# Patient Record
Sex: MALE | Race: WHITE | NOT HISPANIC OR LATINO | Employment: OTHER | URBAN - METROPOLITAN AREA
[De-identification: names, ages, dates, MRNs, and addresses within clinical notes are randomized per-mention and may not be internally consistent; named-entity substitution may affect disease eponyms.]

---

## 2017-02-22 ENCOUNTER — HOSPITAL ENCOUNTER (EMERGENCY)
Facility: OTHER | Age: 52
Discharge: HOME | End: 2017-02-22
Attending: EMERGENCY MEDICINE
Payer: MEDICAID

## 2017-02-22 VITALS
DIASTOLIC BLOOD PRESSURE: 82 MMHG | SYSTOLIC BLOOD PRESSURE: 143 MMHG | BODY MASS INDEX: 28.25 KG/M2 | TEMPERATURE: 98 F | HEART RATE: 98 BPM | OXYGEN SATURATION: 94 % | HEIGHT: 67 IN | WEIGHT: 180 LBS | RESPIRATION RATE: 18 BRPM

## 2017-02-22 DIAGNOSIS — F10.920 ALCOHOL INTOXICATION, UNCOMPLICATED: Primary | ICD-10-CM

## 2017-02-22 DIAGNOSIS — R41.0 CONFUSION: ICD-10-CM

## 2017-02-22 LAB
ETHANOL SERPL-MCNC: 424 MG/DL
POCT GLUCOSE: 104 MG/DL (ref 70–110)

## 2017-02-22 PROCEDURE — 25000003 PHARM REV CODE 250: Performed by: EMERGENCY MEDICINE

## 2017-02-22 PROCEDURE — 80320 DRUG SCREEN QUANTALCOHOLS: CPT

## 2017-02-22 PROCEDURE — 82962 GLUCOSE BLOOD TEST: CPT

## 2017-02-22 PROCEDURE — 99285 EMERGENCY DEPT VISIT HI MDM: CPT | Mod: 25

## 2017-02-22 RX ORDER — IBUPROFEN 200 MG
1 TABLET ORAL
Status: DISCONTINUED | OUTPATIENT
Start: 2017-02-22 | End: 2017-02-22 | Stop reason: HOSPADM

## 2017-02-22 RX ADMIN — NICOTINE 1 PATCH: 21 PATCH, EXTENDED RELEASE TRANSDERMAL at 10:02

## 2017-02-22 RX ADMIN — SODIUM CHLORIDE 1000 ML: 0.9 INJECTION, SOLUTION INTRAVENOUS at 10:02

## 2017-02-22 NOTE — ED NOTES
Patient dirty, disshelved, and malodorous. ETOH like odor on breath. Incontinent of urine. Unsteady gait noted. Slurred speech and aggressive behavior, AAOx3, respirations even and unlabored, HR regular and WNL, skin is warm , dry and intact w/ good turgor, peripheral pulses intact.

## 2017-02-22 NOTE — ED NOTES
"Patient on ambulance stretcher yelling and screaming at staff and patient states "I have a knife in my pocket if I could get to it". Security at bedside to search patient   "

## 2017-02-22 NOTE — ED AVS SNAPSHOT
[facility]  [address]  [facility] [address]               Chris Aguirre   2017  8:37 AM   ED    Description:  Male : 1965   Department:  [facility]           Your Care was Coordinated By:     Provider Role From To    Shawn Garcia MD Attending Provider 17 0912 --      Reason for Visit     Alcohol Intoxication           Diagnoses this Visit        Comments    Alcohol intoxication, uncomplicated    -  Primary     Confusion           ED Disposition     None           To Do List           Follow-up Information     Follow up with [facility] In 2 days.    Contact information:    [address]  [facility] [address]        [facility]     [facility] -  Assistance  Registered nurses in the [facility] provide clinical advisement, health education, appointment booking, and other advisory services.  Call for this free service at [phone].             Medications           Message regarding Medications     Verify the changes and/or additions to your medication regime listed below are the same as discussed with your clinician today.  If any of these changes or additions are incorrect, please notify your healthcare provider.        These medications were administered today        Dose Freq    sodium chloride 0.9% bolus 1,000 mL 1,000 mL ED 1 Time    Sig: Inject 1,000 mLs into the vein ED 1 Time.    Class: Normal    Route: Intravenous    nicotine 21 mg/24 hr 1 patch 1 patch ED 1 Time    Sig: Place 1 patch onto the skin ED 1 Time.    Class: Normal    Route: Transdermal           Verify that the below list of medications is an accurate representation of the medications you are currently taking.  If none reported, the list may be blank. If incorrect, please contact your healthcare provider. Carry this list with you in case of emergency.           Current Medications      "aspirin 81 MG Chew Take 1 tablet (81 mg total) by mouth once daily.    mirtazapine (REMERON) 15 MG tablet Take 1 tablet (15 mg total) by mouth nightly.    nicotine 21 mg/24 hr 1 patch Place 1 patch onto the skin ED 1 Time.    sertraline (ZOLOFT) 50 MG tablet Take 1 tablet (50 mg total) by mouth once daily.           Clinical Reference Information           Your Vitals Were     BP Pulse Temp Resp Height Weight    126/95 90 97.6 °F (36.4 °C) (Oral) 18 5' 7" (1.702 m) 81.6 kg (180 lb)    SpO2 BMI             99% 28.19 kg/m2         Allergies as of 2/22/2017     No Known Allergies      Immunizations Administered on Date of Encounter - 2/22/2017     None      ED Micro, Lab, POCT     Start Ordered       Status Ordering Provider    02/22/17 0939 02/22/17 0938  POCT glucose  Once      Acknowledged     02/22/17 0939 02/22/17 0938  Ethanol  Once      Final result     02/22/17 0932 02/22/17 0932  POCT glucose  Once      Final result       ED Imaging Orders     None      Discharge References/Attachments     ALCOHOL INTOXICATION (ENGLISH)    CONFUSION (ENGLISH)      [facility] Sign-Up     Activating your [facility] account is as easy as 1-2-3!     1) Visit [email or web address], select Sign Up Now, enter this activation code and your date of birth, then select Next.  [email or web address]  Expires: 4/8/2017 10:59 AM      2) Create a username and password to use when you visit [facility] in the future and select a security question in case you lose your password and select Next.    3) Enter your e-mail address and click Sign Up!    Additional Information  If you have questions, please e-mail [email or web address] or call [phone] to talk to our [facility] staff. Remember, [facility] is NOT to be used for urgent needs. For medical emergencies, dial 911.         Smoking Cessation     If you would like to quit smoking:   You may be eligible for free services if you are a [place] resident and started smoking cigarettes before September 1, " 1988.  Call the [employer]oking [employer]essation [employer] ([employer]) toll free at (225) 051-0653 or (181) 366-6195.   Call 8-499-QUIT-NOW if you do not meet the above criteria.             [facility] complies with applicable Federal civil rights laws and does not discriminate on the basis of race, color, national origin, age, disability, or sex.        Language Assistance Services     ATTENTION: Language assistance services are available, free of charge. Please call [phone].      ATENCIÓN: Si habla español, tiene a ashton disposición servicios gratuitos de asistencia lingüística. Llame al [phone].     CHÚ Ý: N?u b?n nói Ti?ng Vi?t, có các d?ch v? h? tr? ngôn ng? mi?n phí dành cho b?n. G?i s? [phone].

## 2017-02-22 NOTE — ED NOTES
Patient able to ambulate down hallway with steady gait and without assistance. No slurred speech or nystagmus noted

## 2018-03-09 ENCOUNTER — HOSPITAL ENCOUNTER (INPATIENT)
Facility: HOSPITAL | Age: 53
LOS: 7 days | Discharge: HOME | DRG: 330 | End: 2018-03-17
Attending: EMERGENCY MEDICINE | Admitting: COLON & RECTAL SURGERY
Payer: MEDICAID

## 2018-03-09 DIAGNOSIS — R10.30 LOWER ABDOMINAL PAIN: Primary | ICD-10-CM

## 2018-03-09 DIAGNOSIS — R39.89 PNEUMATURIA: ICD-10-CM

## 2018-03-09 PROBLEM — K92.1 BLOOD IN STOOL: Status: ACTIVE | Noted: 2018-03-09

## 2018-03-09 PROBLEM — K59.09 OTHER CONSTIPATION: Status: ACTIVE | Noted: 2018-03-09

## 2018-03-09 PROBLEM — B19.20 HEPATITIS C: Status: ACTIVE | Noted: 2018-03-09

## 2018-03-09 LAB
ABO + RH BLD: NORMAL
ALBUMIN SERPL BCP-MCNC: 3.4 G/DL
ALP SERPL-CCNC: 49 U/L
ALT SERPL W/O P-5'-P-CCNC: 10 U/L
ANION GAP SERPL CALC-SCNC: 9 MMOL/L
AST SERPL-CCNC: 16 U/L
BACTERIA #/AREA URNS AUTO: ABNORMAL /HPF
BASOPHILS # BLD AUTO: 0.04 K/UL
BASOPHILS NFR BLD: 0.4 %
BILIRUB SERPL-MCNC: 0.3 MG/DL
BILIRUB UR QL STRIP: NEGATIVE
BLD GP AB SCN CELLS X3 SERPL QL: NORMAL
BUN SERPL-MCNC: 10 MG/DL
BUN SERPL-MCNC: 9 MG/DL (ref 6–30)
CALCIUM SERPL-MCNC: 9.7 MG/DL
CHLORIDE SERPL-SCNC: 103 MMOL/L (ref 95–110)
CHLORIDE SERPL-SCNC: 104 MMOL/L
CLARITY UR REFRACT.AUTO: ABNORMAL
CO2 SERPL-SCNC: 27 MMOL/L
COLOR UR AUTO: YELLOW
CREAT SERPL-MCNC: 0.7 MG/DL (ref 0.5–1.4)
CREAT SERPL-MCNC: 0.8 MG/DL
DIFFERENTIAL METHOD: ABNORMAL
EOSINOPHIL # BLD AUTO: 0.2 K/UL
EOSINOPHIL NFR BLD: 1.6 %
ERYTHROCYTE [DISTWIDTH] IN BLOOD BY AUTOMATED COUNT: 13.4 %
EST. GFR  (AFRICAN AMERICAN): >60 ML/MIN/1.73 M^2
EST. GFR  (NON AFRICAN AMERICAN): >60 ML/MIN/1.73 M^2
GLUCOSE SERPL-MCNC: 95 MG/DL
GLUCOSE SERPL-MCNC: 99 MG/DL (ref 70–110)
GLUCOSE UR QL STRIP: NEGATIVE
HCT VFR BLD AUTO: 37.7 %
HCT VFR BLD CALC: 38 %PCV (ref 36–54)
HGB BLD-MCNC: 12.8 G/DL
HGB UR QL STRIP: ABNORMAL
HYALINE CASTS UR QL AUTO: 0 /LPF
IMM GRANULOCYTES # BLD AUTO: 0.04 K/UL
IMM GRANULOCYTES NFR BLD AUTO: 0.4 %
INR PPP: 1
KETONES UR QL STRIP: NEGATIVE
LACTATE SERPL-SCNC: 1.2 MMOL/L
LEUKOCYTE ESTERASE UR QL STRIP: ABNORMAL
LIPASE SERPL-CCNC: 10 U/L
LYMPHOCYTES # BLD AUTO: 2.8 K/UL
LYMPHOCYTES NFR BLD: 24.8 %
MAGNESIUM SERPL-MCNC: 1.3 MG/DL
MCH RBC QN AUTO: 29.4 PG
MCHC RBC AUTO-ENTMCNC: 34 G/DL
MCV RBC AUTO: 87 FL
MICROSCOPIC COMMENT: ABNORMAL
MONOCYTES # BLD AUTO: 0.8 K/UL
MONOCYTES NFR BLD: 6.7 %
NEUTROPHILS # BLD AUTO: 7.5 K/UL
NEUTROPHILS NFR BLD: 66.1 %
NITRITE UR QL STRIP: NEGATIVE
NRBC BLD-RTO: 0 /100 WBC
PH UR STRIP: 6 [PH] (ref 5–8)
PHOSPHATE SERPL-MCNC: 3.3 MG/DL
PLATELET # BLD AUTO: 350 K/UL
PMV BLD AUTO: 10 FL
POC IONIZED CALCIUM: 1.14 MMOL/L (ref 1.06–1.42)
POC TCO2 (MEASURED): 27 MMOL/L (ref 23–29)
POTASSIUM BLD-SCNC: 3.5 MMOL/L (ref 3.5–5.1)
POTASSIUM SERPL-SCNC: 3.6 MMOL/L
PROT SERPL-MCNC: 7.3 G/DL
PROT UR QL STRIP: ABNORMAL
PROTHROMBIN TIME: 10.5 SEC
RBC # BLD AUTO: 4.36 M/UL
RBC #/AREA URNS AUTO: >100 /HPF (ref 0–4)
SAMPLE: NORMAL
SODIUM BLD-SCNC: 140 MMOL/L (ref 136–145)
SODIUM SERPL-SCNC: 140 MMOL/L
SP GR UR STRIP: 1 (ref 1–1.03)
SQUAMOUS #/AREA URNS AUTO: 3 /HPF
URN SPEC COLLECT METH UR: ABNORMAL
UROBILINOGEN UR STRIP-ACNC: NEGATIVE EU/DL
WBC # BLD AUTO: 11.37 K/UL
WBC #/AREA URNS AUTO: >100 /HPF (ref 0–5)
WBC CLUMPS UR QL AUTO: ABNORMAL

## 2018-03-09 PROCEDURE — 80053 COMPREHEN METABOLIC PANEL: CPT

## 2018-03-09 PROCEDURE — 93010 ELECTROCARDIOGRAM REPORT: CPT | Mod: ,,, | Performed by: INTERNAL MEDICINE

## 2018-03-09 PROCEDURE — 96376 TX/PRO/DX INJ SAME DRUG ADON: CPT

## 2018-03-09 PROCEDURE — 25000003 PHARM REV CODE 250: Performed by: EMERGENCY MEDICINE

## 2018-03-09 PROCEDURE — 85610 PROTHROMBIN TIME: CPT

## 2018-03-09 PROCEDURE — 25500020 PHARM REV CODE 255: Performed by: EMERGENCY MEDICINE

## 2018-03-09 PROCEDURE — 83735 ASSAY OF MAGNESIUM: CPT

## 2018-03-09 PROCEDURE — 84100 ASSAY OF PHOSPHORUS: CPT

## 2018-03-09 PROCEDURE — 83690 ASSAY OF LIPASE: CPT

## 2018-03-09 PROCEDURE — 87077 CULTURE AEROBIC IDENTIFY: CPT

## 2018-03-09 PROCEDURE — 87186 SC STD MICRODIL/AGAR DIL: CPT

## 2018-03-09 PROCEDURE — 96374 THER/PROPH/DIAG INJ IV PUSH: CPT

## 2018-03-09 PROCEDURE — 99285 EMERGENCY DEPT VISIT HI MDM: CPT

## 2018-03-09 PROCEDURE — 63600175 PHARM REV CODE 636 W HCPCS: Performed by: EMERGENCY MEDICINE

## 2018-03-09 PROCEDURE — 96375 TX/PRO/DX INJ NEW DRUG ADDON: CPT

## 2018-03-09 PROCEDURE — 99285 EMERGENCY DEPT VISIT HI MDM: CPT | Mod: ,,, | Performed by: EMERGENCY MEDICINE

## 2018-03-09 PROCEDURE — 93005 ELECTROCARDIOGRAM TRACING: CPT

## 2018-03-09 PROCEDURE — 86901 BLOOD TYPING SEROLOGIC RH(D): CPT

## 2018-03-09 PROCEDURE — 85025 COMPLETE CBC W/AUTO DIFF WBC: CPT

## 2018-03-09 PROCEDURE — 87040 BLOOD CULTURE FOR BACTERIA: CPT | Mod: 59

## 2018-03-09 PROCEDURE — 81001 URINALYSIS AUTO W/SCOPE: CPT

## 2018-03-09 PROCEDURE — 87088 URINE BACTERIA CULTURE: CPT

## 2018-03-09 PROCEDURE — 83605 ASSAY OF LACTIC ACID: CPT

## 2018-03-09 PROCEDURE — 87086 URINE CULTURE/COLONY COUNT: CPT

## 2018-03-09 PROCEDURE — 20600001 HC STEP DOWN PRIVATE ROOM

## 2018-03-09 RX ORDER — MEPERIDINE HYDROCHLORIDE 50 MG/ML
INJECTION INTRAMUSCULAR; INTRAVENOUS; SUBCUTANEOUS
Status: DISPENSED
Start: 2018-03-09 | End: 2018-03-10

## 2018-03-09 RX ORDER — IBUPROFEN 200 MG
1 TABLET ORAL
Status: COMPLETED | OUTPATIENT
Start: 2018-03-09 | End: 2018-03-11

## 2018-03-09 RX ORDER — MEPERIDINE HYDROCHLORIDE 50 MG/ML
25 INJECTION INTRAMUSCULAR; INTRAVENOUS; SUBCUTANEOUS
Status: COMPLETED | OUTPATIENT
Start: 2018-03-09 | End: 2018-03-09

## 2018-03-09 RX ORDER — ONDANSETRON 2 MG/ML
4 INJECTION INTRAMUSCULAR; INTRAVENOUS
Status: COMPLETED | OUTPATIENT
Start: 2018-03-09 | End: 2018-03-09

## 2018-03-09 RX ORDER — ONDANSETRON 2 MG/ML
INJECTION INTRAMUSCULAR; INTRAVENOUS
Status: DISPENSED
Start: 2018-03-09 | End: 2018-03-10

## 2018-03-09 RX ORDER — NAPROXEN 500 MG/1
500 TABLET ORAL 2 TIMES DAILY
COMMUNITY
End: 2018-04-13

## 2018-03-09 RX ORDER — THIAMINE HCL 100 MG
100 TABLET ORAL DAILY
Status: DISCONTINUED | OUTPATIENT
Start: 2018-03-10 | End: 2018-03-17 | Stop reason: HOSPADM

## 2018-03-09 RX ORDER — FOLIC ACID 1 MG/1
1 TABLET ORAL DAILY
Status: DISCONTINUED | OUTPATIENT
Start: 2018-03-10 | End: 2018-03-17 | Stop reason: HOSPADM

## 2018-03-09 RX ORDER — ONDANSETRON 4 MG/1
8 TABLET, ORALLY DISINTEGRATING ORAL
COMMUNITY
End: 2018-04-02

## 2018-03-09 RX ORDER — LORAZEPAM 1 MG/1
2 TABLET ORAL EVERY 4 HOURS PRN
Status: DISCONTINUED | OUTPATIENT
Start: 2018-03-09 | End: 2018-03-17 | Stop reason: HOSPADM

## 2018-03-09 RX ORDER — POLYETHYLENE GLYCOL 3350 17 G/17G
POWDER, FOR SOLUTION ORAL
COMMUNITY
End: 2018-04-13

## 2018-03-09 RX ADMIN — IOHEXOL 15 ML: 350 INJECTION, SOLUTION INTRAVENOUS at 06:03

## 2018-03-09 RX ADMIN — ONDANSETRON HYDROCHLORIDE 4 MG: 2 INJECTION, SOLUTION INTRAMUSCULAR; INTRAVENOUS at 06:03

## 2018-03-09 RX ADMIN — IOHEXOL 100 ML: 350 INJECTION, SOLUTION INTRAVENOUS at 09:03

## 2018-03-09 RX ADMIN — MEPERIDINE HYDROCHLORIDE 25 MG: 50 INJECTION INTRAMUSCULAR; INTRAVENOUS; SUBCUTANEOUS at 06:03

## 2018-03-09 RX ADMIN — MEPERIDINE HYDROCHLORIDE 25 MG: 50 INJECTION INTRAMUSCULAR; INTRAVENOUS; SUBCUTANEOUS at 09:03

## 2018-03-09 RX ADMIN — SODIUM CHLORIDE 1000 ML: 0.9 INJECTION, SOLUTION INTRAVENOUS at 06:03

## 2018-03-09 RX ADMIN — IOHEXOL 30 ML: 350 INJECTION, SOLUTION INTRAVENOUS at 09:03

## 2018-03-09 RX ADMIN — IOHEXOL 15 ML: 350 INJECTION, SOLUTION INTRAVENOUS at 07:03

## 2018-03-09 NOTE — ED TRIAGE NOTES
Presents to ER with complaint of dysuria for 4-5 days.    Pt identifiers checked and correct  LOC: The patient is awake, alert, aware of environment with an appropriate affect. Oriented x3, speaking appropriately  APPEARANCE: Pt resting comfortably, in no acute distress, pt is clean and well groomed, clothing properly fastened  SKIN: Skin warm, dry and intact, normal skin turgor, moist mucus membranes  RESPIRATORY: Airway is open and patent, respirations are spontaneous, even and unlabored, normal effort and rate  MUSCULOSKELETAL: No obvious deformities.

## 2018-03-09 NOTE — ED PROVIDER NOTES
Encounter Date: 3/9/2018    SCRIBE #1 NOTE: I, Jessica Rodríguezaggie, am scribing for, and in the presence of, Dr. Lao.       History     Chief Complaint   Patient presents with    Abdominal Pain     still having pain, states diarrhea coming from penis after taking laxatives for few days     Time seen by provider: 5:11 PM    This is a 53 y.o. male with medical conditions including CAD, MI, HTN, psychosis who presents with complaint of painful urination with passing what he thinks is stool and gas through his penis for about 4-5 days. It is dark in color and smells of stool. The patient reports that for the past 2 months he has been severely constipated and requires laxatives in order to have a BM and BMs require a lot of straining. He states his BMs have been stringy and thin in shape/size. The patient also describes that he has had several months of lower abdominal pain, abdominal distention, and rectal pain. He has not noted any blood with urination or BMs. He states he has lost about 15lbs unexpectedly in the past 2-3 months. The patient reports an extensive family history of cancer including rectal, throat, lung, and colon cancers. The patient denies any recent fevers, headache, visual or eye complains, nose bleeds, easy bleeding, dental problems, neck swelling or stiffness, swelling of the ankles, cough or hemoptysis, back pain, testicular complaints, or swelling to the lower extremities.       The history is provided by the patient.     Review of patient's allergies indicates:  No Known Allergies  Past Medical History:   Diagnosis Date    Coronary artery disease     Depression     Hepatitis C 3/9/2018    History of psychiatric care     History of psychiatric hospitalization     Hypertension     MI (myocardial infarction)     6 months ago    Neuropathy     Psychiatric problem     Psychosis     Self-harming behavior     Suicide attempt      Past Surgical History:   Procedure Laterality Date     ABDOMINAL SURGERY      History of perforated ulcer, unknown surgery    APPENDECTOMY       Family History   Problem Relation Age of Onset    No Known Problems Mother     No Known Problems Father     No Known Problems Sister     No Known Problems Brother     No Known Problems Maternal Aunt     No Known Problems Paternal Aunt     No Known Problems Maternal Uncle     No Known Problems Paternal Uncle     No Known Problems Maternal Grandfather     No Known Problems Maternal Grandmother     No Known Problems Paternal Grandfather     No Known Problems Paternal Grandmother     ADD / ADHD Cousin     Alcohol abuse Neg Hx     Anxiety disorder Neg Hx     Bipolar disorder Neg Hx     Dementia Neg Hx     Depression Neg Hx     Drug abuse Neg Hx     OCD Neg Hx     Paranoid behavior Neg Hx     Physical abuse Neg Hx     Schizophrenia Neg Hx     Seizures Neg Hx     Self injury Neg Hx     Sexual abuse Neg Hx     Suicide Neg Hx      Social History   Substance Use Topics    Smoking status: Current Every Day Smoker     Packs/day: 2.00     Years: 30.00    Smokeless tobacco: Never Used    Alcohol use Yes     Review of Systems   Constitutional: Positive for unexpected weight change. Negative for chills and fever.   HENT: Negative for dental problem and nosebleeds.    Eyes: Negative for pain, discharge and visual disturbance.   Respiratory: Negative for cough and shortness of breath.    Cardiovascular: Negative for chest pain.   Gastrointestinal: Positive for abdominal distention, abdominal pain, constipation and rectal pain. Negative for anal bleeding and blood in stool.   Genitourinary: Positive for difficulty urinating and dysuria. Negative for scrotal swelling and testicular pain.        Patient believes he is passing stool through his penis.    Musculoskeletal: Negative for back pain, gait problem, neck pain and neck stiffness.   Skin: Negative for wound.   Neurological: Negative for headaches.    Hematological: Does not bruise/bleed easily.       Physical Exam     Initial Vitals [03/09/18 1559]   BP Pulse Resp Temp SpO2   (!) 151/98 90 18 97.5 °F (36.4 °C) 100 %      MAP       115.67         Physical Exam    Constitutional: He appears ill. He appears distressed.   HENT:   Head: Normocephalic and atraumatic.   Mouth/Throat: Oropharynx is clear and moist.   Eyes: Conjunctivae and lids are normal.   Neck: Normal range of motion. Neck supple.   Cardiovascular: Normal rate, regular rhythm and normal heart sounds.   Pulmonary/Chest: Breath sounds normal. No accessory muscle usage. No tachypnea. No respiratory distress.   Abdominal: There is tenderness in the right lower quadrant, suprapubic area and left lower quadrant. There is rigidity and guarding.   Genitourinary:   Genitourinary Comments: Normal rectal exam. Brown stool. No tightening of canal.    Musculoskeletal: Normal range of motion.   Neurological: He is alert and oriented to person, place, and time. He has normal strength. No cranial nerve deficit. GCS eye subscore is 4. GCS verbal subscore is 5. GCS motor subscore is 6.   Skin: Skin is warm and dry.         ED Course   Procedures  Labs Reviewed   URINALYSIS, REFLEX TO URINE CULTURE - Abnormal; Notable for the following:        Result Value    Appearance, UA Cloudy (*)     Protein, UA 2+ (*)     Occult Blood UA 3+ (*)     Leukocytes, UA 3+ (*)     All other components within normal limits    Narrative:     Preferred Collection Type->Urine, Clean Catch   URINALYSIS MICROSCOPIC - Abnormal; Notable for the following:     RBC, UA >100 (*)     WBC, UA >100 (*)     WBC Clumps, UA Many (*)     Bacteria, UA Many (*)     All other components within normal limits    Narrative:     Preferred Collection Type->Urine, Clean Catch   CBC W/ AUTO DIFFERENTIAL - Abnormal; Notable for the following:     RBC 4.36 (*)     Hemoglobin 12.8 (*)     Hematocrit 37.7 (*)     All other components within normal limits    COMPREHENSIVE METABOLIC PANEL - Abnormal; Notable for the following:     Albumin 3.4 (*)     Alkaline Phosphatase 49 (*)     All other components within normal limits   MAGNESIUM - Abnormal; Notable for the following:     Magnesium 1.3 (*)     All other components within normal limits   LIPASE   PROTIME-INR   LACTIC ACID, PLASMA   PHOSPHORUS   TYPE & SCREEN   ISTAT PROCEDURE     EKG Readings: (Independently Interpreted)   Initial Reading: No STEMI. Rhythm: Normal Sinus Rhythm. Heart Rate: 76.       X-Rays:   Independently Interpreted Readings:   Abdomen: CT of the chest abdomen and pelvis with oral IV and rectal contrast demonstrates a mass in the region of the sigmoid with thickening of the wall this region which has air within this towards the bladder and there was likewise air in the bladder there is concern for a fistulous tract from the colon to the bladder at this time.  There were no significant abnormal findings in the chest CT portion of the study     Medical Decision Making:   History:   Old Medical Records: I decided to obtain old medical records.  Initial Assessment:   This is a very worrisome 53 y.o. Male who presents reporting that he is passing fecal matter and gas through his penis which has been ongoing for the last 6 days. He also reports unexpected significant weight loss over the past few months and stool caliber change to the size of spaghetti. Patient has a very strong family history of various cancers and various relatives including tonsil cancer, lung cancer, and several people with rectal and sigmoid cancer. Patient is also complaining of lower abdominal pain and he is very tender. He denies any blood or fever. Will do labs and CT of chest, and abdomen/pelvis with oral, IV, and rectal contrast. Will discuss case with colo/rectal   Independently Interpreted Test(s):   I have ordered and independently interpreted EKG Reading(s) - see prior notes  Clinical Tests:   Lab Tests: Ordered and  Reviewed  Radiological Study: Ordered and Reviewed  Medical Tests: Ordered and Reviewed  ED Management:  Discussed case with colo/rectal who evaluated the patient in the ED. They plan to admit the patient to their service. We are awaiting final results of imaging. Pain medication has been given and the patient is comfortable.     CT of chest, ab/pel with oral, IV, and rectal contrast radiological read shows findings concerning for:  1.  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  Air within the urinary bladder is suspected to be on the bases of the same although correlation for recent catheterization and clinical symptomatology.  There is indistinctness about the colon region, with several although nonenlarged lymph nodes, and disorganized fluid.  Please see above for full description.    2.  Vague groundglass focus of attenuation within the right upper lobe of the right lung, nonspecific, could reflect nonspecific inflammation or infection, metastatic disease however cannot be excluded in this setting.  Followup is advised.  Other:   I have discussed this case with another health care provider.            Scribe Attestation:   Scribe #1: I performed the above scribed service and the documentation accurately describes the services I performed. I attest to the accuracy of the note.    Attending Attestation:             Attending ED Notes:   Patient presenting to the ED with recent significant weight loss also with several day history of associated passage of feces and feculent air through the penis.  Patient has also had a thinning in the caliber of the feces that is passed through his rectum with now spaghetti size feces.  There is strong family history of cancers of tonsil long and sigmoid and rectal cancer   patient evaluated in the ER with blood work patient given IV fluid pain control was obtained a CT study of the  chest abdomen and pelvis with oral and IV contrast yielded a mass of the sigmoid area that abuts and distorts the bladder there is air within the bladder with concern for a fistulous tract at this time patient was seen by colon and rectal surgery and they will admit the patient             Clinical Impression:   The primary encounter diagnosis was Lower abdominal pain. A diagnosis of Pneumaturia was also pertinent to this visit.    Disposition:   Disposition: Admitted  Condition: Serious                        Ciaran Lao MD  03/18/18 8954

## 2018-03-10 PROBLEM — Z01.810 PRE-OPERATIVE CARDIOVASCULAR EXAMINATION: Status: ACTIVE | Noted: 2018-03-10

## 2018-03-10 LAB
ALBUMIN SERPL BCP-MCNC: 2.9 G/DL
ALP SERPL-CCNC: 50 U/L
ALT SERPL W/O P-5'-P-CCNC: 9 U/L
ANION GAP SERPL CALC-SCNC: 11 MMOL/L
AST SERPL-CCNC: 16 U/L
BASOPHILS # BLD AUTO: 0.03 K/UL
BASOPHILS NFR BLD: 0.3 %
BILIRUB SERPL-MCNC: 0.5 MG/DL
BUN SERPL-MCNC: 8 MG/DL
CALCIUM SERPL-MCNC: 9.1 MG/DL
CEA SERPL-MCNC: 1.7 NG/ML
CHLORIDE SERPL-SCNC: 106 MMOL/L
CO2 SERPL-SCNC: 22 MMOL/L
CREAT SERPL-MCNC: 0.7 MG/DL
DIFFERENTIAL METHOD: ABNORMAL
EOSINOPHIL # BLD AUTO: 0.2 K/UL
EOSINOPHIL NFR BLD: 2.1 %
ERYTHROCYTE [DISTWIDTH] IN BLOOD BY AUTOMATED COUNT: 13.6 %
EST. GFR  (AFRICAN AMERICAN): >60 ML/MIN/1.73 M^2
EST. GFR  (NON AFRICAN AMERICAN): >60 ML/MIN/1.73 M^2
GLUCOSE SERPL-MCNC: 83 MG/DL
HCT VFR BLD AUTO: 35.4 %
HGB BLD-MCNC: 12 G/DL
IMM GRANULOCYTES # BLD AUTO: 0.04 K/UL
IMM GRANULOCYTES NFR BLD AUTO: 0.4 %
INR PPP: 1
LYMPHOCYTES # BLD AUTO: 2.1 K/UL
LYMPHOCYTES NFR BLD: 19.4 %
MCH RBC QN AUTO: 29.1 PG
MCHC RBC AUTO-ENTMCNC: 33.9 G/DL
MCV RBC AUTO: 86 FL
MONOCYTES # BLD AUTO: 0.9 K/UL
MONOCYTES NFR BLD: 8.4 %
NEUTROPHILS # BLD AUTO: 7.3 K/UL
NEUTROPHILS NFR BLD: 69.4 %
NRBC BLD-RTO: 0 /100 WBC
PLATELET # BLD AUTO: 304 K/UL
PMV BLD AUTO: 10.3 FL
POTASSIUM SERPL-SCNC: 3.7 MMOL/L
PROT SERPL-MCNC: 6.3 G/DL
PROTHROMBIN TIME: 10.2 SEC
RBC # BLD AUTO: 4.12 M/UL
SODIUM SERPL-SCNC: 139 MMOL/L
WBC # BLD AUTO: 10.54 K/UL

## 2018-03-10 PROCEDURE — S0030 INJECTION, METRONIDAZOLE: HCPCS | Performed by: COLON & RECTAL SURGERY

## 2018-03-10 PROCEDURE — 25000003 PHARM REV CODE 250: Performed by: COLON & RECTAL SURGERY

## 2018-03-10 PROCEDURE — 99223 1ST HOSP IP/OBS HIGH 75: CPT | Mod: ,,, | Performed by: COLON & RECTAL SURGERY

## 2018-03-10 PROCEDURE — 85610 PROTHROMBIN TIME: CPT

## 2018-03-10 PROCEDURE — 63600175 PHARM REV CODE 636 W HCPCS: Performed by: COLON & RECTAL SURGERY

## 2018-03-10 PROCEDURE — 20600001 HC STEP DOWN PRIVATE ROOM

## 2018-03-10 PROCEDURE — 25000003 PHARM REV CODE 250: Performed by: EMERGENCY MEDICINE

## 2018-03-10 PROCEDURE — 82378 CARCINOEMBRYONIC ANTIGEN: CPT

## 2018-03-10 PROCEDURE — 36415 COLL VENOUS BLD VENIPUNCTURE: CPT

## 2018-03-10 PROCEDURE — 80053 COMPREHEN METABOLIC PANEL: CPT

## 2018-03-10 PROCEDURE — 85025 COMPLETE CBC W/AUTO DIFF WBC: CPT

## 2018-03-10 PROCEDURE — S4991 NICOTINE PATCH NONLEGEND: HCPCS | Performed by: EMERGENCY MEDICINE

## 2018-03-10 RX ORDER — SODIUM CHLORIDE, SODIUM LACTATE, POTASSIUM CHLORIDE, CALCIUM CHLORIDE 600; 310; 30; 20 MG/100ML; MG/100ML; MG/100ML; MG/100ML
INJECTION, SOLUTION INTRAVENOUS CONTINUOUS
Status: DISCONTINUED | OUTPATIENT
Start: 2018-03-10 | End: 2018-03-11

## 2018-03-10 RX ORDER — OXYCODONE HYDROCHLORIDE 5 MG/1
5 TABLET ORAL EVERY 4 HOURS PRN
Status: DISCONTINUED | OUTPATIENT
Start: 2018-03-10 | End: 2018-03-17 | Stop reason: HOSPADM

## 2018-03-10 RX ORDER — MEPERIDINE HYDROCHLORIDE 50 MG/ML
25 INJECTION INTRAMUSCULAR; INTRAVENOUS; SUBCUTANEOUS EVERY 4 HOURS PRN
Status: DISCONTINUED | OUTPATIENT
Start: 2018-03-10 | End: 2018-03-17

## 2018-03-10 RX ORDER — ONDANSETRON 2 MG/ML
4 INJECTION INTRAMUSCULAR; INTRAVENOUS EVERY 6 HOURS PRN
Status: DISCONTINUED | OUTPATIENT
Start: 2018-03-10 | End: 2018-03-17 | Stop reason: HOSPADM

## 2018-03-10 RX ORDER — DIPHENHYDRAMINE HYDROCHLORIDE 50 MG/ML
25 INJECTION INTRAMUSCULAR; INTRAVENOUS EVERY 4 HOURS PRN
Status: DISCONTINUED | OUTPATIENT
Start: 2018-03-10 | End: 2018-03-17 | Stop reason: HOSPADM

## 2018-03-10 RX ORDER — MIRTAZAPINE 7.5 MG/1
15 TABLET, FILM COATED ORAL NIGHTLY
Status: DISCONTINUED | OUTPATIENT
Start: 2018-03-10 | End: 2018-03-17 | Stop reason: HOSPADM

## 2018-03-10 RX ORDER — METRONIDAZOLE 500 MG/100ML
500 INJECTION, SOLUTION INTRAVENOUS
Status: DISCONTINUED | OUTPATIENT
Start: 2018-03-10 | End: 2018-03-16

## 2018-03-10 RX ORDER — ENOXAPARIN SODIUM 100 MG/ML
40 INJECTION SUBCUTANEOUS EVERY 24 HOURS
Status: DISCONTINUED | OUTPATIENT
Start: 2018-03-10 | End: 2018-03-13 | Stop reason: SDUPTHER

## 2018-03-10 RX ORDER — NALOXONE HCL 0.4 MG/ML
0.02 VIAL (ML) INJECTION
Status: DISCONTINUED | OUTPATIENT
Start: 2018-03-10 | End: 2018-03-13 | Stop reason: SDUPTHER

## 2018-03-10 RX ORDER — METOCLOPRAMIDE HYDROCHLORIDE 5 MG/ML
10 INJECTION INTRAMUSCULAR; INTRAVENOUS EVERY 6 HOURS PRN
Status: DISCONTINUED | OUTPATIENT
Start: 2018-03-10 | End: 2018-03-17 | Stop reason: HOSPADM

## 2018-03-10 RX ORDER — CIPROFLOXACIN 2 MG/ML
400 INJECTION, SOLUTION INTRAVENOUS
Status: DISCONTINUED | OUTPATIENT
Start: 2018-03-10 | End: 2018-03-16

## 2018-03-10 RX ORDER — SERTRALINE HYDROCHLORIDE 50 MG/1
50 TABLET, FILM COATED ORAL DAILY
Status: DISCONTINUED | OUTPATIENT
Start: 2018-03-10 | End: 2018-03-17 | Stop reason: HOSPADM

## 2018-03-10 RX ORDER — ACETAMINOPHEN 10 MG/ML
1000 INJECTION, SOLUTION INTRAVENOUS EVERY 8 HOURS
Status: COMPLETED | OUTPATIENT
Start: 2018-03-10 | End: 2018-03-10

## 2018-03-10 RX ADMIN — MEPERIDINE HYDROCHLORIDE 25 MG: 50 INJECTION INTRAMUSCULAR; INTRAVENOUS; SUBCUTANEOUS at 04:03

## 2018-03-10 RX ADMIN — CIPROFLOXACIN 400 MG: 2 INJECTION, SOLUTION INTRAVENOUS at 04:03

## 2018-03-10 RX ADMIN — IBUPROFEN 800 MG: 800 INJECTION INTRAVENOUS at 10:03

## 2018-03-10 RX ADMIN — METRONIDAZOLE 500 MG: 500 INJECTION, SOLUTION INTRAVENOUS at 08:03

## 2018-03-10 RX ADMIN — SERTRALINE HYDROCHLORIDE 50 MG: 50 TABLET ORAL at 10:03

## 2018-03-10 RX ADMIN — NICOTINE 1 PATCH: 21 PATCH, EXTENDED RELEASE TRANSDERMAL at 12:03

## 2018-03-10 RX ADMIN — Medication 100 MG: at 10:03

## 2018-03-10 RX ADMIN — ACETAMINOPHEN 1000 MG: 10 INJECTION, SOLUTION INTRAVENOUS at 02:03

## 2018-03-10 RX ADMIN — MIRTAZAPINE 15 MG: 7.5 TABLET ORAL at 04:03

## 2018-03-10 RX ADMIN — METRONIDAZOLE 500 MG: 500 INJECTION, SOLUTION INTRAVENOUS at 12:03

## 2018-03-10 RX ADMIN — ACETAMINOPHEN 1000 MG: 10 INJECTION, SOLUTION INTRAVENOUS at 10:03

## 2018-03-10 RX ADMIN — ENOXAPARIN SODIUM 40 MG: 100 INJECTION SUBCUTANEOUS at 04:03

## 2018-03-10 RX ADMIN — IBUPROFEN 800 MG: 800 INJECTION INTRAVENOUS at 05:03

## 2018-03-10 RX ADMIN — FOLIC ACID 1 MG: 1 TABLET ORAL at 10:03

## 2018-03-10 RX ADMIN — IBUPROFEN 800 MG: 800 INJECTION INTRAVENOUS at 02:03

## 2018-03-10 RX ADMIN — ACETAMINOPHEN 1000 MG: 10 INJECTION, SOLUTION INTRAVENOUS at 05:03

## 2018-03-10 RX ADMIN — SODIUM CHLORIDE, POTASSIUM CHLORIDE, SODIUM LACTATE AND CALCIUM CHLORIDE: 600; 310; 30; 20 INJECTION, SOLUTION INTRAVENOUS at 04:03

## 2018-03-10 RX ADMIN — METRONIDAZOLE 500 MG: 500 INJECTION, SOLUTION INTRAVENOUS at 04:03

## 2018-03-10 RX ADMIN — MEPERIDINE HYDROCHLORIDE 25 MG: 50 INJECTION INTRAMUSCULAR; INTRAVENOUS; SUBCUTANEOUS at 08:03

## 2018-03-10 RX ADMIN — MEPERIDINE HYDROCHLORIDE 25 MG: 50 INJECTION INTRAMUSCULAR; INTRAVENOUS; SUBCUTANEOUS at 10:03

## 2018-03-10 RX ADMIN — THERA TABS 1 TABLET: TAB at 10:03

## 2018-03-10 RX ADMIN — MIRTAZAPINE 15 MG: 7.5 TABLET ORAL at 08:03

## 2018-03-10 NOTE — PROGRESS NOTES
[facility]  Colorectal Surgery  Progress Note    Patient Name: Chris Aguirre  MRN: [id number]  Admission Date: 3/9/2018  Hospital Length of Stay: 0 days  Attending Physician: Mikal Nino MD    Subjective:     Interval History: naeon. Passing gas. C/o intermittent mild abdominal pain.     Post-Op Info:  * No surgery found *          Medications:  Continuous Infusions:   lactated ringers 100 mL/hr at 03/10/18 0409     Scheduled Meds:   acetaminophen  1,000 mg Intravenous Q8H    ciprofloxacin (CIPRO)400mg/200ml D5W IVPB  400 mg Intravenous Q12H    enoxaparin  40 mg Subcutaneous Daily    folic acid  1 mg Oral Daily    ibuprofen  800 mg Intravenous Q8H    metronidazole  500 mg Intravenous Q8H    mirtazapine  15 mg Oral Nightly    multivitamin  1 tablet Oral Daily    nicotine  1 patch Transdermal ED 1 Time    sertraline  50 mg Oral Daily    thiamine  100 mg Oral Daily     PRN Meds:   diphenhydrAMINE    LORazepam    meperidine    metoclopramide HCl    naloxone    ondansetron    oxyCODONE        Objective:     Vital Signs (Most Recent):  Temp: 99 °F (37.2 °C) (03/10/18 0451)  Pulse: 89 (03/10/18 0451)  Resp: 18 (03/10/18 0304)  BP: (!) 149/100 (03/10/18 0451)  SpO2: 96 % (03/10/18 0451) Vital Signs (24h Range):  Temp:  [97.5 °F (36.4 °C)-99 °F (37.2 °C)] 99 °F (37.2 °C)  Pulse:  [80-90] 89  Resp:  [18] 18  SpO2:  [93 %-100 %] 96 %  BP: (149-154)/() 149/100     Intake/Output - Last 3 Shifts       03/08 0700 - 03/09 0659 03/09 0700 - 03/10 0659 03/10 0700 - 03/11 0659    P.O.  0     I.V. (mL/kg)  66.7 (0.9)     IV Piggyback  300     Total Intake(mL/kg)  366.7 (4.8)     Urine (mL/kg/hr)  350     Emesis/NG output  0     Other  0     Stool  0     Blood  0     Total Output   350      Net   +16.7             Urine Occurrence  0 x     Stool Occurrence  0 x           Physical Exam    Constitutional: He is oriented to person, place, and time. He appears well-developed and well-nourished.  No distress.   HENT:   Head: Normocephalic and atraumatic.   Eyes: EOM are normal.   Neck: Normal range of motion.   Cardiovascular: Normal rate.    Pulmonary/Chest: Effort normal. No respiratory distress.   Abdominal: Soft. He exhibits no distension. There is tenderness (lower abdomen). There is guarding (voluntary in lower abdomen).   Musculoskeletal: Normal range of motion.   Neurological: He is alert and oriented to person, place, and time.   Skin: Skin is warm and dry. He is not diaphoretic.   Psychiatric: He has a normal mood and affect.         Anorectal Exam:  Anal Skin: Normal     Digital Rectal Exam:  Resting Tone normal  Squeeze normal  Relaxation with bear down present  Mass none  Rectocele  absent  Tenderness  absent  Significant Labs:  All pertinent lab results within the last 24 hours have been reviewed.     Significant Diagnostics:  I have reviewed all pertinent imaging results/findings within the past 24 hours.     CT abdomen/pelvis 3/9:   1.  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  Air within the urinary bladder is suspected to be on the bases of the same although correlation for recent catheterization and clinical symptomatology.  There is indistinctness about the colon region, with several although nonenlarged lymph nodes, and disorganized fluid.  Please see above for full description.    2.  Vague groundglass focus of attenuation within the right upper lobe of the right lung, nonspecific, could reflect nonspecific inflammation or infection, metastatic disease however cannot be excluded in this setting.  Followup is advised.    3.  Constipation.    Assessment/Plan:     Pneumaturia    -Patient's history is worrisome for either diverticulitis with colovesical fistula or malignancy with invasion into bladder, particularly given his history of bowel changes, increasing constipation, bloody bowel  movements, and family history of colon cancer.  -CT C/A/P with PO, IV, and rectal contrast. --> focal thickening of sigmoid colon abutting bladder, no e/o metastatic liver disease  -Will likely need sigmoidoscopy with possible biopsy  -Keep NPO, IVFs, IV abx,pain/nausea meds, alcohol withdrawal protocol.              Romel Schwartz, DO  Colorectal Surgery  [facility]

## 2018-03-10 NOTE — SUBJECTIVE & OBJECTIVE
Past Medical History:   Diagnosis Date    Coronary artery disease     Depression     Hepatitis C 3/9/2018    History of psychiatric care     History of psychiatric hospitalization     Hypertension     MI (myocardial infarction)     6 months ago    Neuropathy     Psychiatric problem     Psychosis     Self-harming behavior     Suicide attempt        Past Surgical History:   Procedure Laterality Date    ABDOMINAL SURGERY      History of perforated ulcer, unknown surgery    APPENDECTOMY         Review of patient's allergies indicates:  No Known Allergies    No current facility-administered medications on file prior to encounter.      Current Outpatient Prescriptions on File Prior to Encounter   Medication Sig    aspirin 81 MG Chew Take 1 tablet (81 mg total) by mouth once daily.    mirtazapine (REMERON) 15 MG tablet Take 1 tablet (15 mg total) by mouth nightly.    sertraline (ZOLOFT) 50 MG tablet Take 1 tablet (50 mg total) by mouth once daily.     Family History     Problem Relation (Age of Onset)    ADD / ADHD Cousin    No Known Problems Mother, Father, Sister, Brother, Maternal Aunt, Paternal Aunt, Maternal Uncle, Paternal Uncle, Maternal Grandfather, Maternal Grandmother, Paternal Grandfather, Paternal Grandmother        Social History Main Topics    Smoking status: Current Every Day Smoker     Packs/day: 2.00     Years: 30.00    Smokeless tobacco: Never Used    Alcohol use Yes    Drug use: Yes     Types: Oxycodone    Sexual activity: Yes     Partners: Female     Birth control/ protection: None     Review of Systems   Constitution: Negative.   HENT: Negative.    Eyes: Negative.    Cardiovascular: Negative.    Respiratory: Negative.    Endocrine: Negative.    Skin: Negative.    Musculoskeletal: Negative.    Gastrointestinal: Negative.    Genitourinary: Negative.    Neurological: Negative.      Objective:     Vital Signs (Most Recent):  Temp: 98 °F (36.7 °C) (03/10/18 1312)  Pulse: 87 (03/10/18  1312)  Resp: 20 (03/10/18 1312)  BP: 136/86 (03/10/18 1312)  SpO2: 97 % (03/10/18 1312) Vital Signs (24h Range):  Temp:  [97.5 °F (36.4 °C)-99 °F (37.2 °C)] 98 °F (36.7 °C)  Pulse:  [80-90] 87  Resp:  [18-20] 20  SpO2:  [93 %-100 %] 97 %  BP: (113-154)/() 136/86     Weight: 77.1 kg (169 lb 15.6 oz)  Body mass index is 23.71 kg/m².    SpO2: 97 %  O2 Device (Oxygen Therapy): room air      Intake/Output Summary (Last 24 hours) at 03/10/18 1355  Last data filed at 03/10/18 0449   Gross per 24 hour   Intake           366.67 ml   Output              350 ml   Net            16.67 ml       Lines/Drains/Airways     Peripheral Intravenous Line                 Peripheral IV - Single Lumen 03/09/18 1735 Left Antecubital less than 1 day                Physical Exam   Constitutional: He is oriented to person, place, and time. He appears well-developed and well-nourished.   HENT:   Head: Normocephalic and atraumatic.   Eyes: Conjunctivae and EOM are normal. Pupils are equal, round, and reactive to light.   Neck: Normal range of motion. Neck supple. No JVD present.   Cardiovascular: Normal rate, regular rhythm and normal heart sounds.  Exam reveals no gallop and no friction rub.    No murmur heard.  Pulmonary/Chest: Effort normal and breath sounds normal. No respiratory distress. He has no wheezes. He has no rales. He exhibits no tenderness.   Abdominal: Soft. Bowel sounds are normal. He exhibits no distension. There is no tenderness.   Musculoskeletal: Normal range of motion. He exhibits no edema or tenderness.   Neurological: He is alert and oriented to person, place, and time.   Skin: Skin is warm and dry. No erythema. No pallor.       Significant Labs:   Recent Lab Results       03/10/18  0402 03/09/18  1757 03/09/18  1737 03/09/18  1737 03/09/18  1736      Immature Granulocytes 0.4    0.4     Immature Grans (Abs) 0.04  Comment:  Mild elevation in immature granulocytes is non specific and   can be seen in a variety of  conditions including stress response,   acute inflammation, trauma and pregnancy. Correlation with other   laboratory and clinical findings is essential.      0.04  Comment:  Mild elevation in immature granulocytes is non specific and   can be seen in a variety of conditions including stress response,   acute inflammation, trauma and pregnancy. Correlation with other   laboratory and clinical findings is essential.       Albumin 2.9(L)    3.4(L)     Alkaline Phosphatase 50(L)    49(L)     ALT 9(L)    10     Anion Gap 11    9     Appearance, UA          AST 16    16     Bacteria, UA          Baso # 0.03    0.04     Basophil% 0.3    0.4     Bilirubin (UA)          Total Bilirubin 0.5  Comment:  For infants and newborns, interpretation of results should be based  on gestational age, weight and in agreement with clinical  observations.  Premature Infant recommended reference ranges:  Up to 24 hours.............<8.0 mg/dL  Up to 48 hours............<12.0 mg/dL  3-5 days..................<15.0 mg/dL  6-29 days.................<15.0 mg/dL      0.3  Comment:  For infants and newborns, interpretation of results should be based  on gestational age, weight and in agreement with clinical  observations.  Premature Infant recommended reference ranges:  Up to 24 hours.............<8.0 mg/dL  Up to 48 hours............<12.0 mg/dL  3-5 days..................<15.0 mg/dL  6-29 days.................<15.0 mg/dL       Blood Culture, Routine  No Growth to date[P]  No Growth to date[P]      BUN, Bld 8    10     Calcium 9.1    9.7     Chloride 106    104     CO2 22(L)    27     Color, UA          Creatinine 0.7    0.8     Differential Method Automated    Automated     eGFR if  >60.0    >60.0     eGFR if non  >60.0  Comment:  Calculation used to obtain the estimated glomerular filtration  rate (eGFR) is the CKD-EPI equation.       >60.0  Comment:  Calculation used to obtain the estimated glomerular  filtration  rate (eGFR) is the CKD-EPI equation.        Eos # 0.2    0.2     Eosinophil% 2.1    1.6     Glucose 83    95     Glucose, UA          Gran # (ANC) 7.3    7.5     Gran% 69.4    66.1     Group & Rh     A POS     Hematocrit 35.4(L)    37.7(L)     Hemoglobin 12.0(L)    12.8(L)     Hyaline Casts, UA          INDIRECT LIANNE     NEG     Coumadin Monitoring INR 1.0  Comment:  Coumadin Therapy:  2.0 - 3.0 for INR for all indicators except mechanical heart valves  and antiphospholipid syndromes which should use 2.5 - 3.5.      1.0  Comment:  Coumadin Therapy:  2.0 - 3.0 for INR for all indicators except mechanical heart valves  and antiphospholipid syndromes which should use 2.5 - 3.5.       Ketones, UA          Lactate, Parish     1.2  Comment:  Falsely low lactic acid results can be found in samples   containing >=13.0 mg/dL total bilirubin and/or >=3.5 mg/dL   direct bilirubin.       Leukocytes, UA          Lipase     10     Lymph # 2.1    2.8     Lymph% 19.4    24.8     Magnesium     1.3(L)     MCH 29.1    29.4     MCHC 33.9    34.0     MCV 86    87     Microscopic Comment          Mono # 0.9    0.8     Mono% 8.4    6.7     MPV 10.3    10.0     Nitrite, UA          nRBC 0    0     Occult Blood UA          pH, UA          Phosphorus     3.3     Platelets 304    350     POC BUN   9       POC Chloride   103       POC Creatinine   0.7       POC Glucose   99       POC Hematocrit   38       POC Ionized Calcium   1.14       POC Potassium   3.5       POC Sodium   140       POC TCO2 (MEASURED)   27       Potassium 3.7    3.6     Total Protein 6.3    7.3     Protein, UA          Protime 10.2    10.5     RBC 4.12(L)    4.36(L)     RBC, UA          RDW 13.6    13.4     Sample   PARISH       Sodium 139    140     Specific Gravity, UA          Specimen UA          Squam Epithel, UA          Urobilinogen, UA          WBC Clumps, UA          WBC, UA          WBC 10.54    11.37                 03/09/18  1611      Immature  Granulocytes      Immature Grans (Abs)      Albumin      Alkaline Phosphatase      ALT      Anion Gap      Appearance, UA Cloudy(A)     AST      Bacteria, UA Many(A)     Baso #      Basophil%      Bilirubin (UA) Negative     Total Bilirubin      Blood Culture, Routine      BUN, Bld      Calcium      Chloride      CO2      Color, UA Yellow     Creatinine      Differential Method      eGFR if       eGFR if non       Eos #      Eosinophil%      Glucose      Glucose, UA Negative     Gran # (ANC)      Gran%      Group & Rh      Hematocrit      Hemoglobin      Hyaline Casts, UA 0     INDIRECT LIANNE      Coumadin Monitoring INR      Ketones, UA Negative     Lactate, Brown      Leukocytes, UA 3+(A)     Lipase      Lymph #      Lymph%      Magnesium      MCH      MCHC      MCV      Microscopic Comment SEE COMMENT  Comment:  Other formed elements not mentioned in the report are not   present in the microscopic examination.        Mono #      Mono%      MPV      Nitrite, UA Negative     nRBC      Occult Blood UA 3+(A)     pH, UA 6.0     Phosphorus      Platelets      POC BUN      POC Chloride      POC Creatinine      POC Glucose      POC Hematocrit      POC Ionized Calcium      POC Potassium      POC Sodium      POC TCO2 (MEASURED)      Potassium      Total Protein      Protein, UA 2+  Comment:  Recommend a 24 hour urine protein or a urine   protein/creatinine ratio if globulin induced proteinuria is  clinically suspected.  (A)     Protime      RBC      RBC, UA >100(H)     RDW      Sample      Sodium      Specific Gravity, UA 1.005     Specimen UA Urine, Clean Catch     Squam Epithel, UA 3     Urobilinogen, UA Negative     WBC Clumps, UA Many(A)     WBC, UA >100(H)     WBC            Significant Imaging: CT scan: Cecal mass with bladder infiltration

## 2018-03-10 NOTE — ASSESSMENT & PLAN NOTE
ekg shows q waves v1/2  No symptoms  Continue asa throughout casey-operative period as patient has stents in place and can never be off ASA  Optimize BP post-surgery by adding ace-I (lisinopril 5mg po daily) to goal bp < 130/80  Statin therapy is imperative and should be on atorvastatin 40mg po qhs at least  Assure o/p follow up with cardiology on discharge  RCRI 0.9% risk of casey-operative MACCE  Continue to surgery without any further cardiac w/u or intervention

## 2018-03-10 NOTE — ASSESSMENT & PLAN NOTE
-Patient's history is worrisome for either diverticulitis with colovesical fistula or malignancy with invasion into bladder, particularly given his history of bowel changes, increasing constipation, bloody bowel movements, and family history of colon cancer.  -Will start with CT C/A/P with PO, IV, and rectal contrast.   -Plan to admit, IVF, CLD after CT with NPO after midnight, IV abx,pain/nausea meds, alcohol withdrawal protocol.  -Will need colonoscopy to survey colon, timing dependent on results of scans.  -I discussed all this with the patient and his mother, all questions answered.

## 2018-03-10 NOTE — PLAN OF CARE
Problem: Patient Care Overview  Goal: Plan of Care Review  Outcome: Ongoing (interventions implemented as appropriate)  Plan of care discussed with pt. Pt verbalizes understanding. Pt NPO  with no complaints of discomfort or nausea. Pain managed with PRN pain medication. Pt  normal sinus rhythm. Pt ambulated in room. Pt positions independently. Vital signs stable.Pt continues withstool in urine and urine in stool. Pt remains free of falls and injury. No acute events this shift. Will continue to monitor.

## 2018-03-10 NOTE — ASSESSMENT & PLAN NOTE
-Patient's history is worrisome for either diverticulitis with colovesical fistula or malignancy with invasion into bladder, particularly given his history of bowel changes, increasing constipation, bloody bowel movements, and family history of colon cancer.  -CT C/A/P with PO, IV, and rectal contrast. --> focal thickening of sigmoid colon abutting bladder, no e/o metastatic liver disease  -Will likely need sigmoidoscopy with possible biopsy  -Keep NPO, IVFs, IV abx,pain/nausea meds, alcohol withdrawal protocol.

## 2018-03-10 NOTE — SUBJECTIVE & OBJECTIVE
(Not in a hospital admission)    Review of patient's allergies indicates:  No Known Allergies    Past Medical History:   Diagnosis Date    Coronary artery disease     Depression     History of psychiatric care     History of psychiatric hospitalization     Hypertension     MI (myocardial infarction)     6 months ago    Neuropathy     Psychiatric problem     Psychosis     Self-harming behavior     Suicide attempt      Past Surgical History:   Procedure Laterality Date    APPENDECTOMY       Family History     Problem Relation (Age of Onset)    ADD / ADHD Cousin    No Known Problems Mother, Father, Sister, Brother, Maternal Aunt, Paternal Aunt, Maternal Uncle, Paternal Uncle, Maternal Grandfather, Maternal Grandmother, Paternal Grandfather, Paternal Grandmother        Social History Main Topics    Smoking status: Current Every Day Smoker     Packs/day: 2.00     Years: 30.00    Smokeless tobacco: Never Used    Alcohol use Yes    Drug use: Yes     Types: Oxycodone    Sexual activity: Yes     Partners: Female     Birth control/ protection: None     Review of Systems   Constitutional: Positive for unexpected weight change. Negative for activity change and fatigue.   HENT: Negative for trouble swallowing.    Eyes: Negative for visual disturbance.   Respiratory: Negative for chest tightness and shortness of breath.    Cardiovascular: Negative for chest pain.   Gastrointestinal: Positive for abdominal pain (lower), blood in stool and constipation. Negative for rectal pain.   Genitourinary:        Fecaluria, pneumaturia   Musculoskeletal: Negative for arthralgias.   Skin: Negative for pallor.   Neurological: Negative for seizures.   Psychiatric/Behavioral: Negative for agitation.     Objective:     Vital Signs (Most Recent):  Temp: 97.5 °F (36.4 °C) (03/09/18 1559)  Pulse: 90 (03/09/18 1559)  Resp: 18 (03/09/18 1559)  BP: (!) 151/98 (03/09/18 1559)  SpO2: 100 % (03/09/18 1559) Vital Signs (24h Range):  Temp:   [97.5 °F (36.4 °C)] 97.5 °F (36.4 °C)  Pulse:  [90] 90  Resp:  [18] 18  SpO2:  [100 %] 100 %  BP: (151)/(98) 151/98     Weight: 77.1 kg (170 lb)  Body mass index is 23.71 kg/m².    Physical Exam   Constitutional: He is oriented to person, place, and time. He appears well-developed and well-nourished. No distress.   HENT:   Head: Normocephalic and atraumatic.   Eyes: EOM are normal.   Neck: Normal range of motion.   Cardiovascular: Normal rate.    Pulmonary/Chest: Effort normal. No respiratory distress.   Abdominal: Soft. He exhibits no distension. There is tenderness (lower abdomen). There is guarding (voluntary in lower abdomen).   Musculoskeletal: Normal range of motion.   Neurological: He is alert and oriented to person, place, and time.   Skin: Skin is warm and dry. He is not diaphoretic.   Psychiatric: He has a normal mood and affect.       Anorectal Exam:  Anal Skin: Normal    Digital Rectal Exam:  Resting Tone normal  Squeeze normal  Relaxation with bear down present  Mass none  Rectocele  absent  Tenderness  absent    Significant Labs:  BMP (Last 3 Results):   Recent Labs  Lab 03/09/18  1736   GLU 95      K 3.6      CO2 27   BUN 10   CREATININE 0.8   CALCIUM 9.7   MG 1.3*     CBC (Last 3 Results):   Recent Labs  Lab 03/09/18  1736 03/09/18  1737   WBC 11.37  --    RBC 4.36*  --    HGB 12.8*  --    HCT 37.7* 38     --    MCV 87  --    MCH 29.4  --    MCHC 34.0  --        Significant Diagnostics:    CT chest/abdomen/pelvis pending

## 2018-03-10 NOTE — CONSULTS
[facility]  Cardiology  Consult Note    Patient Name: Chris gAuirre  MRN: [id number]  Admission Date: 3/9/2018  Hospital Length of Stay: 0 days  Code Status: Full Code   Attending Provider: Mikal Nino MD   Consulting Provider: Navarro Rodriguez MD  Primary Care Physician: Ash Hung NP  Principal Problem:<principal problem not specified>    Patient information was obtained from patient and past medical records.     Inpatient consult to Cardiology  Consult performed by: NAVARRO RODRIGUEZ  Consult ordered by: BROOK RODRIGES        Subjective:     Chief Complaint:  Pre-op Cardiac Eval     HPI:   52 yo with PMH sig for 30py smoking hx, CAD s/p MI and PCI x 2 11 ya presenting with progressive blood in stool and change in bowel habits over past several month and new onset pneumaturia/fecaluria over the past few days 2/2 what was found ton CT to be a large sigmoid mass with posterior bladder wall involvement.  Cardiology is consulted for pre-op clearance for OR Tuesday.    He reports no cardiac symptoms (sob, chest pain, nausea, vomiting, functional limitations, can walk without limit).  Euvolemic on exam.     Past Medical History:   Diagnosis Date    Coronary artery disease     Depression     Hepatitis C 3/9/2018    History of psychiatric care     History of psychiatric hospitalization     Hypertension     MI (myocardial infarction)     6 months ago    Neuropathy     Psychiatric problem     Psychosis     Self-harming behavior     Suicide attempt        Past Surgical History:   Procedure Laterality Date    ABDOMINAL SURGERY      History of perforated ulcer, unknown surgery    APPENDECTOMY         Review of patient's allergies indicates:  No Known Allergies    No current facility-administered medications on file prior to encounter.      Current Outpatient Prescriptions on File Prior to Encounter   Medication Sig    aspirin 81 MG Chew Take 1 tablet (81 mg total) by mouth once  daily.    mirtazapine (REMERON) 15 MG tablet Take 1 tablet (15 mg total) by mouth nightly.    sertraline (ZOLOFT) 50 MG tablet Take 1 tablet (50 mg total) by mouth once daily.     Family History     Problem Relation (Age of Onset)    ADD / ADHD Cousin    No Known Problems Mother, Father, Sister, Brother, Maternal Aunt, Paternal Aunt, Maternal Uncle, Paternal Uncle, Maternal Grandfather, Maternal Grandmother, Paternal Grandfather, Paternal Grandmother        Social History Main Topics    Smoking status: Current Every Day Smoker     Packs/day: 2.00     Years: 30.00    Smokeless tobacco: Never Used    Alcohol use Yes    Drug use: Yes     Types: Oxycodone    Sexual activity: Yes     Partners: Female     Birth control/ protection: None     Review of Systems   Constitution: Negative.   HENT: Negative.    Eyes: Negative.    Cardiovascular: Negative.    Respiratory: Negative.    Endocrine: Negative.    Skin: Negative.    Musculoskeletal: Negative.    Gastrointestinal: Negative.    Genitourinary: Negative.    Neurological: Negative.      Objective:     Vital Signs (Most Recent):  Temp: 98 °F (36.7 °C) (03/10/18 1312)  Pulse: 87 (03/10/18 1312)  Resp: 20 (03/10/18 1312)  BP: 136/86 (03/10/18 1312)  SpO2: 97 % (03/10/18 1312) Vital Signs (24h Range):  Temp:  [97.5 °F (36.4 °C)-99 °F (37.2 °C)] 98 °F (36.7 °C)  Pulse:  [80-90] 87  Resp:  [18-20] 20  SpO2:  [93 %-100 %] 97 %  BP: (113-154)/() 136/86     Weight: 77.1 kg (169 lb 15.6 oz)  Body mass index is 23.71 kg/m².    SpO2: 97 %  O2 Device (Oxygen Therapy): room air      Intake/Output Summary (Last 24 hours) at 03/10/18 1355  Last data filed at 03/10/18 0449   Gross per 24 hour   Intake           366.67 ml   Output              350 ml   Net            16.67 ml       Lines/Drains/Airways     Peripheral Intravenous Line                 Peripheral IV - Single Lumen 03/09/18 1735 Left Antecubital less than 1 day                Physical Exam   Constitutional: He is  oriented to person, place, and time. He appears well-developed and well-nourished.   HENT:   Head: Normocephalic and atraumatic.   Eyes: Conjunctivae and EOM are normal. Pupils are equal, round, and reactive to light.   Neck: Normal range of motion. Neck supple. No JVD present.   Cardiovascular: Normal rate, regular rhythm and normal heart sounds.  Exam reveals no gallop and no friction rub.    No murmur heard.  Pulmonary/Chest: Effort normal and breath sounds normal. No respiratory distress. He has no wheezes. He has no rales. He exhibits no tenderness.   Abdominal: Soft. Bowel sounds are normal. He exhibits no distension. There is no tenderness.   Musculoskeletal: Normal range of motion. He exhibits no edema or tenderness.   Neurological: He is alert and oriented to person, place, and time.   Skin: Skin is warm and dry. No erythema. No pallor.       Significant Labs:   Recent Lab Results       03/10/18  0402 03/09/18  1757 03/09/18  1737 03/09/18  1737 03/09/18  1736      Immature Granulocytes 0.4    0.4     Immature Grans (Abs) 0.04  Comment:  Mild elevation in immature granulocytes is non specific and   can be seen in a variety of conditions including stress response,   acute inflammation, trauma and pregnancy. Correlation with other   laboratory and clinical findings is essential.      0.04  Comment:  Mild elevation in immature granulocytes is non specific and   can be seen in a variety of conditions including stress response,   acute inflammation, trauma and pregnancy. Correlation with other   laboratory and clinical findings is essential.       Albumin 2.9(L)    3.4(L)     Alkaline Phosphatase 50(L)    49(L)     ALT 9(L)    10     Anion Gap 11    9     Appearance, UA          AST 16    16     Bacteria, UA          Baso # 0.03    0.04     Basophil% 0.3    0.4     Bilirubin (UA)          Total Bilirubin 0.5  Comment:  For infants and newborns, interpretation of results should be based  on gestational age,  weight and in agreement with clinical  observations.  Premature Infant recommended reference ranges:  Up to 24 hours.............<8.0 mg/dL  Up to 48 hours............<12.0 mg/dL  3-5 days..................<15.0 mg/dL  6-29 days.................<15.0 mg/dL      0.3  Comment:  For infants and newborns, interpretation of results should be based  on gestational age, weight and in agreement with clinical  observations.  Premature Infant recommended reference ranges:  Up to 24 hours.............<8.0 mg/dL  Up to 48 hours............<12.0 mg/dL  3-5 days..................<15.0 mg/dL  6-29 days.................<15.0 mg/dL       Blood Culture, Routine  No Growth to date[P]  No Growth to date[P]      BUN, Bld 8    10     Calcium 9.1    9.7     Chloride 106    104     CO2 22(L)    27     Color, UA          Creatinine 0.7    0.8     Differential Method Automated    Automated     eGFR if  >60.0    >60.0     eGFR if non  >60.0  Comment:  Calculation used to obtain the estimated glomerular filtration  rate (eGFR) is the CKD-EPI equation.       >60.0  Comment:  Calculation used to obtain the estimated glomerular filtration  rate (eGFR) is the CKD-EPI equation.        Eos # 0.2    0.2     Eosinophil% 2.1    1.6     Glucose 83    95     Glucose, UA          Gran # (ANC) 7.3    7.5     Gran% 69.4    66.1     Group & Rh     A POS     Hematocrit 35.4(L)    37.7(L)     Hemoglobin 12.0(L)    12.8(L)     Hyaline Casts, UA          INDIRECT LIANNE     NEG     Coumadin Monitoring INR 1.0  Comment:  Coumadin Therapy:  2.0 - 3.0 for INR for all indicators except mechanical heart valves  and antiphospholipid syndromes which should use 2.5 - 3.5.      1.0  Comment:  Coumadin Therapy:  2.0 - 3.0 for INR for all indicators except mechanical heart valves  and antiphospholipid syndromes which should use 2.5 - 3.5.       Ketones, UA          Lactate, Brown     1.2  Comment:  Falsely low lactic acid results can be found  in samples   containing >=13.0 mg/dL total bilirubin and/or >=3.5 mg/dL   direct bilirubin.       Leukocytes, UA          Lipase     10     Lymph # 2.1    2.8     Lymph% 19.4    24.8     Magnesium     1.3(L)     MCH 29.1    29.4     MCHC 33.9    34.0     MCV 86    87     Microscopic Comment          Mono # 0.9    0.8     Mono% 8.4    6.7     MPV 10.3    10.0     Nitrite, UA          nRBC 0    0     Occult Blood UA          pH, UA          Phosphorus     3.3     Platelets 304    350     POC BUN   9       POC Chloride   103       POC Creatinine   0.7       POC Glucose   99       POC Hematocrit   38       POC Ionized Calcium   1.14       POC Potassium   3.5       POC Sodium   140       POC TCO2 (MEASURED)   27       Potassium 3.7    3.6     Total Protein 6.3    7.3     Protein, UA          Protime 10.2    10.5     RBC 4.12(L)    4.36(L)     RBC, UA          RDW 13.6    13.4     Sample   PARISH       Sodium 139    140     Specific Gravity, UA          Specimen UA          Squam Epithel, UA          Urobilinogen, UA          WBC Clumps, UA          WBC, UA          WBC 10.54    11.37                 03/09/18  1611      Immature Granulocytes      Immature Grans (Abs)      Albumin      Alkaline Phosphatase      ALT      Anion Gap      Appearance, UA Cloudy(A)     AST      Bacteria, UA Many(A)     Baso #      Basophil%      Bilirubin (UA) Negative     Total Bilirubin      Blood Culture, Routine      BUN, Bld      Calcium      Chloride      CO2      Color, UA Yellow     Creatinine      Differential Method      eGFR if       eGFR if non       Eos #      Eosinophil%      Glucose      Glucose, UA Negative     Gran # (ANC)      Gran%      Group & Rh      Hematocrit      Hemoglobin      Hyaline Casts, UA 0     INDIRECT LIANNE      Coumadin Monitoring INR      Ketones, UA Negative     Lactate, Parish      Leukocytes, UA 3+(A)     Lipase      Lymph #      Lymph%      Magnesium      MCH      MCHC      MCV       Microscopic Comment SEE COMMENT  Comment:  Other formed elements not mentioned in the report are not   present in the microscopic examination.        Mono #      Mono%      MPV      Nitrite, UA Negative     nRBC      Occult Blood UA 3+(A)     pH, UA 6.0     Phosphorus      Platelets      POC BUN      POC Chloride      POC Creatinine      POC Glucose      POC Hematocrit      POC Ionized Calcium      POC Potassium      POC Sodium      POC TCO2 (MEASURED)      Potassium      Total Protein      Protein, UA 2+  Comment:  Recommend a 24 hour urine protein or a urine   protein/creatinine ratio if globulin induced proteinuria is  clinically suspected.  (A)     Protime      RBC      RBC, UA >100(H)     RDW      Sample      Sodium      Specific Gravity, UA 1.005     Specimen UA Urine, Clean Catch     Squam Epithel, UA 3     Urobilinogen, UA Negative     WBC Clumps, UA Many(A)     WBC, UA >100(H)     WBC            Significant Imaging: CT scan: Cecal mass with bladder infiltration    Assessment and Plan:     Pre-operative cardiovascular examination    ekg shows q waves v1/2  No symptoms  Continue asa throughout casey-operative period as patient has stents in place and can never be off ASA  Optimize BP post-surgery by adding ace-I (lisinopril 5mg po daily) to goal bp < 130/80  Statin therapy is imperative and should be on atorvastatin 40mg po qhs at least  Assure o/p follow up with cardiology on discharge  RCRI 0.9% risk of casey-operative MACCE  Continue to surgery without any further cardiac w/u or intervention              VTE Risk Mitigation         Ordered     Medium Risk of VTE  Once      03/10/18 0348     Place sequential compression device  Until discontinued      03/10/18 0348     enoxaparin injection 40 mg  Daily     Route:  Subcutaneous        03/10/18 0348          Thank you for your consult. I will sign off. Please contact us if you have any additional questions.    Navarro Yuan MD  Cardiology   [facility] [address]

## 2018-03-10 NOTE — HPI
52 yo with PMH sig for 30py smoking hx, CAD s/p MI and PCI x 2 11 ya presenting with progressive blood in stool and change in bowel habits over past several month and new onset pneumaturia/fecaluria over the past few days 2/2 what was found ton CT to be a large sigmoid mass with posterior bladder wall involvement.  Cardiology is consulted for pre-op clearance for OR Tuesday.    He reports no cardiac symptoms (sob, chest pain, nausea, vomiting, functional limitations, can walk without limit).  Euvolemic on exam.

## 2018-03-10 NOTE — PLAN OF CARE
Problem: Patient Care Overview  Goal: Plan of Care Review  Outcome: Ongoing (interventions implemented as appropriate)  Plan of care reviewed with the patient, a 53 year old male with the DX of stool and gas in urine,,,, had a CT while in the  Er,, alert and oriented times 4, 20 g left ac,,, up ad gloria, NPO since midnight,, all vitals good,,, independent,,, bed locked and low call light in reach,,,,

## 2018-03-10 NOTE — HPI
53yoWM who presented to the ED with acute onset of stool and gas in his urine over the past 4 or 5 days. This occurred after he went to an urgent care, where they prescribed him a laxative due to a several month history of increasing constipation and narrowing caliber of his stools. He states that he has blood in his bowel movements as well. He has also developed lower abdominal pain. He denies a history of recurring abdominal pain. He states he has lost about 15 lbs over the past few months. He denies fatigue, fevers, CP, or SOB.     He has never had a colonoscopy. He had a previous surgery for a perforated ulcer and has had an appendectomy. He was diagnosed with HCV about 15 years ago but never sought treatment for this. He also has a history of heart stents, does not follow with any physician.    Family history is notable for multiple first degree relatives with lung cancer and an uncle with colon cancer, unsure as to age of diagnosis. His mother is with him and she is a survivor from what sounds like SCC of the neck related to smoking.

## 2018-03-10 NOTE — SUBJECTIVE & OBJECTIVE
Subjective:     Interval History: naeon    Post-Op Info:  * No surgery found *          Medications:  Continuous Infusions:   lactated ringers 100 mL/hr at 03/10/18 0409     Scheduled Meds:   acetaminophen  1,000 mg Intravenous Q8H    ciprofloxacin (CIPRO)400mg/200ml D5W IVPB  400 mg Intravenous Q12H    enoxaparin  40 mg Subcutaneous Daily    folic acid  1 mg Oral Daily    ibuprofen  800 mg Intravenous Q8H    metronidazole  500 mg Intravenous Q8H    mirtazapine  15 mg Oral Nightly    multivitamin  1 tablet Oral Daily    nicotine  1 patch Transdermal ED 1 Time    sertraline  50 mg Oral Daily    thiamine  100 mg Oral Daily     PRN Meds:   diphenhydrAMINE    LORazepam    meperidine    metoclopramide HCl    naloxone    ondansetron    oxyCODONE        Objective:     Vital Signs (Most Recent):  Temp: 99 °F (37.2 °C) (03/10/18 0451)  Pulse: 89 (03/10/18 0451)  Resp: 18 (03/10/18 0304)  BP: (!) 149/100 (03/10/18 0451)  SpO2: 96 % (03/10/18 0451) Vital Signs (24h Range):  Temp:  [97.5 °F (36.4 °C)-99 °F (37.2 °C)] 99 °F (37.2 °C)  Pulse:  [80-90] 89  Resp:  [18] 18  SpO2:  [93 %-100 %] 96 %  BP: (149-154)/() 149/100     Intake/Output - Last 3 Shifts       03/08 0700 - 03/09 0659 03/09 0700 - 03/10 0659 03/10 0700 - 03/11 0659    P.O.  0     I.V. (mL/kg)  66.7 (0.9)     IV Piggyback  300     Total Intake(mL/kg)  366.7 (4.8)     Urine (mL/kg/hr)  350     Emesis/NG output  0     Other  0     Stool  0     Blood  0     Total Output   350      Net   +16.7             Urine Occurrence  0 x     Stool Occurrence  0 x           Physical Exam    Constitutional: He is oriented to person, place, and time. He appears well-developed and well-nourished. No distress.   HENT:   Head: Normocephalic and atraumatic.   Eyes: EOM are normal.   Neck: Normal range of motion.   Cardiovascular: Normal rate.    Pulmonary/Chest: Effort normal. No respiratory distress.   Abdominal: Soft. He exhibits no distension. There is  tenderness (lower abdomen). There is guarding (voluntary in lower abdomen).   Musculoskeletal: Normal range of motion.   Neurological: He is alert and oriented to person, place, and time.   Skin: Skin is warm and dry. He is not diaphoretic.   Psychiatric: He has a normal mood and affect.         Anorectal Exam:  Anal Skin: Normal     Digital Rectal Exam:  Resting Tone normal  Squeeze normal  Relaxation with bear down present  Mass none  Rectocele  absent  Tenderness  absent  Significant Labs:  All pertinent lab results within the last 24 hours have been reviewed.     Significant Diagnostics:  I have reviewed all pertinent imaging results/findings within the past 24 hours.     CT abdomen/pelvis 3/9:   1.  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  Air within the urinary bladder is suspected to be on the bases of the same although correlation for recent catheterization and clinical symptomatology.  There is indistinctness about the colon region, with several although nonenlarged lymph nodes, and disorganized fluid.  Please see above for full description.    2.  Vague groundglass focus of attenuation within the right upper lobe of the right lung, nonspecific, could reflect nonspecific inflammation or infection, metastatic disease however cannot be excluded in this setting.  Followup is advised.    3.  Constipation.

## 2018-03-10 NOTE — H&P
[facility]  Colorectal Surgery  History & Physical    Patient Name: Chris Aguirre  MRN: [id number]  Admission Date: 3/9/2018  Attending Physician: Dr. Mikal Nino  Primary Care Provider: Primary Doctor No    Subjective:     Chief Complaint/Reason for Admission: Fecaluria, abdominal pain, constipation    History of Present Illness:  53yoWM who presented to the ED with acute onset of stool and gas in his urine over the past 4 or 5 days. This occurred after he went to an urgent care, where they prescribed him a laxative due to a several month history of increasing constipation and narrowing caliber of his stools. He states that he has blood in his bowel movements as well. He has also developed lower abdominal pain. He denies a history of recurring abdominal pain. He states he has lost about 15 lbs over the past few months. He denies fatigue, fevers, CP, or SOB.     He has never had a colonoscopy. He had a previous surgery for a perforated ulcer and has had an appendectomy. He was diagnosed with HCV about 15 years ago but never sought treatment for this. He was previously hospitalized and seen by inpatient psychiatry for substance abuse, past history of suicide attempt by cutting. He also has a history of an MI with placement of heart stents, on ASA, does not follow with any physician.    Family history is notable for multiple first degree relatives with lung cancer and an uncle with colon cancer, unsure as to age of diagnosis. His mother is with him and she is a survivor from what sounds like SCC of the neck related to smoking.        (Not in a hospital admission)    Review of patient's allergies indicates:  No Known Allergies    Past Medical History:   Diagnosis Date    Coronary artery disease     Depression     History of psychiatric care     History of psychiatric hospitalization     Hypertension     MI (myocardial infarction)     6 months ago    Neuropathy     Psychiatric problem      Psychosis     Self-harming behavior     Suicide attempt      Past Surgical History:   Procedure Laterality Date    APPENDECTOMY       Family History     Problem Relation (Age of Onset)    ADD / ADHD Cousin    No Known Problems Mother, Father, Sister, Brother, Maternal Aunt, Paternal Aunt, Maternal Uncle, Paternal Uncle, Maternal Grandfather, Maternal Grandmother, Paternal Grandfather, Paternal Grandmother        Social History Main Topics    Smoking status: Current Every Day Smoker     Packs/day: 2.00     Years: 30.00    Smokeless tobacco: Never Used    Alcohol use Yes    Drug use: Yes     Types: Oxycodone    Sexual activity: Yes     Partners: Female     Birth control/ protection: None     Review of Systems   Constitutional: Positive for unexpected weight change. Negative for activity change and fatigue.   HENT: Negative for trouble swallowing.    Eyes: Negative for visual disturbance.   Respiratory: Negative for chest tightness and shortness of breath.    Cardiovascular: Negative for chest pain.   Gastrointestinal: Positive for abdominal pain (lower), blood in stool and constipation. Negative for rectal pain.   Genitourinary:        Fecaluria, pneumaturia   Musculoskeletal: Negative for arthralgias.   Skin: Negative for pallor.   Neurological: Negative for seizures.   Psychiatric/Behavioral: Negative for agitation.     Objective:     Vital Signs (Most Recent):  Temp: 97.5 °F (36.4 °C) (03/09/18 1559)  Pulse: 90 (03/09/18 1559)  Resp: 18 (03/09/18 1559)  BP: (!) 151/98 (03/09/18 1559)  SpO2: 100 % (03/09/18 1559) Vital Signs (24h Range):  Temp:  [97.5 °F (36.4 °C)] 97.5 °F (36.4 °C)  Pulse:  [90] 90  Resp:  [18] 18  SpO2:  [100 %] 100 %  BP: (151)/(98) 151/98     Weight: 77.1 kg (170 lb)  Body mass index is 23.71 kg/m².    Physical Exam   Constitutional: He is oriented to person, place, and time. He appears well-developed and well-nourished. No distress.   HENT:   Head: Normocephalic and atraumatic.   Eyes:  EOM are normal.   Neck: Normal range of motion.   Cardiovascular: Normal rate.    Pulmonary/Chest: Effort normal. No respiratory distress.   Abdominal: Soft. He exhibits no distension. There is tenderness (lower abdomen). There is guarding (voluntary in lower abdomen).   Musculoskeletal: Normal range of motion.   Neurological: He is alert and oriented to person, place, and time.   Skin: Skin is warm and dry. He is not diaphoretic.   Psychiatric: He has a normal mood and affect.       Anorectal Exam:  Anal Skin: Normal    Digital Rectal Exam:  Resting Tone normal  Squeeze normal  Relaxation with bear down present  Mass none  Rectocele  absent  Tenderness  absent    Significant Labs:  BMP (Last 3 Results):   Recent Labs  Lab 03/09/18  1736   GLU 95      K 3.6      CO2 27   BUN 10   CREATININE 0.8   CALCIUM 9.7   MG 1.3*     CBC (Last 3 Results):   Recent Labs  Lab 03/09/18  1736 03/09/18  1737   WBC 11.37  --    RBC 4.36*  --    HGB 12.8*  --    HCT 37.7* 38     --    MCV 87  --    MCH 29.4  --    MCHC 34.0  --        Significant Diagnostics:    CT chest/abdomen/pelvis pending    Assessment/Plan:     Pneumaturia    -Patient's history is worrisome for either diverticulitis with colovesical fistula or malignancy with invasion into bladder, particularly given his history of bowel changes, increasing constipation, bloody bowel movements, and family history of colon cancer.  -Will start with CT C/A/P with PO, IV, and rectal contrast.   -Plan to admit, IVF, CLD after CT with NPO after midnight, IV abx,pain/nausea meds, alcohol withdrawal protocol.  -Will need colonoscopy to survey colon, timing dependent on results of scans.  -I discussed all this with the patient and his mother, all questions answered.              Suresh Navarro MD  Colorectal Surgery  [facility]

## 2018-03-11 PROBLEM — N32.1 COLOVESICAL FISTULA: Status: ACTIVE | Noted: 2018-03-11

## 2018-03-11 LAB
ANION GAP SERPL CALC-SCNC: 7 MMOL/L
BACTERIA UR CULT: NORMAL
BASOPHILS # BLD AUTO: 0.03 K/UL
BASOPHILS NFR BLD: 0.5 %
BUN SERPL-MCNC: 8 MG/DL
CALCIUM SERPL-MCNC: 9 MG/DL
CHLORIDE SERPL-SCNC: 105 MMOL/L
CO2 SERPL-SCNC: 26 MMOL/L
CREAT SERPL-MCNC: 0.8 MG/DL
DIFFERENTIAL METHOD: ABNORMAL
EOSINOPHIL # BLD AUTO: 0.2 K/UL
EOSINOPHIL NFR BLD: 3.8 %
ERYTHROCYTE [DISTWIDTH] IN BLOOD BY AUTOMATED COUNT: 13.4 %
EST. GFR  (AFRICAN AMERICAN): >60 ML/MIN/1.73 M^2
EST. GFR  (NON AFRICAN AMERICAN): >60 ML/MIN/1.73 M^2
GLUCOSE SERPL-MCNC: 103 MG/DL
HCT VFR BLD AUTO: 35.1 %
HGB BLD-MCNC: 11.7 G/DL
IMM GRANULOCYTES # BLD AUTO: 0.02 K/UL
IMM GRANULOCYTES NFR BLD AUTO: 0.3 %
INR PPP: 1.1
LYMPHOCYTES # BLD AUTO: 2.2 K/UL
LYMPHOCYTES NFR BLD: 35.8 %
MCH RBC QN AUTO: 29.1 PG
MCHC RBC AUTO-ENTMCNC: 33.3 G/DL
MCV RBC AUTO: 87 FL
MONOCYTES # BLD AUTO: 0.6 K/UL
MONOCYTES NFR BLD: 10.6 %
NEUTROPHILS # BLD AUTO: 3 K/UL
NEUTROPHILS NFR BLD: 49 %
NRBC BLD-RTO: 0 /100 WBC
PLATELET # BLD AUTO: 294 K/UL
PMV BLD AUTO: 9.9 FL
POTASSIUM SERPL-SCNC: 3.7 MMOL/L
PREALB SERPL-MCNC: 10 MG/DL
PROTHROMBIN TIME: 11 SEC
RBC # BLD AUTO: 4.02 M/UL
SODIUM SERPL-SCNC: 138 MMOL/L
WBC # BLD AUTO: 6.06 K/UL

## 2018-03-11 PROCEDURE — 63600175 PHARM REV CODE 636 W HCPCS: Performed by: STUDENT IN AN ORGANIZED HEALTH CARE EDUCATION/TRAINING PROGRAM

## 2018-03-11 PROCEDURE — 85025 COMPLETE CBC W/AUTO DIFF WBC: CPT

## 2018-03-11 PROCEDURE — 85610 PROTHROMBIN TIME: CPT

## 2018-03-11 PROCEDURE — 99232 SBSQ HOSP IP/OBS MODERATE 35: CPT | Mod: ,,, | Performed by: UROLOGY

## 2018-03-11 PROCEDURE — 25000003 PHARM REV CODE 250: Performed by: COLON & RECTAL SURGERY

## 2018-03-11 PROCEDURE — 99232 SBSQ HOSP IP/OBS MODERATE 35: CPT | Mod: 57,,, | Performed by: COLON & RECTAL SURGERY

## 2018-03-11 PROCEDURE — 84134 ASSAY OF PREALBUMIN: CPT

## 2018-03-11 PROCEDURE — 25000003 PHARM REV CODE 250: Performed by: STUDENT IN AN ORGANIZED HEALTH CARE EDUCATION/TRAINING PROGRAM

## 2018-03-11 PROCEDURE — 20600001 HC STEP DOWN PRIVATE ROOM

## 2018-03-11 PROCEDURE — 80048 BASIC METABOLIC PNL TOTAL CA: CPT

## 2018-03-11 PROCEDURE — 63600175 PHARM REV CODE 636 W HCPCS: Performed by: COLON & RECTAL SURGERY

## 2018-03-11 PROCEDURE — S0030 INJECTION, METRONIDAZOLE: HCPCS | Performed by: COLON & RECTAL SURGERY

## 2018-03-11 PROCEDURE — 36415 COLL VENOUS BLD VENIPUNCTURE: CPT

## 2018-03-11 RX ORDER — ASPIRIN 81 MG/1
81 TABLET ORAL DAILY
Status: DISCONTINUED | OUTPATIENT
Start: 2018-03-11 | End: 2018-03-11

## 2018-03-11 RX ORDER — DEXTROSE MONOHYDRATE, SODIUM CHLORIDE, AND POTASSIUM CHLORIDE 50; 1.49; 4.5 G/1000ML; G/1000ML; G/1000ML
INJECTION, SOLUTION INTRAVENOUS CONTINUOUS
Status: DISCONTINUED | OUTPATIENT
Start: 2018-03-11 | End: 2018-03-14

## 2018-03-11 RX ORDER — ATORVASTATIN CALCIUM 20 MG/1
40 TABLET, FILM COATED ORAL DAILY
Status: DISCONTINUED | OUTPATIENT
Start: 2018-03-11 | End: 2018-03-17 | Stop reason: HOSPADM

## 2018-03-11 RX ORDER — HYDRALAZINE HYDROCHLORIDE 20 MG/ML
10 INJECTION INTRAMUSCULAR; INTRAVENOUS EVERY 6 HOURS PRN
Status: DISCONTINUED | OUTPATIENT
Start: 2018-03-11 | End: 2018-03-17 | Stop reason: HOSPADM

## 2018-03-11 RX ADMIN — MIRTAZAPINE 15 MG: 7.5 TABLET ORAL at 08:03

## 2018-03-11 RX ADMIN — IBUPROFEN 800 MG: 800 INJECTION INTRAVENOUS at 09:03

## 2018-03-11 RX ADMIN — DEXTROSE MONOHYDRATE, SODIUM CHLORIDE, AND POTASSIUM CHLORIDE: 50; 4.5; 1.49 INJECTION, SOLUTION INTRAVENOUS at 09:03

## 2018-03-11 RX ADMIN — MEPERIDINE HYDROCHLORIDE 25 MG: 50 INJECTION INTRAMUSCULAR; INTRAVENOUS; SUBCUTANEOUS at 12:03

## 2018-03-11 RX ADMIN — Medication 100 MG: at 09:03

## 2018-03-11 RX ADMIN — IBUPROFEN 800 MG: 800 INJECTION INTRAVENOUS at 01:03

## 2018-03-11 RX ADMIN — CIPROFLOXACIN 400 MG: 2 INJECTION, SOLUTION INTRAVENOUS at 03:03

## 2018-03-11 RX ADMIN — METRONIDAZOLE 500 MG: 500 INJECTION, SOLUTION INTRAVENOUS at 07:03

## 2018-03-11 RX ADMIN — MEPERIDINE HYDROCHLORIDE 25 MG: 50 INJECTION INTRAMUSCULAR; INTRAVENOUS; SUBCUTANEOUS at 10:03

## 2018-03-11 RX ADMIN — THERA TABS 1 TABLET: TAB at 08:03

## 2018-03-11 RX ADMIN — DEXTROSE MONOHYDRATE, SODIUM CHLORIDE, AND POTASSIUM CHLORIDE: 50; 4.5; 1.49 INJECTION, SOLUTION INTRAVENOUS at 10:03

## 2018-03-11 RX ADMIN — ATORVASTATIN CALCIUM 40 MG: 20 TABLET, FILM COATED ORAL at 08:03

## 2018-03-11 RX ADMIN — METRONIDAZOLE 500 MG: 500 INJECTION, SOLUTION INTRAVENOUS at 11:03

## 2018-03-11 RX ADMIN — MEPERIDINE HYDROCHLORIDE 25 MG: 50 INJECTION INTRAMUSCULAR; INTRAVENOUS; SUBCUTANEOUS at 05:03

## 2018-03-11 RX ADMIN — MEPERIDINE HYDROCHLORIDE 25 MG: 50 INJECTION INTRAMUSCULAR; INTRAVENOUS; SUBCUTANEOUS at 06:03

## 2018-03-11 RX ADMIN — METRONIDAZOLE 500 MG: 500 INJECTION, SOLUTION INTRAVENOUS at 03:03

## 2018-03-11 RX ADMIN — IBUPROFEN 800 MG: 800 INJECTION INTRAVENOUS at 05:03

## 2018-03-11 RX ADMIN — SERTRALINE HYDROCHLORIDE 50 MG: 50 TABLET ORAL at 08:03

## 2018-03-11 RX ADMIN — OXYCODONE HYDROCHLORIDE 5 MG: 5 TABLET ORAL at 08:03

## 2018-03-11 RX ADMIN — ENOXAPARIN SODIUM 40 MG: 100 INJECTION SUBCUTANEOUS at 05:03

## 2018-03-11 RX ADMIN — HYDRALAZINE HYDROCHLORIDE 10 MG: 20 INJECTION INTRAMUSCULAR; INTRAVENOUS at 07:03

## 2018-03-11 RX ADMIN — FOLIC ACID 1 MG: 1 TABLET ORAL at 08:03

## 2018-03-11 NOTE — PLAN OF CARE
Problem: Patient Care Overview  Goal: Plan of Care Review  Outcome: Ongoing (interventions implemented as appropriate)  Plan of care reviewed with patient ,, a 53 year old male with the DX of pneumaturia,, and stool in the urin,, also blood in his stool,,, admitted on the ninth,, hes been NPO since,,, CT showed malignancy of sigmoid colon with bladder wall invasion,, also right upper lobe to right lobe showed attenuation,,,, bed rest for now,,, urine is sediment filled and cloudy with odor,,, alert and oriented times 4,, LR at 100 ,,, no falls,, resting peacefully,,, bed locked and low,, call light in reach,,,,

## 2018-03-11 NOTE — PROGRESS NOTES
[facility]  Urology  Progress Note    Patient Name: Chris Aguirre  MRN: [id number]  Admission Date: 3/9/2018  Hospital Length of Stay: 1 days  Code Status: Full Code   Attending Provider: Meng Morrow MD   Primary Care Physician: Ash Hung NP    Subjective:     HPI:  53 YOM with HTN, Hep C, CAD s/p MI, current everyday 35 pack year smoker who presented to the ED with fecaluria and pneumaturia.     This occurred after he went to an urgent care, where they prescribed him a laxative due to a several month history of increasing constipation and narrowing caliber of his stools. He states that he has blood in his urine as well as stool. He has also developed lower abdominal pain.  He has lost15 lbs over the past few months. He denies fatigue, fevers, CP, or SOB.      PSHx positive for perforated ulcer and appendectomy.  He was diagnosed with HCV about 15 years ago but never sought treatment for this. He was previously hospitalized and seen by inpatient psychiatry for substance abuse, past history of suicide attempt. He also has a history of an MI s/p stents, on ASA, does not follow with any physician.     Family history is notable for multiple first degree relatives with lung cancer and an uncle with colon cancer, unsure as to age of diagnosis. His mother is with him and she is a survivor from what sounds like SCC of the neck related to smoking.  Family history is negative for urologic malignancy.      Past Medical History:   Diagnosis Date    Coronary artery disease     Depression     Hepatitis C 3/9/2018    History of psychiatric care     History of psychiatric hospitalization     Hypertension     MI (myocardial infarction)     6 months ago    Neuropathy     Psychiatric problem     Psychosis     Self-harming behavior     Suicide attempt        Past Surgical History:   Procedure Laterality Date    ABDOMINAL SURGERY      History of perforated ulcer, unknown surgery     APPENDECTOMY         Review of patient's allergies indicates:  No Known Allergies    Family History     Problem Relation (Age of Onset)    ADD / ADHD Cousin    No Known Problems Mother, Father, Sister, Brother, Maternal Aunt, Paternal Aunt, Maternal Uncle, Paternal Uncle, Maternal Grandfather, Maternal Grandmother, Paternal Grandfather, Paternal Grandmother          Social History Main Topics    Smoking status: Current Every Day Smoker     Packs/day: 2.00     Years: 30.00    Smokeless tobacco: Never Used    Alcohol use Yes    Drug use: Yes     Types: Oxycodone    Sexual activity: Yes     Partners: Female     Birth control/ protection: None       Review of Systems   Constitutional: Negative for chills and fever.   HENT: Negative for ear discharge and hearing loss.    Eyes: Negative for redness and visual disturbance.   Respiratory: Negative for cough and shortness of breath.    Cardiovascular: Negative for chest pain and palpitations.   Gastrointestinal: Negative for abdominal pain, nausea and vomiting.   Genitourinary: Positive for hematuria. Negative for flank pain.   Neurological: Negative for dizziness and headaches.   Psychiatric/Behavioral: Negative for agitation and behavioral problems.       Objective:     Temp:  [9.8 °F (-12.3 °C)-98.8 °F (37.1 °C)] 98.1 °F (36.7 °C)  Pulse:  [71-87] 71  Resp:  [14-20] 14  SpO2:  [95 %-97 %] 97 %  BP: (113-147)/(76-99) 144/87     Body mass index is 23.71 kg/m².            Drains          No matching active lines, drains, or airways          Physical Exam   Constitutional: No distress.   HENT:   Head: Normocephalic and atraumatic.   Cardiovascular: Normal rate.    Pulmonary/Chest: Effort normal. No respiratory distress.   Abdominal: Soft. He exhibits no distension. There is no tenderness.   Genitourinary: Testes normal. Uncircumcised.   Genitourinary Comments: FIDELIA 35 g smooth, no nodules, no rectal mass, no blood, normal sphincter tone.    Skin: Skin is warm and dry. He  is not diaphoretic.         Significant Labs:    BMP:    Recent Labs  Lab 03/09/18  1736 03/10/18  0402 03/11/18  0430    139 138   K 3.6 3.7 3.7    106 105   CO2 27 22* 26   BUN 10 8 8   CREATININE 0.8 0.7 0.8   CALCIUM 9.7 9.1 9.0       CBC:    Recent Labs  Lab 03/09/18  1736 03/09/18  1737 03/10/18  0402 03/11/18  0430   WBC 11.37  --  10.54 6.06   HGB 12.8*  --  12.0* 11.7*   HCT 37.7* 38 35.4* 35.1*     --  304 294       All pertinent labs results from the past 24 hours have been reviewed.    Significant Imaging:  CT A/P with IV, PO and rectal contrast:      No hydronephrosis, nephrolithiasis, or renal masses.  Contrast is filtered and excreted bilaterally.  There is air in the lumen of the bladder.  There is wall thickening of the sigmoid colon where it is closely apposed to the bladder.  This area appears suspicious for invasion into bladder/ colovesical fistula.  The mid and distal left ureter is opacified with contrast and does not appear to be involved in the fistula, although is adjacent to the area of concern.                        Assessment/Plan:     Colovesical fistula    53 YOM with HTN, Hep C, CAD s/p MI, current everyday 35 pack year smoker with colovesical fistula likely secondary to sigmoid mass    - no further urologic workup indicated   - CRS to perform flexible sigmoidoscopy Monday 3/12/2018  - Dr. Morrow will be available for surgery on Tuesday 3/13/2018  - will plan for cystoscopy with ureteral catheter insertion at the start of the case  - may need pelvic exenteration, but this will be determined at time of surgery-- patient understands this possibility   - wound care to mikki for possible urostomy             VTE Risk Mitigation         Ordered     Medium Risk of VTE  Once      03/10/18 0348     Place sequential compression device  Until discontinued      03/10/18 0348     enoxaparin injection 40 mg  Daily     Route:  Subcutaneous        03/10/18 0348          Masood STEINBERG  MD [place]  Urology  [facility]

## 2018-03-11 NOTE — PLAN OF CARE
Problem: Patient Care Overview  Goal: Plan of Care Review  Outcome: Ongoing (interventions implemented as appropriate)  Plan of care discussed with pt. Pt verbalizes understanding. Pt tolerating regulardiet with no complaints of discomfort or nausea. Pain managed with PRN pain medication.  normal sinus rhythm. Pt ambulated in room once with PT/OT. Pt positions independently. Vital signs stable. Pt remains free of falls and injury.Pt NPO p MN for sigmoid flex gi danni. No acute events this shift. Will continue to monitor.

## 2018-03-11 NOTE — SUBJECTIVE & OBJECTIVE
Past Medical History:   Diagnosis Date    Coronary artery disease     Depression     Hepatitis C 3/9/2018    History of psychiatric care     History of psychiatric hospitalization     Hypertension     MI (myocardial infarction)     6 months ago    Neuropathy     Psychiatric problem     Psychosis     Self-harming behavior     Suicide attempt        Past Surgical History:   Procedure Laterality Date    ABDOMINAL SURGERY      History of perforated ulcer, unknown surgery    APPENDECTOMY         Review of patient's allergies indicates:  No Known Allergies    Family History     Problem Relation (Age of Onset)    ADD / ADHD Cousin    No Known Problems Mother, Father, Sister, Brother, Maternal Aunt, Paternal Aunt, Maternal Uncle, Paternal Uncle, Maternal Grandfather, Maternal Grandmother, Paternal Grandfather, Paternal Grandmother          Social History Main Topics    Smoking status: Current Every Day Smoker     Packs/day: 2.00     Years: 30.00    Smokeless tobacco: Never Used    Alcohol use Yes    Drug use: Yes     Types: Oxycodone    Sexual activity: Yes     Partners: Female     Birth control/ protection: None       Review of Systems   Constitutional: Negative for chills and fever.   HENT: Negative for ear pain and hearing loss.    Eyes: Negative for pain and visual disturbance.   Respiratory: Negative for cough and shortness of breath.    Cardiovascular: Negative for chest pain and palpitations.   Gastrointestinal: Positive for abdominal pain. Negative for nausea and vomiting.   Genitourinary: Positive for hematuria.   Neurological: Negative for dizziness and headaches.   Psychiatric/Behavioral: Negative for agitation and behavioral problems.       Objective:     Temp:  [9.8 °F (-12.3 °C)-98.8 °F (37.1 °C)] 98.1 °F (36.7 °C)  Pulse:  [71-87] 71  Resp:  [14-20] 14  SpO2:  [95 %-97 %] 97 %  BP: (113-147)/(76-99) 144/87     Body mass index is 23.71 kg/m².            Drains          No matching active  lines, drains, or airways          Physical Exam   Constitutional: No distress.   HENT:   Head: Normocephalic and atraumatic.   Cardiovascular: Normal rate.    Pulmonary/Chest: Effort normal. No respiratory distress.   Abdominal: Soft. He exhibits no distension. There is no tenderness.   Genitourinary: Testes normal. Uncircumcised.   Genitourinary Comments: FIDELIA 35 g smooth, no nodules, no rectal mass, no blood, normal sphincter tone.    Skin: Skin is warm and dry. He is not diaphoretic.         Significant Labs:    BMP:    Recent Labs  Lab 03/09/18  1736 03/10/18  0402 03/11/18  0430    139 138   K 3.6 3.7 3.7    106 105   CO2 27 22* 26   BUN 10 8 8   CREATININE 0.8 0.7 0.8   CALCIUM 9.7 9.1 9.0       CBC:    Recent Labs  Lab 03/09/18 1736 03/09/18  1737 03/10/18  0402 03/11/18  0430   WBC 11.37  --  10.54 6.06   HGB 12.8*  --  12.0* 11.7*   HCT 37.7* 38 35.4* 35.1*     --  304 294       All pertinent labs results from the past 24 hours have been reviewed.    Significant Imaging:    CT A/P with IV, PO and rectal contrast:       No hydronephrosis, nephrolithiasis, or renal masses.  Contrast is filtered and excreted bilaterally.  There is air in the lumen of the bladder.  There is wall thickening of the sigmoid colon where it is closely apposed to the bladder.  This area appears suspicious for invasion into bladder/ colovesical fistula.  The mid and distal left ureter is opacified with contrast and does not appear to be involved in the fistula, although is adjacent to the area of concern.

## 2018-03-11 NOTE — SUBJECTIVE & OBJECTIVE
Past Medical History:   Diagnosis Date    Coronary artery disease     Depression     Hepatitis C 3/9/2018    History of psychiatric care     History of psychiatric hospitalization     Hypertension     MI (myocardial infarction)     6 months ago    Neuropathy     Psychiatric problem     Psychosis     Self-harming behavior     Suicide attempt        Past Surgical History:   Procedure Laterality Date    ABDOMINAL SURGERY      History of perforated ulcer, unknown surgery    APPENDECTOMY         Review of patient's allergies indicates:  No Known Allergies    Family History     Problem Relation (Age of Onset)    ADD / ADHD Cousin    No Known Problems Mother, Father, Sister, Brother, Maternal Aunt, Paternal Aunt, Maternal Uncle, Paternal Uncle, Maternal Grandfather, Maternal Grandmother, Paternal Grandfather, Paternal Grandmother          Social History Main Topics    Smoking status: Current Every Day Smoker     Packs/day: 2.00     Years: 30.00    Smokeless tobacco: Never Used    Alcohol use Yes    Drug use: Yes     Types: Oxycodone    Sexual activity: Yes     Partners: Female     Birth control/ protection: None       Review of Systems   Constitutional: Negative for chills and fever.   HENT: Negative for ear discharge and hearing loss.    Eyes: Negative for redness and visual disturbance.   Respiratory: Negative for cough and shortness of breath.    Cardiovascular: Negative for chest pain and palpitations.   Gastrointestinal: Negative for abdominal pain, nausea and vomiting.   Genitourinary: Positive for hematuria. Negative for flank pain.   Neurological: Negative for dizziness and headaches.   Psychiatric/Behavioral: Negative for agitation and behavioral problems.       Objective:     Temp:  [9.8 °F (-12.3 °C)-98.8 °F (37.1 °C)] 98.1 °F (36.7 °C)  Pulse:  [71-87] 71  Resp:  [14-20] 14  SpO2:  [95 %-97 %] 97 %  BP: (113-147)/(76-99) 144/87     Body mass index is 23.71 kg/m².            Drains          No  matching active lines, drains, or airways          Physical Exam   Constitutional: No distress.   HENT:   Head: Normocephalic and atraumatic.   Cardiovascular: Normal rate.    Pulmonary/Chest: Effort normal. No respiratory distress.   Abdominal: Soft. He exhibits no distension. There is no tenderness.   Genitourinary: Testes normal. Uncircumcised.   Genitourinary Comments: FIDELIA 35 g smooth, no nodules, no rectal mass, no blood, normal sphincter tone.    Skin: Skin is warm and dry. He is not diaphoretic.         Significant Labs:    BMP:    Recent Labs  Lab 03/09/18  1736 03/10/18  0402 03/11/18  0430    139 138   K 3.6 3.7 3.7    106 105   CO2 27 22* 26   BUN 10 8 8   CREATININE 0.8 0.7 0.8   CALCIUM 9.7 9.1 9.0       CBC:    Recent Labs  Lab 03/09/18  1736 03/09/18  1737 03/10/18  0402 03/11/18  0430   WBC 11.37  --  10.54 6.06   HGB 12.8*  --  12.0* 11.7*   HCT 37.7* 38 35.4* 35.1*     --  304 294       All pertinent labs results from the past 24 hours have been reviewed.    Significant Imaging:  CT A/P with IV, PO and rectal contrast:      No hydronephrosis, nephrolithiasis, or renal masses.  Contrast is filtered and excreted bilaterally.  There is air in the lumen of the bladder.  There is wall thickening of the sigmoid colon where it is closely apposed to the bladder.  This area appears suspicious for invasion into bladder/ colovesical fistula.  The mid and distal left ureter is opacified with contrast and does not appear to be involved in the fistula, although is adjacent to the area of concern.

## 2018-03-11 NOTE — ASSESSMENT & PLAN NOTE
53 YOM with HTN, Hep C, CAD s/p MI, current everyday 35 pack year smoker with colovesical fistula likely secondary to sigmoid mass    - no further urologic workup indicated   - Dr. Morrow will be available for surgery on Tuesday 3/13/2018  - will plan for cystoscopy with ureteral catheter insertion at the start of the case  - may need pelvic exenteration, but this will be determined at time of surgery-- patient understands this possibility   - wound care to mikki for possible urostomy

## 2018-03-11 NOTE — ASSESSMENT & PLAN NOTE
-Patient's history is worrisome for either diverticulitis with colovesical fistula or malignancy with invasion into bladder, particularly given his history of bowel changes, increasing constipation, bloody bowel movements, and family history of colon cancer.  -CT C/A/P with PO, IV, and rectal contrast demonstrated focal thickening of sigmoid colon abutting bladder, no e/o metastatic liver disease  -Reg diet, IVFs, IV abx,pain/nausea meds, alcohol withdrawal protocol.  -Evaluated by cardiology for surgery, appreciate recommendations:    ekg shows q waves v1/2  No symptoms  Continue asa throughout casey-operative period as patient has stents in place and can never be off ASA  Optimize BP post-surgery by adding ace-I (lisinopril 5mg po daily) to goal bp < 130/80  Statin therapy is imperative and should be on atorvastatin 40mg po qhs at least  Assure o/p follow up with cardiology on discharge  RCRI 0.9% risk of casey-operative MACCE  Continue to surgery without any further cardiac w/u or intervention        -Plan for OR Tuesday

## 2018-03-11 NOTE — SUBJECTIVE & OBJECTIVE
Subjective:     Interval History: naeon. No complaints this morning. Evaluated by cardiology yesterday for surgery.     Post-Op Info:  * No surgery found *          Medications:  Continuous Infusions:   lactated ringers 100 mL/hr at 03/10/18 0409     Scheduled Meds:   aspirin  81 mg Oral Daily    atorvastatin  40 mg Oral Daily    ciprofloxacin (CIPRO)400mg/200ml D5W IVPB  400 mg Intravenous Q12H    enoxaparin  40 mg Subcutaneous Daily    folic acid  1 mg Oral Daily    ibuprofen  800 mg Intravenous Q8H    metronidazole  500 mg Intravenous Q8H    mirtazapine  15 mg Oral Nightly    multivitamin  1 tablet Oral Daily    sertraline  50 mg Oral Daily    thiamine  100 mg Oral Daily     PRN Meds:   diphenhydrAMINE    LORazepam    meperidine    metoclopramide HCl    naloxone    ondansetron    oxyCODONE        Objective:     Vital Signs (Most Recent):  Temp: 98.1 °F (36.7 °C) (03/11/18 0703)  Pulse: 71 (03/11/18 0703)  Resp: 14 (03/11/18 0703)  BP: (!) 144/87 (03/11/18 0703)  SpO2: 97 % (03/11/18 0703) Vital Signs (24h Range):  Temp:  [9.8 °F (-12.3 °C)-98.8 °F (37.1 °C)] 98.1 °F (36.7 °C)  Pulse:  [71-87] 71  Resp:  [14-20] 14  SpO2:  [95 %-97 %] 97 %  BP: (113-147)/(76-99) 144/87     Intake/Output - Last 3 Shifts       03/09 0700 - 03/10 0659 03/10 0700 - 03/11 0659 03/11 0700 - 03/12 0659    P.O. 0 0     I.V. (mL/kg) 66.7 (0.9) 2293.3 (29.7)     IV Piggyback 300 1450     Total Intake(mL/kg) 366.7 (4.8) 3743.3 (48.6)     Urine (mL/kg/hr) 350 2200 (1.2)     Emesis/NG output 0 0 (0)     Other 0 0 (0)     Stool 0 0 (0)     Blood 0 0 (0)     Total Output 350 2200      Net +16.7 +1543.3             Urine Occurrence 0 x 0 x     Stool Occurrence 0 x 2 x     Emesis Occurrence  0 x           Physical Exam      Constitutional: He is oriented to person, place, and time. He appears well-developed and well-nourished. No distress.   HENT:   Head: Normocephalic and atraumatic.   Eyes: EOM are normal.   Neck: Normal  range of motion.   Cardiovascular: Normal rate.    Pulmonary/Chest: Effort normal. No respiratory distress.   Abdominal: Soft. He exhibits no distension. There is tenderness (lower abdomen). There is guarding (voluntary in lower abdomen).   Musculoskeletal: Normal range of motion.   Neurological: He is alert and oriented to person, place, and time.   Skin: Skin is warm and dry. He is not diaphoretic.   Psychiatric: He has a normal mood and affect.         Anorectal Exam:  Anal Skin: Normal     Digital Rectal Exam:  Resting Tone normal  Squeeze normal  Relaxation with bear down present  Mass none  Rectocele  absent  Tenderness  absent  Significant Labs:  All pertinent lab results within the last 24 hours have been reviewed.     Significant Diagnostics:  I have reviewed all pertinent imaging results/findings within the past 24 hours.     CT abdomen/pelvis 3/9:   1.  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  Air within the urinary bladder is suspected to be on the bases of the same although correlation for recent catheterization and clinical symptomatology.  There is indistinctness about the colon region, with several although nonenlarged lymph nodes, and disorganized fluid.  Please see above for full description.    2.  Vague groundglass focus of attenuation within the right upper lobe of the right lung, nonspecific, could reflect nonspecific inflammation or infection, metastatic disease however cannot be excluded in this setting.  Followup is advised.    3.  Constipation.

## 2018-03-11 NOTE — HPI
53 YOM with HTN, Hep C, CAD s/p MI, current everyday 35 pack year smoker who presented to the ED with fecaluria and pneumaturia.     This occurred after he went to an urgent care, where they prescribed him a laxative due to a several month history of increasing constipation and narrowing caliber of his stools. He states that he has blood in his urine as well as stool. He has also developed lower abdominal pain.  He has lost15 lbs over the past few months. He denies fatigue, fevers, CP, or SOB.      PSHx positive for perforated ulcer and appendectomy.  He was diagnosed with HCV about 15 years ago but never sought treatment for this. He was previously hospitalized and seen by inpatient psychiatry for substance abuse, past history of suicide attempt. He also has a history of an MI s/p stents, on ASA, does not follow with any physician.     Family history is notable for multiple first degree relatives with lung cancer and an uncle with colon cancer, unsure as to age of diagnosis. His mother is with him and she is a survivor from what sounds like SCC of the neck related to smoking.  Family history is negative for urologic malignancy.

## 2018-03-11 NOTE — PROGRESS NOTES
[facility]  Colorectal Surgery  Progress Note    Patient Name: Chris Aguirre  MRN: [id number]  Admission Date: 3/9/2018  Hospital Length of Stay: 1 days  Attending Physician: Mikal Nino MD    Subjective:     Interval History: naeon. No complaints this morning. Evaluated by cardiology yesterday for surgery.     Post-Op Info:  * No surgery found *          Medications:  Continuous Infusions:   lactated ringers 100 mL/hr at 03/10/18 0409     Scheduled Meds:   aspirin  81 mg Oral Daily    atorvastatin  40 mg Oral Daily    ciprofloxacin (CIPRO)400mg/200ml D5W IVPB  400 mg Intravenous Q12H    enoxaparin  40 mg Subcutaneous Daily    folic acid  1 mg Oral Daily    ibuprofen  800 mg Intravenous Q8H    metronidazole  500 mg Intravenous Q8H    mirtazapine  15 mg Oral Nightly    multivitamin  1 tablet Oral Daily    sertraline  50 mg Oral Daily    thiamine  100 mg Oral Daily     PRN Meds:   diphenhydrAMINE    LORazepam    meperidine    metoclopramide HCl    naloxone    ondansetron    oxyCODONE        Objective:     Vital Signs (Most Recent):  Temp: 98.1 °F (36.7 °C) (03/11/18 0703)  Pulse: 71 (03/11/18 0703)  Resp: 14 (03/11/18 0703)  BP: (!) 144/87 (03/11/18 0703)  SpO2: 97 % (03/11/18 0703) Vital Signs (24h Range):  Temp:  [9.8 °F (-12.3 °C)-98.8 °F (37.1 °C)] 98.1 °F (36.7 °C)  Pulse:  [71-87] 71  Resp:  [14-20] 14  SpO2:  [95 %-97 %] 97 %  BP: (113-147)/(76-99) 144/87     Intake/Output - Last 3 Shifts       03/09 0700 - 03/10 0659 03/10 0700 - 03/11 0659 03/11 0700 - 03/12 0659    P.O. 0 0     I.V. (mL/kg) 66.7 (0.9) 2293.3 (29.7)     IV Piggyback 300 1450     Total Intake(mL/kg) 366.7 (4.8) 3743.3 (48.6)     Urine (mL/kg/hr) 350 2200 (1.2)     Emesis/NG output 0 0 (0)     Other 0 0 (0)     Stool 0 0 (0)     Blood 0 0 (0)     Total Output 350 2200      Net +16.7 +1543.3             Urine Occurrence 0 x 0 x     Stool Occurrence 0 x 2 x     Emesis Occurrence  0 x           Physical  Exam      Constitutional: He is oriented to person, place, and time. He appears well-developed and well-nourished. No distress.   HENT:   Head: Normocephalic and atraumatic.   Eyes: EOM are normal.   Neck: Normal range of motion.   Cardiovascular: Normal rate.    Pulmonary/Chest: Effort normal. No respiratory distress.   Abdominal: Soft. He exhibits no distension. There is tenderness (lower abdomen). There is guarding (voluntary in lower abdomen).   Musculoskeletal: Normal range of motion.   Neurological: He is alert and oriented to person, place, and time.   Skin: Skin is warm and dry. He is not diaphoretic.   Psychiatric: He has a normal mood and affect.         Anorectal Exam:  Anal Skin: Normal     Digital Rectal Exam:  Resting Tone normal  Squeeze normal  Relaxation with bear down present  Mass none  Rectocele  absent  Tenderness  absent  Significant Labs:  All pertinent lab results within the last 24 hours have been reviewed.     Significant Diagnostics:  I have reviewed all pertinent imaging results/findings within the past 24 hours.     CT abdomen/pelvis 3/9:   1.  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  Air within the urinary bladder is suspected to be on the bases of the same although correlation for recent catheterization and clinical symptomatology.  There is indistinctness about the colon region, with several although nonenlarged lymph nodes, and disorganized fluid.  Please see above for full description.    2.  Vague groundglass focus of attenuation within the right upper lobe of the right lung, nonspecific, could reflect nonspecific inflammation or infection, metastatic disease however cannot be excluded in this setting.  Followup is advised.    3.  Constipation.    Assessment/Plan:     Pneumaturia    -Patient's history is worrisome for either diverticulitis with colovesical fistula or malignancy with  invasion into bladder, particularly given his history of bowel changes, increasing constipation, bloody bowel movements, and family history of colon cancer.  -CT C/A/P with PO, IV, and rectal contrast demonstrated focal thickening of sigmoid colon abutting bladder, no e/o metastatic liver disease  -Reg diet, IVFs, IV abx,pain/nausea meds, alcohol withdrawal protocol.  -Evaluated by cardiology for surgery, appreciate recommendations:    ekg shows q waves v1/2  No symptoms  Continue asa throughout casey-operative period as patient has stents in place and can never be off ASA  Optimize BP post-surgery by adding ace-I (lisinopril 5mg po daily) to goal bp < 130/80  Statin therapy is imperative and should be on atorvastatin 40mg po qhs at least  Assure o/p follow up with cardiology on discharge  RCRI 0.9% risk of casey-operative MACCE  Continue to surgery without any further cardiac w/u or intervention        -Plan for OR Tuesday               Romel young MD  Colorectal Surgery  [facility]

## 2018-03-11 NOTE — CONSULTS
Patient seen for colostomy and urostomy marking. Patient seen and provided pre-op education and educational booklets. Answered patient's and family member's questions at bedside. Assessed patient's abdomen: assessed laying,sitting and standing. Belt line identified by patient. Marked patient with x to LUQ and RUQ. Patient denied any further questions. Ostomy dept to continue to follow educational needs.

## 2018-03-11 NOTE — CONSULTS
[facility]  Urology  Consult Note    Patient Name: Chris Aguirre  MRN: [id number]  Admission Date: 3/9/2018  Hospital Length of Stay: 1   Code Status: Full Code   Attending Provider: Meng Morrow MD  Consulting Provider: Masood Shafer MD  Primary Care Physician: Ash Hung NP  Principal Problem:Pneumaturia    Inpatient consult to Urology  Consult performed by: MASOOD SHAFER.  Consult ordered by: BROOK RODRIGES          Subjective:     HPI:  53 YOM with HTN, Hep C, CAD s/p MI, current everyday 35 pack year smoker who presented to the ED with fecaluria and pneumaturia.     This occurred after he went to an urgent care, where they prescribed him a laxative due to a several month history of increasing constipation and narrowing caliber of his stools. He states that he has blood in his urine as well as stool. He has also developed lower abdominal pain.  He has lost15 lbs over the past few months. He denies fatigue, fevers, CP, or SOB.      PSHx positive for perforated ulcer and appendectomy.  He was diagnosed with HCV about 15 years ago but never sought treatment for this. He was previously hospitalized and seen by inpatient psychiatry for substance abuse, past history of suicide attempt. He also has a history of an MI s/p stents, on ASA, does not follow with any physician.     Family history is notable for multiple first degree relatives with lung cancer and an uncle with colon cancer, unsure as to age of diagnosis. His mother is with him and she is a survivor from what sounds like SCC of the neck related to smoking.  Family history is negative for urologic malignancy.      Past Medical History:   Diagnosis Date    Coronary artery disease     Depression     Hepatitis C 3/9/2018    History of psychiatric care     History of psychiatric hospitalization     Hypertension     MI (myocardial infarction)     6 months ago    Neuropathy     Psychiatric problem     Psychosis      Self-harming behavior     Suicide attempt        Past Surgical History:   Procedure Laterality Date    ABDOMINAL SURGERY      History of perforated ulcer, unknown surgery    APPENDECTOMY         Review of patient's allergies indicates:  No Known Allergies    Family History     Problem Relation (Age of Onset)    ADD / ADHD Cousin    No Known Problems Mother, Father, Sister, Brother, Maternal Aunt, Paternal Aunt, Maternal Uncle, Paternal Uncle, Maternal Grandfather, Maternal Grandmother, Paternal Grandfather, Paternal Grandmother          Social History Main Topics    Smoking status: Current Every Day Smoker     Packs/day: 2.00     Years: 30.00    Smokeless tobacco: Never Used    Alcohol use Yes    Drug use: Yes     Types: Oxycodone    Sexual activity: Yes     Partners: Female     Birth control/ protection: None       Review of Systems   Constitutional: Negative for chills and fever.   HENT: Negative for ear pain and hearing loss.    Eyes: Negative for pain and visual disturbance.   Respiratory: Negative for cough and shortness of breath.    Cardiovascular: Negative for chest pain and palpitations.   Gastrointestinal: Positive for abdominal pain. Negative for nausea and vomiting.   Genitourinary: Positive for hematuria.   Neurological: Negative for dizziness and headaches.   Psychiatric/Behavioral: Negative for agitation and behavioral problems.       Objective:     Temp:  [9.8 °F (-12.3 °C)-98.8 °F (37.1 °C)] 98.1 °F (36.7 °C)  Pulse:  [71-87] 71  Resp:  [14-20] 14  SpO2:  [95 %-97 %] 97 %  BP: (113-147)/(76-99) 144/87     Body mass index is 23.71 kg/m².            Drains          No matching active lines, drains, or airways          Physical Exam   Constitutional: No distress.   HENT:   Head: Normocephalic and atraumatic.   Cardiovascular: Normal rate.    Pulmonary/Chest: Effort normal. No respiratory distress.   Abdominal: Soft. He exhibits no distension. There is no tenderness.   Genitourinary: Testes  normal. Uncircumcised.   Genitourinary Comments: FIDELIA 35 g smooth, no nodules, no rectal mass, no blood, normal sphincter tone.    Skin: Skin is warm and dry. He is not diaphoretic.         Significant Labs:    BMP:    Recent Labs  Lab 03/09/18  1736 03/10/18  0402 03/11/18  0430    139 138   K 3.6 3.7 3.7    106 105   CO2 27 22* 26   BUN 10 8 8   CREATININE 0.8 0.7 0.8   CALCIUM 9.7 9.1 9.0       CBC:    Recent Labs  Lab 03/09/18  1736 03/09/18  1737 03/10/18  0402 03/11/18  0430   WBC 11.37  --  10.54 6.06   HGB 12.8*  --  12.0* 11.7*   HCT 37.7* 38 35.4* 35.1*     --  304 294       All pertinent labs results from the past 24 hours have been reviewed.    Significant Imaging:    CT A/P with IV, PO and rectal contrast:       No hydronephrosis, nephrolithiasis, or renal masses.  Contrast is filtered and excreted bilaterally.  There is air in the lumen of the bladder.  There is wall thickening of the sigmoid colon where it is closely apposed to the bladder.  This area appears suspicious for invasion into bladder/ colovesical fistula.  The mid and distal left ureter is opacified with contrast and does not appear to be involved in the fistula, although is adjacent to the area of concern.                    Assessment and Plan:     Colovesical fistula    53 YOM with HTN, Hep C, CAD s/p MI, current everyday 35 pack year smoker with colovesical fistula likely secondary to sigmoid mass    - no further urologic workup indicated   - Dr. Morrow will be available for surgery on Tuesday 3/13/2018  - will plan for cystoscopy with ureteral catheter insertion at the start of the case  - may need pelvic exenteration, but this will be determined at time of surgery-- patient understands this possibility   - wound care to mikki for possible urostomy             VTE Risk Mitigation         Ordered     Medium Risk of VTE  Once      03/10/18 0348     Place sequential compression device  Until discontinued      03/10/18  0348     enoxaparin injection 40 mg  Daily     Route:  Subcutaneous        03/10/18 0348          Thank you for your consult. I will follow-up with patient. Please contact us if you have any additional questions.    Masood Shafer MD  Urology  [facility]

## 2018-03-12 ENCOUNTER — ANESTHESIA EVENT (OUTPATIENT)
Dept: ENDOSCOPY | Facility: HOSPITAL | Age: 53
DRG: 330 | End: 2018-03-12
Payer: MEDICAID

## 2018-03-12 ENCOUNTER — SURGERY (OUTPATIENT)
Age: 53
End: 2018-03-12

## 2018-03-12 ENCOUNTER — ANESTHESIA EVENT (OUTPATIENT)
Dept: SURGERY | Facility: HOSPITAL | Age: 53
DRG: 330 | End: 2018-03-12
Payer: MEDICAID

## 2018-03-12 ENCOUNTER — ANESTHESIA (OUTPATIENT)
Dept: ENDOSCOPY | Facility: HOSPITAL | Age: 53
DRG: 330 | End: 2018-03-12
Payer: MEDICAID

## 2018-03-12 LAB
ABO + RH BLD: NORMAL
ANION GAP SERPL CALC-SCNC: 11 MMOL/L
BASOPHILS # BLD AUTO: 0.04 K/UL
BASOPHILS NFR BLD: 0.7 %
BLD GP AB SCN CELLS X3 SERPL QL: NORMAL
BUN SERPL-MCNC: 8 MG/DL
CALCIUM SERPL-MCNC: 9.3 MG/DL
CHLORIDE SERPL-SCNC: 106 MMOL/L
CO2 SERPL-SCNC: 25 MMOL/L
CREAT SERPL-MCNC: 0.8 MG/DL
DIFFERENTIAL METHOD: ABNORMAL
EOSINOPHIL # BLD AUTO: 0.2 K/UL
EOSINOPHIL NFR BLD: 4.2 %
ERYTHROCYTE [DISTWIDTH] IN BLOOD BY AUTOMATED COUNT: 13.3 %
EST. GFR  (AFRICAN AMERICAN): >60 ML/MIN/1.73 M^2
EST. GFR  (NON AFRICAN AMERICAN): >60 ML/MIN/1.73 M^2
GLUCOSE SERPL-MCNC: 80 MG/DL
HCT VFR BLD AUTO: 37.6 %
HGB BLD-MCNC: 12.2 G/DL
IMM GRANULOCYTES # BLD AUTO: 0.03 K/UL
IMM GRANULOCYTES NFR BLD AUTO: 0.5 %
INR PPP: 1.1
LYMPHOCYTES # BLD AUTO: 2.8 K/UL
LYMPHOCYTES NFR BLD: 52 %
MCH RBC QN AUTO: 28.5 PG
MCHC RBC AUTO-ENTMCNC: 32.4 G/DL
MCV RBC AUTO: 88 FL
MONOCYTES # BLD AUTO: 0.6 K/UL
MONOCYTES NFR BLD: 10.4 %
NEUTROPHILS # BLD AUTO: 1.8 K/UL
NEUTROPHILS NFR BLD: 32.2 %
NRBC BLD-RTO: 0 /100 WBC
PLATELET # BLD AUTO: 339 K/UL
PMV BLD AUTO: 10.4 FL
POTASSIUM SERPL-SCNC: 4 MMOL/L
PROTHROMBIN TIME: 11 SEC
RBC # BLD AUTO: 4.28 M/UL
SODIUM SERPL-SCNC: 142 MMOL/L
WBC # BLD AUTO: 5.46 K/UL

## 2018-03-12 PROCEDURE — 0DBP8ZX EXCISION OF RECTUM, VIA NATURAL OR ARTIFICIAL OPENING ENDOSCOPIC, DIAGNOSTIC: ICD-10-PCS | Performed by: COLON & RECTAL SURGERY

## 2018-03-12 PROCEDURE — 99232 SBSQ HOSP IP/OBS MODERATE 35: CPT | Mod: 25,,, | Performed by: COLON & RECTAL SURGERY

## 2018-03-12 PROCEDURE — S0030 INJECTION, METRONIDAZOLE: HCPCS | Performed by: COLON & RECTAL SURGERY

## 2018-03-12 PROCEDURE — 25000003 PHARM REV CODE 250: Performed by: COLON & RECTAL SURGERY

## 2018-03-12 PROCEDURE — 37000008 HC ANESTHESIA 1ST 15 MINUTES: Performed by: COLON & RECTAL SURGERY

## 2018-03-12 PROCEDURE — 63600175 PHARM REV CODE 636 W HCPCS: Performed by: COLON & RECTAL SURGERY

## 2018-03-12 PROCEDURE — 45338 SIGMOIDOSCOPY W/TUMR REMOVE: CPT | Mod: ,,, | Performed by: COLON & RECTAL SURGERY

## 2018-03-12 PROCEDURE — D9220A PRA ANESTHESIA: Mod: CRNA,,, | Performed by: NURSE ANESTHETIST, CERTIFIED REGISTERED

## 2018-03-12 PROCEDURE — 88305 TISSUE EXAM BY PATHOLOGIST: CPT | Performed by: PATHOLOGY

## 2018-03-12 PROCEDURE — 88305 TISSUE EXAM BY PATHOLOGIST: CPT | Mod: 26,,, | Performed by: PATHOLOGY

## 2018-03-12 PROCEDURE — 86920 COMPATIBILITY TEST SPIN: CPT

## 2018-03-12 PROCEDURE — 37000009 HC ANESTHESIA EA ADD 15 MINS: Performed by: COLON & RECTAL SURGERY

## 2018-03-12 PROCEDURE — 80048 BASIC METABOLIC PNL TOTAL CA: CPT

## 2018-03-12 PROCEDURE — 25000003 PHARM REV CODE 250: Performed by: NURSE ANESTHETIST, CERTIFIED REGISTERED

## 2018-03-12 PROCEDURE — 25000003 PHARM REV CODE 250: Performed by: STUDENT IN AN ORGANIZED HEALTH CARE EDUCATION/TRAINING PROGRAM

## 2018-03-12 PROCEDURE — 45338 SIGMOIDOSCOPY W/TUMR REMOVE: CPT | Performed by: COLON & RECTAL SURGERY

## 2018-03-12 PROCEDURE — 20600001 HC STEP DOWN PRIVATE ROOM

## 2018-03-12 PROCEDURE — D9220A PRA ANESTHESIA: Mod: ANES,,, | Performed by: ANESTHESIOLOGY

## 2018-03-12 PROCEDURE — 85610 PROTHROMBIN TIME: CPT

## 2018-03-12 PROCEDURE — 63600175 PHARM REV CODE 636 W HCPCS: Performed by: NURSE ANESTHETIST, CERTIFIED REGISTERED

## 2018-03-12 PROCEDURE — 36415 COLL VENOUS BLD VENIPUNCTURE: CPT

## 2018-03-12 PROCEDURE — 85025 COMPLETE CBC W/AUTO DIFF WBC: CPT

## 2018-03-12 PROCEDURE — 27201089 HC SNARE, DISP (ANY): Performed by: COLON & RECTAL SURGERY

## 2018-03-12 PROCEDURE — 86850 RBC ANTIBODY SCREEN: CPT

## 2018-03-12 PROCEDURE — 25000003 PHARM REV CODE 250: Performed by: SURGERY

## 2018-03-12 RX ORDER — NEOMYCIN SULFATE 500 MG/1
1000 TABLET ORAL
Status: COMPLETED | OUTPATIENT
Start: 2018-03-12 | End: 2018-03-12

## 2018-03-12 RX ORDER — PROPOFOL 10 MG/ML
VIAL (ML) INTRAVENOUS
Status: DISCONTINUED | OUTPATIENT
Start: 2018-03-12 | End: 2018-03-12

## 2018-03-12 RX ORDER — POLYETHYLENE GLYCOL 3350, SODIUM SULFATE ANHYDROUS, SODIUM BICARBONATE, SODIUM CHLORIDE, POTASSIUM CHLORIDE 236; 22.74; 6.74; 5.86; 2.97 G/4L; G/4L; G/4L; G/4L; G/4L
4000 POWDER, FOR SOLUTION ORAL ONCE
Status: COMPLETED | OUTPATIENT
Start: 2018-03-12 | End: 2018-03-12

## 2018-03-12 RX ORDER — LIDOCAINE HCL/PF 100 MG/5ML
SYRINGE (ML) INTRAVENOUS
Status: DISCONTINUED | OUTPATIENT
Start: 2018-03-12 | End: 2018-03-12

## 2018-03-12 RX ORDER — METRONIDAZOLE 500 MG/1
500 TABLET ORAL
Status: COMPLETED | OUTPATIENT
Start: 2018-03-12 | End: 2018-03-12

## 2018-03-12 RX ORDER — METRONIDAZOLE 500 MG/100ML
500 INJECTION, SOLUTION INTRAVENOUS
Status: DISCONTINUED | OUTPATIENT
Start: 2018-03-12 | End: 2018-03-13

## 2018-03-12 RX ORDER — SODIUM CHLORIDE 0.9 % (FLUSH) 0.9 %
3 SYRINGE (ML) INJECTION
Status: DISCONTINUED | OUTPATIENT
Start: 2018-03-12 | End: 2018-03-12 | Stop reason: HOSPADM

## 2018-03-12 RX ORDER — METRONIDAZOLE 500 MG/1
500 TABLET ORAL ONCE
Status: COMPLETED | OUTPATIENT
Start: 2018-03-13 | End: 2018-03-13

## 2018-03-12 RX ORDER — NEOMYCIN SULFATE 500 MG/1
1000 TABLET ORAL ONCE
Status: COMPLETED | OUTPATIENT
Start: 2018-03-13 | End: 2018-03-13

## 2018-03-12 RX ORDER — SODIUM CHLORIDE 9 MG/ML
INJECTION, SOLUTION INTRAVENOUS CONTINUOUS PRN
Status: DISCONTINUED | OUTPATIENT
Start: 2018-03-12 | End: 2018-03-12

## 2018-03-12 RX ORDER — METRONIDAZOLE 500 MG/100ML
500 INJECTION, SOLUTION INTRAVENOUS
Status: DISCONTINUED | OUTPATIENT
Start: 2018-03-13 | End: 2018-03-12

## 2018-03-12 RX ADMIN — SERTRALINE HYDROCHLORIDE 50 MG: 50 TABLET ORAL at 09:03

## 2018-03-12 RX ADMIN — MEPERIDINE HYDROCHLORIDE 25 MG: 50 INJECTION INTRAMUSCULAR; INTRAVENOUS; SUBCUTANEOUS at 10:03

## 2018-03-12 RX ADMIN — PROPOFOL 50 MG: 10 INJECTION, EMULSION INTRAVENOUS at 05:03

## 2018-03-12 RX ADMIN — ENOXAPARIN SODIUM 40 MG: 100 INJECTION SUBCUTANEOUS at 06:03

## 2018-03-12 RX ADMIN — ATORVASTATIN CALCIUM 40 MG: 20 TABLET, FILM COATED ORAL at 09:03

## 2018-03-12 RX ADMIN — MEPERIDINE HYDROCHLORIDE 25 MG: 50 INJECTION INTRAMUSCULAR; INTRAVENOUS; SUBCUTANEOUS at 06:03

## 2018-03-12 RX ADMIN — Medication 100 MG: at 08:03

## 2018-03-12 RX ADMIN — METRONIDAZOLE 500 MG: 500 INJECTION, SOLUTION INTRAVENOUS at 12:03

## 2018-03-12 RX ADMIN — NEOMYCIN SULFATE 1000 MG: 500 TABLET ORAL at 10:03

## 2018-03-12 RX ADMIN — DEXTROSE MONOHYDRATE, SODIUM CHLORIDE, AND POTASSIUM CHLORIDE: 50; 4.5; 1.49 INJECTION, SOLUTION INTRAVENOUS at 07:03

## 2018-03-12 RX ADMIN — PROPOFOL 80 MG: 10 INJECTION, EMULSION INTRAVENOUS at 04:03

## 2018-03-12 RX ADMIN — ENEMA 2 ENEMA: 19; 7 ENEMA RECTAL at 08:03

## 2018-03-12 RX ADMIN — METRONIDAZOLE 500 MG: 500 INJECTION, SOLUTION INTRAVENOUS at 03:03

## 2018-03-12 RX ADMIN — SODIUM CHLORIDE: 0.9 INJECTION, SOLUTION INTRAVENOUS at 04:03

## 2018-03-12 RX ADMIN — CIPROFLOXACIN 400 MG: 2 INJECTION, SOLUTION INTRAVENOUS at 06:03

## 2018-03-12 RX ADMIN — METRONIDAZOLE 500 MG: 500 TABLET ORAL at 10:03

## 2018-03-12 RX ADMIN — OXYCODONE HYDROCHLORIDE 5 MG: 5 TABLET ORAL at 09:03

## 2018-03-12 RX ADMIN — POLYETHYLENE GLYCOL 3350, SODIUM SULFATE ANHYDROUS, SODIUM BICARBONATE, SODIUM CHLORIDE, POTASSIUM CHLORIDE 4000 ML: 236; 22.74; 6.74; 5.86; 2.97 POWDER, FOR SOLUTION ORAL at 10:03

## 2018-03-12 RX ADMIN — IBUPROFEN 800 MG: 800 INJECTION INTRAVENOUS at 05:03

## 2018-03-12 RX ADMIN — METRONIDAZOLE 500 MG: 500 INJECTION, SOLUTION INTRAVENOUS at 08:03

## 2018-03-12 RX ADMIN — METRONIDAZOLE 500 MG: 500 TABLET ORAL at 11:03

## 2018-03-12 RX ADMIN — MEPERIDINE HYDROCHLORIDE 25 MG: 50 INJECTION INTRAMUSCULAR; INTRAVENOUS; SUBCUTANEOUS at 12:03

## 2018-03-12 RX ADMIN — FOLIC ACID 1 MG: 1 TABLET ORAL at 08:03

## 2018-03-12 RX ADMIN — MIRTAZAPINE 15 MG: 7.5 TABLET ORAL at 08:03

## 2018-03-12 RX ADMIN — LIDOCAINE HYDROCHLORIDE 80 MG: 20 INJECTION, SOLUTION INTRAVENOUS at 04:03

## 2018-03-12 RX ADMIN — PROPOFOL 50 MG: 10 INJECTION, EMULSION INTRAVENOUS at 04:03

## 2018-03-12 RX ADMIN — NEOMYCIN SULFATE 1000 MG: 500 TABLET ORAL at 11:03

## 2018-03-12 RX ADMIN — THERA TABS 1 TABLET: TAB at 09:03

## 2018-03-12 RX ADMIN — CIPROFLOXACIN 400 MG: 2 INJECTION, SOLUTION INTRAVENOUS at 03:03

## 2018-03-12 NOTE — PROVATION PATIENT INSTRUCTIONS
Discharge Summary/Instructions after an Endoscopic Procedure  Patient Name: Chris Aguirre  Patient MRN: [id number]  Patient YOB: 1965  Monday, March 12, 2018  Mikal Nino MD  RESTRICTIONS:  During your procedure today, you received medications for sedation.  These   medications may affect your judgment, balance and coordination.  Therefore,   for 24 hours, you have the following restrictions:   - DO NOT drive a car, operate machinery, make legal/financial decisions,   sign important papers or drink alcohol.    ACTIVITY:  The following day: return to full activity including work, except no heavy   lifting, straining or running for 3 days if polyps were removed.  DIET:  Eat and drink normally unless instructed otherwise.     TREATMENT FOR COMMON SIDE EFFECTS:  - Mild abdominal pain, nausea, belching, bloating or excessive gas:  rest,   eat lightly and use a heating pad.  - Sore Throat: treat with throat lozenges and/or gargle with warm salt   water.  - Because air was used during the procedure, expelling large amounts of air   from your rectum or belching is normal.  - If a bowel prep was taken, you may not have a bowel movement for 1-3 days.    This is normal.  SYMPTOMS TO WATCH FOR AND REPORT TO YOUR PHYSICIAN:  1. Abdominal pain or bloating, other than gas cramps.  2. Chest pain.  3. Back pain.  4. Signs of infection such as: chills or fever occurring within 24 hours   after the procedure.  5. Rectal bleeding, which would show as bright red, maroon, or black stools.   (A tablespoon of blood from the rectum is not serious, especially if   hemorrhoids are present.)  6. Vomiting.  7. Weakness or dizziness.  GO DIRECTLY TO THE NEAREST EMERGENCY ROOM IF YOU HAVE ANY OF THE FOLLOWING:      Difficulty breathing  Chills and/or fever over 101 F   Persistent vomiting and/or vomiting blood   Severe abdominal pain   Severe chest pain   Black, tarry stools   Bleeding- more than one tablespoon   Any other symptom or  condition that you feel may need urgent attention  Your doctor recommends these additional instructions:  If any biopsies were taken, your doctors clinic will contact you in 1 to 2   weeks with any results.  Do not eat or drink anything.   We are waiting for your pathology results.  For questions, problems or results please call your physician - Mikal Nino MD at Work:  [phone], Work:  [phone].  [facility], EMERGENCY ROOM PHONE NUMBER: [phone]  IF A COMPLICATION OR EMERGENCY SITUATION ARISES AND YOU ARE UNABLE TO REACH   YOUR PHYSICIAN - GO DIRECTLY TO THE EMERGENCY ROOM.  Mikal Nino MD  3/12/2018 5:26:25 PM  This report has been verified and signed electronically.

## 2018-03-12 NOTE — SUBJECTIVE & OBJECTIVE
Subjective:     Interval History: NAEON.  VSSAF.  Pain controlled.    Post-Op Info:  Procedure(s) (LRB):  SIGMOIDOSCOPY-FLEXIBLE (N/A)          Medications:  Continuous Infusions:   dextrose 5 % and 0.45 % NaCl with KCl 20 mEq 100 mL/hr at 03/11/18 2256     Scheduled Meds:   atorvastatin  40 mg Oral Daily    ciprofloxacin (CIPRO)400mg/200ml D5W IVPB  400 mg Intravenous Q12H    enoxaparin  40 mg Subcutaneous Daily    folic acid  1 mg Oral Daily    ibuprofen  800 mg Intravenous Q8H    metronidazole  500 mg Intravenous Q8H    mirtazapine  15 mg Oral Nightly    multivitamin  1 tablet Oral Daily    sertraline  50 mg Oral Daily    sodium phosphates  2 enema Rectal Once    thiamine  100 mg Oral Daily     PRN Meds:   diphenhydrAMINE    hydrALAZINE    LORazepam    meperidine    metoclopramide HCl    naloxone    ondansetron    oxyCODONE        Objective:     Vital Signs (Most Recent):  Temp: 98.3 °F (36.8 °C) (03/12/18 0425)  Pulse: (!) 58 (03/12/18 0425)  Resp: 18 (03/12/18 0002)  BP: (!) 141/94 (03/12/18 0425)  SpO2: 97 % (03/12/18 0425) Vital Signs (24h Range):  Temp:  [98.3 °F (36.8 °C)-98.9 °F (37.2 °C)] 98.3 °F (36.8 °C)  Pulse:  [58-98] 58  Resp:  [18] 18  SpO2:  [94 %-97 %] 97 %  BP: (136-154)/() 141/94     Intake/Output - Last 3 Shifts       03/10 0700 - 03/11 0659 03/11 0700 - 03/12 0659 03/12 0700 - 03/13 0659    P.O. 0 780     I.V. (mL/kg) 2293.3 (29.7) 2400 (31.1)     IV Piggyback 1450 1200     Total Intake(mL/kg) 3743.3 (48.6) 4380 (56.8)     Urine (mL/kg/hr) 2200 (1.2) 2660 (1.4)     Emesis/NG output 0 (0) 0 (0)     Other 0 (0) 0 (0)     Stool 0 (0) 0 (0)     Blood 0 (0) 0 (0)     Total Output 2200 2660      Net +1543.3 +1720             Urine Occurrence 0 x 0 x     Stool Occurrence 2 x 1 x     Emesis Occurrence 0 x 0 x           Physical Exam    Anorectal Exam:  NAD, AAOx3  RRR  CTAB  Abd S/ND/ATTP in LLQ      Significant Labs:  BMP:   Recent Labs  Lab 03/09/18  1736  03/12/18  0427    GLU 95  < > 80     < > 142   K 3.6  < > 4.0     < > 106   CO2 27  < > 25   BUN 10  < > 8   CREATININE 0.8  < > 0.8   CALCIUM 9.7  < > 9.3   MG 1.3*  --   --    < > = values in this interval not displayed.  CBC:   Recent Labs  Lab 03/12/18  0427   WBC 5.46   RBC 4.28*   HGB 12.2*   HCT 37.6*      MCV 88   MCH 28.5   MCHC 32.4

## 2018-03-12 NOTE — PROGRESS NOTES
[facility]  Colorectal Surgery  Progress Note    Patient Name: Chris Aguirre  MRN: [id number]  Admission Date: 3/9/2018  Hospital Length of Stay: 2 days  Attending Physician: Mikal Nino MD    Subjective:     Interval History: NAEON.  VSSAF.  Pain controlled.    Post-Op Info:  Procedure(s) (LRB):  SIGMOIDOSCOPY-FLEXIBLE (N/A)          Medications:  Continuous Infusions:   dextrose 5 % and 0.45 % NaCl with KCl 20 mEq 100 mL/hr at 03/11/18 2256     Scheduled Meds:   atorvastatin  40 mg Oral Daily    ciprofloxacin (CIPRO)400mg/200ml D5W IVPB  400 mg Intravenous Q12H    enoxaparin  40 mg Subcutaneous Daily    folic acid  1 mg Oral Daily    ibuprofen  800 mg Intravenous Q8H    metronidazole  500 mg Intravenous Q8H    mirtazapine  15 mg Oral Nightly    multivitamin  1 tablet Oral Daily    sertraline  50 mg Oral Daily    sodium phosphates  2 enema Rectal Once    thiamine  100 mg Oral Daily     PRN Meds:   diphenhydrAMINE    hydrALAZINE    LORazepam    meperidine    metoclopramide HCl    naloxone    ondansetron    oxyCODONE        Objective:     Vital Signs (Most Recent):  Temp: 98.3 °F (36.8 °C) (03/12/18 0425)  Pulse: (!) 58 (03/12/18 0425)  Resp: 18 (03/12/18 0002)  BP: (!) 141/94 (03/12/18 0425)  SpO2: 97 % (03/12/18 0425) Vital Signs (24h Range):  Temp:  [98.3 °F (36.8 °C)-98.9 °F (37.2 °C)] 98.3 °F (36.8 °C)  Pulse:  [58-98] 58  Resp:  [18] 18  SpO2:  [94 %-97 %] 97 %  BP: (136-154)/() 141/94     Intake/Output - Last 3 Shifts       03/10 0700 - 03/11 0659 03/11 0700 - 03/12 0659 03/12 0700 - 03/13 0659    P.O. 0 780     I.V. (mL/kg) 2293.3 (29.7) 2400 (31.1)     IV Piggyback 1450 1200     Total Intake(mL/kg) 3743.3 (48.6) 4380 (56.8)     Urine (mL/kg/hr) 2200 (1.2) 2660 (1.4)     Emesis/NG output 0 (0) 0 (0)     Other 0 (0) 0 (0)     Stool 0 (0) 0 (0)     Blood 0 (0) 0 (0)     Total Output 2200 2660      Net +1543.3 +1720             Urine Occurrence 0 x 0 x     Stool  Occurrence 2 x 1 x     Emesis Occurrence 0 x 0 x           Physical Exam    Anorectal Exam:  NAD, AAOx3  RRR  CTAB  Abd S/ND/ATTP in LLQ      Significant Labs:  BMP:   Recent Labs  Lab 03/09/18  1736  03/12/18  0427   GLU 95  < > 80     < > 142   K 3.6  < > 4.0     < > 106   CO2 27  < > 25   BUN 10  < > 8   CREATININE 0.8  < > 0.8   CALCIUM 9.7  < > 9.3   MG 1.3*  --   --    < > = values in this interval not displayed.  CBC:   Recent Labs  Lab 03/12/18 0427   WBC 5.46   RBC 4.28*   HGB 12.2*   HCT 37.6*      MCV 88   MCH 28.5   MCHC 32.4         Assessment/Plan:     * Pneumaturia    -Patient's history is worrisome for either diverticulitis with colovesical fistula or malignancy with invasion into bladder, particularly given his history of bowel changes, increasing constipation, bloody bowel movements, and family history of colon cancer.  -CT C/A/P with PO, IV, and rectal contrast demonstrated focal thickening of sigmoid colon abutting bladder, no e/o metastatic liver disease  -NPO, IVFs, IV abx,pain/nausea meds, alcohol withdrawal protocol.  -Evaluated by cardiology for surgery, appreciate recommendations:    ekg shows q waves v1/2  No symptoms  Continue asa throughout casey-operative period as patient has stents in place and can never be off ASA  Optimize BP post-surgery by adding ace-I (lisinopril 5mg po daily) to goal bp < 130/80  Statin therapy is imperative and should be on atorvastatin 40mg po qhs at least  Assure o/p follow up with cardiology on discharge  RCRI 0.9% risk of casey-operative MACCE  Continue to surgery without any further cardiac w/u or intervention      -Flexible sigmoidoscopy today  -OR Tuesday for sigmoidectomy              Kumar Jay MD  Colorectal Surgery  [facility]

## 2018-03-12 NOTE — PLAN OF CARE
Problem: Patient Care Overview  Goal: Plan of Care Review  Outcome: Ongoing (interventions implemented as appropriate)  Plan of care reviewed with the patient , a 53 year old male with the DX of pneumaturia ,, patient has stool in his urine and blood in his stool,,,, CT has shown malignancy ,,, NPO due to Sigmoid flex of the GI today, with possible colectomy and bladder resection on Tuesday,,,,  Up ad gloria, alert and oriented times 4, no wounds,,, house at 100, regular diet, NPO at the time,,, blood pressure started running high last night, SN had to call in to colol rectal on call for patricia PRN hydralazine,, pressure was back to WNL in an hour,,, resting nicely ,, bed locked and low, call light in reach....

## 2018-03-12 NOTE — NURSING TRANSFER
Nursing Transfer Note      3/12/2018     Transfer To: [facility] 606 From: [facility]    Transfer via stretcher    Transfer with na    Transported by escort    Medicines sent: none    Chart send with patient: Yes    Notified: [facility] RN    Patient reassessed at: 03/12/18 1800 (date, time)    Upon arrival to floor: patient oriented to room, call bell in reach and bed in lowest position

## 2018-03-12 NOTE — PLAN OF CARE
Problem: Patient Care Overview  Goal: Plan of Care Review  Outcome: Ongoing (interventions implemented as appropriate)  Plan of care discussed with pt. Pt verbalizes understanding. Pt. NPO for sigmoidoscopy today 3/12/18.  Pain managed with PRN pain medication. Pt. Ambulated independetley  To bathroom. Pt. Positions independently. Vital signs stable. Pt. Remains free of fall and injury. No. Acute events this shift. Will continue to monitor.

## 2018-03-12 NOTE — TRANSFER OF CARE
"Anesthesia Transfer of Care Note    Patient: Chris Aguirre    Procedure(s) Performed: Procedure(s) (LRB):  SIGMOIDOSCOPY-FLEXIBLE (N/A)    Patient location: PACU    Anesthesia Type: general    Transport from OR: Transported from OR on room air with adequate spontaneous ventilation    Post pain: adequate analgesia    Post assessment: no apparent anesthetic complications and tolerated procedure well    Post vital signs: stable    Level of consciousness: awake and responds to stimulation    Nausea/Vomiting: no nausea/vomiting    Complications: none    Transfer of care protocol was followed      Last vitals:   Visit Vitals  BP (!) 175/90 (Patient Position: Lying)   Pulse 62   Temp 36.7 °C (98.1 °F) (Temporal)   Resp 16   Ht 5' 11" (1.803 m)   Wt 77.1 kg (169 lb 15.6 oz)   SpO2 98%   BMI 23.71 kg/m²     "

## 2018-03-12 NOTE — ASSESSMENT & PLAN NOTE
-Patient's history is worrisome for either diverticulitis with colovesical fistula or malignancy with invasion into bladder, particularly given his history of bowel changes, increasing constipation, bloody bowel movements, and family history of colon cancer.  -CT C/A/P with PO, IV, and rectal contrast demonstrated focal thickening of sigmoid colon abutting bladder, no e/o metastatic liver disease  -NPO, IVFs, IV abx,pain/nausea meds, alcohol withdrawal protocol.  -Evaluated by cardiology for surgery, appreciate recommendations:    ekg shows q waves v1/2  No symptoms  Continue asa throughout casey-operative period as patient has stents in place and can never be off ASA  Optimize BP post-surgery by adding ace-I (lisinopril 5mg po daily) to goal bp < 130/80  Statin therapy is imperative and should be on atorvastatin 40mg po qhs at least  Assure o/p follow up with cardiology on discharge  RCRI 0.9% risk of casey-operative MACCE  Continue to surgery without any further cardiac w/u or intervention      -Flexible sigmoidoscopy today  -OR Tuesday for sigmoidectomy

## 2018-03-12 NOTE — ANESTHESIA PREPROCEDURE EVALUATION
03/12/2018  Chris Aguirre is a 53 y.o., male.  Patient Active Problem List   Diagnosis    Substance induced mood disorder    Alcohol dependence    Pneumaturia    Lower abdominal pain    Blood in stool    Other constipation    Hepatitis C    Pre-operative cardiovascular examination    Colovesical fistula     Past Surgical History:   Procedure Laterality Date    ABDOMINAL SURGERY      History of perforated ulcer, unknown surgery    APPENDECTOMY         Anesthesia Evaluation    I have reviewed the Patient Summary Reports.    I have reviewed the Nursing Notes.   I have reviewed the Medications.     Review of Systems      Physical Exam  General:  Well nourished    Airway/Jaw/Neck:  Airway Findings: Mouth Opening: Normal General Airway Assessment: Adult  Mallampati: II  Improves to II with phonation.  Jaw/Neck Findings:  Limited Ability to Prognath  Neck ROM: Normal ROM     Eyes/Ears/Nose:  Eyes/Ears/Nose Findings:    Dental:  Dental Findings: In tact   Chest/Lungs:  Chest/Lungs Findings: Clear to auscultation, Normal Respiratory Rate     Heart/Vascular:  Heart Findings: Rate: Normal  Rhythm: Regular Rhythm  Sounds: Normal     Abdomen:  Abdomen Findings:  Normal     Musculoskeletal:  Musculoskeletal Findings:    Skin:  Skin Findings:     Mental Status:  Mental Status Findings:  Cooperative, Alert and Oriented         Anesthesia Plan  Type of Anesthesia, risks & benefits discussed:  Anesthesia Type:  general, MAC  Patient's Preference:   Intra-op Monitoring Plan:   Intra-op Monitoring Plan Comments:   Post Op Pain Control Plan:   Post Op Pain Control Plan Comments:   Induction:   IV  Beta Blocker:  Patient is not currently on a Beta-Blocker (No further documentation required).       Informed Consent: Patient understands risks and agrees with Anesthesia plan.  Questions answered. Anesthesia consent  signed with patient.  ASA Score: 2     Day of Surgery Review of History & Physical:    H&P update referred to the surgeon.         Ready For Surgery From Anesthesia Perspective.

## 2018-03-12 NOTE — ANESTHESIA PREPROCEDURE EVALUATION
[facility]  Anesthesia Pre-Operative Evaluation         Patient Name: Chris Aguirre  YOB: 1965  MRN: [id number]    SUBJECTIVE:     Pre-operative evaluation for Procedure(s) (LRB):  COLECTOMY-SIGMOID POSSIBLE BLADDER RESECTION (N/A)  CYSTOSCOPY WITH STENT PLACEMENT (N/A)     03/12/2018    Chris Aguirre is a 53 y.o. male w/ a significant PMHx of HTN, Hep C, CAD s/p MI and stents, current everyday 35 pack year smoker admitted with fecaluria and pneumaturia who presents for the above procedure.    Patient underwent flex sigmoidoscopy today.    LDA:   L AC 20 G    Prev airway: None documented.     Drips:    dextrose 5 % and 0.45 % NaCl with KCl 20 mEq 100 mL/hr at 03/12/18 0745         Patient Active Problem List   Diagnosis    Substance induced mood disorder    Alcohol dependence    Pneumaturia    Lower abdominal pain    Blood in stool    Other constipation    Hepatitis C    Pre-operative cardiovascular examination    Colovesical fistula       Review of patient's allergies indicates:  No Known Allergies    Current Inpatient Medications:   atorvastatin  40 mg Oral Daily    ciprofloxacin (CIPRO)400mg/200ml D5W IVPB  400 mg Intravenous Q12H    enoxaparin  40 mg Subcutaneous Daily    folic acid  1 mg Oral Daily    ibuprofen  800 mg Intravenous Q8H    metronidazole  500 mg Intravenous Q8H    mirtazapine  15 mg Oral Nightly    multivitamin  1 tablet Oral Daily    sertraline  50 mg Oral Daily    thiamine  100 mg Oral Daily       No current facility-administered medications on file prior to encounter.      Current Outpatient Prescriptions on File Prior to Encounter   Medication Sig Dispense Refill    aspirin 81 MG Chew Take 1 tablet (81 mg total) by mouth once daily. 30 tablet 1    mirtazapine (REMERON) 15 MG tablet Take 1 tablet (15 mg total) by mouth nightly. 30 tablet 1    sertraline (ZOLOFT) 50 MG tablet Take 1 tablet (50 mg total) by mouth once daily. 30 tablet 1        Past Surgical History:   Procedure Laterality Date    ABDOMINAL SURGERY      History of perforated ulcer, unknown surgery    APPENDECTOMY         Social History     Social History    Marital status: Single     Spouse name: N/A    Number of children: N/A    Years of education: N/A     Occupational History    Not on file.     Social History Main Topics    Smoking status: Current Every Day Smoker     Packs/day: 2.00     Years: 30.00    Smokeless tobacco: Never Used    Alcohol use Yes    Drug use: Yes     Types: Oxycodone    Sexual activity: Yes     Partners: Female     Birth control/ protection: None     Other Topics Concern    Patient Feels They Ought To Cut Down On Drinking/Drug Use No    Patient Annoyed By Others Criticizing Their Drinking/Drug Use No    Patient Has Felt Bad Or Guilty About Drinking/Drug Use No    Patient Has Had A Drink/Used Drugs As An Eye Opener In [place] No     Social History Narrative    No narrative on file       OBJECTIVE:     Vital Signs Range (Last 24H):  Temp:  [36.7 °C (98.1 °F)-37.2 °C (98.9 °F)]   Pulse:  [53-98]   Resp:  [16-18]   BP: (115-175)/()   SpO2:  [96 %-98 %]       CBC:   Recent Labs      03/11/18   0430  03/12/18   0427   WBC  6.06  5.46   RBC  4.02*  4.28*   HGB  11.7*  12.2*   HCT  35.1*  37.6*   PLT  294  339   MCV  87  88   MCH  29.1  28.5   MCHC  33.3  32.4       CMP:   Recent Labs      03/09/18   1736  03/10/18   0402  03/11/18   0430  03/12/18   0427   NA  140  139  138  142   K  3.6  3.7  3.7  4.0   CL  104  106  105  106   CO2  27  22*  26  25   BUN  10  8  8  8   CREATININE  0.8  0.7  0.8  0.8   GLU  95  83  103  80   MG  1.3*   --    --    --    PHOS  3.3   --    --    --    CALCIUM  9.7  9.1  9.0  9.3   ALBUMIN  3.4*  2.9*   --    --    PROT  7.3  6.3   --    --    ALKPHOS  49*  50*   --    --    ALT  10  9*   --    --    AST  16  16   --    --    BILITOT  0.3  0.5   --    --        INR:  Recent Labs      03/10/18   0402  03/11/18    0430  03/12/18   0427   INR  1.0  1.1  1.1       Diagnostic Studies: No relevant studies.    EKG:   3/9/18  NSR 76 bpm    2D ECHO:  No results found for this or any previous visit.      ASSESSMENT/PLAN:       Anesthesia Evaluation    I have reviewed the Patient Summary Reports.     I have reviewed the Medications.     Review of Systems  Anesthesia Hx:  History of prior surgery of interest to airway management or planning: Denies Family Hx of Anesthesia complications.   Denies Personal Hx of Anesthesia complications.   Hematology/Oncology:  Hematology Normal   Oncology Normal     EENT/Dental:EENT/Dental Normal   Cardiovascular:   Hypertension Past MI CAD      Pulmonary:  Pulmonary Normal    Hepatic/GI:   Liver Disease, Hepatitis, C fecaluria and pneumaturia   Neurological:  Neurology Normal    Psych:   depression          Physical Exam  General:  Well nourished    Airway/Jaw/Neck:  Airway Findings: Mouth Opening: Normal General Airway Assessment: Adult  Mallampati: II  Improves to II with phonation.  Jaw/Neck Findings:  Limited Ability to Prognath  Neck ROM: Normal ROM     Eyes/Ears/Nose:  Eyes/Ears/Nose Findings:    Dental:  Dental Findings: In tact   Chest/Lungs:  Chest/Lungs Findings: Clear to auscultation, Normal Respiratory Rate     Heart/Vascular:  Heart Findings: Rate: Normal  Rhythm: Regular Rhythm  Sounds: Normal     Abdomen:  Abdomen Findings:  Normal     Musculoskeletal:  Musculoskeletal Findings:    Skin:  Skin Findings:     Mental Status:  Mental Status Findings:  Cooperative, Alert and Oriented         Anesthesia Plan  Type of Anesthesia, risks & benefits discussed:  Anesthesia Type:  general  Patient's Preference:   Intra-op Monitoring Plan: standard ASA monitors and arterial line  Intra-op Monitoring Plan Comments:   Post Op Pain Control Plan: multimodal analgesia, IV/PO Opioids PRN and per primary service following discharge from PACU  Post Op Pain Control Plan Comments:   Induction:   IV  Beta  Blocker:  Patient is not currently on a Beta-Blocker (No further documentation required).       Informed Consent: Patient representative understands risks and agrees with Anesthesia plan.  Questions answered. Anesthesia consent signed with patient representative.  ASA Score: 3     Day of Surgery Review of History & Physical:  There are no significant changes.          Ready For Surgery From Anesthesia Perspective.

## 2018-03-13 ENCOUNTER — ANESTHESIA (OUTPATIENT)
Dept: SURGERY | Facility: HOSPITAL | Age: 53
DRG: 330 | End: 2018-03-13
Payer: MEDICAID

## 2018-03-13 ENCOUNTER — SURGERY (OUTPATIENT)
Age: 53
End: 2018-03-13

## 2018-03-13 LAB
ANION GAP SERPL CALC-SCNC: 11 MMOL/L
BASOPHILS # BLD AUTO: 0.04 K/UL
BASOPHILS NFR BLD: 0.4 %
BUN SERPL-MCNC: 6 MG/DL
CALCIUM SERPL-MCNC: 10 MG/DL
CHLORIDE SERPL-SCNC: 106 MMOL/L
CO2 SERPL-SCNC: 25 MMOL/L
CREAT SERPL-MCNC: 0.8 MG/DL
DIFFERENTIAL METHOD: ABNORMAL
EOSINOPHIL # BLD AUTO: 0.2 K/UL
EOSINOPHIL NFR BLD: 1.8 %
ERYTHROCYTE [DISTWIDTH] IN BLOOD BY AUTOMATED COUNT: 13.4 %
EST. GFR  (AFRICAN AMERICAN): >60 ML/MIN/1.73 M^2
EST. GFR  (NON AFRICAN AMERICAN): >60 ML/MIN/1.73 M^2
GLUCOSE SERPL-MCNC: 120 MG/DL (ref 70–110)
GLUCOSE SERPL-MCNC: 159 MG/DL (ref 70–110)
GLUCOSE SERPL-MCNC: 94 MG/DL
HCO3 UR-SCNC: 23.8 MMOL/L (ref 24–28)
HCO3 UR-SCNC: 24 MMOL/L (ref 24–28)
HCT VFR BLD AUTO: 40.6 %
HCT VFR BLD CALC: 33 %PCV (ref 36–54)
HCT VFR BLD CALC: 35 %PCV (ref 36–54)
HGB BLD-MCNC: 13.4 G/DL
IMM GRANULOCYTES # BLD AUTO: 0.04 K/UL
IMM GRANULOCYTES NFR BLD AUTO: 0.4 %
INR PPP: 1
LYMPHOCYTES # BLD AUTO: 2.7 K/UL
LYMPHOCYTES NFR BLD: 27.4 %
MCH RBC QN AUTO: 28.8 PG
MCHC RBC AUTO-ENTMCNC: 33 G/DL
MCV RBC AUTO: 87 FL
MONOCYTES # BLD AUTO: 0.8 K/UL
MONOCYTES NFR BLD: 8.4 %
NEUTROPHILS # BLD AUTO: 6 K/UL
NEUTROPHILS NFR BLD: 61.6 %
NRBC BLD-RTO: 0 /100 WBC
PCO2 BLDA: 38.9 MMHG (ref 35–45)
PCO2 BLDA: 39.7 MMHG (ref 35–45)
PH SMN: 7.38 [PH] (ref 7.35–7.45)
PH SMN: 7.4 [PH] (ref 7.35–7.45)
PLATELET # BLD AUTO: 323 K/UL
PMV BLD AUTO: 9.8 FL
PO2 BLDA: 103 MMHG (ref 80–100)
PO2 BLDA: 81 MMHG (ref 80–100)
POC BE: -1 MMOL/L
POC BE: -1 MMOL/L
POC IONIZED CALCIUM: 1.06 MMOL/L (ref 1.06–1.42)
POC IONIZED CALCIUM: 1.09 MMOL/L (ref 1.06–1.42)
POC SATURATED O2: 96 % (ref 95–100)
POC SATURATED O2: 98 % (ref 95–100)
POC TCO2: 25 MMOL/L (ref 23–27)
POC TCO2: 25 MMOL/L (ref 23–27)
POTASSIUM BLD-SCNC: 3.6 MMOL/L (ref 3.5–5.1)
POTASSIUM BLD-SCNC: 3.8 MMOL/L (ref 3.5–5.1)
POTASSIUM SERPL-SCNC: 3.8 MMOL/L
PROTHROMBIN TIME: 10.9 SEC
RBC # BLD AUTO: 4.65 M/UL
SAMPLE: ABNORMAL
SAMPLE: ABNORMAL
SODIUM BLD-SCNC: 139 MMOL/L (ref 136–145)
SODIUM BLD-SCNC: 141 MMOL/L (ref 136–145)
SODIUM SERPL-SCNC: 142 MMOL/L
WBC # BLD AUTO: 9.74 K/UL

## 2018-03-13 PROCEDURE — 27201423 OPTIME MED/SURG SUP & DEVICES STERILE SUPPLY: Performed by: COLON & RECTAL SURGERY

## 2018-03-13 PROCEDURE — 63600175 PHARM REV CODE 636 W HCPCS: Performed by: SURGERY

## 2018-03-13 PROCEDURE — 25000003 PHARM REV CODE 250: Performed by: SURGERY

## 2018-03-13 PROCEDURE — D9220A PRA ANESTHESIA: Mod: ANES,,, | Performed by: ANESTHESIOLOGY

## 2018-03-13 PROCEDURE — 0T170ZB BYPASS LEFT URETER TO BLADDER, OPEN APPROACH: ICD-10-PCS | Performed by: UROLOGY

## 2018-03-13 PROCEDURE — 63600175 PHARM REV CODE 636 W HCPCS: Performed by: COLON & RECTAL SURGERY

## 2018-03-13 PROCEDURE — 36620 INSERTION CATHETER ARTERY: CPT | Mod: ,,, | Performed by: ANESTHESIOLOGY

## 2018-03-13 PROCEDURE — C1729 CATH, DRAINAGE: HCPCS | Performed by: COLON & RECTAL SURGERY

## 2018-03-13 PROCEDURE — C9290 INJ, BUPIVACAINE LIPOSOME: HCPCS | Performed by: COLON & RECTAL SURGERY

## 2018-03-13 PROCEDURE — 49905 OMENTAL FLAP INTRA-ABDOM: CPT | Mod: ,,, | Performed by: COLON & RECTAL SURGERY

## 2018-03-13 PROCEDURE — 63600175 PHARM REV CODE 636 W HCPCS: Performed by: NURSE ANESTHETIST, CERTIFIED REGISTERED

## 2018-03-13 PROCEDURE — 0D1B0Z4 BYPASS ILEUM TO CUTANEOUS, OPEN APPROACH: ICD-10-PCS | Performed by: COLON & RECTAL SURGERY

## 2018-03-13 PROCEDURE — C1765 ADHESION BARRIER: HCPCS | Performed by: COLON & RECTAL SURGERY

## 2018-03-13 PROCEDURE — 63600175 PHARM REV CODE 636 W HCPCS: Performed by: STUDENT IN AN ORGANIZED HEALTH CARE EDUCATION/TRAINING PROGRAM

## 2018-03-13 PROCEDURE — D9220A PRA ANESTHESIA: Mod: CRNA,,, | Performed by: NURSE ANESTHETIST, CERTIFIED REGISTERED

## 2018-03-13 PROCEDURE — 07BC0ZZ EXCISION OF PELVIS LYMPHATIC, OPEN APPROACH: ICD-10-PCS | Performed by: UROLOGY

## 2018-03-13 PROCEDURE — 0TBB0ZZ EXCISION OF BLADDER, OPEN APPROACH: ICD-10-PCS | Performed by: UROLOGY

## 2018-03-13 PROCEDURE — 38770 REMOVE PELVIS LYMPH NODES: CPT | Mod: 51,LT,, | Performed by: UROLOGY

## 2018-03-13 PROCEDURE — 37000009 HC ANESTHESIA EA ADD 15 MINS: Performed by: COLON & RECTAL SURGERY

## 2018-03-13 PROCEDURE — 80048 BASIC METABOLIC PNL TOTAL CA: CPT

## 2018-03-13 PROCEDURE — 25000003 PHARM REV CODE 250: Performed by: ANESTHESIOLOGY

## 2018-03-13 PROCEDURE — 51565 REVISE BLADDER & URETER(S): CPT | Mod: 22,,, | Performed by: UROLOGY

## 2018-03-13 PROCEDURE — 36415 COLL VENOUS BLD VENIPUNCTURE: CPT

## 2018-03-13 PROCEDURE — C2617 STENT, NON-COR, TEM W/O DEL: HCPCS | Performed by: COLON & RECTAL SURGERY

## 2018-03-13 PROCEDURE — 20600001 HC STEP DOWN PRIVATE ROOM

## 2018-03-13 PROCEDURE — 71000039 HC RECOVERY, EACH ADD'L HOUR: Performed by: COLON & RECTAL SURGERY

## 2018-03-13 PROCEDURE — S0030 INJECTION, METRONIDAZOLE: HCPCS | Performed by: COLON & RECTAL SURGERY

## 2018-03-13 PROCEDURE — 07BC0ZX EXCISION OF PELVIS LYMPHATIC, OPEN APPROACH, DIAGNOSTIC: ICD-10-PCS | Performed by: COLON & RECTAL SURGERY

## 2018-03-13 PROCEDURE — C1758 CATHETER, URETERAL: HCPCS | Performed by: COLON & RECTAL SURGERY

## 2018-03-13 PROCEDURE — 0DTN0ZZ RESECTION OF SIGMOID COLON, OPEN APPROACH: ICD-10-PCS | Performed by: COLON & RECTAL SURGERY

## 2018-03-13 PROCEDURE — 36000709 HC OR TIME LEV III EA ADD 15 MIN: Performed by: COLON & RECTAL SURGERY

## 2018-03-13 PROCEDURE — 88302 TISSUE EXAM BY PATHOLOGIST: CPT | Mod: 26,,, | Performed by: PATHOLOGY

## 2018-03-13 PROCEDURE — C1769 GUIDE WIRE: HCPCS | Performed by: COLON & RECTAL SURGERY

## 2018-03-13 PROCEDURE — 44140 PARTIAL REMOVAL OF COLON: CPT | Mod: ,,, | Performed by: COLON & RECTAL SURGERY

## 2018-03-13 PROCEDURE — 25000003 PHARM REV CODE 250: Performed by: NURSE ANESTHETIST, CERTIFIED REGISTERED

## 2018-03-13 PROCEDURE — 44310 ILEOSTOMY/JEJUNOSTOMY: CPT | Mod: 51,,, | Performed by: COLON & RECTAL SURGERY

## 2018-03-13 PROCEDURE — C1751 CATH, INF, PER/CENT/MIDLINE: HCPCS | Performed by: NURSE ANESTHETIST, CERTIFIED REGISTERED

## 2018-03-13 PROCEDURE — 36000708 HC OR TIME LEV III 1ST 15 MIN: Performed by: COLON & RECTAL SURGERY

## 2018-03-13 PROCEDURE — 0T788DZ DILATION OF BILATERAL URETERS WITH INTRALUMINAL DEVICE, VIA NATURAL OR ARTIFICIAL OPENING ENDOSCOPIC: ICD-10-PCS | Performed by: UROLOGY

## 2018-03-13 PROCEDURE — 52005 CYSTO W/URTRL CATHJ: CPT | Mod: 51,,, | Performed by: UROLOGY

## 2018-03-13 PROCEDURE — 88305 TISSUE EXAM BY PATHOLOGIST: CPT | Performed by: PATHOLOGY

## 2018-03-13 PROCEDURE — 85025 COMPLETE CBC W/AUTO DIFF WBC: CPT

## 2018-03-13 PROCEDURE — 88331 PATH CONSLTJ SURG 1 BLK 1SPC: CPT | Mod: 26,,, | Performed by: PATHOLOGY

## 2018-03-13 PROCEDURE — 0VB00ZZ EXCISION OF PROSTATE, OPEN APPROACH: ICD-10-PCS | Performed by: UROLOGY

## 2018-03-13 PROCEDURE — 25000003 PHARM REV CODE 250: Performed by: COLON & RECTAL SURGERY

## 2018-03-13 PROCEDURE — 0DUG07Z SUPPLEMENT LEFT LARGE INTESTINE WITH AUTOLOGOUS TISSUE SUBSTITUTE, OPEN APPROACH: ICD-10-PCS | Performed by: COLON & RECTAL SURGERY

## 2018-03-13 PROCEDURE — 44139 MOBILIZATION OF COLON: CPT | Mod: ,,, | Performed by: COLON & RECTAL SURGERY

## 2018-03-13 PROCEDURE — 3E0M05Z INTRODUCTION OF ADHESION BARRIER INTO PERITONEAL CAVITY, OPEN APPROACH: ICD-10-PCS | Performed by: COLON & RECTAL SURGERY

## 2018-03-13 PROCEDURE — 85610 PROTHROMBIN TIME: CPT

## 2018-03-13 PROCEDURE — 88305 TISSUE EXAM BY PATHOLOGIST: CPT | Mod: 26,,, | Performed by: PATHOLOGY

## 2018-03-13 PROCEDURE — 37000008 HC ANESTHESIA 1ST 15 MINUTES: Performed by: COLON & RECTAL SURGERY

## 2018-03-13 PROCEDURE — 71000033 HC RECOVERY, INTIAL HOUR: Performed by: COLON & RECTAL SURGERY

## 2018-03-13 PROCEDURE — 88309 TISSUE EXAM BY PATHOLOGIST: CPT | Mod: 26,,, | Performed by: PATHOLOGY

## 2018-03-13 PROCEDURE — 49905 OMENTAL FLAP INTRA-ABDOM: CPT | Mod: 51,,, | Performed by: UROLOGY

## 2018-03-13 DEVICE — STENT URETERAL UNIV 6FR 24CM: Type: IMPLANTABLE DEVICE | Site: URETER | Status: FUNCTIONAL

## 2018-03-13 DEVICE — MEMBRANE SEPRAFILM 5 X 6: Type: IMPLANTABLE DEVICE | Site: URETER | Status: FUNCTIONAL

## 2018-03-13 RX ORDER — GLYCOPYRROLATE 0.2 MG/ML
INJECTION INTRAMUSCULAR; INTRAVENOUS
Status: DISCONTINUED | OUTPATIENT
Start: 2018-03-13 | End: 2018-03-13

## 2018-03-13 RX ORDER — LIDOCAINE HYDROCHLORIDE 10 MG/ML
1 INJECTION, SOLUTION EPIDURAL; INFILTRATION; INTRACAUDAL; PERINEURAL ONCE
Status: COMPLETED | OUTPATIENT
Start: 2018-03-13 | End: 2018-03-13

## 2018-03-13 RX ORDER — ROCURONIUM BROMIDE 10 MG/ML
INJECTION, SOLUTION INTRAVENOUS
Status: DISCONTINUED | OUTPATIENT
Start: 2018-03-13 | End: 2018-03-13

## 2018-03-13 RX ORDER — BUPIVACAINE HYDROCHLORIDE 2.5 MG/ML
INJECTION, SOLUTION EPIDURAL; INFILTRATION; INTRACAUDAL
Status: DISCONTINUED | OUTPATIENT
Start: 2018-03-13 | End: 2018-03-13 | Stop reason: HOSPADM

## 2018-03-13 RX ORDER — PROPOFOL 10 MG/ML
VIAL (ML) INTRAVENOUS
Status: DISCONTINUED | OUTPATIENT
Start: 2018-03-13 | End: 2018-03-13

## 2018-03-13 RX ORDER — MIDAZOLAM HYDROCHLORIDE 1 MG/ML
INJECTION INTRAMUSCULAR; INTRAVENOUS
Status: DISCONTINUED | OUTPATIENT
Start: 2018-03-13 | End: 2018-03-13

## 2018-03-13 RX ORDER — HYDROMORPHONE HCL IN 0.9% NACL 6 MG/30 ML
PATIENT CONTROLLED ANALGESIA SYRINGE INTRAVENOUS CONTINUOUS
Status: DISCONTINUED | OUTPATIENT
Start: 2018-03-13 | End: 2018-03-14

## 2018-03-13 RX ORDER — SODIUM CHLORIDE 0.9 % (FLUSH) 0.9 %
3 SYRINGE (ML) INJECTION
Status: DISCONTINUED | OUTPATIENT
Start: 2018-03-13 | End: 2018-03-13

## 2018-03-13 RX ORDER — SUCCINYLCHOLINE CHLORIDE 20 MG/ML
INJECTION INTRAMUSCULAR; INTRAVENOUS
Status: DISCONTINUED | OUTPATIENT
Start: 2018-03-13 | End: 2018-03-13

## 2018-03-13 RX ORDER — VECURONIUM BROMIDE FOR INJECTION 1 MG/ML
INJECTION, POWDER, LYOPHILIZED, FOR SOLUTION INTRAVENOUS
Status: DISCONTINUED | OUTPATIENT
Start: 2018-03-13 | End: 2018-03-13

## 2018-03-13 RX ORDER — LIDOCAINE HCL/PF 100 MG/5ML
SYRINGE (ML) INTRAVENOUS
Status: DISCONTINUED | OUTPATIENT
Start: 2018-03-13 | End: 2018-03-13

## 2018-03-13 RX ORDER — DIPHENHYDRAMINE HYDROCHLORIDE 50 MG/ML
12.5 INJECTION INTRAMUSCULAR; INTRAVENOUS EVERY 4 HOURS PRN
Status: DISCONTINUED | OUTPATIENT
Start: 2018-03-13 | End: 2018-03-14

## 2018-03-13 RX ORDER — FENTANYL CITRATE 50 UG/ML
INJECTION, SOLUTION INTRAMUSCULAR; INTRAVENOUS
Status: DISCONTINUED | OUTPATIENT
Start: 2018-03-13 | End: 2018-03-13

## 2018-03-13 RX ORDER — SODIUM CHLORIDE 0.9 % (FLUSH) 0.9 %
3 SYRINGE (ML) INJECTION
Status: DISCONTINUED | OUTPATIENT
Start: 2018-03-13 | End: 2018-03-13 | Stop reason: HOSPADM

## 2018-03-13 RX ORDER — PHENYLEPHRINE HYDROCHLORIDE 10 MG/ML
INJECTION INTRAVENOUS
Status: DISCONTINUED | OUTPATIENT
Start: 2018-03-13 | End: 2018-03-13

## 2018-03-13 RX ORDER — NALOXONE HCL 0.4 MG/ML
0.02 VIAL (ML) INJECTION
Status: DISCONTINUED | OUTPATIENT
Start: 2018-03-13 | End: 2018-03-14

## 2018-03-13 RX ORDER — SODIUM CHLORIDE 9 MG/ML
INJECTION, SOLUTION INTRAVENOUS CONTINUOUS
Status: DISCONTINUED | OUTPATIENT
Start: 2018-03-13 | End: 2018-03-13

## 2018-03-13 RX ORDER — ENOXAPARIN SODIUM 100 MG/ML
40 INJECTION SUBCUTANEOUS EVERY 24 HOURS
Status: DISCONTINUED | OUTPATIENT
Start: 2018-03-14 | End: 2018-03-17 | Stop reason: HOSPADM

## 2018-03-13 RX ORDER — ESMOLOL HYDROCHLORIDE 10 MG/ML
INJECTION INTRAVENOUS
Status: DISCONTINUED | OUTPATIENT
Start: 2018-03-13 | End: 2018-03-13

## 2018-03-13 RX ORDER — ACETAMINOPHEN 10 MG/ML
INJECTION, SOLUTION INTRAVENOUS
Status: DISCONTINUED | OUTPATIENT
Start: 2018-03-13 | End: 2018-03-13

## 2018-03-13 RX ORDER — DEXMEDETOMIDINE HYDROCHLORIDE 100 UG/ML
INJECTION, SOLUTION INTRAVENOUS
Status: DISCONTINUED | OUTPATIENT
Start: 2018-03-13 | End: 2018-03-13

## 2018-03-13 RX ORDER — KETAMINE HCL IN 0.9 % NACL 50 MG/5 ML
SYRINGE (ML) INTRAVENOUS
Status: DISCONTINUED | OUTPATIENT
Start: 2018-03-13 | End: 2018-03-13

## 2018-03-13 RX ORDER — NEOSTIGMINE METHYLSULFATE 1 MG/ML
INJECTION, SOLUTION INTRAVENOUS
Status: DISCONTINUED | OUTPATIENT
Start: 2018-03-13 | End: 2018-03-13

## 2018-03-13 RX ADMIN — GLYCOPYRROLATE 0.2 MG: 0.2 INJECTION, SOLUTION INTRAMUSCULAR; INTRAVENOUS at 05:03

## 2018-03-13 RX ADMIN — VECURONIUM BROMIDE FOR INJECTION 1 MG: 1 INJECTION, POWDER, LYOPHILIZED, FOR SOLUTION INTRAVENOUS at 03:03

## 2018-03-13 RX ADMIN — METOCLOPRAMIDE 10 MG: 5 INJECTION, SOLUTION INTRAMUSCULAR; INTRAVENOUS at 07:03

## 2018-03-13 RX ADMIN — NEOSTIGMINE METHYLSULFATE 3.5 MG: 1 INJECTION INTRAVENOUS at 05:03

## 2018-03-13 RX ADMIN — Medication: at 06:03

## 2018-03-13 RX ADMIN — FENTANYL CITRATE 50 MCG: 50 INJECTION, SOLUTION INTRAMUSCULAR; INTRAVENOUS at 02:03

## 2018-03-13 RX ADMIN — SODIUM CHLORIDE, SODIUM GLUCONATE, SODIUM ACETATE, POTASSIUM CHLORIDE, MAGNESIUM CHLORIDE, SODIUM PHOSPHATE, DIBASIC, AND POTASSIUM PHOSPHATE: .53; .5; .37; .037; .03; .012; .00082 INJECTION, SOLUTION INTRAVENOUS at 12:03

## 2018-03-13 RX ADMIN — ACETAMINOPHEN 1000 MG: 10 INJECTION, SOLUTION INTRAVENOUS at 12:03

## 2018-03-13 RX ADMIN — FENTANYL CITRATE 50 MCG: 50 INJECTION, SOLUTION INTRAMUSCULAR; INTRAVENOUS at 06:03

## 2018-03-13 RX ADMIN — METRONIDAZOLE 500 MG: 500 INJECTION, SOLUTION INTRAVENOUS at 04:03

## 2018-03-13 RX ADMIN — METRONIDAZOLE 500 MG: 500 INJECTION, SOLUTION INTRAVENOUS at 11:03

## 2018-03-13 RX ADMIN — FENTANYL CITRATE 50 MCG: 50 INJECTION, SOLUTION INTRAMUSCULAR; INTRAVENOUS at 04:03

## 2018-03-13 RX ADMIN — LIDOCAINE HYDROCHLORIDE 1 MG: 10 INJECTION, SOLUTION EPIDURAL; INFILTRATION; INTRACAUDAL; PERINEURAL at 11:03

## 2018-03-13 RX ADMIN — SUCCINYLCHOLINE CHLORIDE 140 MG: 20 INJECTION, SOLUTION INTRAMUSCULAR; INTRAVENOUS at 12:03

## 2018-03-13 RX ADMIN — EPHEDRINE SULFATE 10 MG: 50 INJECTION, SOLUTION INTRAMUSCULAR; INTRAVENOUS; SUBCUTANEOUS at 03:03

## 2018-03-13 RX ADMIN — PHENYLEPHRINE HYDROCHLORIDE 200 MCG: 10 INJECTION INTRAVENOUS at 02:03

## 2018-03-13 RX ADMIN — FENTANYL CITRATE 50 MCG: 50 INJECTION, SOLUTION INTRAMUSCULAR; INTRAVENOUS at 03:03

## 2018-03-13 RX ADMIN — VECURONIUM BROMIDE FOR INJECTION 2 MG: 1 INJECTION, POWDER, LYOPHILIZED, FOR SOLUTION INTRAVENOUS at 02:03

## 2018-03-13 RX ADMIN — ROCURONIUM BROMIDE 30 MG: 10 INJECTION, SOLUTION INTRAVENOUS at 01:03

## 2018-03-13 RX ADMIN — SODIUM CHLORIDE: 0.9 INJECTION, SOLUTION INTRAVENOUS at 11:03

## 2018-03-13 RX ADMIN — BUPIVACAINE HYDROCHLORIDE 30 ML: 2.5 INJECTION, SOLUTION EPIDURAL; INFILTRATION; INTRACAUDAL; PERINEURAL at 03:03

## 2018-03-13 RX ADMIN — METRONIDAZOLE 500 MG: 500 TABLET ORAL at 04:03

## 2018-03-13 RX ADMIN — IBUPROFEN 800 MG: 800 INJECTION INTRAVENOUS at 09:03

## 2018-03-13 RX ADMIN — PROPOFOL 200 MG: 10 INJECTION, EMULSION INTRAVENOUS at 12:03

## 2018-03-13 RX ADMIN — MEPERIDINE HYDROCHLORIDE 25 MG: 50 INJECTION INTRAMUSCULAR; INTRAVENOUS; SUBCUTANEOUS at 03:03

## 2018-03-13 RX ADMIN — EPHEDRINE SULFATE 10 MG: 50 INJECTION, SOLUTION INTRAMUSCULAR; INTRAVENOUS; SUBCUTANEOUS at 04:03

## 2018-03-13 RX ADMIN — FENTANYL CITRATE 100 MCG: 50 INJECTION, SOLUTION INTRAMUSCULAR; INTRAVENOUS at 12:03

## 2018-03-13 RX ADMIN — DEXMEDETOMIDINE HYDROCHLORIDE 12 MCG: 100 INJECTION, SOLUTION, CONCENTRATE INTRAVENOUS at 04:03

## 2018-03-13 RX ADMIN — MIRTAZAPINE 15 MG: 7.5 TABLET ORAL at 09:03

## 2018-03-13 RX ADMIN — FENTANYL CITRATE 50 MCG: 50 INJECTION, SOLUTION INTRAMUSCULAR; INTRAVENOUS at 12:03

## 2018-03-13 RX ADMIN — LIDOCAINE HYDROCHLORIDE 60 MG: 20 INJECTION, SOLUTION INTRAVENOUS at 12:03

## 2018-03-13 RX ADMIN — ONDANSETRON 4 MG: 2 INJECTION INTRAMUSCULAR; INTRAVENOUS at 06:03

## 2018-03-13 RX ADMIN — SODIUM CHLORIDE, SODIUM GLUCONATE, SODIUM ACETATE, POTASSIUM CHLORIDE, MAGNESIUM CHLORIDE, SODIUM PHOSPHATE, DIBASIC, AND POTASSIUM PHOSPHATE: .53; .5; .37; .037; .03; .012; .00082 INJECTION, SOLUTION INTRAVENOUS at 03:03

## 2018-03-13 RX ADMIN — IBUPROFEN 800 MG: 800 INJECTION INTRAVENOUS at 06:03

## 2018-03-13 RX ADMIN — GLYCOPYRROLATE 0.4 MG: 0.2 INJECTION, SOLUTION INTRAMUSCULAR; INTRAVENOUS at 05:03

## 2018-03-13 RX ADMIN — MIDAZOLAM HYDROCHLORIDE 4 MG: 1 INJECTION, SOLUTION INTRAMUSCULAR; INTRAVENOUS at 12:03

## 2018-03-13 RX ADMIN — PHENYLEPHRINE HYDROCHLORIDE 100 MCG: 10 INJECTION INTRAVENOUS at 05:03

## 2018-03-13 RX ADMIN — DEXMEDETOMIDINE HYDROCHLORIDE 40 MCG: 100 INJECTION, SOLUTION, CONCENTRATE INTRAVENOUS at 01:03

## 2018-03-13 RX ADMIN — SODIUM CHLORIDE, SODIUM GLUCONATE, SODIUM ACETATE, POTASSIUM CHLORIDE, MAGNESIUM CHLORIDE, SODIUM PHOSPHATE, DIBASIC, AND POTASSIUM PHOSPHATE: .53; .5; .37; .037; .03; .012; .00082 INJECTION, SOLUTION INTRAVENOUS at 01:03

## 2018-03-13 RX ADMIN — HYDRALAZINE HYDROCHLORIDE 10 MG: 20 INJECTION INTRAMUSCULAR; INTRAVENOUS at 12:03

## 2018-03-13 RX ADMIN — DEXTROSE MONOHYDRATE, SODIUM CHLORIDE, AND POTASSIUM CHLORIDE: 50; 4.5; 1.49 INJECTION, SOLUTION INTRAVENOUS at 06:03

## 2018-03-13 RX ADMIN — FENTANYL CITRATE 100 MCG: 50 INJECTION, SOLUTION INTRAMUSCULAR; INTRAVENOUS at 01:03

## 2018-03-13 RX ADMIN — EPHEDRINE SULFATE 10 MG: 50 INJECTION, SOLUTION INTRAMUSCULAR; INTRAVENOUS; SUBCUTANEOUS at 02:03

## 2018-03-13 RX ADMIN — IBUPROFEN 800 MG: 800 INJECTION INTRAVENOUS at 12:03

## 2018-03-13 RX ADMIN — PHENYLEPHRINE HYDROCHLORIDE 200 MCG: 10 INJECTION INTRAVENOUS at 01:03

## 2018-03-13 RX ADMIN — CEFTRIAXONE SODIUM 2 G: 2 INJECTION, POWDER, FOR SOLUTION INTRAMUSCULAR; INTRAVENOUS at 12:03

## 2018-03-13 RX ADMIN — NEOMYCIN SULFATE 1000 MG: 500 TABLET ORAL at 04:03

## 2018-03-13 RX ADMIN — ROCURONIUM BROMIDE 45 MG: 10 INJECTION, SOLUTION INTRAVENOUS at 12:03

## 2018-03-13 RX ADMIN — DEXMEDETOMIDINE HYDROCHLORIDE 12 MCG: 100 INJECTION, SOLUTION, CONCENTRATE INTRAVENOUS at 05:03

## 2018-03-13 RX ADMIN — ROCURONIUM BROMIDE 5 MG: 10 INJECTION, SOLUTION INTRAVENOUS at 12:03

## 2018-03-13 RX ADMIN — MEPERIDINE HYDROCHLORIDE 25 MG: 50 INJECTION INTRAMUSCULAR; INTRAVENOUS; SUBCUTANEOUS at 08:03

## 2018-03-13 RX ADMIN — ONDANSETRON 4 MG: 2 INJECTION INTRAMUSCULAR; INTRAVENOUS at 04:03

## 2018-03-13 RX ADMIN — CIPROFLOXACIN 400 MG: 2 INJECTION, SOLUTION INTRAVENOUS at 03:03

## 2018-03-13 RX ADMIN — SERTRALINE HYDROCHLORIDE 50 MG: 50 TABLET ORAL at 08:03

## 2018-03-13 RX ADMIN — VECURONIUM BROMIDE FOR INJECTION 1 MG: 1 INJECTION, POWDER, LYOPHILIZED, FOR SOLUTION INTRAVENOUS at 04:03

## 2018-03-13 RX ADMIN — METRONIDAZOLE 500 MG: 500 INJECTION, SOLUTION INTRAVENOUS at 09:03

## 2018-03-13 RX ADMIN — Medication 50 MG: at 02:03

## 2018-03-13 RX ADMIN — DEXTROSE MONOHYDRATE, SODIUM CHLORIDE, AND POTASSIUM CHLORIDE: 50; 4.5; 1.49 INJECTION, SOLUTION INTRAVENOUS at 03:03

## 2018-03-13 RX ADMIN — BUPIVACAINE 20 ML: 13.3 INJECTION, SUSPENSION, LIPOSOMAL INFILTRATION at 03:03

## 2018-03-13 RX ADMIN — CIPROFLOXACIN 400 MG: 2 INJECTION, SOLUTION INTRAVENOUS at 08:03

## 2018-03-13 RX ADMIN — ESMOLOL HYDROCHLORIDE 20 MG: 10 INJECTION INTRAVENOUS at 12:03

## 2018-03-13 NOTE — PLAN OF CARE
Problem: Patient Care Overview  Goal: Plan of Care Review  Outcome: Ongoing (interventions implemented as appropriate)  Plan of care discussed with pt. Pt verbalizes understanding. Pain managed with PRN pain medication. Pt. Ambulated independetley  To bathroom. Pt. Positions independently. Vital signs stable. Pt. Remains free of fall and injury. No. Acute events this shift. Will continue to monitor.

## 2018-03-13 NOTE — SUBJECTIVE & OBJECTIVE
Subjective:     Interval History: NAEON. VSS. Completed bowel prep this AM, having clearer BMs. NPO today.     Post-Op Info:  Procedure(s) (LRB):  SIGMOIDOSCOPY-FLEXIBLE (N/A)   1 Day Post-Op      Medications:  Continuous Infusions:   dextrose 5 % and 0.45 % NaCl with KCl 20 mEq 100 mL/hr at 03/13/18 0301     Scheduled Meds:   atorvastatin  40 mg Oral Daily    ciprofloxacin (CIPRO)400mg/200ml D5W IVPB  400 mg Intravenous Q12H    enoxaparin  40 mg Subcutaneous Daily    folic acid  1 mg Oral Daily    ibuprofen  800 mg Intravenous Q8H    metronidazole  500 mg Intravenous Q8H    mirtazapine  15 mg Oral Nightly    multivitamin  1 tablet Oral Daily    sertraline  50 mg Oral Daily    thiamine  100 mg Oral Daily     PRN Meds:   cefTRIAXone (ROCEPHIN) IVPB    diphenhydrAMINE    hydrALAZINE    LORazepam    meperidine    metoclopramide HCl    metronidazole    naloxone    ondansetron    oxyCODONE        Objective:     Vital Signs (Most Recent):  Temp: 98 °F (36.7 °C) (03/13/18 0416)  Pulse: 80 (03/13/18 0416)  Resp: 18 (03/13/18 0416)  BP: (!) 158/92 (03/13/18 0445)  SpO2: 98 % (03/13/18 0416) Vital Signs (24h Range):  Temp:  [97.2 °F (36.2 °C)-98.3 °F (36.8 °C)] 98 °F (36.7 °C)  Pulse:  [53-80] 80  Resp:  [16-18] 18  SpO2:  [96 %-99 %] 98 %  BP: (109-180)/() 158/92     Intake/Output - Last 3 Shifts       03/11 0700 - 03/12 0659 03/12 0700 - 03/13 0659 03/13 0700 - 03/14 0659    P.O. 780 10     I.V. (mL/kg) 2400 (31.1) 1936.7 (25.1)     IV Piggyback 1200 900     Total Intake(mL/kg) 4380 (56.8) 2846.7 (36.9)     Urine (mL/kg/hr) 2660 (1.4) 1325 (0.7)     Emesis/NG output 0 (0)      Other 0 (0)      Stool 0 (0) 0 (0)     Blood 0 (0)      Total Output 2660 1325      Net +1720 +1521.7             Urine Occurrence 0 x      Stool Occurrence 1 x 5 x     Emesis Occurrence 0 x            Physical Exam    NAD, AAOx3  RRR  CTAB  Abd S/ND/ATTP in LLQ      Significant Labs:  BMP:   Recent Labs  Lab  03/09/18  1736  03/13/18  0440   GLU 95  < > 94     < > 142   K 3.6  < > 3.8     < > 106   CO2 27  < > 25   BUN 10  < > 6   CREATININE 0.8  < > 0.8   CALCIUM 9.7  < > 10.0   MG 1.3*  --   --    < > = values in this interval not displayed.  CBC:     Recent Labs  Lab 03/13/18  0440   WBC 9.74   RBC 4.65   HGB 13.4*   HCT 40.6      MCV 87   MCH 28.8   MCHC 33.0

## 2018-03-13 NOTE — ANESTHESIA PROCEDURE NOTES
Arterial    Diagnosis: fecaluria    Patient location during procedure: done in OR  Procedure start time: 3/13/2018 12:43 PM  Timeout: 3/13/2018 12:42 PM  Procedure end time: 3/13/2018 12:50 PM  Staffing  Anesthesiologist: RIGO ECHAVARRIA  Performed: anesthesiologist   Anesthesiologist was present at the time of the procedure.  Preanesthetic Checklist  Completed: patient identified, site marked, surgical consent, pre-op evaluation, timeout performed, IV checked, risks and benefits discussed, monitors and equipment checked and anesthesia consent givenArterial  Skin Prep: chlorhexidine gluconate and isopropyl alcohol  Local Infiltration: none  Orientation: right  Location: radial  Catheter Size: 20 G  Catheter placement by Anatomical landmarks. Heme positive aspiration all ports.Insertion Attempts: 3  Assessment  Dressing: secured with tape and tegaderm  Additional Notes  Attempted X 2 on left wrist,  But unable to thread wire depsite good blood flash

## 2018-03-13 NOTE — PLAN OF CARE
03/12/18 1151   Discharge Assessment   Assessment Type Discharge Planning Assessment   Assessment information obtained from? Medical Record     PT IN OR AT THIS TIME. Will attempt later to complete d/c assessment

## 2018-03-13 NOTE — ANESTHESIA POSTPROCEDURE EVALUATION
"Anesthesia Post Evaluation    Patient: Chris Aguirre    Procedure(s) Performed: Procedure(s) (LRB):  SIGMOIDOSCOPY-FLEXIBLE (N/A)    Final Anesthesia Type: general  Patient location during evaluation: PACU  Patient participation: Yes- Able to Participate  Level of consciousness: awake and alert  Post-procedure vital signs: reviewed and stable  Pain management: adequate  Airway patency: patent  PONV status at discharge: No PONV  Anesthetic complications: no      Cardiovascular status: blood pressure returned to baseline  Respiratory status: unassisted  Hydration status: euvolemic  Follow-up not needed.        Visit Vitals  BP (!) 158/92   Pulse 80   Temp 36.7 °C (98 °F) (Oral)   Resp 18   Ht 5' 11" (1.803 m)   Wt 77.1 kg (169 lb 15.6 oz)   SpO2 98%   BMI 23.71 kg/m²       Pain/Marcella Score: Pain Assessment Performed: Yes (3/12/2018  8:27 PM)  Presence of Pain: denies (3/12/2018  8:27 PM)  Pain Rating Prior to Med Admin: 7 (3/13/2018  3:52 AM)  Pain Rating Post Med Admin: 4 (3/13/2018  4:22 AM)  Marcella Score: 10 (3/12/2018  6:00 PM)      "

## 2018-03-13 NOTE — ASSESSMENT & PLAN NOTE
53 YOM with HTN, Hep C, CAD s/p MI, current everyday 35 pack year smoker with colovesical fistula likely secondary to sigmoid mass    - To OR today for exploration/ removal of mass by CRS with possible partial cystectomy, bladder repair, possible ureteral reimplant with possible cystectomy and other indicated procedures.   - consented yesterday     Christopher Alonzo MD PGY-4  Urology  [phone]

## 2018-03-13 NOTE — SUBJECTIVE & OBJECTIVE
Interval History: NAEON. NPO. Denies pain.     Review of Systems  Objective:     Temp:  [97.2 °F (36.2 °C)-98.3 °F (36.8 °C)] 98 °F (36.7 °C)  Pulse:  [53-80] 80  Resp:  [16-18] 18  SpO2:  [96 %-99 %] 98 %  BP: (109-180)/() 158/92     Body mass index is 23.71 kg/m².            Drains          No matching active lines, drains, or airways          Physical Exam   Constitutional: No distress.   HENT:   Head: Normocephalic and atraumatic.   Cardiovascular: Normal rate.    Pulmonary/Chest: Effort normal. No respiratory distress.   Abdominal: Soft. He exhibits no distension. There is no tenderness.   Genitourinary: Testes normal. Uncircumcised.   Genitourinary Comments: FIDELIA 35 g smooth, no nodules, no rectal mass, no blood, normal sphincter tone.    Skin: Skin is warm and dry. He is not diaphoretic.         Significant Labs:    BMP:    Recent Labs  Lab 03/10/18  0402 03/11/18  0430 03/12/18  0427    138 142   K 3.7 3.7 4.0    105 106   CO2 22* 26 25   BUN 8 8 8   CREATININE 0.7 0.8 0.8   CALCIUM 9.1 9.0 9.3       CBC:     Recent Labs  Lab 03/11/18  0430 03/12/18  0427 03/13/18  0440   WBC 6.06 5.46 9.74   HGB 11.7* 12.2* 13.4*   HCT 35.1* 37.6* 40.6    339 323       All pertinent labs results from the past 24 hours have been reviewed.    Significant Imaging:  All pertinent imaging results/findings from the past 24 hours have been reviewed.

## 2018-03-13 NOTE — NURSING TRANSFER
Nursing Transfer Note      3/13/2018     Transfer To DOS     Transfer via WC    Transfer with SL     Transported by Transporter    Medicines sent: none    Chart send with patient: Y    Notified: Accompanied by family member    Patient reassessed at:  Upon arrival to floor:

## 2018-03-13 NOTE — ASSESSMENT & PLAN NOTE
-Patient's history is worrisome for either diverticulitis with colovesical fistula or malignancy with invasion into bladder, particularly given his history of bowel changes, increasing constipation, bloody bowel movements, and family history of colon cancer.  -CT C/A/P with PO, IV, and rectal contrast demonstrated focal thickening of sigmoid colon abutting bladder, no e/o metastatic liver disease  -NPO, IVFs, IV abx,pain/nausea meds, alcohol withdrawal protocol.  -Evaluated by cardiology for surgery, appreciate recommendations:    ekg shows q waves v1/2  No symptoms  Continue asa throughout casey-operative period as patient has stents in place and can never be off ASA  Optimize BP post-surgery by adding ace-I (lisinopril 5mg po daily) to goal bp < 130/80  Statin therapy is imperative and should be on atorvastatin 40mg po qhs at least  Assure o/p follow up with cardiology on discharge  RCRI 0.9% risk of casey-operative MACCE  Continue to surgery without any further cardiac w/u or intervention      -Flexible sigmoidoscopy 3/12 unable to advance to 50cm due to likely extrinsic compression  -OR Today for sigmoidectomy/ possible partial cystectomy and ureteral reimplant, urology following  -Consented this AM

## 2018-03-13 NOTE — PROGRESS NOTES
[facility]  Colorectal Surgery  Progress Note    Patient Name: Chris Aguirre  MRN: [id number]  Admission Date: 3/9/2018  Hospital Length of Stay: 3 days  Attending Physician: Mikal Nino MD    Subjective:     Interval History: NAEON. VSS. Completed bowel prep this AM, having clearer BMs. NPO today.     Post-Op Info:  Procedure(s) (LRB):  SIGMOIDOSCOPY-FLEXIBLE (N/A)   1 Day Post-Op      Medications:  Continuous Infusions:   dextrose 5 % and 0.45 % NaCl with KCl 20 mEq 100 mL/hr at 03/13/18 0301     Scheduled Meds:   atorvastatin  40 mg Oral Daily    ciprofloxacin (CIPRO)400mg/200ml D5W IVPB  400 mg Intravenous Q12H    enoxaparin  40 mg Subcutaneous Daily    folic acid  1 mg Oral Daily    ibuprofen  800 mg Intravenous Q8H    metronidazole  500 mg Intravenous Q8H    mirtazapine  15 mg Oral Nightly    multivitamin  1 tablet Oral Daily    sertraline  50 mg Oral Daily    thiamine  100 mg Oral Daily     PRN Meds:   cefTRIAXone (ROCEPHIN) IVPB    diphenhydrAMINE    hydrALAZINE    LORazepam    meperidine    metoclopramide HCl    metronidazole    naloxone    ondansetron    oxyCODONE        Objective:     Vital Signs (Most Recent):  Temp: 98 °F (36.7 °C) (03/13/18 0416)  Pulse: 80 (03/13/18 0416)  Resp: 18 (03/13/18 0416)  BP: (!) 158/92 (03/13/18 0445)  SpO2: 98 % (03/13/18 0416) Vital Signs (24h Range):  Temp:  [97.2 °F (36.2 °C)-98.3 °F (36.8 °C)] 98 °F (36.7 °C)  Pulse:  [53-80] 80  Resp:  [16-18] 18  SpO2:  [96 %-99 %] 98 %  BP: (109-180)/() 158/92     Intake/Output - Last 3 Shifts       03/11 0700 - 03/12 0659 03/12 0700 - 03/13 0659 03/13 0700 - 03/14 0659    P.O. 780 10     I.V. (mL/kg) 2400 (31.1) 1936.7 (25.1)     IV Piggyback 1200 900     Total Intake(mL/kg) 4380 (56.8) 2846.7 (36.9)     Urine (mL/kg/hr) 2660 (1.4) 1325 (0.7)     Emesis/NG output 0 (0)      Other 0 (0)      Stool 0 (0) 0 (0)     Blood 0 (0)      Total Output 2660 1325      Net +1720 +1521.7              Urine Occurrence 0 x      Stool Occurrence 1 x 5 x     Emesis Occurrence 0 x            Physical Exam    NAD, AAOx3  RRR  CTAB  Abd S/ND/ATTP in LLQ      Significant Labs:  BMP:   Recent Labs  Lab 03/09/18  1736  03/13/18  0440   GLU 95  < > 94     < > 142   K 3.6  < > 3.8     < > 106   CO2 27  < > 25   BUN 10  < > 6   CREATININE 0.8  < > 0.8   CALCIUM 9.7  < > 10.0   MG 1.3*  --   --    < > = values in this interval not displayed.  CBC:     Recent Labs  Lab 03/13/18  0440   WBC 9.74   RBC 4.65   HGB 13.4*   HCT 40.6      MCV 87   MCH 28.8   MCHC 33.0         Assessment/Plan:     * Pneumaturia    -Patient's history is worrisome for either diverticulitis with colovesical fistula or malignancy with invasion into bladder, particularly given his history of bowel changes, increasing constipation, bloody bowel movements, and family history of colon cancer.  -CT C/A/P with PO, IV, and rectal contrast demonstrated focal thickening of sigmoid colon abutting bladder, no e/o metastatic liver disease  -NPO, IVFs, IV abx,pain/nausea meds, alcohol withdrawal protocol.  -Evaluated by cardiology for surgery, appreciate recommendations:    ekg shows q waves v1/2  No symptoms  Continue asa throughout casey-operative period as patient has stents in place and can never be off ASA  Optimize BP post-surgery by adding ace-I (lisinopril 5mg po daily) to goal bp < 130/80  Statin therapy is imperative and should be on atorvastatin 40mg po qhs at least  Assure o/p follow up with cardiology on discharge  RCRI 0.9% risk of casey-operative MACCE  Continue to surgery without any further cardiac w/u or intervention      -Flexible sigmoidoscopy 3/12 unable to advance to 50cm due to likely extrinsic compression  -OR Today for sigmoidectomy/ possible partial cystectomy and ureteral reimplant, urology following  -Consented this AM               Carrillo Howard MD  Colorectal Surgery  [facility]

## 2018-03-13 NOTE — TRANSFER OF CARE
"Anesthesia Transfer of Care Note    Patient: Chris Aguirre    Procedure(s) Performed: Procedure(s) (LRB):  COLECTOMY-SIGMOID POSSIBLE BLADDER RESECTION (N/A)  CYSTOSCOPY WITH STENT PLACEMENT (Bilateral)  ILEOSTOMY (N/A)  CYSTECTOMY-PARTIAL w/ bladder resection    Patient location: PACU    Anesthesia Type: general    Transport from OR: Transported from OR on 6-10 L/min O2 by face mask with adequate spontaneous ventilation    Post pain: adequate analgesia    Post assessment: no apparent anesthetic complications and tolerated procedure well    Post vital signs: stable    Level of consciousness: sedated and responds to stimulation    Nausea/Vomiting: no nausea/vomiting    Complications: none    Transfer of care protocol was followed      Last vitals:   Visit Vitals  BP (!) 140/95   Pulse 85   Temp 37.2 °C (99 °F) (Oral)   Resp 20   Ht 5' 11" (1.803 m)   Wt 77.1 kg (169 lb 15.6 oz)   SpO2 97%   BMI 23.71 kg/m²     "

## 2018-03-13 NOTE — PROGRESS NOTES
[facility]  Urology  Progress Note    Patient Name: Chris Aguirre  MRN: [id number]  Admission Date: 3/9/2018  Hospital Length of Stay: 3 days  Code Status: Full Code   Attending Provider: Mikal Nino MD   Primary Care Physician: Ash Hung NP    Subjective:     HPI:  53 YOM with HTN, Hep C, CAD s/p MI, current everyday 35 pack year smoker who presented to the ED with fecaluria and pneumaturia.     This occurred after he went to an urgent care, where they prescribed him a laxative due to a several month history of increasing constipation and narrowing caliber of his stools. He states that he has blood in his urine as well as stool. He has also developed lower abdominal pain.  He has lost15 lbs over the past few months. He denies fatigue, fevers, CP, or SOB.      PSHx positive for perforated ulcer and appendectomy.  He was diagnosed with HCV about 15 years ago but never sought treatment for this. He was previously hospitalized and seen by inpatient psychiatry for substance abuse, past history of suicide attempt. He also has a history of an MI s/p stents, on ASA, does not follow with any physician.     Family history is notable for multiple first degree relatives with lung cancer and an uncle with colon cancer, unsure as to age of diagnosis. His mother is with him and she is a survivor from what sounds like SCC of the neck related to smoking.  Family history is negative for urologic malignancy.      Interval History: NAEON. NPO. Denies pain.     Review of Systems  Objective:     Temp:  [97.2 °F (36.2 °C)-98.3 °F (36.8 °C)] 98 °F (36.7 °C)  Pulse:  [53-80] 80  Resp:  [16-18] 18  SpO2:  [96 %-99 %] 98 %  BP: (109-180)/() 158/92     Body mass index is 23.71 kg/m².            Drains          No matching active lines, drains, or airways          Physical Exam   Constitutional: No distress.   HENT:   Head: Normocephalic and atraumatic.   Cardiovascular: Normal rate.    Pulmonary/Chest:  Effort normal. No respiratory distress.   Abdominal: Soft. He exhibits no distension. There is no tenderness.   Genitourinary: Testes normal. Uncircumcised.   Genitourinary Comments: FIDELIA 35 g smooth, no nodules, no rectal mass, no blood, normal sphincter tone.    Skin: Skin is warm and dry. He is not diaphoretic.         Significant Labs:    BMP:    Recent Labs  Lab 03/10/18  0402 03/11/18  0430 03/12/18  0427    138 142   K 3.7 3.7 4.0    105 106   CO2 22* 26 25   BUN 8 8 8   CREATININE 0.7 0.8 0.8   CALCIUM 9.1 9.0 9.3       CBC:     Recent Labs  Lab 03/11/18  0430 03/12/18  0427 03/13/18  0440   WBC 6.06 5.46 9.74   HGB 11.7* 12.2* 13.4*   HCT 35.1* 37.6* 40.6    339 323       All pertinent labs results from the past 24 hours have been reviewed.    Significant Imaging:  All pertinent imaging results/findings from the past 24 hours have been reviewed.                  Assessment/Plan:     Colovesical fistula    53 YOM with HTN, Hep C, CAD s/p MI, current everyday 35 pack year smoker with colovesical fistula likely secondary to sigmoid mass    - To OR today for exploration/ removal of mass by CRS with possible partial cystectomy, bladder repair, possible ureteral reimplant with possible cystectomy and other indicated procedures.   - consented yesterday     Christopher Alonzo MD PGY-4  Urology  [phone]              VTE Risk Mitigation         Ordered     Medium Risk of VTE  Once      03/10/18 0348     Place sequential compression device  Until discontinued      03/10/18 0348     enoxaparin injection 40 mg  Daily     Route:  Subcutaneous        03/10/18 0348          Christopher Alonzo MD  Urology  [facility]

## 2018-03-14 LAB
ANION GAP SERPL CALC-SCNC: 11 MMOL/L
BACTERIA BLD CULT: NORMAL
BACTERIA BLD CULT: NORMAL
BASOPHILS # BLD AUTO: 0.03 K/UL
BASOPHILS NFR BLD: 0.2 %
BUN SERPL-MCNC: 5 MG/DL
CALCIUM SERPL-MCNC: 8.5 MG/DL
CHLORIDE SERPL-SCNC: 102 MMOL/L
CO2 SERPL-SCNC: 22 MMOL/L
CREAT SERPL-MCNC: 0.8 MG/DL
DIFFERENTIAL METHOD: ABNORMAL
EOSINOPHIL # BLD AUTO: 0 K/UL
EOSINOPHIL NFR BLD: 0.1 %
ERYTHROCYTE [DISTWIDTH] IN BLOOD BY AUTOMATED COUNT: 13.3 %
EST. GFR  (AFRICAN AMERICAN): >60 ML/MIN/1.73 M^2
EST. GFR  (NON AFRICAN AMERICAN): >60 ML/MIN/1.73 M^2
GLUCOSE SERPL-MCNC: 140 MG/DL
HCT VFR BLD AUTO: 36.6 %
HGB BLD-MCNC: 12.3 G/DL
IMM GRANULOCYTES # BLD AUTO: 0.07 K/UL
IMM GRANULOCYTES NFR BLD AUTO: 0.4 %
INR PPP: 1.2
LYMPHOCYTES # BLD AUTO: 2.5 K/UL
LYMPHOCYTES NFR BLD: 15.2 %
MCH RBC QN AUTO: 28.3 PG
MCHC RBC AUTO-ENTMCNC: 33.6 G/DL
MCV RBC AUTO: 84 FL
MONOCYTES # BLD AUTO: 0.9 K/UL
MONOCYTES NFR BLD: 5.5 %
NEUTROPHILS # BLD AUTO: 12.9 K/UL
NEUTROPHILS NFR BLD: 78.6 %
NRBC BLD-RTO: 0 /100 WBC
PLATELET # BLD AUTO: 287 K/UL
PMV BLD AUTO: 10.3 FL
POTASSIUM SERPL-SCNC: 3.8 MMOL/L
PROTHROMBIN TIME: 12.5 SEC
RBC # BLD AUTO: 4.34 M/UL
SODIUM SERPL-SCNC: 135 MMOL/L
WBC # BLD AUTO: 16.46 K/UL

## 2018-03-14 PROCEDURE — 80048 BASIC METABOLIC PNL TOTAL CA: CPT

## 2018-03-14 PROCEDURE — 36415 COLL VENOUS BLD VENIPUNCTURE: CPT

## 2018-03-14 PROCEDURE — S0030 INJECTION, METRONIDAZOLE: HCPCS | Performed by: COLON & RECTAL SURGERY

## 2018-03-14 PROCEDURE — 85610 PROTHROMBIN TIME: CPT

## 2018-03-14 PROCEDURE — 63600175 PHARM REV CODE 636 W HCPCS: Performed by: STUDENT IN AN ORGANIZED HEALTH CARE EDUCATION/TRAINING PROGRAM

## 2018-03-14 PROCEDURE — 85025 COMPLETE CBC W/AUTO DIFF WBC: CPT

## 2018-03-14 PROCEDURE — 63600175 PHARM REV CODE 636 W HCPCS: Performed by: COLON & RECTAL SURGERY

## 2018-03-14 PROCEDURE — 25000003 PHARM REV CODE 250: Performed by: STUDENT IN AN ORGANIZED HEALTH CARE EDUCATION/TRAINING PROGRAM

## 2018-03-14 PROCEDURE — 25000003 PHARM REV CODE 250: Performed by: COLON & RECTAL SURGERY

## 2018-03-14 PROCEDURE — 63600175 PHARM REV CODE 636 W HCPCS: Performed by: SURGERY

## 2018-03-14 PROCEDURE — 20600001 HC STEP DOWN PRIVATE ROOM

## 2018-03-14 PROCEDURE — 25000003 PHARM REV CODE 250: Performed by: SURGERY

## 2018-03-14 PROCEDURE — 25000003 PHARM REV CODE 250: Performed by: UROLOGY

## 2018-03-14 RX ORDER — OXYCODONE AND ACETAMINOPHEN 10; 325 MG/1; MG/1
1 TABLET ORAL EVERY 4 HOURS PRN
Status: DISCONTINUED | OUTPATIENT
Start: 2018-03-14 | End: 2018-03-17 | Stop reason: HOSPADM

## 2018-03-14 RX ORDER — OXYCODONE AND ACETAMINOPHEN 5; 325 MG/1; MG/1
1 TABLET ORAL EVERY 4 HOURS PRN
Status: DISCONTINUED | OUTPATIENT
Start: 2018-03-14 | End: 2018-03-17 | Stop reason: HOSPADM

## 2018-03-14 RX ORDER — HYOSCYAMINE SULFATE 0.12 MG/1
0.12 TABLET SUBLINGUAL EVERY 4 HOURS
Status: DISCONTINUED | OUTPATIENT
Start: 2018-03-14 | End: 2018-03-15

## 2018-03-14 RX ADMIN — METRONIDAZOLE 500 MG: 500 INJECTION, SOLUTION INTRAVENOUS at 11:03

## 2018-03-14 RX ADMIN — HYDRALAZINE HYDROCHLORIDE 10 MG: 20 INJECTION INTRAMUSCULAR; INTRAVENOUS at 12:03

## 2018-03-14 RX ADMIN — METRONIDAZOLE 500 MG: 500 INJECTION, SOLUTION INTRAVENOUS at 08:03

## 2018-03-14 RX ADMIN — IBUPROFEN 800 MG: 800 INJECTION INTRAVENOUS at 02:03

## 2018-03-14 RX ADMIN — HYOSCYAMINE SULFATE 0.12 MG: 0.12 TABLET ORAL; SUBLINGUAL at 10:03

## 2018-03-14 RX ADMIN — METRONIDAZOLE 500 MG: 500 INJECTION, SOLUTION INTRAVENOUS at 04:03

## 2018-03-14 RX ADMIN — THERA TABS 1 TABLET: TAB at 08:03

## 2018-03-14 RX ADMIN — ENOXAPARIN SODIUM 40 MG: 100 INJECTION SUBCUTANEOUS at 06:03

## 2018-03-14 RX ADMIN — MIRTAZAPINE 15 MG: 7.5 TABLET ORAL at 08:03

## 2018-03-14 RX ADMIN — ATORVASTATIN CALCIUM 40 MG: 20 TABLET, FILM COATED ORAL at 08:03

## 2018-03-14 RX ADMIN — Medication: at 12:03

## 2018-03-14 RX ADMIN — LORAZEPAM 2 MG: 1 TABLET ORAL at 04:03

## 2018-03-14 RX ADMIN — OXYCODONE AND ACETAMINOPHEN 1 TABLET: 10; 325 TABLET ORAL at 10:03

## 2018-03-14 RX ADMIN — OXYCODONE AND ACETAMINOPHEN 1 TABLET: 10; 325 TABLET ORAL at 06:03

## 2018-03-14 RX ADMIN — CIPROFLOXACIN 400 MG: 2 INJECTION, SOLUTION INTRAVENOUS at 10:03

## 2018-03-14 RX ADMIN — DEXTROSE MONOHYDRATE, SODIUM CHLORIDE, AND POTASSIUM CHLORIDE: 50; 4.5; 1.49 INJECTION, SOLUTION INTRAVENOUS at 10:03

## 2018-03-14 RX ADMIN — LORAZEPAM 2 MG: 1 TABLET ORAL at 11:03

## 2018-03-14 RX ADMIN — SERTRALINE HYDROCHLORIDE 50 MG: 50 TABLET ORAL at 08:03

## 2018-03-14 RX ADMIN — FOLIC ACID 1 MG: 1 TABLET ORAL at 08:03

## 2018-03-14 RX ADMIN — IBUPROFEN 800 MG: 800 INJECTION INTRAVENOUS at 06:03

## 2018-03-14 RX ADMIN — IBUPROFEN 800 MG: 800 INJECTION INTRAVENOUS at 11:03

## 2018-03-14 RX ADMIN — HYOSCYAMINE SULFATE 0.12 MG: 0.12 TABLET ORAL; SUBLINGUAL at 02:03

## 2018-03-14 RX ADMIN — HYOSCYAMINE SULFATE 0.12 MG: 0.12 TABLET ORAL; SUBLINGUAL at 06:03

## 2018-03-14 RX ADMIN — Medication 100 MG: at 08:03

## 2018-03-14 RX ADMIN — LORAZEPAM 2 MG: 1 TABLET ORAL at 08:03

## 2018-03-14 RX ADMIN — HYOSCYAMINE SULFATE 0.12 MG: 0.12 TABLET ORAL; SUBLINGUAL at 11:03

## 2018-03-14 RX ADMIN — CIPROFLOXACIN 400 MG: 2 INJECTION, SOLUTION INTRAVENOUS at 08:03

## 2018-03-14 NOTE — PLAN OF CARE
Problem: Patient Care Overview  Goal: Plan of Care Review  Outcome: Ongoing (interventions implemented as appropriate)  POC reviewed with pt who verbalized understanding. AAOx4. Hydralazine given x1 for elevated BP; VSS otherwise. Remains free of falls and injury. IVF infusing throughout shift, ML GAGE with DB. Left abd SONNY in place. Ileostomy in place with no output noted yet. Pain managed with Dilaudid PCA. Tolerating CL diet; no complaints of nausea. Samson in place with red output. Resting between care. No acute events. No distress noted. Family at bedside. Will continue to monitor.

## 2018-03-14 NOTE — PROGRESS NOTES
Patient seen for ileostomy education. Patient POD 1 loop ileostomy. Patient resting comfortably in bed, PCA pump in use. Provided patient with ileostomy education booklet. Discussed with patient, will follow up with ileostomy lesson. Stoma pink, no output noted in pouch. Patient denied any questions or need. Ostomy dept to follow.

## 2018-03-14 NOTE — ASSESSMENT & PLAN NOTE
53 YOM with HTN, Hep C, CAD s/p MI, with sigmoid mass with colovesical fistula s/p sigmoidectomy with DLI and partial cystectomy with bladder repair and ureteral re-implant    Management per colorectal  KUB today to check stent placement  Maintain mcnally  Scheduled levsin for bladder spasms

## 2018-03-14 NOTE — PROGRESS NOTES
[facility]  Urology  Progress Note    Patient Name: Chris Aguirre  MRN: [id number]  Admission Date: 3/9/2018  Hospital Length of Stay: 4 days  Code Status: Full Code   Attending Provider: Mikal Nino MD   Primary Care Physician: Ash Hung NP    Subjective:     HPI:  53 YOM with HTN, Hep C, CAD s/p MI, current everyday 35 pack year smoker who presented to the ED with fecaluria and pneumaturia.     This occurred after he went to an urgent care, where they prescribed him a laxative due to a several month history of increasing constipation and narrowing caliber of his stools. He states that he has blood in his urine as well as stool. He has also developed lower abdominal pain.  He has lost15 lbs over the past few months. He denies fatigue, fevers, CP, or SOB.      PSHx positive for perforated ulcer and appendectomy.  He was diagnosed with HCV about 15 years ago but never sought treatment for this. He was previously hospitalized and seen by inpatient psychiatry for substance abuse, past history of suicide attempt. He also has a history of an MI s/p stents, on ASA, does not follow with any physician.     Family history is notable for multiple first degree relatives with lung cancer and an uncle with colon cancer, unsure as to age of diagnosis. His mother is with him and she is a survivor from what sounds like SCC of the neck related to smoking.  Family history is negative for urologic malignancy.      Interval History:   POD1, NAEO.   Bloating with clears  Has not ambulated  Pain controlled on PCA  Samson draining light pink urine.    Review of Systems  Objective:     Temp:  [97 °F (36.1 °C)-99 °F (37.2 °C)] 98.8 °F (37.1 °C)  Pulse:  [] 100  Resp:  [12-20] 18  SpO2:  [93 %-99 %] 96 %  BP: (113-176)/() 164/88     Body mass index is 23.71 kg/m².            Drains     Drain                 Closed/Suction Drain 03/13/18 1724 Left Abdomen Bulb 10 Fr. less than 1 day         Ileostomy  03/13/18 1727 Loop RUQ less than 1 day         Urethral Catheter 03/13/18 1310 Non-latex;Straight-tip 16 Fr. less than 1 day                Physical Exam   Constitutional: No distress.   HENT:   Head: Normocephalic and atraumatic.   Cardiovascular: Normal rate.    Pulmonary/Chest: Effort normal. No respiratory distress.   Abdominal: Soft. He exhibits no distension. There is no tenderness.   Midline staples in place  Ileostomy with bowel sweat   Genitourinary: Testes normal. Uncircumcised.   Genitourinary Comments: Mcnally with light pink urine   Skin: Skin is warm and dry. He is not diaphoretic.       Significant Labs:    BMP:    Recent Labs  Lab 03/11/18  0430 03/12/18  0427 03/13/18  0440    142 142   K 3.7 4.0 3.8    106 106   CO2 26 25 25   BUN 8 8 6   CREATININE 0.8 0.8 0.8   CALCIUM 9.0 9.3 10.0       CBC:     Recent Labs  Lab 03/11/18  0430 03/12/18  0427 03/13/18  0440 03/13/18  1321 03/13/18  1514   WBC 6.06 5.46 9.74  --   --    HGB 11.7* 12.2* 13.4*  --   --    HCT 35.1* 37.6* 40.6 33* 35*    339 323  --   --        All pertinent labs results from the past 24 hours have been reviewed.    Significant Imaging:  All pertinent imaging results/findings from the past 24 hours have been reviewed.                  Assessment/Plan:     Colovesical fistula    53 YOM with HTN, Hep C, CAD s/p MI, with sigmoid mass with colovesical fistula s/p sigmoidectomy with DLI and partial cystectomy with bladder repair and ureteral re-implant    Management per colorectal  KUB today to check stent placement  Maintain mcnally  Scheduled levsin for bladder spasms            VTE Risk Mitigation         Ordered     enoxaparin injection 40 mg  Daily     Route:  Subcutaneous        03/13/18 1823     Medium Risk of VTE  Once      03/10/18 0348     Place sequential compression device  Until discontinued      03/10/18 0348          Renzo Johnson MD  Urology  [facility]

## 2018-03-14 NOTE — NURSING TRANSFER
Nursing Transfer Note      3/13/2018     Transfer To: [id number]    Transfer via stretcher    Transfer with IV meds/fluids    Transported by pct    Medicines sent: tamela  pca    Chart send with patient: Yes    Notified: friend    Patient reassessed at: 3/13/18 7530

## 2018-03-14 NOTE — PLAN OF CARE
Problem: Patient Care Overview  Goal: Plan of Care Review  Outcome: Ongoing (interventions implemented as appropriate)  Patient assessed, VSS, AAOx4 RA. Patient in bed upright, family at bedside.  POC reviewed with patient who verbalized understanding. Patient transferring with assist. Patient advanced to clear liquid diet today, tolerating well, reports no nausea. ML GAGE  With DB .   R ileostomy site, clean and in tact, scant liquid output to bag. L abd. SONNY drain in place, low sanguinous output. Samson in place, patent, clear red output.  Pt controlling pain with PCA pump, warm packs for low back comfort. Pt remains free of falls/injury family adherent with safety protocols. WCTM.

## 2018-03-14 NOTE — BRIEF OP NOTE
[facility]  Brief Operative Note    SUMMARY     Surgery Date: 3/13/2018     Surgeon(s) and Role:  Panel 1:     * Mikal Nino MD - Primary     * Kumar Jay MD - Resident - Assisting     * Carrillo Howard MD - Resident - Assisting    Panel 2:     * Meng Morrow MD - Primary     * Lois Major MD - Resident - Assisting     * Renzo Johnson MD - Resident - Assisting        Pre-op Diagnosis:  Pneumaturia [R39.89]    Post-op Diagnosis:  Post-Op Diagnosis Codes:     * Pneumaturia [R39.89]    Procedure(s) (LRB):  COLECTOMY-SIGMOID POSSIBLE BLADDER RESECTION (N/A)  CYSTOSCOPY WITH STENT PLACEMENT (Bilateral)  ILEOSTOMY (N/A)  CYSTECTOMY-PARTIAL w/ bladder resection    Anesthesia: General    Description of the findings of the procedure:   Sigmoid colectomy with en bloc resection of posterior wall of bladder.  33mm EEA colorectal anastomosis.  Reimplantation of L ureter.  Diverting loop ileostomy    Estimated Blood Loss: 200 mL         Specimens:   Specimen (12h ago through future)    Start     Ordered    03/13/18 1722  Specimen to Pathology - Surgery  Once     Comments:  1. Sigmoid colon and posterior bladder wall enbloc - perm2. Right lateral bladder wall - frozen3. Inferior bladder wall - frozen4. Left later bladder wall - frozen5. Superior bladder wall - frozen6. Second right lateral margin - frozen7. Second left lateral margin - frozen8. Left internal Iliac lymph node - perm9. Proximal Sigmoid - perm10.) proximal donut- perm11.) distal donut- perm      03/13/18 1723

## 2018-03-14 NOTE — SUBJECTIVE & OBJECTIVE
Subjective:     Interval History: No acute events. Pain well controlled with PCA. SONNY output 250ml. Red tinged UOP adequate.     Post-Op Info:  Procedure(s) (LRB):  COLECTOMY-SIGMOID POSSIBLE BLADDER RESECTION (N/A)  CYSTOSCOPY WITH STENT PLACEMENT (Bilateral)  ILEOSTOMY (N/A)  CYSTECTOMY-PARTIAL w/ bladder resection   1 Day Post-Op      Medications:  Continuous Infusions:   dextrose 5 % and 0.45 % NaCl with KCl 20 mEq 100 mL/hr at 03/13/18 1853    hydromorphone in 0.9 % NaCl 6 mg/30 ml       Scheduled Meds:   atorvastatin  40 mg Oral Daily    ciprofloxacin (CIPRO)400mg/200ml D5W IVPB  400 mg Intravenous Q12H    enoxaparin  40 mg Subcutaneous Daily    folic acid  1 mg Oral Daily    hyoscyamine  0.125 mg Oral Q4H    ibuprofen  800 mg Intravenous Q8H    metronidazole  500 mg Intravenous Q8H    mirtazapine  15 mg Oral Nightly    multivitamin  1 tablet Oral Daily    sertraline  50 mg Oral Daily    thiamine  100 mg Oral Daily     PRN Meds:   diphenhydrAMINE    diphenhydrAMINE    hydrALAZINE    LORazepam    meperidine    metoclopramide HCl    naloxone    ondansetron    oxyCODONE        Objective:     Vital Signs (Most Recent):  Temp: 98.8 °F (37.1 °C) (03/14/18 0715)  Pulse: 100 (03/14/18 0715)  Resp: 18 (03/14/18 0715)  BP: (!) 164/88 (03/14/18 0715)  SpO2: 96 % (03/14/18 0715) Vital Signs (24h Range):  Temp:  [97 °F (36.1 °C)-99 °F (37.2 °C)] 98.8 °F (37.1 °C)  Pulse:  [] 100  Resp:  [12-20] 18  SpO2:  [93 %-99 %] 96 %  BP: (113-176)/() 164/88     Intake/Output - Last 3 Shifts       03/12 0700 - 03/13 0659 03/13 0700 - 03/14 0659 03/14 0700 - 03/15 0659    P.O. 10 400     I.V. (mL/kg) 1936.7 (25.1) 5065.7 (65.7)     IV Piggyback 900 1300     Total Intake(mL/kg) 2846.7 (36.9) 6765.7 (87.8)     Urine (mL/kg/hr) 1325 (0.7) 2000 (1.1)     Emesis/NG output       Drains  250 (0.1)     Other       Stool 0 (0) 0 (0)     Blood  200 (0.1)     Total Output 1325 2450      Net +1521.7 +4315.7              Stool Occurrence 5 x 0 x           Physical Exam    NAD, AAOx3  RRR  CTAB  Abd midline incision CDI w/ dermabond in place, SONNY draining serosanguinous fluid, ostomy pink, edematous, bowel sweat in bag   mcnally in place draining red tinged urine      Significant Labs:  BMP:   Recent Labs  Lab 03/09/18  1736  03/14/18  0616   GLU 95  < > 140*     < > 135*   K 3.6  < > 3.8     < > 102   CO2 27  < > 22*   BUN 10  < > 5*   CREATININE 0.8  < > 0.8   CALCIUM 9.7  < > 8.5*   MG 1.3*  --   --    < > = values in this interval not displayed.  CBC:     Recent Labs  Lab 03/13/18  0440  03/13/18  1514   WBC 9.74  --   --    RBC 4.65  --   --    HGB 13.4*  --   --    HCT 40.6  < > 35*     --   --    MCV 87  --   --    MCH 28.8  --   --    MCHC 33.0  --   --    < > = values in this interval not displayed.

## 2018-03-14 NOTE — ASSESSMENT & PLAN NOTE
52 yo M 1 Day Post-Op sigmoidectomy, partial cystectomy w/ Left ureteral reimplant, diverting loop ileostomy for sigmoid mass invading posterior wall of bladder. CT C/A/P with PO, IV, and rectal contrast demonstrated focal thickening of sigmoid colon abutting bladder, no e/o metastatic liver disease    -Clear liquid diet  -PCA- transiton to oral meds today if tolerates clears  -PT/OT  -OOB/ Ambulate   -Home meds   -lovenox daily  -continue cipro/ flagyl for now  -Encourage IS  -Continue IVF today  -Alcohol withdrawal protocol.  -Cystectomy care per urology    -Evaluated by cardiology prior to surgery, appreciate recommendations:    ekg shows q waves v1/2  No symptoms  Continue asa throughout casey-operative period as patient has stents in place and can never be off ASA  Optimize BP post-surgery by adding ace-I (lisinopril 5mg po daily) to goal bp < 130/80  Statin therapy is imperative and should be on atorvastatin 40mg po qhs at least  Assure o/p follow up with cardiology on discharge  RCRI 0.9% risk of casey-operative MACCE  Continue to surgery without any further cardiac w/u or intervention

## 2018-03-14 NOTE — ANESTHESIA POSTPROCEDURE EVALUATION
"Anesthesia Post Evaluation    Patient: Chris Aguirre    Procedure(s) Performed: Procedure(s) (LRB):  COLECTOMY-SIGMOID POSSIBLE BLADDER RESECTION (N/A)  CYSTOSCOPY WITH STENT PLACEMENT (Bilateral)  ILEOSTOMY (N/A)  CYSTECTOMY-PARTIAL w/ bladder resection    Final Anesthesia Type: general  Patient location during evaluation: PACU  Patient participation: Yes- Able to Participate  Level of consciousness: awake and alert and oriented  Post-procedure vital signs: reviewed and stable  Pain management: adequate  Airway patency: patent  PONV status at discharge: No PONV  Anesthetic complications: no      Cardiovascular status: hemodynamically stable  Respiratory status: unassisted, spontaneous ventilation and room air  Hydration status: euvolemic  Follow-up not needed.        Visit Vitals  BP (!) 171/100 (BP Location: Left arm, Patient Position: Lying)   Pulse 94   Temp 37 °C (98.6 °F) (Oral)   Resp 18   Ht 5' 11" (1.803 m)   Wt 77.1 kg (169 lb 15.6 oz)   SpO2 95%   BMI 23.71 kg/m²       Pain/Marcella Score: Pain Assessment Performed: Yes (3/14/2018  7:15 AM)  Presence of Pain: complains of pain/discomfort (3/14/2018  7:15 AM)  Pain Rating Prior to Med Admin: 9 (3/14/2018 12:01 PM)  Pain Rating Post Med Admin: 4 (3/13/2018  4:22 AM)  Marcella Score: 10 (3/13/2018  7:07 PM)      "

## 2018-03-14 NOTE — PROGRESS NOTES
[facility]  Colorectal Surgery  Progress Note    Patient Name: Chris Aguirre  MRN: [id number]  Admission Date: 3/9/2018  Hospital Length of Stay: 4 days  Attending Physician: Mikal Nino MD    Subjective:     Interval History: No acute events. Pain well controlled with PCA. SONNY output 250ml. Red tinged UOP adequate.     Post-Op Info:  Procedure(s) (LRB):  COLECTOMY-SIGMOID POSSIBLE BLADDER RESECTION (N/A)  CYSTOSCOPY WITH STENT PLACEMENT (Bilateral)  ILEOSTOMY (N/A)  CYSTECTOMY-PARTIAL w/ bladder resection   1 Day Post-Op      Medications:  Continuous Infusions:   dextrose 5 % and 0.45 % NaCl with KCl 20 mEq 100 mL/hr at 03/13/18 1853    hydromorphone in 0.9 % NaCl 6 mg/30 ml       Scheduled Meds:   atorvastatin  40 mg Oral Daily    ciprofloxacin (CIPRO)400mg/200ml D5W IVPB  400 mg Intravenous Q12H    enoxaparin  40 mg Subcutaneous Daily    folic acid  1 mg Oral Daily    hyoscyamine  0.125 mg Oral Q4H    ibuprofen  800 mg Intravenous Q8H    metronidazole  500 mg Intravenous Q8H    mirtazapine  15 mg Oral Nightly    multivitamin  1 tablet Oral Daily    sertraline  50 mg Oral Daily    thiamine  100 mg Oral Daily     PRN Meds:   diphenhydrAMINE    diphenhydrAMINE    hydrALAZINE    LORazepam    meperidine    metoclopramide HCl    naloxone    ondansetron    oxyCODONE        Objective:     Vital Signs (Most Recent):  Temp: 98.8 °F (37.1 °C) (03/14/18 0715)  Pulse: 100 (03/14/18 0715)  Resp: 18 (03/14/18 0715)  BP: (!) 164/88 (03/14/18 0715)  SpO2: 96 % (03/14/18 0715) Vital Signs (24h Range):  Temp:  [97 °F (36.1 °C)-99 °F (37.2 °C)] 98.8 °F (37.1 °C)  Pulse:  [] 100  Resp:  [12-20] 18  SpO2:  [93 %-99 %] 96 %  BP: (113-176)/() 164/88     Intake/Output - Last 3 Shifts       03/12 0700 - 03/13 0659 03/13 0700 - 03/14 0659 03/14 0700 - 03/15 0659    P.O. 10 400     I.V. (mL/kg) 1936.7 (25.1) 5065.7 (65.7)     IV Piggyback 900 1300     Total Intake(mL/kg) 2846.7 (36.9)  6765.7 (87.8)     Urine (mL/kg/hr) 1325 (0.7) 2000 (1.1)     Emesis/NG output       Drains  250 (0.1)     Other       Stool 0 (0) 0 (0)     Blood  200 (0.1)     Total Output 1325 2450      Net +1521.7 +4315.7             Stool Occurrence 5 x 0 x           Physical Exam    NAD, AAOx3  RRR  CTAB  Abd midline incision CDI w/ dermabond in place, SONNY draining serosanguinous fluid, ostomy pink, edematous, bowel sweat in bag   mcnally in place draining red tinged urine      Significant Labs:  BMP:   Recent Labs  Lab 03/09/18  1736  03/14/18  0616   GLU 95  < > 140*     < > 135*   K 3.6  < > 3.8     < > 102   CO2 27  < > 22*   BUN 10  < > 5*   CREATININE 0.8  < > 0.8   CALCIUM 9.7  < > 8.5*   MG 1.3*  --   --    < > = values in this interval not displayed.  CBC:     Recent Labs  Lab 03/13/18  0440  03/13/18  1514   WBC 9.74  --   --    RBC 4.65  --   --    HGB 13.4*  --   --    HCT 40.6  < > 35*     --   --    MCV 87  --   --    MCH 28.8  --   --    MCHC 33.0  --   --    < > = values in this interval not displayed.      Assessment/Plan:     * Pneumaturia    54 yo M 1 Day Post-Op sigmoidectomy, partial cystectomy w/ Left ureteral reimplant, diverting loop ileostomy for sigmoid mass invading posterior wall of bladder. CT C/A/P with PO, IV, and rectal contrast demonstrated focal thickening of sigmoid colon abutting bladder, no e/o metastatic liver disease    -Clear liquid diet  -PCA- transiton to oral meds today if tolerates clears  -PT/OT  -OOB/ Ambulate   -Home meds   -lovenox daily  -continue cipro/ flagyl for now  -Encourage IS  -Continue IVF today  -Alcohol withdrawal protocol.  -Cystectomy care per urology    -Evaluated by cardiology prior to surgery, appreciate recommendations:    ekg shows q waves v1/2  No symptoms  Continue asa throughout casey-operative period as patient has stents in place and can never be off ASA  Optimize BP post-surgery by adding ace-I (lisinopril 5mg po daily) to goal bp <  130/80  Statin therapy is imperative and should be on atorvastatin 40mg po qhs at least  Assure o/p follow up with cardiology on discharge  RCRI 0.9% risk of casey-operative MACCE  Continue to surgery without any further cardiac w/u or intervention                    Carrillo Howard MD  Colorectal Surgery  [facility]

## 2018-03-14 NOTE — SUBJECTIVE & OBJECTIVE
Interval History:   POD1, NAEO.   Bloating with clears  Has not ambulated  Pain controlled on PCA  Samson draining light pink urine.    Review of Systems  Objective:     Temp:  [97 °F (36.1 °C)-99 °F (37.2 °C)] 98.8 °F (37.1 °C)  Pulse:  [] 100  Resp:  [12-20] 18  SpO2:  [93 %-99 %] 96 %  BP: (113-176)/() 164/88     Body mass index is 23.71 kg/m².            Drains     Drain                 Closed/Suction Drain 03/13/18 1724 Left Abdomen Bulb 10 Fr. less than 1 day         Ileostomy 03/13/18 1727 Loop RUQ less than 1 day         Urethral Catheter 03/13/18 1310 Non-latex;Straight-tip 16 Fr. less than 1 day                Physical Exam   Constitutional: No distress.   HENT:   Head: Normocephalic and atraumatic.   Cardiovascular: Normal rate.    Pulmonary/Chest: Effort normal. No respiratory distress.   Abdominal: Soft. He exhibits no distension. There is no tenderness.   Midline staples in place  Ileostomy with bowel sweat   Genitourinary: Testes normal. Uncircumcised.   Genitourinary Comments: Samson with light pink urine   Skin: Skin is warm and dry. He is not diaphoretic.       Significant Labs:    BMP:    Recent Labs  Lab 03/11/18  0430 03/12/18  0427 03/13/18  0440    142 142   K 3.7 4.0 3.8    106 106   CO2 26 25 25   BUN 8 8 6   CREATININE 0.8 0.8 0.8   CALCIUM 9.0 9.3 10.0       CBC:     Recent Labs  Lab 03/11/18  0430 03/12/18  0427 03/13/18  0440 03/13/18  1321 03/13/18  1514   WBC 6.06 5.46 9.74  --   --    HGB 11.7* 12.2* 13.4*  --   --    HCT 35.1* 37.6* 40.6 33* 35*    339 323  --   --        All pertinent labs results from the past 24 hours have been reviewed.    Significant Imaging:  All pertinent imaging results/findings from the past 24 hours have been reviewed.

## 2018-03-14 NOTE — OP NOTE
[facility]  Operative Note    Date: 03/13/2018    Pre-Op Diagnosis:   1. Sigmoid stricture and concern for sigmoid mass with direct extension into bladder  2. Colovesical fistula  3. Urinary tract infection  4. Psychosis with history of suicide attempt  5. History of myocardial infection  6. Hypertension  7. Hepatitis C  8. Coronary artery disease  9. History of alcohol abuse    Post-Op Diagnosis: same    Procedure(s) Performed:   1. Partial cystectomy with complex cystorrhaphy and partial prostatectomy- 22 MODIFIER  2. Left neoureterocystotomy with ureteral re-implantation  3. Cystoscopy with bilateral ureteral catheter placement  4. Bilateral ureteral stent placement   5. Left pelvic lymph node dissection    Specimen(s): none    Staff Surgeon: Rigo Morrow MD    Assistant Surgeon: Lois Major MD, Renzo Johnson MD    Anesthesia: General endotracheal anesthesia    Indications: Chris Aguirre is a 53 y.o. male with above history.  Dr. Nino has requested intra-operative ureteral catheters to allow for early intra-operative identification and repair of any injuries as well as possible cystectomy with urinary diversion as needed given extent of disease.    Findings:   - cystoscopy demonstrated mass effect on left posterior bladder wall from colon mass, edematous bullous urothelial changes consistent with colovesical fistula (no papillary tumors) which was a few cm lateral and superior to the trigone and left UO; bilateral ureteral catheters placed without complication  - Dr. Nino had divided the sigmoid colon distal to the mass and the left colon proximal to the mass and we were able to perform supratrigonal partial cystectomy en block with the mass  - intraoperative frozen margins negative  - left ureter tunneled beneath sigmoid colon and re-implanted in left anterior bladder wall (neoureterocystotomy completed intravesically and refluxing); this was tension and leak free  - peritoneal advancement  rotational flap between ureteral and bowel anastomoses  - single layer bladder repair, leak tested to 60cc without evidence of leak    Reason for 22-modifier: >200% added time and effort required for loss of surgical planes from locally advanced colon mass needed for en bloc resection requiring multiple surgeons; required complex closure of bladder    Estimated Blood Loss: min    Drains:   - 18Fr urethral mcnally (10cc in balloon)  - 6x24 JJ ureteral stents bilaterally  - pelvic drain (per CRS)    Procedure in Detail: Upon entering the room the patient was under general anesthesia.  The patient was then placed in the dorsal lithotomy position and prepped and draped in the usual sterile fashion. Preoperative antibiotics were administered per the primary surgeon preference.  Timeout was performed.      A 22 Fr cystoscope was inserted into the urethra and formal cystourethroscopy was performed. The urethra was normal.  The right and left ureteral orifices were in the normal anatomic position. There was a mass effect on left posterior bladder wall from colon mass. The urothelial lining had edematous bullous changes consistent with colovesical fistula which was a few cm lateral and superior to the trigone. There were no papillary tumors. A 0.38 glidewire was inserted into the left ureteral orifice and advanced to the level of the left renal pelvis. A 5 Fr ureteral catheter was then inserted over the wire and the wire was removed. The cystoscope was then removed leaving the ureteral catheter in place. The cystoscope was then reinserted alongside the ureteral catheter and a 0.38 glidewire was inserted into the right ureteral orifice and advanced to the level of the right renal pelvis. A 5 Fr ureteral catheter was advanced over the wire and the wire was removed. The cystoscope was then removed. A 16 Fr mcnally catheter was inserted and the balloon was filled with 10mL of sterile water. The ureteral catheters were secured in the  standard fashion. There were no complications with the procedure and the patient tolerated our procedure well.     The case was then turned over to the primary surgeon. Once we returned to the case, a midline laparotomy had been made and bookwalter was in place. The colon has been divided above and below the colon mass. The mass was densely adherent to the posterior bladder above the bladder neck. There was some edematous changes posterior to the prostate.     We did drop the bladder and develop the space of Retzius. The posterior peritoneal attachments of the bladder were divided with enseal. Bilateral ureters were isolated and vessel loops were placed around each ureter. The right ureter was mobilized to the bladder. There was no tumor involvement. The left ureter was densely adherent to the mass and was unable to be freed. This was transected just proximal to the mass and tagged with 3-0 chromic suture. The ureteral catheter was removed. The ureter was mobilized proximally.     2-0 vicryl stay stitches were placed in the anterior bladder wall. A capacious anterior cystotomy was made with bovie cautery. The left ureteral catheter was in place. Using a combination of bovie and enseal cautery the bladder the bladder was transected posteriorly above the trigone and all grossly palpable tumor removed. The left lateral bladder pedicle was taken with enseal.    There was a few large lymph nodes on the external iliac iliac artery which were dissected free and sent for pathology.    The case was turned over to Dr. Nino who completed the colorectal anastomosis. The left ureter had been tunneled posterior to the sigmoid mesentery.     We next performed the ureteral re-implantation. A neocystotomy was made intravesically about 2cm in size on the posterior right lateral bladder wall. The ureter was brought through the cytostomy. The distal ureter was spatulated and trimmed. 4-0 vicryl interrupted sutures were placed. A 6x24JJ  ureteral stent was placed over a straight glidewire. The ureter was not twisted in any way and tension free. The right ureteral catheter was exchanged for a 6x24 JJ ureteral stent over a straight glidewire. A complex cystorrhaphy was required for closure. 2-0 v-lock sutures were used to approximate the right lateral bladder wall to the posterior defect. The right anterior bladder flap was rotated posteriorly to recreated the lateral wall and then anterior bladder defect was closed. An 18Fr mcnally was placed under direct vision and secured with 10cc in the balloon. This was irrigated with 60cc and no leak was noted.     A peritoneal flap was harvested on the right side. This was secured to the sacral promontory with 2-0 vicryl and then wrapped around the anastomosis creating tissue buttress between the neoureterocystotomy and bowel anastomosis.     The case was turned over to Dr. Nino for diversion ileostomy.     Disposition: Maintain SONNY drain (SONNY creatinine prior to its removal). Maintain mcnally x2 weeks with cystogram prior to removal. Scheduled anticholinergics.  Will need bilateral stent removal in 4-6 weeks.    Lois Major MD    As the attending surgeon, Dr. Morrow was present for the entire procedure and performed all key aspects of the operation.

## 2018-03-15 LAB
ANION GAP SERPL CALC-SCNC: 5 MMOL/L
BASOPHILS # BLD AUTO: 0.02 K/UL
BASOPHILS NFR BLD: 0.2 %
BUN SERPL-MCNC: 6 MG/DL
CALCIUM SERPL-MCNC: 8.6 MG/DL
CHLORIDE SERPL-SCNC: 103 MMOL/L
CO2 SERPL-SCNC: 28 MMOL/L
CREAT SERPL-MCNC: 0.8 MG/DL
DIFFERENTIAL METHOD: ABNORMAL
EOSINOPHIL # BLD AUTO: 0.1 K/UL
EOSINOPHIL NFR BLD: 0.8 %
ERYTHROCYTE [DISTWIDTH] IN BLOOD BY AUTOMATED COUNT: 13.6 %
EST. GFR  (AFRICAN AMERICAN): >60 ML/MIN/1.73 M^2
EST. GFR  (NON AFRICAN AMERICAN): >60 ML/MIN/1.73 M^2
GLUCOSE SERPL-MCNC: 99 MG/DL
HCT VFR BLD AUTO: 33.6 %
HGB BLD-MCNC: 11.1 G/DL
IMM GRANULOCYTES # BLD AUTO: 0.04 K/UL
IMM GRANULOCYTES NFR BLD AUTO: 0.4 %
INR PPP: 1.2
LYMPHOCYTES # BLD AUTO: 2.1 K/UL
LYMPHOCYTES NFR BLD: 18 %
MCH RBC QN AUTO: 28.5 PG
MCHC RBC AUTO-ENTMCNC: 33 G/DL
MCV RBC AUTO: 86 FL
MONOCYTES # BLD AUTO: 1 K/UL
MONOCYTES NFR BLD: 8.7 %
NEUTROPHILS # BLD AUTO: 8.2 K/UL
NEUTROPHILS NFR BLD: 71.9 %
NRBC BLD-RTO: 0 /100 WBC
PLATELET # BLD AUTO: 236 K/UL
PMV BLD AUTO: 10.4 FL
POTASSIUM SERPL-SCNC: 3.9 MMOL/L
PROTHROMBIN TIME: 12 SEC
RBC # BLD AUTO: 3.89 M/UL
SODIUM SERPL-SCNC: 136 MMOL/L
WBC # BLD AUTO: 11.42 K/UL

## 2018-03-15 PROCEDURE — 25000003 PHARM REV CODE 250: Performed by: SURGERY

## 2018-03-15 PROCEDURE — 80048 BASIC METABOLIC PNL TOTAL CA: CPT

## 2018-03-15 PROCEDURE — S0030 INJECTION, METRONIDAZOLE: HCPCS | Performed by: COLON & RECTAL SURGERY

## 2018-03-15 PROCEDURE — 25000003 PHARM REV CODE 250: Performed by: COLON & RECTAL SURGERY

## 2018-03-15 PROCEDURE — 25000003 PHARM REV CODE 250: Performed by: STUDENT IN AN ORGANIZED HEALTH CARE EDUCATION/TRAINING PROGRAM

## 2018-03-15 PROCEDURE — 85610 PROTHROMBIN TIME: CPT

## 2018-03-15 PROCEDURE — 85025 COMPLETE CBC W/AUTO DIFF WBC: CPT

## 2018-03-15 PROCEDURE — 63600175 PHARM REV CODE 636 W HCPCS: Performed by: COLON & RECTAL SURGERY

## 2018-03-15 PROCEDURE — 25000003 PHARM REV CODE 250: Performed by: UROLOGY

## 2018-03-15 PROCEDURE — 36415 COLL VENOUS BLD VENIPUNCTURE: CPT

## 2018-03-15 PROCEDURE — 63600175 PHARM REV CODE 636 W HCPCS: Performed by: SURGERY

## 2018-03-15 PROCEDURE — 63600175 PHARM REV CODE 636 W HCPCS: Performed by: STUDENT IN AN ORGANIZED HEALTH CARE EDUCATION/TRAINING PROGRAM

## 2018-03-15 PROCEDURE — 20600001 HC STEP DOWN PRIVATE ROOM

## 2018-03-15 RX ORDER — HYOSCYAMINE SULFATE 0.12 MG/1
0.12 TABLET SUBLINGUAL EVERY 6 HOURS PRN
Status: DISCONTINUED | OUTPATIENT
Start: 2018-03-15 | End: 2018-03-17 | Stop reason: HOSPADM

## 2018-03-15 RX ORDER — OXYBUTYNIN CHLORIDE 10 MG/1
10 TABLET, EXTENDED RELEASE ORAL DAILY
Status: DISCONTINUED | OUTPATIENT
Start: 2018-03-15 | End: 2018-03-17 | Stop reason: HOSPADM

## 2018-03-15 RX ADMIN — CIPROFLOXACIN 400 MG: 2 INJECTION, SOLUTION INTRAVENOUS at 09:03

## 2018-03-15 RX ADMIN — THERA TABS 1 TABLET: TAB at 09:03

## 2018-03-15 RX ADMIN — MEPERIDINE HYDROCHLORIDE 25 MG: 50 INJECTION INTRAMUSCULAR; INTRAVENOUS; SUBCUTANEOUS at 11:03

## 2018-03-15 RX ADMIN — METRONIDAZOLE 500 MG: 500 INJECTION, SOLUTION INTRAVENOUS at 08:03

## 2018-03-15 RX ADMIN — FOLIC ACID 1 MG: 1 TABLET ORAL at 09:03

## 2018-03-15 RX ADMIN — HYOSCYAMINE SULFATE 0.12 MG: 0.12 TABLET ORAL; SUBLINGUAL at 09:03

## 2018-03-15 RX ADMIN — MIRTAZAPINE 15 MG: 7.5 TABLET ORAL at 08:03

## 2018-03-15 RX ADMIN — METRONIDAZOLE 500 MG: 500 INJECTION, SOLUTION INTRAVENOUS at 11:03

## 2018-03-15 RX ADMIN — IBUPROFEN 800 MG: 800 INJECTION INTRAVENOUS at 02:03

## 2018-03-15 RX ADMIN — ENOXAPARIN SODIUM 40 MG: 100 INJECTION SUBCUTANEOUS at 05:03

## 2018-03-15 RX ADMIN — IBUPROFEN 800 MG: 800 INJECTION INTRAVENOUS at 10:03

## 2018-03-15 RX ADMIN — HYOSCYAMINE SULFATE 0.12 MG: 0.12 TABLET ORAL; SUBLINGUAL at 03:03

## 2018-03-15 RX ADMIN — OXYBUTYNIN CHLORIDE 10 MG: 10 TABLET, FILM COATED, EXTENDED RELEASE ORAL at 09:03

## 2018-03-15 RX ADMIN — MEPERIDINE HYDROCHLORIDE 25 MG: 50 INJECTION INTRAMUSCULAR; INTRAVENOUS; SUBCUTANEOUS at 07:03

## 2018-03-15 RX ADMIN — OXYCODONE AND ACETAMINOPHEN 1 TABLET: 10; 325 TABLET ORAL at 08:03

## 2018-03-15 RX ADMIN — MEPERIDINE HYDROCHLORIDE 25 MG: 50 INJECTION INTRAMUSCULAR; INTRAVENOUS; SUBCUTANEOUS at 03:03

## 2018-03-15 RX ADMIN — IBUPROFEN 800 MG: 800 INJECTION INTRAVENOUS at 06:03

## 2018-03-15 RX ADMIN — OXYCODONE AND ACETAMINOPHEN 1 TABLET: 10; 325 TABLET ORAL at 07:03

## 2018-03-15 RX ADMIN — HYOSCYAMINE SULFATE 0.12 MG: 0.12 TABLET ORAL; SUBLINGUAL at 06:03

## 2018-03-15 RX ADMIN — SERTRALINE HYDROCHLORIDE 50 MG: 50 TABLET ORAL at 09:03

## 2018-03-15 RX ADMIN — Medication 100 MG: at 09:03

## 2018-03-15 RX ADMIN — ATORVASTATIN CALCIUM 40 MG: 20 TABLET, FILM COATED ORAL at 09:03

## 2018-03-15 RX ADMIN — OXYCODONE AND ACETAMINOPHEN 1 TABLET: 10; 325 TABLET ORAL at 03:03

## 2018-03-15 RX ADMIN — METRONIDAZOLE 500 MG: 500 INJECTION, SOLUTION INTRAVENOUS at 03:03

## 2018-03-15 RX ADMIN — CIPROFLOXACIN 400 MG: 2 INJECTION, SOLUTION INTRAVENOUS at 11:03

## 2018-03-15 NOTE — SUBJECTIVE & OBJECTIVE
Subjective:     Interval History: No acute events. Pain well controlled with oral meds. Tolerating clears. SONNY output 75ml. Red tinged UOP 4.2L. Ostomy output 80ml    Post-Op Info:  Procedure(s) (LRB):  COLECTOMY-SIGMOID POSSIBLE BLADDER RESECTION (N/A)  CYSTOSCOPY WITH STENT PLACEMENT (Bilateral)  ILEOSTOMY (N/A)  CYSTECTOMY-PARTIAL w/ bladder resection   2 Days Post-Op      Medications:  Continuous Infusions:    Scheduled Meds:   atorvastatin  40 mg Oral Daily    ciprofloxacin (CIPRO)400mg/200ml D5W IVPB  400 mg Intravenous Q12H    enoxaparin  40 mg Subcutaneous Daily    folic acid  1 mg Oral Daily    ibuprofen  800 mg Intravenous Q8H    metronidazole  500 mg Intravenous Q8H    mirtazapine  15 mg Oral Nightly    multivitamin  1 tablet Oral Daily    oxybutynin  10 mg Oral Daily    sertraline  50 mg Oral Daily    thiamine  100 mg Oral Daily     PRN Meds:   diphenhydrAMINE    hydrALAZINE    hyoscyamine    LORazepam    meperidine    metoclopramide HCl    ondansetron    oxyCODONE    oxyCODONE-acetaminophen    oxyCODONE-acetaminophen        Objective:     Vital Signs (Most Recent):  Temp: 99 °F (37.2 °C) (03/15/18 0736)  Pulse: 102 (03/15/18 0736)  Resp: 16 (03/15/18 0736)  BP: (!) 136/93 (03/15/18 0736)  SpO2: 95 % (03/15/18 0736) Vital Signs (24h Range):  Temp:  [98.6 °F (37 °C)-99.3 °F (37.4 °C)] 99 °F (37.2 °C)  Pulse:  [] 102  Resp:  [16-18] 16  SpO2:  [92 %-96 %] 95 %  BP: (122-171)/() 136/93     Intake/Output - Last 3 Shifts       03/13 0700 - 03/14 0659 03/14 0700 - 03/15 0659 03/15 0700 - 03/16 0659    P.O. 400 1340     I.V. (mL/kg) 5065.7 (65.7) 965 (12.5)     IV Piggyback 1300 1450     Total Intake(mL/kg) 6765.7 (87.8) 3755 (48.7)     Urine (mL/kg/hr) 2000 (1.1) 4250 (2.3)     Drains 250 (0.1) 75 (0)     Stool 0 (0) 80 (0)     Blood 200 (0.1)      Total Output 2450 4405      Net +4315.7 -650             Stool Occurrence 0 x 0 x           Physical Exam    NAD,  AAOx3  RRR  CTAB  Abd midline incision CDI w/ dermabond in place, SONNY draining serosanguinous fluid, ostomy pink, edematous, small amount of stool in bag   mcnally in place draining red tinged urine      Significant Labs:  BMP:   Recent Labs  Lab 03/09/18  1736  03/15/18  0510   GLU 95  < > 99     < > 136   K 3.6  < > 3.9     < > 103   CO2 27  < > 28   BUN 10  < > 6   CREATININE 0.8  < > 0.8   CALCIUM 9.7  < > 8.6*   MG 1.3*  --   --    < > = values in this interval not displayed.  CBC:     Recent Labs  Lab 03/15/18  0510   WBC 11.42   RBC 3.89*   HGB 11.1*   HCT 33.6*      MCV 86   MCH 28.5   MCHC 33.0

## 2018-03-15 NOTE — ASSESSMENT & PLAN NOTE
52 yo M 2 Days Post-Op sigmoidectomy, partial cystectomy w/ Left ureteral reimplant, diverting loop ileostomy for sigmoid mass invading posterior wall of bladder. CT C/A/P with PO, IV, and rectal contrast demonstrated focal thickening of sigmoid colon abutting bladder, no e/o metastatic liver disease    -Clear liquid diet, tolerating, may advance to low res later depending on ostomy output  -tolerating oral meds  -PT/OT  -OOB/ Ambulate   -Home meds   -lovenox daily  -continue cipro/ flagyl for now  -Encourage IS  -IVF hep locked  -Alcohol withdrawal protocol.  -Cystectomy care per urology, hyoscyamine for bladder spasms    -Evaluated by cardiology prior to surgery, appreciate recommendations:    ekg shows q waves v1/2  No symptoms  Continue asa throughout casey-operative period as patient has stents in place and can never be off ASA  Optimize BP post-surgery by adding ace-I (lisinopril 5mg po daily) to goal bp < 130/80  Statin therapy is imperative and should be on atorvastatin 40mg po qhs at least  Assure o/p follow up with cardiology on discharge  RCRI 0.9% risk of casey-operative MACCE  Continue to surgery without any further cardiac w/u or intervention

## 2018-03-15 NOTE — PROGRESS NOTES
Patient seen for ileostomy education. Patient's family member at bedside, attentive and involved in care. Educated patient and family member on diet and hydration. Diet sheet and hydration sheet provided. Answered patient's and family member's questions. Educated on pouch care: how often, cleaning peristomal skin, changing the pouch and emptying pouch. No additional questions noted. Plan on following up tomorrow for pouch change. Pouch intact with small amount of liquid brown stool in pouch. Stoma pink and moist. Educational booklet at bedside. Ostomy dept to follow.

## 2018-03-15 NOTE — PLAN OF CARE
Problem: Patient Care Overview  Goal: Plan of Care Review  Outcome: Ongoing (interventions implemented as appropriate)  POC reviewed with pt who verbalized understanding. AAOx4. VSS. Remains free of falls and injury. ML GAGE with DB. Left abd SONNY in place. Ileostomy in place with small amount of stool noted. Pain managed with PO PRN meds per MAR. Tolerating CL diet; no complaints of nausea. Samson in place with red output. Resting well between care. No acute events. No distress noted. Family at bedside. Will continue to monitor.

## 2018-03-15 NOTE — PLAN OF CARE
Problem: Patient Care Overview  Goal: Plan of Care Review  Outcome: Ongoing (interventions implemented as appropriate)  Plan of care discussed with pt. Pt verbalizes understanding. Pt tolerating clear liquid diet advanced to regular with no complaints of discomfort or nausea. Pain managed with PRN pain medication.  normal sinus rhythm. Pt ambulated in room . Pt positions independently.Illeostomy with small amts of stool. Vital signs stable. Pt remains free of falls and injury. No acute events this shift. Will continue to monitor.

## 2018-03-15 NOTE — PROGRESS NOTES
[facility]  Colorectal Surgery  Progress Note    Patient Name: Chris Aguirre  MRN: [id number]  Admission Date: 3/9/2018  Hospital Length of Stay: 5 days  Attending Physician: Mikal Nino MD    Subjective:     Interval History: No acute events. Pain well controlled with oral meds. Tolerating clears. SONNY output 75ml. Red tinged UOP 4.2L. Ostomy output 80ml    Post-Op Info:  Procedure(s) (LRB):  COLECTOMY-SIGMOID POSSIBLE BLADDER RESECTION (N/A)  CYSTOSCOPY WITH STENT PLACEMENT (Bilateral)  ILEOSTOMY (N/A)  CYSTECTOMY-PARTIAL w/ bladder resection   2 Days Post-Op      Medications:  Continuous Infusions:    Scheduled Meds:   atorvastatin  40 mg Oral Daily    ciprofloxacin (CIPRO)400mg/200ml D5W IVPB  400 mg Intravenous Q12H    enoxaparin  40 mg Subcutaneous Daily    folic acid  1 mg Oral Daily    ibuprofen  800 mg Intravenous Q8H    metronidazole  500 mg Intravenous Q8H    mirtazapine  15 mg Oral Nightly    multivitamin  1 tablet Oral Daily    oxybutynin  10 mg Oral Daily    sertraline  50 mg Oral Daily    thiamine  100 mg Oral Daily     PRN Meds:   diphenhydrAMINE    hydrALAZINE    hyoscyamine    LORazepam    meperidine    metoclopramide HCl    ondansetron    oxyCODONE    oxyCODONE-acetaminophen    oxyCODONE-acetaminophen        Objective:     Vital Signs (Most Recent):  Temp: 99 °F (37.2 °C) (03/15/18 0736)  Pulse: 102 (03/15/18 0736)  Resp: 16 (03/15/18 0736)  BP: (!) 136/93 (03/15/18 0736)  SpO2: 95 % (03/15/18 0736) Vital Signs (24h Range):  Temp:  [98.6 °F (37 °C)-99.3 °F (37.4 °C)] 99 °F (37.2 °C)  Pulse:  [] 102  Resp:  [16-18] 16  SpO2:  [92 %-96 %] 95 %  BP: (122-171)/() 136/93     Intake/Output - Last 3 Shifts       03/13 0700 - 03/14 0659 03/14 0700 - 03/15 0659 03/15 0700 - 03/16 0659    P.O. 400 1340     I.V. (mL/kg) 5065.7 (65.7) 965 (12.5)     IV Piggyback 1300 1450     Total Intake(mL/kg) 6765.7 (87.8) 3755 (48.7)     Urine (mL/kg/hr) 2000 (1.1) 4250  (2.3)     Drains 250 (0.1) 75 (0)     Stool 0 (0) 80 (0)     Blood 200 (0.1)      Total Output 2450 4405      Net +4315.7 -650             Stool Occurrence 0 x 0 x           Physical Exam    NAD, AAOx3  RRR  CTAB  Abd midline incision CDI w/ dermabond in place, SONNY draining serosanguinous fluid, ostomy pink, edematous, small amount of stool in bag   mcnally in place draining red tinged urine      Significant Labs:  BMP:   Recent Labs  Lab 03/09/18  1736  03/15/18  0510   GLU 95  < > 99     < > 136   K 3.6  < > 3.9     < > 103   CO2 27  < > 28   BUN 10  < > 6   CREATININE 0.8  < > 0.8   CALCIUM 9.7  < > 8.6*   MG 1.3*  --   --    < > = values in this interval not displayed.  CBC:     Recent Labs  Lab 03/15/18  0510   WBC 11.42   RBC 3.89*   HGB 11.1*   HCT 33.6*      MCV 86   MCH 28.5   MCHC 33.0         Assessment/Plan:     * Pneumaturia    54 yo M 2 Days Post-Op sigmoidectomy, partial cystectomy w/ Left ureteral reimplant, diverting loop ileostomy for sigmoid mass invading posterior wall of bladder. CT C/A/P with PO, IV, and rectal contrast demonstrated focal thickening of sigmoid colon abutting bladder, no e/o metastatic liver disease    -Clear liquid diet, tolerating, may advance to low res later depending on ostomy output  -tolerating oral meds  -PT/OT  -OOB/ Ambulate   -Home meds   -lovenox daily  -continue cipro/ flagyl for now  -Encourage IS  -IVF hep locked  -Alcohol withdrawal protocol.  -Cystectomy care per urology, hyoscyamine for bladder spasms    -Evaluated by cardiology prior to surgery, appreciate recommendations:    ekg shows q waves v1/2  No symptoms  Continue asa throughout casey-operative period as patient has stents in place and can never be off ASA  Optimize BP post-surgery by adding ace-I (lisinopril 5mg po daily) to goal bp < 130/80  Statin therapy is imperative and should be on atorvastatin 40mg po qhs at least  Assure o/p follow up with cardiology on discharge  RCRI 0.9% risk  of casye-operative MACCE  Continue to surgery without any further cardiac w/u or intervention                    Carrillo Howard MD  Colorectal Surgery  [facility]

## 2018-03-16 LAB
ANION GAP SERPL CALC-SCNC: 7 MMOL/L
BASOPHILS # BLD AUTO: 0.05 K/UL
BASOPHILS NFR BLD: 0.5 %
BLD PROD TYP BPU: NORMAL
BLD PROD TYP BPU: NORMAL
BLOOD UNIT EXPIRATION DATE: NORMAL
BLOOD UNIT EXPIRATION DATE: NORMAL
BLOOD UNIT TYPE CODE: 6200
BLOOD UNIT TYPE CODE: 6200
BLOOD UNIT TYPE: NORMAL
BLOOD UNIT TYPE: NORMAL
BODY FLUID SOURCE, CREATININE: NORMAL
BUN SERPL-MCNC: 5 MG/DL
CALCIUM SERPL-MCNC: 9.1 MG/DL
CHLORIDE SERPL-SCNC: 103 MMOL/L
CO2 SERPL-SCNC: 27 MMOL/L
CODING SYSTEM: NORMAL
CODING SYSTEM: NORMAL
CREAT FLD-MCNC: 0.7 MG/DL
CREAT SERPL-MCNC: 0.8 MG/DL
DIFFERENTIAL METHOD: ABNORMAL
DISPENSE STATUS: NORMAL
DISPENSE STATUS: NORMAL
EOSINOPHIL # BLD AUTO: 0.5 K/UL
EOSINOPHIL NFR BLD: 4.8 %
ERYTHROCYTE [DISTWIDTH] IN BLOOD BY AUTOMATED COUNT: 13.4 %
EST. GFR  (AFRICAN AMERICAN): >60 ML/MIN/1.73 M^2
EST. GFR  (NON AFRICAN AMERICAN): >60 ML/MIN/1.73 M^2
GLUCOSE SERPL-MCNC: 112 MG/DL
HCT VFR BLD AUTO: 35.5 %
HGB BLD-MCNC: 11.5 G/DL
IMM GRANULOCYTES # BLD AUTO: 0.04 K/UL
IMM GRANULOCYTES NFR BLD AUTO: 0.4 %
INR PPP: 1.1
LYMPHOCYTES # BLD AUTO: 2.8 K/UL
LYMPHOCYTES NFR BLD: 28.8 %
MCH RBC QN AUTO: 28.8 PG
MCHC RBC AUTO-ENTMCNC: 32.4 G/DL
MCV RBC AUTO: 89 FL
MONOCYTES # BLD AUTO: 0.9 K/UL
MONOCYTES NFR BLD: 9.3 %
NEUTROPHILS # BLD AUTO: 5.5 K/UL
NEUTROPHILS NFR BLD: 56.2 %
NRBC BLD-RTO: 0 /100 WBC
PLATELET # BLD AUTO: 263 K/UL
PMV BLD AUTO: 10.7 FL
POTASSIUM SERPL-SCNC: 3.6 MMOL/L
PROTHROMBIN TIME: 11.7 SEC
RBC # BLD AUTO: 3.99 M/UL
SODIUM SERPL-SCNC: 137 MMOL/L
TRANS ERYTHROCYTES VOL PATIENT: NORMAL ML
TRANS ERYTHROCYTES VOL PATIENT: NORMAL ML
WBC # BLD AUTO: 9.71 K/UL

## 2018-03-16 PROCEDURE — 25000003 PHARM REV CODE 250: Performed by: COLON & RECTAL SURGERY

## 2018-03-16 PROCEDURE — 20600001 HC STEP DOWN PRIVATE ROOM

## 2018-03-16 PROCEDURE — 85610 PROTHROMBIN TIME: CPT

## 2018-03-16 PROCEDURE — 25000003 PHARM REV CODE 250: Performed by: STUDENT IN AN ORGANIZED HEALTH CARE EDUCATION/TRAINING PROGRAM

## 2018-03-16 PROCEDURE — 82570 ASSAY OF URINE CREATININE: CPT

## 2018-03-16 PROCEDURE — 63600175 PHARM REV CODE 636 W HCPCS: Performed by: COLON & RECTAL SURGERY

## 2018-03-16 PROCEDURE — 80048 BASIC METABOLIC PNL TOTAL CA: CPT

## 2018-03-16 PROCEDURE — 36415 COLL VENOUS BLD VENIPUNCTURE: CPT

## 2018-03-16 PROCEDURE — 63600175 PHARM REV CODE 636 W HCPCS: Performed by: SURGERY

## 2018-03-16 PROCEDURE — S0030 INJECTION, METRONIDAZOLE: HCPCS | Performed by: COLON & RECTAL SURGERY

## 2018-03-16 PROCEDURE — 25000003 PHARM REV CODE 250: Performed by: SURGERY

## 2018-03-16 PROCEDURE — 85025 COMPLETE CBC W/AUTO DIFF WBC: CPT

## 2018-03-16 RX ORDER — METRONIDAZOLE 500 MG/1
500 TABLET ORAL EVERY 8 HOURS
Status: DISCONTINUED | OUTPATIENT
Start: 2018-03-16 | End: 2018-03-17 | Stop reason: HOSPADM

## 2018-03-16 RX ORDER — CIPROFLOXACIN 500 MG/1
500 TABLET ORAL EVERY 12 HOURS
Status: DISCONTINUED | OUTPATIENT
Start: 2018-03-16 | End: 2018-03-17 | Stop reason: HOSPADM

## 2018-03-16 RX ORDER — POTASSIUM CHLORIDE 20 MEQ/1
40 TABLET, EXTENDED RELEASE ORAL ONCE
Status: COMPLETED | OUTPATIENT
Start: 2018-03-16 | End: 2018-03-16

## 2018-03-16 RX ADMIN — MEPERIDINE HYDROCHLORIDE 25 MG: 50 INJECTION INTRAMUSCULAR; INTRAVENOUS; SUBCUTANEOUS at 04:03

## 2018-03-16 RX ADMIN — FOLIC ACID 1 MG: 1 TABLET ORAL at 08:03

## 2018-03-16 RX ADMIN — MIRTAZAPINE 15 MG: 7.5 TABLET ORAL at 08:03

## 2018-03-16 RX ADMIN — IBUPROFEN 800 MG: 800 INJECTION INTRAVENOUS at 05:03

## 2018-03-16 RX ADMIN — CIPROFLOXACIN HYDROCHLORIDE 500 MG: 500 TABLET, FILM COATED ORAL at 08:03

## 2018-03-16 RX ADMIN — IBUPROFEN 800 MG: 800 INJECTION INTRAVENOUS at 02:03

## 2018-03-16 RX ADMIN — OXYCODONE AND ACETAMINOPHEN 1 TABLET: 10; 325 TABLET ORAL at 04:03

## 2018-03-16 RX ADMIN — IBUPROFEN 800 MG: 800 INJECTION INTRAVENOUS at 08:03

## 2018-03-16 RX ADMIN — MEPERIDINE HYDROCHLORIDE 25 MG: 50 INJECTION INTRAMUSCULAR; INTRAVENOUS; SUBCUTANEOUS at 05:03

## 2018-03-16 RX ADMIN — POTASSIUM CHLORIDE 40 MEQ: 1500 TABLET, EXTENDED RELEASE ORAL at 08:03

## 2018-03-16 RX ADMIN — OXYCODONE AND ACETAMINOPHEN 1 TABLET: 10; 325 TABLET ORAL at 10:03

## 2018-03-16 RX ADMIN — MEPERIDINE HYDROCHLORIDE 25 MG: 50 INJECTION INTRAMUSCULAR; INTRAVENOUS; SUBCUTANEOUS at 09:03

## 2018-03-16 RX ADMIN — THERA TABS 1 TABLET: TAB at 08:03

## 2018-03-16 RX ADMIN — Medication 100 MG: at 08:03

## 2018-03-16 RX ADMIN — MEPERIDINE HYDROCHLORIDE 25 MG: 50 INJECTION INTRAMUSCULAR; INTRAVENOUS; SUBCUTANEOUS at 08:03

## 2018-03-16 RX ADMIN — OXYBUTYNIN CHLORIDE 10 MG: 10 TABLET, FILM COATED, EXTENDED RELEASE ORAL at 08:03

## 2018-03-16 RX ADMIN — METRONIDAZOLE 500 MG: 500 INJECTION, SOLUTION INTRAVENOUS at 04:03

## 2018-03-16 RX ADMIN — METRONIDAZOLE 500 MG: 500 TABLET ORAL at 12:03

## 2018-03-16 RX ADMIN — METRONIDAZOLE 500 MG: 500 TABLET ORAL at 08:03

## 2018-03-16 RX ADMIN — ENOXAPARIN SODIUM 40 MG: 100 INJECTION SUBCUTANEOUS at 05:03

## 2018-03-16 RX ADMIN — OXYCODONE AND ACETAMINOPHEN 1 TABLET: 10; 325 TABLET ORAL at 12:03

## 2018-03-16 RX ADMIN — ATORVASTATIN CALCIUM 40 MG: 20 TABLET, FILM COATED ORAL at 08:03

## 2018-03-16 RX ADMIN — SERTRALINE HYDROCHLORIDE 50 MG: 50 TABLET ORAL at 08:03

## 2018-03-16 NOTE — PROGRESS NOTES
UROLOGY PROGRESS NOTE    SONNY Cr consistent with serum Cr.  Okay to remove SONNY per primary team.    - follow-up with Dr. Morrow 2 weeks post-op for cystogram at 3/28 and mcnally removal (note sent to nurse)  - continue scheduled anticholinergics while mcnally in place  - DO NOT remove or manipulate mcnally  - will sign off, please call with questions    Lois Major MD  Urology PGY.5  (p) [phone]

## 2018-03-16 NOTE — SUBJECTIVE & OBJECTIVE
Subjective:     Interval History: No acute events. Pain well controlled with oral meds. Tolerating CLD. Has had minimal ambulation. SONNY output 30ml. Red tinged UOP 3.2L. Ostomy output 80ml    Post-Op Info:  Procedure(s) (LRB):  COLECTOMY-SIGMOID POSSIBLE BLADDER RESECTION (N/A)  CYSTOSCOPY WITH STENT PLACEMENT (Bilateral)  ILEOSTOMY (N/A)  CYSTECTOMY-PARTIAL w/ bladder resection   3 Days Post-Op      Medications:  Continuous Infusions:    Scheduled Meds:   atorvastatin  40 mg Oral Daily    ciprofloxacin (CIPRO)400mg/200ml D5W IVPB  400 mg Intravenous Q12H    enoxaparin  40 mg Subcutaneous Daily    folic acid  1 mg Oral Daily    ibuprofen  800 mg Intravenous Q8H    metronidazole  500 mg Intravenous Q8H    mirtazapine  15 mg Oral Nightly    multivitamin  1 tablet Oral Daily    oxybutynin  10 mg Oral Daily    potassium chloride  40 mEq Oral Once    sertraline  50 mg Oral Daily    thiamine  100 mg Oral Daily     PRN Meds:   diphenhydrAMINE    hydrALAZINE    hyoscyamine    LORazepam    meperidine    metoclopramide HCl    ondansetron    oxyCODONE    oxyCODONE-acetaminophen    oxyCODONE-acetaminophen        Objective:     Vital Signs (Most Recent):  Temp: 98.5 °F (36.9 °C) (03/16/18 0708)  Pulse: 76 (03/16/18 0708)  Resp: 16 (03/16/18 0708)  BP: 131/89 (03/16/18 0708)  SpO2: 95 % (03/16/18 0708) Vital Signs (24h Range):  Temp:  [97.6 °F (36.4 °C)-99.4 °F (37.4 °C)] 98.5 °F (36.9 °C)  Pulse:  [] 76  Resp:  [16-82] 16  SpO2:  [92 %-96 %] 95 %  BP: (131-160)/() 131/89     Intake/Output - Last 3 Shifts       03/14 0700 - 03/15 0659 03/15 0700 - 03/16 0659 03/16 0700 - 03/17 0659    P.O. 1340 1060     I.V. (mL/kg) 965 (12.5)      IV Piggyback 1450 1500     Total Intake(mL/kg) 3755 (48.7) 2560 (33.2)     Urine (mL/kg/hr) 4250 (2.3) 3250 (1.8)     Emesis/NG output  0 (0)     Drains 75 (0) 30 (0)     Stool 80 (0) 600 (0.3)     Blood       Total Output 4405 9220      Net -620 -5228              Stool Occurrence 0 x            Physical Exam    NAD, AAOx3  RRR  CTAB  Abd midline incision CDI w/ dermabond in place, SONNY draining serous fluid, ostomy pink, edematous, stool and gas in bag   mcnally in place draining red tinged urine      Significant Labs:  BMP:   Recent Labs  Lab 03/09/18  1736  03/16/18  0538   GLU 95  < > 112*     < > 137   K 3.6  < > 3.6     < > 103   CO2 27  < > 27   BUN 10  < > 5*   CREATININE 0.8  < > 0.8   CALCIUM 9.7  < > 9.1   MG 1.3*  --   --    < > = values in this interval not displayed.  CBC:     Recent Labs  Lab 03/15/18  0510   WBC 11.42   RBC 3.89*   HGB 11.1*   HCT 33.6*      MCV 86   MCH 28.5   MCHC 33.0

## 2018-03-16 NOTE — PLAN OF CARE
PCP- DR. ELINA OSCAR    PT HAS A RIDE HOME AND FAMILY SUPPORT WITH MOTHER     PHARMACY- [facility]        03/16/18 1241   Discharge Assessment   Assessment Type Discharge Planning Assessment   Confirmed/corrected address and phone number on facesheet? Yes   Assessment information obtained from? Patient   Expected Length of Stay (days) 6   Communicated expected length of stay with patient/caregiver yes   Prior to hospitilization cognitive status: Alert/Oriented   Prior to hospitalization functional status: Independent   Current cognitive status: Alert/Oriented   Current Functional Status: Independent   Lives With parent(s)   Able to Return to Prior Arrangements yes   Is patient able to care for self after discharge? No   Patient's perception of discharge disposition home or selfcare   Readmission Within The Last 30 Days no previous admission in last 30 days   Patient currently being followed by outpatient case management? No   Patient currently receives any other outside agency services? No   Equipment Currently Used at Home none   Do you have any problems affording any of your prescribed medications? No   Is the patient taking medications as prescribed? yes   Does the patient have transportation home? Yes   Transportation Available car;family or friend will provide   Does the patient receive services at the [facility]? No   Discharge Plan A Home with family   Discharge Plan B Home Health;Home with family   Patient/Family In Agreement With Plan yes

## 2018-03-16 NOTE — PLAN OF CARE
Problem: Patient Care Overview  Goal: Plan of Care Review  Outcome: Ongoing (interventions implemented as appropriate)  Plan of care discussed with pt. Pt verbalizes understanding. Pt tolerating regular diet with no complaints of discomfort or nausea. Pain managed with PRN pain medication.  normal sinus rhythm. Pt ambulated in jain independently.. Pt positions independently. Vital signs stable. Pt remains free of falls and injury. No acute events this shift. Will continue to monitor.

## 2018-03-16 NOTE — PROGRESS NOTES
Patient seen for ileostomy education. Patient's family member at bedside, attentive and involved with care. Reviewed previous education on diet/hydration. Answered patient's questions. Reviewed output sheet/protocol. Reviewed pouch care: how often, removing pouch, cleaning peristomal skin, measuring/cutting/applying pouch, and emptying pouch. Drew removed today, POD 3, patient tolerated well. Stoma red, well budded, 40mm, mucus/liquid yellow brown output. Peristomal skin intact, divet in skin noted at 9 oclock. Peristomal skin cleaned, barrier ring and coloplast pat pouch applied. Patient tolerated well. Answered patient's and family member's questions. Patient may benefit from soft convexity pouch, coloplast contacted for samples. Extra supplies left at bedside. Educational booklet with ostomy nurses' contact information at bedside. Patient denied any further questions or need. Nursing to continue care.

## 2018-03-16 NOTE — ASSESSMENT & PLAN NOTE
54 yo M 3 Days Post-Op sigmoidectomy, partial cystectomy w/ Left ureteral reimplant, diverting loop ileostomy for sigmoid mass invading posterior wall of bladder. CT C/A/P with PO, IV, and rectal contrast demonstrated focal thickening of sigmoid colon abutting bladder, no e/o metastatic liver disease    -advance to low res diet  -tolerating oral meds  -PT/OT  -OOB/ Ambulate, patient needs to walk the halls  -Home meds   -lovenox daily  -continue cipro/ flagyl for now  -Encourage IS  -IVF hep locked  -Alcohol withdrawal protocol.  -Cystectomy care per urology, hyoscyamine for bladder spasms  -Likely home tomorrow    -Evaluated by cardiology prior to surgery, appreciate recommendations:    ekg shows q waves v1/2  No symptoms  Continue asa throughout casey-operative period as patient has stents in place and can never be off ASA  Optimize BP post-surgery by adding ace-I (lisinopril 5mg po daily) to goal bp < 130/80  Statin therapy is imperative and should be on atorvastatin 40mg po qhs at least  Assure o/p follow up with cardiology on discharge  RCRI 0.9% risk of casey-operative MACCE  Continue to surgery without any further cardiac w/u or intervention

## 2018-03-16 NOTE — SUBJECTIVE & OBJECTIVE
Interval History:   Naeo, ambulating around the room. No n/v. No bladder spasms.    Review of Systems  Objective:     Temp:  [97.6 °F (36.4 °C)-99.4 °F (37.4 °C)] 98.5 °F (36.9 °C)  Pulse:  [] 76  Resp:  [16-82] 16  SpO2:  [92 %-96 %] 95 %  BP: (131-160)/() 131/89     Body mass index is 23.71 kg/m².            Drains     Drain                 Closed/Suction Drain 03/13/18 1724 Left Abdomen Bulb 10 Fr. 2 days         Ileostomy 03/13/18 1727 Loop RUQ 2 days         Urethral Catheter 03/13/18 1310 Non-latex;Straight-tip 16 Fr. 2 days                Physical Exam   Constitutional: No distress.   HENT:   Head: Normocephalic and atraumatic.   Cardiovascular: Normal rate.    Pulmonary/Chest: Effort normal. No respiratory distress.   Abdominal: Soft. He exhibits no distension. There is no tenderness.   Midline staples in place  Ileostomy with output   Genitourinary: Testes normal. Uncircumcised.   Genitourinary Comments: Samson with light pink urine draining well   Skin: Skin is warm and dry. He is not diaphoretic.       Significant Labs:    BMP:    Recent Labs  Lab 03/14/18  0616 03/15/18  0510 03/16/18  0538   * 136 137   K 3.8 3.9 3.6    103 103   CO2 22* 28 27   BUN 5* 6 5*   CREATININE 0.8 0.8 0.8   CALCIUM 8.5* 8.6* 9.1       CBC:     Recent Labs  Lab 03/14/18  0616 03/15/18  0510 03/16/18  0538   WBC 16.46* 11.42 9.71   HGB 12.3* 11.1* 11.5*   HCT 36.6* 33.6* 35.5*    236 263       All pertinent labs results from the past 24 hours have been reviewed.    Significant Imaging:  All pertinent imaging results/findings from the past 24 hours have been reviewed.

## 2018-03-16 NOTE — PROGRESS NOTES
[facility]  Colorectal Surgery  Progress Note    Patient Name: Chris Aguirre  MRN: [id number]  Admission Date: 3/9/2018  Hospital Length of Stay: 6 days  Attending Physician: Mikal Nino MD    Subjective:     Interval History: No acute events. Pain well controlled with oral meds. Tolerating CLD. Has had minimal ambulation. SONNY output 30ml. Red tinged UOP 3.2L. Ostomy output 80ml    Post-Op Info:  Procedure(s) (LRB):  COLECTOMY-SIGMOID POSSIBLE BLADDER RESECTION (N/A)  CYSTOSCOPY WITH STENT PLACEMENT (Bilateral)  ILEOSTOMY (N/A)  CYSTECTOMY-PARTIAL w/ bladder resection   3 Days Post-Op      Medications:  Continuous Infusions:    Scheduled Meds:   atorvastatin  40 mg Oral Daily    ciprofloxacin (CIPRO)400mg/200ml D5W IVPB  400 mg Intravenous Q12H    enoxaparin  40 mg Subcutaneous Daily    folic acid  1 mg Oral Daily    ibuprofen  800 mg Intravenous Q8H    metronidazole  500 mg Intravenous Q8H    mirtazapine  15 mg Oral Nightly    multivitamin  1 tablet Oral Daily    oxybutynin  10 mg Oral Daily    potassium chloride  40 mEq Oral Once    sertraline  50 mg Oral Daily    thiamine  100 mg Oral Daily     PRN Meds:   diphenhydrAMINE    hydrALAZINE    hyoscyamine    LORazepam    meperidine    metoclopramide HCl    ondansetron    oxyCODONE    oxyCODONE-acetaminophen    oxyCODONE-acetaminophen        Objective:     Vital Signs (Most Recent):  Temp: 98.5 °F (36.9 °C) (03/16/18 0708)  Pulse: 76 (03/16/18 0708)  Resp: 16 (03/16/18 0708)  BP: 131/89 (03/16/18 0708)  SpO2: 95 % (03/16/18 0708) Vital Signs (24h Range):  Temp:  [97.6 °F (36.4 °C)-99.4 °F (37.4 °C)] 98.5 °F (36.9 °C)  Pulse:  [] 76  Resp:  [16-82] 16  SpO2:  [92 %-96 %] 95 %  BP: (131-160)/() 131/89     Intake/Output - Last 3 Shifts       03/14 0700 - 03/15 0659 03/15 0700 - 03/16 0659 03/16 0700 - 03/17 0659    P.O. 1340 1060     I.V. (mL/kg) 965 (12.5)      IV Piggyback 1450 1500     Total Intake(mL/kg) 2845  (48.7) 2560 (33.2)     Urine (mL/kg/hr) 4250 (2.3) 3250 (1.8)     Emesis/NG output  0 (0)     Drains 75 (0) 30 (0)     Stool 80 (0) 600 (0.3)     Blood       Total Output 4405 3880      Net -650 -1320             Stool Occurrence 0 x            Physical Exam    NAD, AAOx3  RRR  CTAB  Abd midline incision CDI w/ dermabond in place, SONNY draining serous fluid, ostomy pink, edematous, stool and gas in bag   mcnally in place draining red tinged urine      Significant Labs:  BMP:   Recent Labs  Lab 03/09/18  1736  03/16/18  0538   GLU 95  < > 112*     < > 137   K 3.6  < > 3.6     < > 103   CO2 27  < > 27   BUN 10  < > 5*   CREATININE 0.8  < > 0.8   CALCIUM 9.7  < > 9.1   MG 1.3*  --   --    < > = values in this interval not displayed.  CBC:     Recent Labs  Lab 03/15/18  0510   WBC 11.42   RBC 3.89*   HGB 11.1*   HCT 33.6*      MCV 86   MCH 28.5   MCHC 33.0         Assessment/Plan:     * Pneumaturia    52 yo M 3 Days Post-Op sigmoidectomy, partial cystectomy w/ Left ureteral reimplant, diverting loop ileostomy for sigmoid mass invading posterior wall of bladder. CT C/A/P with PO, IV, and rectal contrast demonstrated focal thickening of sigmoid colon abutting bladder, no e/o metastatic liver disease    -advance to low res diet  -tolerating oral meds  -PT/OT  -OOB/ Ambulate, patient needs to walk the halls  -Home meds   -lovenox daily  -continue cipro/ flagyl for now  -Encourage IS  -IVF hep locked  -Alcohol withdrawal protocol.  -Cystectomy care per urology, hyoscyamine for bladder spasms  -Likely home tomorrow    -Evaluated by cardiology prior to surgery, appreciate recommendations:    ekg shows q waves v1/2  No symptoms  Continue asa throughout casey-operative period as patient has stents in place and can never be off ASA  Optimize BP post-surgery by adding ace-I (lisinopril 5mg po daily) to goal bp < 130/80  Statin therapy is imperative and should be on atorvastatin 40mg po qhs at least  Assure o/p follow  up with cardiology on discharge  RCRI 0.9% risk of casey-operative MACCE  Continue to surgery without any further cardiac w/u or intervention                    Carrillo Howard MD  Colorectal Surgery  [facility]

## 2018-03-16 NOTE — PLAN OF CARE
Problem: Patient Care Overview  Goal: Plan of Care Review  Outcome: Ongoing (interventions implemented as appropriate)  POC reviewed with pt. Who verbalized understanding. AAOx 4. Remains free of falls and injuries. VSS. Tolerating CL diet, denies nausea. Pain controlled with PRN meds. Assist with activity. ML-DB. Ileostomy. Samson. SONNY. No acute events. No distress noted. WCTM.

## 2018-03-16 NOTE — PROGRESS NOTES
[facility]  Urology  Progress Note    Patient Name: Chris Aguirre  MRN: [id number]  Admission Date: 3/9/2018  Hospital Length of Stay: 6 days  Code Status: Full Code   Attending Provider: Mikal Nino MD   Primary Care Physician: Ash Hung NP    Subjective:     HPI:  53 YOM with HTN, Hep C, CAD s/p MI, current everyday 35 pack year smoker who presented to the ED with fecaluria and pneumaturia.     This occurred after he went to an urgent care, where they prescribed him a laxative due to a several month history of increasing constipation and narrowing caliber of his stools. He states that he has blood in his urine as well as stool. He has also developed lower abdominal pain.  He has lost15 lbs over the past few months. He denies fatigue, fevers, CP, or SOB.      PSHx positive for perforated ulcer and appendectomy.  He was diagnosed with HCV about 15 years ago but never sought treatment for this. He was previously hospitalized and seen by inpatient psychiatry for substance abuse, past history of suicide attempt. He also has a history of an MI s/p stents, on ASA, does not follow with any physician.     Family history is notable for multiple first degree relatives with lung cancer and an uncle with colon cancer, unsure as to age of diagnosis. His mother is with him and she is a survivor from what sounds like SCC of the neck related to smoking.  Family history is negative for urologic malignancy.      Interval History:   Naeo, ambulating around the room. No n/v. No bladder spasms.    Review of Systems  Objective:     Temp:  [97.6 °F (36.4 °C)-99.4 °F (37.4 °C)] 98.5 °F (36.9 °C)  Pulse:  [] 76  Resp:  [16-82] 16  SpO2:  [92 %-96 %] 95 %  BP: (131-160)/() 131/89     Body mass index is 23.71 kg/m².            Drains     Drain                 Closed/Suction Drain 03/13/18 1724 Left Abdomen Bulb 10 Fr. 2 days         Ileostomy 03/13/18 1727 Loop RUQ 2 days         Urethral Catheter  03/13/18 1310 Non-latex;Straight-tip 16 Fr. 2 days                Physical Exam   Constitutional: No distress.   HENT:   Head: Normocephalic and atraumatic.   Cardiovascular: Normal rate.    Pulmonary/Chest: Effort normal. No respiratory distress.   Abdominal: Soft. He exhibits no distension. There is no tenderness.   Midline staples in place  Ileostomy with output   Genitourinary: Testes normal. Uncircumcised.   Genitourinary Comments: Mcnally with light pink urine draining well   Skin: Skin is warm and dry. He is not diaphoretic.       Significant Labs:    BMP:    Recent Labs  Lab 03/14/18  0616 03/15/18  0510 03/16/18  0538   * 136 137   K 3.8 3.9 3.6    103 103   CO2 22* 28 27   BUN 5* 6 5*   CREATININE 0.8 0.8 0.8   CALCIUM 8.5* 8.6* 9.1       CBC:     Recent Labs  Lab 03/14/18  0616 03/15/18  0510 03/16/18  0538   WBC 16.46* 11.42 9.71   HGB 12.3* 11.1* 11.5*   HCT 36.6* 33.6* 35.5*    236 263       All pertinent labs results from the past 24 hours have been reviewed.    Significant Imaging:  All pertinent imaging results/findings from the past 24 hours have been reviewed.                  Assessment/Plan:     Colovesical fistula    53 YOM with HTN, Hep C, CAD s/p MI, with sigmoid mass with colovesical fistula 3 Days Post-Op s/p sigmoidectomy with DLI and partial cystectomy with bladder repair and ureteral re-implant    Management per colorectal  Check SONNY creatinine today  Maintain mcnally for 2 weeks. Will obtain cystogram at that time (3/28) whether inpatient or outpatient  Continue oxybutynin for bladder spasms            VTE Risk Mitigation         Ordered     enoxaparin injection 40 mg  Daily     Route:  Subcutaneous        03/13/18 1823     Medium Risk of VTE  Once      03/10/18 0348     Place sequential compression device  Until discontinued      03/10/18 0348          Renzo Johnson MD  Urology  [facility]

## 2018-03-17 VITALS
HEART RATE: 68 BPM | OXYGEN SATURATION: 96 % | TEMPERATURE: 98 F | SYSTOLIC BLOOD PRESSURE: 130 MMHG | HEIGHT: 71 IN | DIASTOLIC BLOOD PRESSURE: 72 MMHG | RESPIRATION RATE: 16 BRPM | WEIGHT: 164.69 LBS | BODY MASS INDEX: 23.06 KG/M2

## 2018-03-17 LAB
ANION GAP SERPL CALC-SCNC: 7 MMOL/L
BASOPHILS # BLD AUTO: 0.03 K/UL
BASOPHILS NFR BLD: 0.4 %
BUN SERPL-MCNC: 7 MG/DL
CALCIUM SERPL-MCNC: 9.4 MG/DL
CHLORIDE SERPL-SCNC: 104 MMOL/L
CO2 SERPL-SCNC: 28 MMOL/L
CREAT SERPL-MCNC: 0.8 MG/DL
DIFFERENTIAL METHOD: ABNORMAL
EOSINOPHIL # BLD AUTO: 0.7 K/UL
EOSINOPHIL NFR BLD: 9 %
ERYTHROCYTE [DISTWIDTH] IN BLOOD BY AUTOMATED COUNT: 13.6 %
EST. GFR  (AFRICAN AMERICAN): >60 ML/MIN/1.73 M^2
EST. GFR  (NON AFRICAN AMERICAN): >60 ML/MIN/1.73 M^2
GLUCOSE SERPL-MCNC: 121 MG/DL
HCT VFR BLD AUTO: 33.9 %
HGB BLD-MCNC: 11.1 G/DL
IMM GRANULOCYTES # BLD AUTO: 0.05 K/UL
IMM GRANULOCYTES NFR BLD AUTO: 0.7 %
INR PPP: 1
LYMPHOCYTES # BLD AUTO: 2.5 K/UL
LYMPHOCYTES NFR BLD: 33.1 %
MCH RBC QN AUTO: 28.5 PG
MCHC RBC AUTO-ENTMCNC: 32.7 G/DL
MCV RBC AUTO: 87 FL
MONOCYTES # BLD AUTO: 0.7 K/UL
MONOCYTES NFR BLD: 9.6 %
NEUTROPHILS # BLD AUTO: 3.6 K/UL
NEUTROPHILS NFR BLD: 47.2 %
NRBC BLD-RTO: 0 /100 WBC
PLATELET # BLD AUTO: 293 K/UL
PMV BLD AUTO: 10.1 FL
POTASSIUM SERPL-SCNC: 3.9 MMOL/L
PROTHROMBIN TIME: 10.9 SEC
RBC # BLD AUTO: 3.89 M/UL
SODIUM SERPL-SCNC: 139 MMOL/L
WBC # BLD AUTO: 7.59 K/UL

## 2018-03-17 PROCEDURE — 85025 COMPLETE CBC W/AUTO DIFF WBC: CPT

## 2018-03-17 PROCEDURE — 25000003 PHARM REV CODE 250: Performed by: STUDENT IN AN ORGANIZED HEALTH CARE EDUCATION/TRAINING PROGRAM

## 2018-03-17 PROCEDURE — 97165 OT EVAL LOW COMPLEX 30 MIN: CPT

## 2018-03-17 PROCEDURE — 25000003 PHARM REV CODE 250: Performed by: COLON & RECTAL SURGERY

## 2018-03-17 PROCEDURE — 80048 BASIC METABOLIC PNL TOTAL CA: CPT

## 2018-03-17 PROCEDURE — 85610 PROTHROMBIN TIME: CPT

## 2018-03-17 PROCEDURE — 63600175 PHARM REV CODE 636 W HCPCS: Performed by: COLON & RECTAL SURGERY

## 2018-03-17 PROCEDURE — 36415 COLL VENOUS BLD VENIPUNCTURE: CPT

## 2018-03-17 PROCEDURE — 25000003 PHARM REV CODE 250: Performed by: SURGERY

## 2018-03-17 PROCEDURE — 97161 PT EVAL LOW COMPLEX 20 MIN: CPT

## 2018-03-17 RX ORDER — CIPROFLOXACIN 500 MG/1
500 TABLET ORAL EVERY 12 HOURS
Qty: 10 TABLET | Refills: 0 | Status: SHIPPED | OUTPATIENT
Start: 2018-03-17 | End: 2018-03-22

## 2018-03-17 RX ORDER — METRONIDAZOLE 500 MG/1
500 TABLET ORAL EVERY 8 HOURS
Qty: 15 TABLET | Refills: 0 | Status: SHIPPED | OUTPATIENT
Start: 2018-03-17 | End: 2018-03-22

## 2018-03-17 RX ORDER — OXYBUTYNIN CHLORIDE 10 MG/1
10 TABLET, EXTENDED RELEASE ORAL DAILY
Qty: 30 TABLET | Refills: 11 | Status: SHIPPED | OUTPATIENT
Start: 2018-03-18 | End: 2018-05-07

## 2018-03-17 RX ORDER — OXYCODONE AND ACETAMINOPHEN 5; 325 MG/1; MG/1
1 TABLET ORAL EVERY 4 HOURS PRN
Qty: 30 TABLET | Refills: 0 | Status: SHIPPED | OUTPATIENT
Start: 2018-03-17 | End: 2018-03-21 | Stop reason: SDUPTHER

## 2018-03-17 RX ADMIN — FOLIC ACID 1 MG: 1 TABLET ORAL at 08:03

## 2018-03-17 RX ADMIN — CIPROFLOXACIN HYDROCHLORIDE 500 MG: 500 TABLET, FILM COATED ORAL at 08:03

## 2018-03-17 RX ADMIN — OXYCODONE AND ACETAMINOPHEN 1 TABLET: 10; 325 TABLET ORAL at 08:03

## 2018-03-17 RX ADMIN — SERTRALINE HYDROCHLORIDE 50 MG: 50 TABLET ORAL at 08:03

## 2018-03-17 RX ADMIN — IBUPROFEN 800 MG: 800 INJECTION INTRAVENOUS at 05:03

## 2018-03-17 RX ADMIN — Medication 100 MG: at 08:03

## 2018-03-17 RX ADMIN — THERA TABS 1 TABLET: TAB at 08:03

## 2018-03-17 RX ADMIN — ATORVASTATIN CALCIUM 40 MG: 20 TABLET, FILM COATED ORAL at 08:03

## 2018-03-17 RX ADMIN — MEPERIDINE HYDROCHLORIDE 25 MG: 50 INJECTION INTRAMUSCULAR; INTRAVENOUS; SUBCUTANEOUS at 04:03

## 2018-03-17 RX ADMIN — OXYBUTYNIN CHLORIDE 10 MG: 10 TABLET, FILM COATED, EXTENDED RELEASE ORAL at 08:03

## 2018-03-17 RX ADMIN — METRONIDAZOLE 500 MG: 500 TABLET ORAL at 05:03

## 2018-03-17 NOTE — PT/OT/SLP EVAL
"Occupational Therapy   Evaluation and Discharge Note    Name: Chris Aguirre  MRN: [id number]  Admitting Diagnosis:  Pneumaturia 4 Days Post-Op    Recommendations:     Discharge Recommendations: home  Discharge Equipment Recommendations:  none  Barriers to discharge:  None    History:     Occupational Profile:  Living Environment: Pt reported that he lives with his mother, uncle, and uncle girlfriend in [place] with 4 MERVAT and rail available. Home has tub/shower combo.  Previous level of function: active and indep with ADLs/self care. Occasionally working (labor work). Does not drive.   Roles and Routines: son, care taker to self, home and community dweller  Equipment Owned:  none  Assistance upon Discharge: yes, mother/uncle home and able to assist as needed    Past Medical History:   Diagnosis Date    Coronary artery disease     Depression     Hepatitis C 3/9/2018    History of psychiatric care     History of psychiatric hospitalization     Hypertension     MI (myocardial infarction)     6 months ago    Neuropathy     Psychiatric problem     Psychosis     Self-harming behavior     Suicide attempt        Past Surgical History:   Procedure Laterality Date    ABDOMINAL SURGERY      History of perforated ulcer, unknown surgery    APPENDECTOMY         Subjective     Chief Complaint: pain  Patient/Family stated goals: "Home soon"  Communicated with: RN prior to session.  Pain/Comfort:  · Pain Rating 1: 8/10  · Pain Addressed 1: Reposition, Nurse notified  · Pain Rating Post-Intervention 1: 8/10    Patients cultural, spiritual, Mandaen conflicts given the current situation: no conflicts reported    Objective:     Patient found with: peripheral IV, mcnally catheter, SONNY drain, colostomy    General Precautions: Standard, fall   Orthopedic Precautions:N/A   Braces: N/A     Occupational Performance:    Bed Mobility:    · Patient completed Supine to Sit with modified independence  · Patient completed Sit to Supine with " modified independence    Functional Mobility/Transfers:  · Patient completed Sit <> Stand Transfer with modified independence  with  no assistive device   · Patient completed Bed <> Chair Transfer using Stand Pivot technique with modified independence with no assistive device  · Functional Mobility: at household distance with no AD with mod(I) x2 trials     Activities of Daily Living:  · Grooming: modified independence while standing at sink; did not require B UE support  · UB Dressing: set up for donning gown as jacket; no difficulty with fine motor closure for tie    · LB Dressing: supervision and set up for donning B socks while seated EOB    Cognitive/Visual Perceptual:  Cognitive/Psychosocial Skills:     -       Oriented to: Person, Place, Time and Situation   -       Follows Commands/attention:Follows multistep  commands  -       Communication: clear/fluent  -       Memory: No Deficits noted  -       Safety awareness/insight to disability: intact   -       Mood/Affect/Coping skills/emotional control: Cooperative  Visual/Perceptual:      -Intact    Physical Exam:  Balance:    -       mod(I) throughout  Postural examination/scapula alignment:    -       Rounded shoulders  -       Forward head  -       Posterior pelvic tilt  Skin integrity: Visible skin intact  Sensation:    -       Intact  light/touch B UE  Motor Planning:    -       Intact  Dominant hand:    -       R  Upper Extremity Range of Motion:     -       Right Upper Extremity: WFL  -       Left Upper Extremity: WFL  Upper Extremity Strength:    -       Right Upper Extremity: WFL  -       Left Upper Extremity: WFL   Strength:    -       Right Upper Extremity: WFL  -       Left Upper Extremity: WFL  Fine Motor Coordination:    -       Intact  Left hand, finger to nose, Right hand, finger to nose, Left hand, manipulation of objects and Right hand, manipulation of objects  Gross motor coordination:   WFL    Patient left HOB elevated with all lines  "intact, call button in reach, RN and PT notified and mother present    [facility] 6 Click:  [facility] Total Score: 24    Treatment & Education:  -Pt alert and agreeable to OT eval; edu on OT role in care  -Communication board updated  -Questions/concerns addressed within OT scope pf practice   Education:    Assessment:     Chris Aguirre is a 53 y.o. male with a medical diagnosis of Pneumaturia. He demo functional indep with ADLs/self care/functional mobility on this date. He demo no skilled OT needs at this time. At this time, patient is functioning at their prior level of function and does not require further acute OT services.     Clinical Decision Makin.  OT Low:  "Pt evaluation falls under low complexity for evaluation coding due to performance deficits noted in 1-3 areas as stated above and 0 co-morbities affecting current functional status. Data obtained from problem focused assessments. No modifications or assistance was required for completion of evaluation. Only brief occupational profile and history review completed."     Plan:     During this hospitalization, patient does not require further acute OT services.  Please re-consult if situation changes.    · Plan of Care Reviewed with: patient    This Plan of care has been discussed with the patient who was involved in its development and understands and is in agreement with the identified goals and treatment plan    Time Tracking:     OT Date of Treatment: 18  OT Start Time: 759  OT Stop Time: 815  OT Total Time (min): 16 min    Billable Minutes:Evaluation 16    JAYDEN Knott  3/17/2018    "

## 2018-03-17 NOTE — NURSING
Discharge instructions, medications, and follow-up appointments complete with patient and his wife verbalize understanding with no further questions at this time. Pt request wheel chair for transport to hospital exit for discharge. Pt awaiting for family member to arrive for transport home.No acute distress or needs at this time.

## 2018-03-17 NOTE — PLAN OF CARE
Problem: Physical Therapy Goal  Goal: Physical Therapy Goal  D/C AC PT (3/17/2018)  Outcome: Outcome(s) achieved Date Met: 03/17/18  No goals set today. D/C AC PT (3/17/2018)

## 2018-03-17 NOTE — ASSESSMENT & PLAN NOTE
54 yo M 4 Days Post-Op sigmoidectomy, partial cystectomy w/ Left ureteral reimplant, diverting loop ileostomy for sigmoid mass invading posterior wall of bladder. CT C/A/P with PO, IV, and rectal contrast demonstrated focal thickening of sigmoid colon abutting bladder, no e/o metastatic liver disease    -advance to low res diet  -tolerating oral meds  -PT/OT  -OOB/ Ambulate, patient needs to walk the halls  -Home meds   -lovenox daily  -continue cipro/ flagyl for now  -Encourage IS  -IVF hep locked  -Alcohol withdrawal protocol.  -Cystectomy care per urology, hyoscyamine for bladder spasms  -D/C SONNY drain prior to discharge   -Home today with mcnally, f/u with urology in 2 wks     -Evaluated by cardiology prior to surgery, appreciate recommendations:    ekg shows q waves v1/2  No symptoms  Continue asa throughout casey-operative period as patient has stents in place and can never be off ASA  Optimize BP post-surgery by adding ace-I (lisinopril 5mg po daily) to goal bp < 130/80  Statin therapy is imperative and should be on atorvastatin 40mg po qhs at least  Assure o/p follow up with cardiology on discharge  RCRI 0.9% risk of casey-operative MACCE  Continue to surgery without any further cardiac w/u or intervention

## 2018-03-17 NOTE — SUBJECTIVE & OBJECTIVE
Subjective:     Interval History: No acute events. Pain well controlled with oral meds. Tolerating diet. 800mL from ostomy.  Minimal SONNY output    Post-Op Info:  Procedure(s) (LRB):  COLECTOMY-SIGMOID POSSIBLE BLADDER RESECTION (N/A)  CYSTOSCOPY WITH STENT PLACEMENT (Bilateral)  ILEOSTOMY (N/A)  CYSTECTOMY-PARTIAL w/ bladder resection   4 Days Post-Op      Medications:  Continuous Infusions:    Scheduled Meds:   atorvastatin  40 mg Oral Daily    ciprofloxacin HCl  500 mg Oral Q12H    enoxaparin  40 mg Subcutaneous Daily    folic acid  1 mg Oral Daily    ibuprofen  800 mg Intravenous Q8H    metroNIDAZOLE  500 mg Oral Q8H    mirtazapine  15 mg Oral Nightly    multivitamin  1 tablet Oral Daily    oxybutynin  10 mg Oral Daily    sertraline  50 mg Oral Daily    thiamine  100 mg Oral Daily     PRN Meds:   diphenhydrAMINE    hydrALAZINE    hyoscyamine    LORazepam    metoclopramide HCl    ondansetron    oxyCODONE    oxyCODONE-acetaminophen    oxyCODONE-acetaminophen        Objective:     Vital Signs (Most Recent):  Temp: 98.2 °F (36.8 °C) (03/17/18 0735)  Pulse: 68 (03/17/18 0751)  Resp: 16 (03/17/18 0735)  BP: 130/72 (03/17/18 0735)  SpO2: 96 % (03/17/18 0751) Vital Signs (24h Range):  Temp:  [98 °F (36.7 °C)-99.1 °F (37.3 °C)] 98.2 °F (36.8 °C)  Pulse:  [65-91] 68  Resp:  [16-18] 16  SpO2:  [95 %-98 %] 96 %  BP: (122-140)/(72-93) 130/72     Intake/Output - Last 3 Shifts       03/15 0700 - 03/16 0659 03/16 0700 - 03/17 0659 03/17 0700 - 03/18 0659    P.O. 1060 1120     I.V. (mL/kg)       IV Piggyback 1500 750     Total Intake(mL/kg) 2560 (33.2) 1870 (25)     Urine (mL/kg/hr) 3250 (1.8) 2750 (1.5)     Emesis/NG output 0 (0)      Drains 30 (0) 45 (0)     Stool 600 (0.3) 800 (0.4)     Total Output 3880 2363      Net -2370 -2605                   Physical Exam    NAD, AAOx3  RRR  CTAB  Abd midline incision CDI w/ dermabond in place, SONNY draining serous fluid, ostomy pink, edematous, stool and gas in bag    mcnally in place draining red tinged urine      Significant Labs:  BMP:     Recent Labs  Lab 03/17/18  0356   *      K 3.9      CO2 28   BUN 7   CREATININE 0.8   CALCIUM 9.4     CBC:     Recent Labs  Lab 03/17/18 0356   WBC 7.59   RBC 3.89*   HGB 11.1*   HCT 33.9*      MCV 87   MCH 28.5   MCHC 32.7

## 2018-03-17 NOTE — PT/OT/SLP EVAL
Physical Therapy Evaluation and Discharge Note    Patient Name:  Chris Aguirre   MRN:  [id number]    Recommendations:     Discharge Recommendations:  home   Discharge Equipment Recommendations: none   Barriers to discharge: Inaccessible home    Assessment:     Chris Aguirre is a 53 y.o. male admitted with a medical diagnosis of Pneumaturia. .  At this time, patient is functioning at their prior level of function and does not require further acute PT services.     Recent Surgery: Procedure(s) (LRB):  COLECTOMY-SIGMOID POSSIBLE BLADDER RESECTION (N/A)  CYSTOSCOPY WITH STENT PLACEMENT (Bilateral)  ILEOSTOMY (N/A)  CYSTECTOMY-PARTIAL w/ bladder resection 4 Days Post-Op    Plan:     During this hospitalization, patient does not require further acute PT services.  Please re-consult if situation changes.     Plan of Care Reviewed with: patient, mother    Subjective     Communicated with nursing prior to session.  Patient found supine in bed, with mother in room, upon PT entry to room, agreeable to evaluation.      Chief Complaint: intestinal pain  Patient comments/goals: to go home  Pain/Comfort:  · Pain Rating 1: 5/10 (stomach/intestinal pain)  · Pain Addressed 1: Reposition, Distraction  · Pain Rating Post-Intervention 1:  (not rated)    Patients cultural, spiritual, Sabianism conflicts given the current situation: none    Living Environment:  Pt lives with his mother in [place] with 4 MERVAT and (B) handrails.  Prior to admission, patients level of function was independent in all activities.  Patient has the following equipment: none.  DME owned (not currently used): none.  Upon discharge, patient will have assistance from mother as needed.    Objective:     Patient found with: mcnally catheter     General Precautions: Standard, fall   Orthopedic Precautions:N/A   Braces: N/A     Exams:  · RLE ROM: WFL  · RLE Strength: WFL  · LLE ROM: WFL  · LLE Strength: WFL     Functional Mobility:  · Bed Mobility:     · Rolling Right:  supervision  · Supine to Sit: supervision  · Sit to Supine: supervision  · Transfers:     · Sit to Stand:  supervision with no AD  · Gait: ~75' supervision with no AD, normal step length and laura, no LOB  · Balance: good static/dynamic standing balance during gait and stairs activity  · Stairs:  Pt ascended/descended 4 stair(s) with No Assistive Device and using R handrail to ascend and L handrail to descend with supervision.     AM-PAC 6 CLICK MOBILITY  Total Score:24       Therapeutic Activities and Exercises:   PT educated pt on PT POC and discharge planning.    Patient left supine with all lines intact, call button in reach, nurse notified and mother present.    GOALS:    Physical Therapy Goals     Not on file          Multidisciplinary Problems (Resolved)        Problem: Physical Therapy Goal    Goal Priority Disciplines Outcome Goal Variances Interventions   Physical Therapy Goal   (Resolved)     PT/OT, PT Outcome(s) achieved     Description:  D/C AC PT (3/17/2018)                    History:     Past Medical History:   Diagnosis Date    Coronary artery disease     Depression     Hepatitis C 3/9/2018    History of psychiatric care     History of psychiatric hospitalization     Hypertension     MI (myocardial infarction)     6 months ago    Neuropathy     Psychiatric problem     Psychosis     Self-harming behavior     Suicide attempt        Past Surgical History:   Procedure Laterality Date    ABDOMINAL SURGERY      History of perforated ulcer, unknown surgery    APPENDECTOMY         Clinical Decision Making:     History  Co-morbidities and personal factors that may impact the plan of care Examination  Body Structures and Functions, activity limitations and participation restrictions that may impact the plan of care Clinical Presentation   Decision Making/ Complexity Score   Co-morbidities:   [] Time since onset of injury / illness / exacerbation  [] Status of current condition  []Patient's  cognitive status and safety concerns    [] Multiple Medical Problems (see med hx)  Personal Factors:   [] Patient's age  [] Prior Level of function   [] Patient's home situation (environment and family support)  [] Patient's level of motivation  [] Expected progression of patient      HISTORY:(criteria)    [] 81759 - no personal factors/history    [] 52859 - has 1-2 personal factor/comorbidity     [] 78388 - has >3 personal factor/comorbidity     Body Regions:  [] Objective examination findings  [] Head     []  Neck  [] Trunk   [] Upper Extremity  [] Lower Extremity    Body Systems:  [] For communication ability, affect, cognition, language, and learning style: the assessment of the ability to make needs known, consciousness, orientation (person, place, and time), expected emotional /behavioral responses, and learning preferences (eg, learning barriers, education  needs)  [] For the neuromuscular system: a general assessment of gross coordinated movement (eg, balance, gait, locomotion, transfers, and transitions) and motor function  (motor control and motor learning)  [] For the musculoskeletal system: the assessment of gross symmetry, gross range of motion, gross strength, height, and weight  [] For the integumentary system: the assessment of pliability(texture), presence of scar formation, skin color, and skin integrity  [] For cardiovascular/pulmonary system: the assessment of heart rate, respiratory rate, blood pressure, and edema     Activity limitations:    [] Patient's cognitive status and saf ety concerns          [] Status of current condition      [] Weight bearing restriction  [] Cardiopulmunary Restriction    Participation Restrictions:   [] Goals and goal agreement with the patient     [] Rehab potential (prognosis) and probable outcome      Examination of Body System: (criteria)    [] 03961 - addressing 1-2 elements    [] 26473 - addressing a total of 3 or more elements     [] 21960 -  Addressing a  total of 4 or more elements         Clinical Presentation: (criteria)  Choose one     On examination of body system using standardized tests and measures patient presents with (CHOOSE ONE) elements from any of the following: body structures and functions, activity limitations, and/or participation restrictions.  Leading to a clinical presentation that is considered (CHOOSE ONE)                              Clinical Decision Making  (Eval Complexity):  Choose One     Time Tracking:     PT Received On: 03/17/18  PT Start Time: 1034     PT Stop Time: 1040  PT Total Time (min): 6 min     Billable Minutes: Evaluation 6 mins      Meghan Love, SPT  03/17/2018

## 2018-03-17 NOTE — PLAN OF CARE
Problem: Occupational Therapy Goal  Goal: Occupational Therapy Goal  Outcome: Outcome(s) achieved Date Met: 03/17/18  No skilled OT needs. Safe to be up with family/staff for ADLs/self care/functional mobility. JAYDEN Knott 3/17/2018

## 2018-03-17 NOTE — DISCHARGE SUMMARY
[facility]  DISCHARGE SUMMARY  General Surgery      Admit Date:  3/9/2018    Discharge Date and Time:  3/17/2018  12:00 PM    Attending Physician:  Mikal Nino MD     Discharge Provider:  Shawn William MD     Reason for Admission:  Pneumaturia     Procedures Performed:  Procedure(s) (LRB):  COLECTOMY-SIGMOID POSSIBLE BLADDER RESECTION (N/A)  CYSTOSCOPY WITH STENT PLACEMENT (Bilateral)  ILEOSTOMY (N/A)  CYSTECTOMY-PARTIAL w/ bladder resection    Hospital Course:  Please see the preoperative H&P and other available documentation for full details related to history prior to this admission.  Briefly, Chris Aguirre is a 53 y.o. male who was admitted following scheduled elective surgery for Pneumaturia    Following a complete preoperative discussion of the risks and benefits of surgery with signed informed consent, the patient was taken to the operating room on 3/13/2018 and underwent the above stated procedures.  The patient tolerated surgery well and there were no complications.  Please see the operative report for full intraoperative findings and details.  Postoperatively, the patient did well and was transferred from the PACU to the floor in stable condition where they had a stable and uncomplicated hospital course.  Labs and vital signs remained stable and appropriate throughout course.  Diet was advanced as tolerated and the patient's pain was controlled on oral pain medications without problem.  Currently, the patient is doing well at 4 Days Post-Op and is stable and appropriate for discharge home at this time.      Consults:  None.    Significant Diagnostic Studies:     Recent Labs  Lab 03/15/18  0510 03/16/18  0538 03/17/18  0356   WBC 11.42 9.71 7.59   HGB 11.1* 11.5* 11.1*   HCT 33.6* 35.5* 33.9*    263 293     Recent Labs  Lab 03/15/18  0510 03/16/18  0538 03/17/18  0356    137 139   K 3.9 3.6 3.9    103 104   CO2 28 27 28   BUN 6 5* 7   CREATININE 0.8 0.8 0.8   GLU  99 112* 121*   CALCIUM 8.6* 9.1 9.4     Recent Labs  Lab 03/15/18  0510 03/16/18  0538 03/17/18  0355   INR 1.2 1.1 1.0   No results for input(s): PH, PCO2, PO2, HCO3 in the last 72 hours.      Final Diagnoses:   Principal Problem:  Pneumaturia   Secondary Diagnoses:    Active Hospital Problems    Diagnosis  POA    *Pneumaturia [R39.89]  Yes    Colovesical fistula [N32.1]  Yes    Pre-operative cardiovascular examination [Z01.810]  Not Applicable    Lower abdominal pain [R10.30]  Yes    Blood in stool [K92.1]  Yes    Other constipation [K59.09]  Yes    Hepatitis C [B19.20]  Yes      Resolved Hospital Problems    Diagnosis Date Resolved POA   No resolved problems to display.       Discharged Condition:  Good    Disposition:  Home or Self Care    Follow Up/Patient Instructions:     Medications:  Reconciled Home Medications:  Current Discharge Medication List      START taking these medications    Details   ciprofloxacin HCl (CIPRO) 500 MG tablet Take 1 tablet (500 mg total) by mouth every 12 (twelve) hours.  Qty: 10 tablet, Refills: 0      metroNIDAZOLE (FLAGYL) 500 MG tablet Take 1 tablet (500 mg total) by mouth every 8 (eight) hours.  Qty: 15 tablet, Refills: 0      oxybutynin (DITROPAN-XL) 10 MG 24 hr tablet Take 1 tablet (10 mg total) by mouth once daily.  Qty: 30 tablet, Refills: 11      oxyCODONE-acetaminophen (PERCOCET) 5-325 mg per tablet Take 1 tablet by mouth every 4 (four) hours as needed.  Qty: 30 tablet, Refills: 0         CONTINUE these medications which have NOT CHANGED    Details   naproxen (NAPROSYN) 500 MG tablet Take 500 mg by mouth 2 (two) times daily.      polyethylene glycol (GLYCOLAX) 17 gram PwPk Take by mouth.      aspirin 81 MG Chew Take 1 tablet (81 mg total) by mouth once daily.  Qty: 30 tablet, Refills: 1      mirtazapine (REMERON) 15 MG tablet Take 1 tablet (15 mg total) by mouth nightly.  Qty: 30 tablet, Refills: 1      ondansetron (ZOFRAN ODT) 4 MG TbDL Take 8 mg by mouth every 12  (twelve) hours.      sertraline (ZOLOFT) 50 MG tablet Take 1 tablet (50 mg total) by mouth once daily.  Qty: 30 tablet, Refills: 1             Discharge Procedure Orders  Diet Adult Regular     Other restrictions (specify):   Order Comments: No lifting heavier than 20lbs for 6 weeks after surgery     Notify your health care provider if you experience any of the following:  temperature >100.4     Notify your health care provider if you experience any of the following:  persistent nausea and vomiting or diarrhea     Notify your health care provider if you experience any of the following:  severe uncontrolled pain     Notify your health care provider if you experience any of the following:  redness, tenderness, or signs of infection (pain, swelling, redness, odor or green/yellow discharge around incision site)       Follow-up Information     Mikal Nino MD In 2 weeks.    Specialty:  Colon and Rectal Surgery  Contact information:  [address]  [facility] [address]  [phone]             Meng Morrow MD In 2 weeks.    Specialty:  Urology  Contact information:  [address]  [facility] [address]  [phone]                   Shawn William MD

## 2018-03-17 NOTE — PLAN OF CARE
Problem: Patient Care Overview  Goal: Plan of Care Review  Outcome: Ongoing (interventions implemented as appropriate)  Pt has call bell in reach with family at bedside, non slip socks on walking in hallway, and bedrails up x2. Pt has prn pain meds. Pt encouraged to wash hands and on antibiotics.

## 2018-03-17 NOTE — PROGRESS NOTES
[facility]  Colorectal Surgery  Progress Note    Patient Name: Chris Aguirre  MRN: [id number]  Admission Date: 3/9/2018  Hospital Length of Stay: 7 days  Attending Physician: Mikal Nino MD    Subjective:     Interval History: No acute events. Pain well controlled with oral meds. Tolerating diet. 800mL from ostomy.  Minimal SONNY output    Post-Op Info:  Procedure(s) (LRB):  COLECTOMY-SIGMOID POSSIBLE BLADDER RESECTION (N/A)  CYSTOSCOPY WITH STENT PLACEMENT (Bilateral)  ILEOSTOMY (N/A)  CYSTECTOMY-PARTIAL w/ bladder resection   4 Days Post-Op      Medications:  Continuous Infusions:    Scheduled Meds:   atorvastatin  40 mg Oral Daily    ciprofloxacin HCl  500 mg Oral Q12H    enoxaparin  40 mg Subcutaneous Daily    folic acid  1 mg Oral Daily    ibuprofen  800 mg Intravenous Q8H    metroNIDAZOLE  500 mg Oral Q8H    mirtazapine  15 mg Oral Nightly    multivitamin  1 tablet Oral Daily    oxybutynin  10 mg Oral Daily    sertraline  50 mg Oral Daily    thiamine  100 mg Oral Daily     PRN Meds:   diphenhydrAMINE    hydrALAZINE    hyoscyamine    LORazepam    metoclopramide HCl    ondansetron    oxyCODONE    oxyCODONE-acetaminophen    oxyCODONE-acetaminophen        Objective:     Vital Signs (Most Recent):  Temp: 98.2 °F (36.8 °C) (03/17/18 0735)  Pulse: 68 (03/17/18 0751)  Resp: 16 (03/17/18 0735)  BP: 130/72 (03/17/18 0735)  SpO2: 96 % (03/17/18 0751) Vital Signs (24h Range):  Temp:  [98 °F (36.7 °C)-99.1 °F (37.3 °C)] 98.2 °F (36.8 °C)  Pulse:  [65-91] 68  Resp:  [16-18] 16  SpO2:  [95 %-98 %] 96 %  BP: (122-140)/(72-93) 130/72     Intake/Output - Last 3 Shifts       03/15 0700 - 03/16 0659 03/16 0700 - 03/17 0659 03/17 0700 - 03/18 0659    P.O. 1060 1120     I.V. (mL/kg)       IV Piggyback 1500 750     Total Intake(mL/kg) 2560 (33.2) 1870 (25)     Urine (mL/kg/hr) 3250 (1.8) 2750 (1.5)     Emesis/NG output 0 (0)      Drains 30 (0) 45 (0)     Stool 600 (0.3) 800 (0.4)     Total  Output 2418 2429      Net -1629 -6788                   Physical Exam    NAD, AAOx3  RRR  CTAB  Abd midline incision CDI w/ dermabond in place, SONNY draining serous fluid, ostomy pink, edematous, stool and gas in bag   mcnally in place draining red tinged urine      Significant Labs:  BMP:     Recent Labs  Lab 03/17/18  0356   *      K 3.9      CO2 28   BUN 7   CREATININE 0.8   CALCIUM 9.4     CBC:     Recent Labs  Lab 03/17/18  0356   WBC 7.59   RBC 3.89*   HGB 11.1*   HCT 33.9*      MCV 87   MCH 28.5   MCHC 32.7         Assessment/Plan:     * Pneumaturia    54 yo M 4 Days Post-Op sigmoidectomy, partial cystectomy w/ Left ureteral reimplant, diverting loop ileostomy for sigmoid mass invading posterior wall of bladder. CT C/A/P with PO, IV, and rectal contrast demonstrated focal thickening of sigmoid colon abutting bladder, no e/o metastatic liver disease    -advance to low res diet  -tolerating oral meds  -PT/OT  -OOB/ Ambulate, patient needs to walk the halls  -Home meds   -lovenox daily  -continue cipro/ flagyl for now  -Encourage IS  -IVF hep locked  -Alcohol withdrawal protocol.  -Cystectomy care per urology, hyoscyamine for bladder spasms  -D/C SONNY drain prior to discharge   -Home today with mcnally, f/u with urology in 2 wks     -Evaluated by cardiology prior to surgery, appreciate recommendations:    ekg shows q waves v1/2  No symptoms  Continue asa throughout casey-operative period as patient has stents in place and can never be off ASA  Optimize BP post-surgery by adding ace-I (lisinopril 5mg po daily) to goal bp < 130/80  Statin therapy is imperative and should be on atorvastatin 40mg po qhs at least  Assure o/p follow up with cardiology on discharge  RCRI 0.9% risk of casey-operative MACCE  Continue to surgery without any further cardiac w/u or intervention                    Shawn William MD  Colorectal Surgery  [facility]

## 2018-03-19 ENCOUNTER — TELEPHONE (OUTPATIENT)
Dept: SURGERY | Facility: CLINIC | Age: 53
End: 2018-03-19

## 2018-03-19 DIAGNOSIS — C18.7 MALIGNANT NEOPLASM OF SIGMOID COLON: Primary | ICD-10-CM

## 2018-03-19 NOTE — TELEPHONE ENCOUNTER
----- Message from Jocelynn Miguel sent at 3/19/2018  2:11 PM CDT -----  Contact: marzena/case mgmt [phone]  niurka - is asking for two week f/u on April 2 - please call patient at [phone]

## 2018-03-20 NOTE — PLAN OF CARE
Scheduled an appt with Urolog MACRINA Lindo on 4/2/2018 @ 11am, Dr. Gomes on 4/2/2018 @ 10am and Frida 4/2/2018 @ 1015am       03/20/18 1232   Final Note   Assessment Type Final Discharge Note   Discharge Disposition Home

## 2018-03-20 NOTE — OP NOTE
DATE OF PROCEDURE:  03/13/2018    PREOPERATIVE DIAGNOSES:  Colovesical fistula, suspected sigmoid colon mass.    POSTOPERATIVE DIAGNOSIS:  Colovesical fistula, suspected sigmoid colon mass.    PROCEDURE:    1. Sigmoid colectomy with en bloc partial cystectomy.  2. Diverting loop ileostomy.  3. Splenic flexure mobilization.  4. Omental pedicle flap.    SURGEON:  Mikal Nino M.D.    ASSISTANT:  Kumar Jay M.D. (RES)    INTRAOPERATIVE CONSULTATION: Meng Morrow M.D., Urology.    ANESTHESIA:  General endotracheal.    IV FLUIDS: 4100 cc crystalloid.    ESTIMATED BLOOD LOSS:  200 mL.    URINE OUTPUT:  Immeasurable due to partial cystectomy.    SPECIMENS:  1.  Sigmoid colon and posterior bladder wall en bloc.  2.  Proximal anastomotic ring.  3.  Distal anastomotic ring.  4. Multiple frozen resection of the edges of the bladder portion of the   resection which were all negative for malignancy.    DRAINS:  Samson catheter and Chet-Rodriguez drain in the pelvis.    COMPLICATIONS:  None.    OPERATIVE FINDINGS:  Sigmoid colon mass involving postero-  lateral bladder wall, necessitating an en bloc resection of a portion of the  bladder with bladder reconstruction and left ureteral re-implantation.    No distant metastatic disease.    INDICATIONS:  The patient is a 53-year-old man who presented with relatively   acute onset of pneumaturia.  CT scan showed what appeared to be a mass in the   sigmoid colon directly invading into the posterior wall of the bladder.  He   presents today for elective surgical resection.    DESCRIPTION OF PROCEDURE:  The patient was brought to the operating room   and placed on the table in a supine position after obtaining informed consent.    Venous sequential stockings were placed.  He was given preoperative intravenous   antibiotics as well as subcutaneous heparin.  After general endotracheal anesthesia,   he was repositioned in a modified lithotomy position using Yellofin stirrups.     Preoperative cystoscopy and ureteral catheter placement was performed by   Urology.  Please see their operative note for details regarding that portion of   the procedure.      Following that, the abdomen and perineum were prepped and in a sterile fashion.  A midline incision was made from above the symphysis pubis to just above the   Umbilicus.  Incision was carried down through subcutaneous fat and fascia to enter   the abdominal cavity.  Uopn entering the abdomen, a large mass was noted in the   proximal sigmoid colon that was looped back down onto the pelvis and densely   adherent to the posterior bladder wall.  No distant metastatic disease was noted.    We the set about mobilizing the colon to perform an en bloc resection.  The lateral   peritoneal attachments were incised and the descending and sigmoid colon and   their mesentery were mobilized medially off of the retroperitoneum, taking care to   identify and protect the ureter and gonadal vessels.  The splenic flexure was fully   mobilized.  The mid descending colon was divided with a RYDER stapler.  The   mesentery of the proximal sigmoid colon was divided with the EnSeal down to   the pedicle of the inferior mesenteric artery.  The ELROY was divided between Jeny   clamps, securing the pedicles with #0 Vicryl suture ligatures.  Distal mesentery was   divided down to the level of the upper rectum.  Mesorectum was cleared from the   upper rectum, and the rectum was stapled at the level of the sacral promontory   with a TA stapler and divided proximal to the staple line sharply.  This left the   sigmoid colon attached to the posterior bladder.  Dr. Morrow then came to the   operating room and completed the en bloc resection of the affected portion of   bladder.  The resected specimen was passed from the field.  Representative   margins were sent for frozen section and they all came back negative for   malignancy.  The bladder resection necessitated  transection of the left ureter.    Please see Dr. Morrow's operative note for further details.      An end-to-end anastomosis was then fashioned between the descending   colon and the upper rectum utilizing a 29 mm EEA stapler.  Endoscopic visualization   of the anastomosis was performed with a flexible sigmoidoscope, and air leak   testing was performed, which revealed no evidence of anastomotic leak.  Dr. Morrow   then returned to the operating room to perform bladder reconstruction and reimplantation   of the left ureter.  Please seen his note for additional details.   I then returned to the   operating room.  The abdomen and pelvis were then irrigated with sterile saline.    Hemostasis was noted to be adequate.  A #10 Chet-Rodriguez drain was placed through   a stab incision in the left lower quadrant and placed adjacent to the anastomosis and the   bladder repair.  It was secured at the skin level with a #2-0 Nylon suture.  A vascularized   pedicle of omentum was then mobilized and swung down into the pelvis and placed  between the bladder repair and the anastomosis.  Given that he was markedly   hypoalbuminemic, I elected to divert the anastomosis with a loop ileostomy.  A trephination   was created in the right lower quadrant using a muscle splitting technique.  The distal   most loop of small bowel that would reach the abdominal wall without tension was then   wrapped in Seprafilm and exteriorized through the trephination and secured with a stoma   jalil.  We then performed a TAP block utilizing a combination of Exparel and 0.25%   Marcaine.  Additional Seprafilm was placed between the abdominal wall and the   abdominal contents.    We then changed gowns and gloves and brought the closing tray to the field.  The midline   fascia was closed with a running looped #1 PDS suture.  Skin was closed with 4-0 Monocryl   subcuticular closure.  Dermabond was placed as a dressing.  The Chet-Rodriguez   drain was connected  to bulb suction.  We then matured the ileostomy, making a   transverse enterotomy over the exteriorized loop of bowel, then maturing both the   afferent and efferent limbs of the ostomy with 3-0 chromic Sindy sutures.  A   stoma appliance was cut to the appropriate size and placed over the ostomy.    The patient tolerated the procedure well with no complications.  He was   extubated in the Operating Room and taken to Recovery in satisfactory condition.    All needle, instrument, and sponge counts were correct at the end of the case.    I was present throughout the entire procedure.          /chloé [id number] blank(s)        SAQIB/JESUS  dd: 03/17/2018 06:09:54 (CDT)  td: 03/17/2018 10:05:52 (CDT)  Doc ID   #[id number]  Job ID #[id number]    CC:

## 2018-03-21 ENCOUNTER — TELEPHONE (OUTPATIENT)
Dept: SURGERY | Facility: CLINIC | Age: 53
End: 2018-03-21

## 2018-03-21 DIAGNOSIS — C18.7 MALIGNANT NEOPLASM OF SIGMOID COLON: Primary | ICD-10-CM

## 2018-03-21 RX ORDER — OXYCODONE AND ACETAMINOPHEN 5; 325 MG/1; MG/1
1 TABLET ORAL EVERY 4 HOURS PRN
Qty: 30 TABLET | Refills: 0 | Status: SHIPPED | OUTPATIENT
Start: 2018-03-21 | End: 2018-04-02

## 2018-03-21 NOTE — TELEPHONE ENCOUNTER
Spoke with pt and he asked if he needed to continue abx once he is out and I instructed him he just needed to take them until they were complete. Pt also requested pain med rx. Informed him I will give Dr. Nino a message and let him know when they are ready.

## 2018-03-21 NOTE — TELEPHONE ENCOUNTER
----- Message from Aylin Cook sent at 3/21/2018 11:18 AM CDT -----  Contact: pt#[phone]  Pt states that he was given 4 Rx and he wants to know how long he's going to be taking them. Please call

## 2018-03-26 NOTE — PHYSICIAN QUERY
PT Name: Chris Aguirre  MR #: [id number]    Physician Query Form - Pathology Findings Clarification     CDS/: Roxana GILBERT, RN, CCDS              Contact information: [email or web address]  This form is a permanent document in the medical record.     Query Date: March 26, 2018      By submitting this query, we are merely seeking further clarification of documentation.  Please utilize your independent clinical judgment when addressing the question(s) below.      The medical record contains the following:     Findings Supporting Clinical Information Location Medical Record     Adenocarcinoma of Sigmoid Colon    FINAL PATHOLOGIC DIAGNOSIS  1. Sigmoid colon) posterior bladder wall, mass, en bloc resection of sigmoid colon with attached posterior bladder  wall:  Adenocarcinoma, measuring 4.7 cm in greatest dimension.  Tumor extends through the muscularis propria into the pericolorectal tissue with surrounding abcess formation  with adherent bladder. There is no viable tumor seen invading bladder in multiple examined sections.  Proximal and distal colonic surgical margins are negative for malignancy.  Mesenteric surgical margin is negative for malignancy.  17 benign lymph nodes, negative for metastatic carcinoma (0/17).  See synoptic report in comment section for further details.  2. Right lateral bladder wall margin, biopsy:  Negative for malignancy.  3. Inferior bladder wall margin, biopsy:  Negative for malignancy.  Report    Pathology Report     Please document the clinical significance of the Pathologists findings of ____Adenocarcinoma of Sigmoid Colon____.          [ x ] I agree with the Pathology Findings        [  ] I do not agree with the Pathology Findings        [  ] Clinically Insignificant        [  ] Clinically Undetermined        [  ] Other/Clarification of Findings: ______________________________________________    Please document in your progress notes daily for the duration of treatment until  resolved and include in your discharge summary.

## 2018-03-29 ENCOUNTER — TELEPHONE (OUTPATIENT)
Dept: RADIOLOGY | Facility: HOSPITAL | Age: 53
End: 2018-03-29

## 2018-04-02 ENCOUNTER — HOSPITAL ENCOUNTER (OUTPATIENT)
Dept: RADIOLOGY | Facility: HOSPITAL | Age: 53
Discharge: HOME | End: 2018-04-02
Attending: UROLOGY
Payer: MEDICAID

## 2018-04-02 ENCOUNTER — TELEPHONE (OUTPATIENT)
Dept: UROLOGY | Facility: CLINIC | Age: 53
End: 2018-04-02

## 2018-04-02 ENCOUNTER — OFFICE VISIT (OUTPATIENT)
Dept: UROLOGY | Facility: CLINIC | Age: 53
End: 2018-04-02
Payer: MEDICAID

## 2018-04-02 ENCOUNTER — OFFICE VISIT (OUTPATIENT)
Dept: WOUND CARE | Facility: CLINIC | Age: 53
End: 2018-04-02
Payer: MEDICAID

## 2018-04-02 ENCOUNTER — OFFICE VISIT (OUTPATIENT)
Dept: SURGERY | Facility: CLINIC | Age: 53
End: 2018-04-02
Payer: MEDICAID

## 2018-04-02 VITALS
WEIGHT: 159.19 LBS | HEART RATE: 81 BPM | SYSTOLIC BLOOD PRESSURE: 140 MMHG | HEIGHT: 71 IN | BODY MASS INDEX: 22.29 KG/M2 | DIASTOLIC BLOOD PRESSURE: 86 MMHG

## 2018-04-02 VITALS
TEMPERATURE: 98 F | WEIGHT: 151 LBS | BODY MASS INDEX: 21.07 KG/M2 | SYSTOLIC BLOOD PRESSURE: 141 MMHG | DIASTOLIC BLOOD PRESSURE: 93 MMHG | HEART RATE: 98 BPM

## 2018-04-02 DIAGNOSIS — N32.1 COLOVESICAL FISTULA: Primary | ICD-10-CM

## 2018-04-02 DIAGNOSIS — L73.9 FOLLICULITIS: ICD-10-CM

## 2018-04-02 DIAGNOSIS — Z98.890 POST-OPERATIVE STATE: ICD-10-CM

## 2018-04-02 DIAGNOSIS — Z93.2 ILEOSTOMY STATUS: Primary | ICD-10-CM

## 2018-04-02 DIAGNOSIS — N32.1 COLOVESICAL FISTULA: ICD-10-CM

## 2018-04-02 DIAGNOSIS — G89.18 ACUTE POST-OPERATIVE PAIN: ICD-10-CM

## 2018-04-02 DIAGNOSIS — C18.7 MALIGNANT NEOPLASM OF SIGMOID COLON: Primary | ICD-10-CM

## 2018-04-02 DIAGNOSIS — Z93.2 ILEOSTOMY IN PLACE: ICD-10-CM

## 2018-04-02 DIAGNOSIS — C18.7 MALIGNANT NEOPLASM OF SIGMOID COLON: ICD-10-CM

## 2018-04-02 PROCEDURE — 74430 CONTRAST X-RAY BLADDER: CPT | Mod: 26,,, | Performed by: RADIOLOGY

## 2018-04-02 PROCEDURE — 99999 PR PBB SHADOW E&M-EST. PATIENT-LVL I: CPT | Mod: PBBFAC,,, | Performed by: CLINICAL NURSE SPECIALIST

## 2018-04-02 PROCEDURE — 99213 OFFICE O/P EST LOW 20 MIN: CPT | Mod: PBBFAC,25 | Performed by: COLON & RECTAL SURGERY

## 2018-04-02 PROCEDURE — 99024 POSTOP FOLLOW-UP VISIT: CPT | Mod: ,,, | Performed by: CLINICAL NURSE SPECIALIST

## 2018-04-02 PROCEDURE — 99211 OFF/OP EST MAY X REQ PHY/QHP: CPT | Mod: PBBFAC,25,27 | Performed by: CLINICAL NURSE SPECIALIST

## 2018-04-02 PROCEDURE — 99024 POSTOP FOLLOW-UP VISIT: CPT | Mod: ,,, | Performed by: NURSE PRACTITIONER

## 2018-04-02 PROCEDURE — 99024 POSTOP FOLLOW-UP VISIT: CPT | Mod: ,,, | Performed by: COLON & RECTAL SURGERY

## 2018-04-02 PROCEDURE — 99213 OFFICE O/P EST LOW 20 MIN: CPT | Mod: PBBFAC,25,27 | Performed by: NURSE PRACTITIONER

## 2018-04-02 PROCEDURE — 74430 CONTRAST X-RAY BLADDER: CPT | Mod: TC

## 2018-04-02 PROCEDURE — 99999 PR PBB SHADOW E&M-EST. PATIENT-LVL III: CPT | Mod: PBBFAC,,, | Performed by: COLON & RECTAL SURGERY

## 2018-04-02 PROCEDURE — 99999 PR PBB SHADOW E&M-EST. PATIENT-LVL III: CPT | Mod: PBBFAC,,, | Performed by: NURSE PRACTITIONER

## 2018-04-02 PROCEDURE — 25500020 PHARM REV CODE 255: Performed by: UROLOGY

## 2018-04-02 RX ORDER — OXYCODONE AND ACETAMINOPHEN 5; 325 MG/1; MG/1
1 TABLET ORAL EVERY 4 HOURS PRN
Qty: 40 TABLET | Refills: 0 | Status: SHIPPED | OUTPATIENT
Start: 2018-04-02 | End: 2018-04-13

## 2018-04-02 RX ADMIN — IOTHALAMATE MEGLUMINE 150 ML: 172 INJECTION URETERAL at 08:04

## 2018-04-02 NOTE — PROGRESS NOTES
CHIEF COMPLAINT:    Mr. Aguirre is a 53 y.o. male presenting post op   PRESENTING ILLNESS:    Chris Aguirre is a 53 y.o. male who is a current everyday 35 pack year smoker presents for post op.    He initially presented to the ED with fecaluria and pneumaturia. He had a ureteral catheter in place after partial cystectomy with complex cystorrhaphy and partial prostatectomy. He also had bilateral ureteral stent placement and left pelvic lymph node dissection. He reports that it is draining well. Denies blood clots. Reports irritation to his urethral meatus. He had a cystogram this morning that was suggestive of a fistula.    3/13/18 Procedure(s) Performed:   1. Partial cystectomy with complex cystorrhaphy and partial prostatectomy.  2. Left neoureterocystotomy with ureteral re-implantation  3. Cystoscopy with bilateral ureteral catheter placement  4. Bilateral ureteral stent placement   5. Left pelvic lymph node dissection    3/13/18 Pathology bladder wall, biopsies: Negative for malignancy.    REVIEW OF SYSTEMS:    Review of Systems    Constitutional: Negative for fever and chills.   HENT: Negative for hearing loss.   Eyes: Negative for visual disturbance.   Respiratory: Negative for shortness of breath.   Cardiovascular: Negative for chest pain.   Gastrointestinal: Negative for nausea and vomiting.  Genitourinary: see above  Neurological: Negative for dizziness.   Hematological: Does not bruise/bleed easily.   Psychiatric/Behavioral: Negative for confusion.       PATIENT HISTORY:    Past Medical History:   Diagnosis Date    Coronary artery disease     Depression     Hepatitis C 3/9/2018    History of psychiatric care     History of psychiatric hospitalization     Hypertension     MI (myocardial infarction)     6 months ago    Neuropathy     Psychiatric problem     Psychosis     Self-harming behavior     Suicide attempt        Past Surgical History:   Procedure Laterality Date    ABDOMINAL SURGERY       History of perforated ulcer, unknown surgery    APPENDECTOMY         Family History   Problem Relation Age of Onset    No Known Problems Mother     No Known Problems Father     No Known Problems Sister     No Known Problems Brother     No Known Problems Maternal Aunt     No Known Problems Paternal Aunt     No Known Problems Maternal Uncle     No Known Problems Paternal Uncle     No Known Problems Maternal Grandfather     No Known Problems Maternal Grandmother     No Known Problems Paternal Grandfather     No Known Problems Paternal Grandmother     ADD / ADHD Cousin     Alcohol abuse Neg Hx     Anxiety disorder Neg Hx     Bipolar disorder Neg Hx     Dementia Neg Hx     Depression Neg Hx     Drug abuse Neg Hx     OCD Neg Hx     Paranoid behavior Neg Hx     Physical abuse Neg Hx     Schizophrenia Neg Hx     Seizures Neg Hx     Self injury Neg Hx     Sexual abuse Neg Hx     Suicide Neg Hx        Social History     Social History    Marital status: Single     Spouse name: N/A    Number of children: N/A    Years of education: N/A     Occupational History    Not on file.     Social History Main Topics    Smoking status: Current Every Day Smoker     Packs/day: 2.00     Years: 30.00    Smokeless tobacco: Never Used    Alcohol use Yes    Drug use: Yes     Types: Oxycodone    Sexual activity: Yes     Partners: Female     Birth control/ protection: None     Other Topics Concern    Patient Feels They Ought To Cut Down On Drinking/Drug Use No    Patient Annoyed By Others Criticizing Their Drinking/Drug Use No    Patient Has Felt Bad Or Guilty About Drinking/Drug Use No    Patient Has Had A Drink/Used Drugs As An Eye Opener In The Am No     Social History Narrative    No narrative on file       Allergies:  Patient has no known allergies.    Medications:    Current Outpatient Prescriptions:     naproxen (NAPROSYN) 500 MG tablet, Take 500 mg by mouth 2 (two) times daily., Disp: , Rfl:      oxybutynin (DITROPAN-XL) 10 MG 24 hr tablet, Take 1 tablet (10 mg total) by mouth once daily., Disp: 30 tablet, Rfl: 11    oxyCODONE-acetaminophen (PERCOCET) 5-325 mg per tablet, Take 1 tablet by mouth every 4 (four) hours as needed., Disp: 40 tablet, Rfl: 0    polyethylene glycol (GLYCOLAX) 17 gram PwPk, Take by mouth., Disp: , Rfl:     aspirin 81 MG Chew, Take 1 tablet (81 mg total) by mouth once daily., Disp: 30 tablet, Rfl: 1    mirtazapine (REMERON) 15 MG tablet, Take 1 tablet (15 mg total) by mouth nightly., Disp: 30 tablet, Rfl: 1    sertraline (ZOLOFT) 50 MG tablet, Take 1 tablet (50 mg total) by mouth once daily., Disp: 30 tablet, Rfl: 1  No current facility-administered medications for this visit.     PHYSICAL EXAMINATION:    Constitutional: He is oriented to person, place, and time. He appears well-developed and well-nourished.  He is in no apparent distress.    Neck: No tracheal deviation present.     Cardiovascular: Normal rate.      Pulmonary/Chest: Effort normal. No respiratory distress.     Abdominal:  He exhibits no distension.      Neurological: He is alert and oriented to person, place, and time.     Skin: Skin is warm and dry.     Extremities: No pitting edema noted in lower extremities bilaterally    Psych: Cooperative with normal affect.    Genitourinary: Catheter draining clear pink urine. No blood clots visualized.     Physical Exam    IMPRESSION:    Encounter Diagnoses   Name Primary?    Colovesical fistula Yes         PLAN:  -Discussed cystogram results,   -Catheter not removed today and will repeat cystogram in 1 week. Explained if no fistula, then will remove catheter.   -Will discuss findings with Dr. Morrow.   -RTC in 1 week for possible cath removal .     I encouraged him or any of his family members to call or email me with questions and/or concerns.      DAMARIS Baez

## 2018-04-02 NOTE — LETTER
April 2, 2018        Ash Hung, MACRINA  [address]  [place] [address]             [facility]  [address]  [facility] [address]  Phone: [phone]   Patient: Chris Aguirre   MR Number: [id number]   YOB: 1965   Date of Visit: 4/2/2018       Dear Dr. Hung:    Thank you for referring Chris Aguirre to me for evaluation. Attached you will find relevant portions of my assessment and plan of care.    If you have questions, please do not hesitate to call me. I look forward to following Chris Agiurre along with you.    Sincerely,      Mikal Nino MD              MD Thalia Shields MD    Enclosure

## 2018-04-02 NOTE — Clinical Note
Cande Morrow,  I saw Mr. Sparks this morning. His cystogram today was suggestive of a fistula so I did not remove the mcnally catheter. I scheduled him for another cystogram in 1 week to reassess. Do you think that is enough time? Thanks!  Shirley

## 2018-04-02 NOTE — PROGRESS NOTES
CRS Post-operative visit    Visit Info:     Procedure:   1. Sigmoid colectomy with en bloc partial cystectomy.  2. Diverting loop ileostomy.  3. Splenic flexure mobilization.  4. Omental pedicle flap.    Also underwent following procedures by Urology ([facility]):  1. Partial cystectomy with complex cystorrhaphy and partial prostatectomy  2. Left neoureterocystotomy with ureteral re-implantation  3. Cystoscopy with bilateral ureteral catheter placement  4. Bilateral ureteral stent placement   5. Left pelvic lymph node dissection    Date of Procedure: March 13, 2018    Indication: 52 yo M admitted with new onset pneumaturia - CT showed sigmoid colon mass with colovesical fistula.  Taken to OR for exploration & resection.    Date of Discharge: March 17, 2018    Current Status: Doing reasonably well.  C/o discomfort from indwelling Samson - had cystogram earlier today and seeing Urology later.  Eating well, no N/V.  Managing ostomy well.  Still c/o some incisional pain, requesting refill for pain meds.  No F/C.    Pathology:   1. Sigmoid colon) posterior bladder wall, mass, en bloc resection of sigmoid colon with attached posterior bladder wall:  Adenocarcinoma, measuring 4.7 cm in greatest dimension.  Tumor extends through the muscularis propria into the pericolorectal tissue with surrounding abcess formation with adherent bladder. There is no viable tumor seen invading bladder in multiple examined sections.  Proximal and distal colonic surgical margins are negative for malignancy.  Mesenteric surgical margin is negative for malignancy.  17 benign lymph nodes, negative for metastatic carcinoma (0/17).  See synoptic report in comment section for further details.    2. Right lateral bladder wall margin, biopsy:  Negative for malignancy.    3. Inferior bladder wall margin, biopsy:  Negative for malignancy.    4. Left lateral bladder wall margin, biopsy:  Negative for malignancy.    5. Superior bladder wall, biopsy:  Negative  for malignancy.    6. Second right lateral bladder wall margin, biopsy:  Negative for malignancy.    7. Second left lateral bladder wall margin, biopsy:  Negative for malignancy.    8. Left internal iliac lymph nodes, regional resection:  3 benign lymph nodes, negative for metastatic carcinoma (see 0/3).    9. Proximal sigmoid colon, segmental resection:  Colon with congested vasculature and no evidence of malignancy.  Resection margins are viable and negative for dysplasia or malignancy.  8 benign lymph nodes, negative for metastatic carcinoma (0/8).    10. Proximal donut, biopsy:  Colonic mucosa with no significant histopathologic abnormality.  No evidence of malignancy.    11. Distal donut, biopsy:  Colonic mucosa with no significant histopathologic abnormality.  No evidence of malignancy.    Physical Exam:  General: White male in NAD   Neuro: aaox4   Respiratory: resps even unlabored  Abdomen: soft, non-distended, appropriately tender, midline incision healed, RLQ ileostomy viable  Extremities: Warm dry and intact    Assessment:  Sigmoid colon cancer with macroscopic invasion of bladder and colovesical fistula, s/p en bloc sigmoid colectomy/partial cystectomy & DLI, bladder reconstruction by urology  Path T3N0, with no microscopic bladder invasion    Plan:  F/U with urology regarding cystogram and Samson removal  Diet & activity as tolerated  Oncology referral to discuss adjuvant chemotherapy  Will likely wait to close ileostomy until done chemotherapy  Refill Percocet (5/325, #40) ( data reviewed.)  RTO 4 weeks    Mikal Nino MD, FACS, FASCRS  Staff Surgeon  Department of Colon & Rectal Surgery

## 2018-04-02 NOTE — PROGRESS NOTES
This patient is unknown to me and is here in clinic today for first post op evaluation related to ileostomy. Surgery done 3/13 by Dr. Nino. The patient reports no  problems with  new ostomy. The patient is not receiving home health care.   Pain level today is reported as  4.    The ileostomy is 30mm  azucena low budded stoma.  The patient is currently  wearing a 1piece pouching system by [employer].   Average wear time is 3 days.   Peristomal skin is clear except for what appears to be folliculitis    There is no  mucocutaneous separation.  Pt is coping well with the new ostomy.  SUPPLIES/DME: Unsure      Pouching concerns include:          Patient instructions for pouching:  Cleanse skin with water, dry well.  Apply no sting skin barrier film . Allow to dry  Apply  Ring optional  Apply  pouch sized appropriately for stoma. Reviewed sizing of new stoma    Has a mcnally in place and seeing Urology today   SPECIAL NEEDS:  Pt counseled on skin care, nutrition, hydration as well as  how to order ostomy supplies.  He will need chemo so this is long term temp stoma  I spent greater than 50% of this 25 minute visit in face to face counseling.

## 2018-04-03 ENCOUNTER — TELEPHONE (OUTPATIENT)
Dept: HEMATOLOGY/ONCOLOGY | Facility: CLINIC | Age: 53
End: 2018-04-03

## 2018-04-06 ENCOUNTER — TELEPHONE (OUTPATIENT)
Dept: RADIOLOGY | Facility: HOSPITAL | Age: 53
End: 2018-04-06

## 2018-04-09 ENCOUNTER — OFFICE VISIT (OUTPATIENT)
Dept: UROLOGY | Facility: CLINIC | Age: 53
End: 2018-04-09
Payer: MEDICAID

## 2018-04-09 ENCOUNTER — TELEPHONE (OUTPATIENT)
Dept: UROLOGY | Facility: CLINIC | Age: 53
End: 2018-04-09

## 2018-04-09 ENCOUNTER — HOSPITAL ENCOUNTER (OUTPATIENT)
Dept: RADIOLOGY | Facility: HOSPITAL | Age: 53
Discharge: HOME | End: 2018-04-09
Attending: NURSE PRACTITIONER
Payer: MEDICAID

## 2018-04-09 VITALS
BODY MASS INDEX: 22.47 KG/M2 | HEIGHT: 71 IN | DIASTOLIC BLOOD PRESSURE: 83 MMHG | HEART RATE: 63 BPM | SYSTOLIC BLOOD PRESSURE: 119 MMHG | WEIGHT: 160.5 LBS

## 2018-04-09 DIAGNOSIS — N32.1 COLOVESICAL FISTULA: ICD-10-CM

## 2018-04-09 DIAGNOSIS — Z98.890 POST-OPERATIVE STATE: ICD-10-CM

## 2018-04-09 DIAGNOSIS — N32.1 COLOVESICAL FISTULA: Primary | ICD-10-CM

## 2018-04-09 PROCEDURE — 25500020 PHARM REV CODE 255: Performed by: NURSE PRACTITIONER

## 2018-04-09 PROCEDURE — 99213 OFFICE O/P EST LOW 20 MIN: CPT | Mod: PBBFAC,25 | Performed by: NURSE PRACTITIONER

## 2018-04-09 PROCEDURE — 99999 PR PBB SHADOW E&M-EST. PATIENT-LVL III: CPT | Mod: PBBFAC,,, | Performed by: NURSE PRACTITIONER

## 2018-04-09 PROCEDURE — 99024 POSTOP FOLLOW-UP VISIT: CPT | Mod: ,,, | Performed by: NURSE PRACTITIONER

## 2018-04-09 PROCEDURE — 74430 CONTRAST X-RAY BLADDER: CPT | Mod: 26,,, | Performed by: RADIOLOGY

## 2018-04-09 PROCEDURE — 74430 CONTRAST X-RAY BLADDER: CPT | Mod: TC

## 2018-04-09 RX ADMIN — IOTHALAMATE MEGLUMINE 150 ML: 172 INJECTION URETERAL at 08:04

## 2018-04-09 NOTE — PROGRESS NOTES
CHIEF COMPLAINT:    Mr. Aguirre is a 53 y.o. male presenting post op for possible cath removal.   PRESENTING ILLNESS:    Chris Aguirre is a 53 y.o. male who is a current everyday 35 pack year smoker presents for post op.    He initially presented to the ED with fecaluria and pneumaturia. He had a ureteral catheter in place after partial cystectomy with complex cystorrhaphy and partial prostatectomy. He also had bilateral ureteral stent placement and left pelvic lymph node dissection. He reports that it is draining well. Reports the catheter is draining clear yellow urine. Denies hematuria and blood clots. Reports irritation to his urethral meatus. His cystogram last week showed a fistula and the cath remained in place. He had a repeat cystogram this morning that was again suggestive of a fistula.     3/13/18 Procedure(s) Performed:   1. Partial cystectomy with complex cystorrhaphy and partial prostatectomy  2. Left neoureterocystotomy with ureteral re-implantation  3. Cystoscopy with bilateral ureteral catheter placement  4. Bilateral ureteral stent placement   5. Left pelvic lymph node dissection    3/13/18 Pathology bladder wall, biopsies: Negative for malignancy.    REVIEW OF SYSTEMS:    Review of Systems    Constitutional: Negative for fever and chills.   HENT: Negative for hearing loss.   Eyes: Negative for visual disturbance.   Respiratory: Negative for shortness of breath.   Cardiovascular: Negative for chest pain.   Gastrointestinal: Negative for nausea and vomiting.  Genitourinary: see above  Neurological: Negative for dizziness.   Hematological: Does not bruise/bleed easily.   Psychiatric/Behavioral: Negative for confusion.       PATIENT HISTORY:    Past Medical History:   Diagnosis Date    Coronary artery disease     Depression     Hepatitis C 3/9/2018    History of psychiatric care     History of psychiatric hospitalization     Hypertension     MI (myocardial infarction)     6 months ago     Neuropathy     Psychiatric problem     Psychosis     Self-harming behavior     Suicide attempt        Past Surgical History:   Procedure Laterality Date    ABDOMINAL SURGERY      History of perforated ulcer, unknown surgery    APPENDECTOMY         Family History   Problem Relation Age of Onset    No Known Problems Mother     No Known Problems Father     No Known Problems Sister     No Known Problems Brother     No Known Problems Maternal Aunt     No Known Problems Paternal Aunt     No Known Problems Maternal Uncle     No Known Problems Paternal Uncle     No Known Problems Maternal Grandfather     No Known Problems Maternal Grandmother     No Known Problems Paternal Grandfather     No Known Problems Paternal Grandmother     ADD / ADHD Cousin     Alcohol abuse Neg Hx     Anxiety disorder Neg Hx     Bipolar disorder Neg Hx     Dementia Neg Hx     Depression Neg Hx     Drug abuse Neg Hx     OCD Neg Hx     Paranoid behavior Neg Hx     Physical abuse Neg Hx     Schizophrenia Neg Hx     Seizures Neg Hx     Self injury Neg Hx     Sexual abuse Neg Hx     Suicide Neg Hx        Social History     Social History    Marital status: Single     Spouse name: N/A    Number of children: N/A    Years of education: N/A     Occupational History    Not on file.     Social History Main Topics    Smoking status: Current Every Day Smoker     Packs/day: 2.00     Years: 30.00    Smokeless tobacco: Never Used    Alcohol use Yes    Drug use: Yes     Types: Oxycodone    Sexual activity: Yes     Partners: Female     Birth control/ protection: None     Other Topics Concern    Patient Feels They Ought To Cut Down On Drinking/Drug Use No    Patient Annoyed By Others Criticizing Their Drinking/Drug Use No    Patient Has Felt Bad Or Guilty About Drinking/Drug Use No    Patient Has Had A Drink/Used Drugs As An Eye Opener In The Am No     Social History Narrative    No narrative on file        Allergies:  Patient has no known allergies.    Medications:    Current Outpatient Prescriptions:     oxybutynin (DITROPAN-XL) 10 MG 24 hr tablet, Take 1 tablet (10 mg total) by mouth once daily., Disp: 30 tablet, Rfl: 11    aspirin 81 MG Chew, Take 1 tablet (81 mg total) by mouth once daily., Disp: 30 tablet, Rfl: 1    mirtazapine (REMERON) 15 MG tablet, Take 1 tablet (15 mg total) by mouth nightly., Disp: 30 tablet, Rfl: 1    naproxen (NAPROSYN) 500 MG tablet, Take 500 mg by mouth 2 (two) times daily., Disp: , Rfl:     oxyCODONE-acetaminophen (PERCOCET) 5-325 mg per tablet, Take 1 tablet by mouth every 4 (four) hours as needed., Disp: 40 tablet, Rfl: 0    polyethylene glycol (GLYCOLAX) 17 gram PwPk, Take by mouth., Disp: , Rfl:     sertraline (ZOLOFT) 50 MG tablet, Take 1 tablet (50 mg total) by mouth once daily., Disp: 30 tablet, Rfl: 1  No current facility-administered medications for this visit.     PHYSICAL EXAMINATION:    Constitutional: He is oriented to person, place, and time. He appears well-developed and well-nourished.  He is in no apparent distress.    Neck: No tracheal deviation present.     Cardiovascular: Normal rate.      Pulmonary/Chest: Effort normal. No respiratory distress.     Abdominal:  He exhibits no distension.      Neurological: He is alert and oriented to person, place, and time.     Skin: Skin is warm and dry.     Extremities: No pitting edema noted in lower extremities bilaterally    Psych: Cooperative with normal affect.    Genitourinary: Catheter draining clear yellow urine. No blood clots visualized.     Physical Exam    IMPRESSION:    Encounter Diagnoses   Name Primary?    Colovesical fistula Yes    Post-operative state          PLAN:  -Discussed cystogram results,   -Catheter not removed today and will repeat cystogram in 2 weeks. Explained if no fistula, then will remove catheter.     -RTC in 2 weeks for possible cath removal .     I encouraged him or any of his  family members to call or email me with questions and/or concerns.      DAMARIS Baez

## 2018-04-09 NOTE — TELEPHONE ENCOUNTER
Called to discuss cystogram results. Fistula present and want to repeat cystogram in 2 weeks then possibly remove catheter. Left VM.

## 2018-04-12 NOTE — PROGRESS NOTES
Cc: Colon cancer    HPI: is a 53yoWM who presented to the ED on March 10th, 2018 with acute onset of stool and gas in his urine over the previous several days. Previously, he had progressively worsening constipation, for which he was on stool softeners. He also had given history of  bloody stools and  lower abdominal pain. He had  15 lbs over the past few months. He denies fatigue, fevers, CP, or SOB.   He was diagnosed with HCV about 15 years ago but never sought treatment for this. He was previously hospitalized and seen by inpatient psychiatry for substance abuse, past history of suicide attempt by cutting. He also has a history of an MI with placement of heart stents, on ASA.He underwent  partial cystectomy with complex cystorrhaphy and partial prostatectomy, left neoureterocystotomy with ureteral re-implantation, cystoscopy with bilateral ureteral catheter placement, bilateral ureteral stent placement, left pelvic lymph node dissection,sigmoid colectomy and ileostomy on 3/13/18.     Past Medical History:   Diagnosis Date    Coronary artery disease     Depression     Hepatitis C 3/9/2018    Hypertension     MI (myocardial infarction)     6 months ago    Neuropathy     Psychosis     Self-harming behavior     Suicide attempt          Past Surgical History:   Procedure Laterality Date    ABDOMINAL SURGERY      History of perforated ulcer, unknown surgery    APPENDECTOMY       Family History   Problem Relation Age of Onset    No Known Problems Mother     No Known Problems Father     No Known Problems Sister     No Known Problems Brother     No Known Problems Maternal Aunt     No Known Problems Paternal Aunt     No Known Problems Maternal Uncle     No Known Problems Paternal Uncle     No Known Problems Maternal Grandfather     No Known Problems Maternal Grandmother     No Known Problems Paternal Grandfather          Social History     Social History    Marital status: Single      Spouse name: N/A    Number of children: N/A    Years of education: N/A     Occupational History    Not on file.     Social History Main Topics    Smoking status: Current Every Day Smoker     Packs/day: 2.00     Years: 30.00    Smokeless tobacco: Never Used    Alcohol use Yes    Drug use: Yes     Types: Oxycodone    Sexual activity: Yes     Partners: Female     Birth control/ protection: None     Other Topics Concern    Patient Feels They Ought To Cut Down On Drinking/Drug Use No    Patient Annoyed By Others Criticizing Their Drinking/Drug Use No    Patient Has Felt Bad Or Guilty About Drinking/Drug Use No    Patient Has Had A Drink/Used Drugs As An Eye Opener In The Am No     Social History Narrative    No narrative on file     Review of patient's allergies indicates: No Known Allergies      Current Outpatient Prescriptions   Medication Sig    aspirin 81 MG Chew Take 1 tablet (81 mg total) by mouth once daily.    mirtazapine (REMERON) 15 MG tablet Take 1 tablet (15 mg total) by mouth nightly.    naproxen (NAPROSYN) 500 MG tablet Take 500 mg by mouth 2 (two) times daily.    oxybutynin (DITROPAN-XL) 10 MG 24 hr tablet Take 1 tablet (10 mg total) by mouth once daily.    oxyCODONE-acetaminophen (PERCOCET) 5-325 mg per tablet Take 1 tablet by mouth every 4 (four) hours as needed.    polyethylene glycol (GLYCOLAX) 17 gram PwPk Take by mouth.    sertraline (ZOLOFT) 50 MG tablet Take 1 tablet (50 mg total) by mouth once daily.     No current facility-administered medications for this visit.          Review of Systems   Constitutional: Positive for malaise/fatigue and weight loss. Negative for chills and fever.   HENT: Negative.    Eyes: Negative.    Respiratory: Negative for cough, hemoptysis, sputum production and shortness of breath.    Cardiovascular: Negative.    Gastrointestinal: Negative for abdominal pain, blood in stool, constipation, diarrhea, heartburn, nausea and vomiting.   Genitourinary:  Negative for dysuria, frequency, hematuria and urgency.   Musculoskeletal: Negative for myalgias and neck pain.   Skin: Negative for itching and rash.   Neurological: Negative for dizziness, tingling, tremors, speech change and weakness.   Endo/Heme/Allergies: Does not bruise/bleed easily.   Psychiatric/Behavioral: Negative for depression.       Vitals:    04/13/18 1327   BP: (!) 132/95   Pulse: (!) 115   Resp: 18   Temp: 98.7 °F (37.1 °C)     PS: ECOG1    Physical Exam   Constitutional: He is oriented to person, place, and time. He appears well-developed.   HENT:   Head: Normocephalic and atraumatic.   Mouth/Throat: No oropharyngeal exudate.   Eyes: Pupils are equal, round, and reactive to light. No scleral icterus.   Neck: Normal range of motion.   Cardiovascular: Regular rhythm and normal heart sounds.    No murmur heard.  He is tachycardic   Pulmonary/Chest: Breath sounds normal. No respiratory distress. He has no wheezes.   Abdominal: Soft. He exhibits no distension. There is no tenderness. There is no rebound.   Scar in midline healing well. He has a colostomy bag with fecal matter   Genitourinary:   Genitourinary Comments: He has a Samson catheter in place   Musculoskeletal: He exhibits no edema.   Lymphadenopathy:     He has no cervical adenopathy.   Neurological: He is alert and oriented to person, place, and time. No cranial nerve deficit.   Skin: Skin is warm.   Psychiatric: He has a normal mood and affect.       3/9/18 CT abdomen/pelvis with cont      CT chest abdomen and pelvis with contrast    Clinical history: Weight loss    Comparison: None    Technique:  Axial images of the chest, abdomen, and pelvis were obtained at 5 mm intervals following administration of 75 cc Omni 350 IV contrast as well as oral and rectal contrast.  Coronal and sagittal and delayed images were reviewed.    Findings:  The structures at the base of the neck are grossly unremarkable.  No significant mediastinal lymphadenopathy.   The heart is not enlarged.  There is atherosclerotic calcification in the distribution of the coronary arteries.  No significant pericardial effusion.    The airways are patent.    There is a vague focus of groundglass attenuation within the right upper lobe, measuring up to 1.6 cm.  No significant volume of pleural fluid.  No pneumothorax.  No large focal consolidation.    The thoracic aorta tapers normally with atherosclerotic calcification.    The liver is hypoattenuating and enlarged, may reflect steatosis, correlation with LFTs recommended.  There is a subcentimeter hypoattenuating lesion within the left hepatic lobe, too small for characterization.  There are 2 faint foci of irregular peripheral enhancement involving the medial aspect and anterior aspect of the left hepatic lobe.  These foci normalize with hepatic parenchymal enhancement on delayed image, and may reflect perfusional anomaly versus flash filling hemangiomas.  Punctate hypoattenuating focus within the right hepatic lobe is too small for characterization.  The spleen, pancreas, gallbladder and adrenal glands are grossly unremarkable.  There is no biliary dilation.  There is thickening at the gastric fundus, nonspecific, correlation with direct visualization as warranted.  No high grade obstruction.  The portal vein, splenic vein, SMV, celiac axis and SMA all are grossly patent.  Scattered shotty periaortic and paracaval lymph nodes are noted.    The kidneys enhance symmetrically and excrete contrast appropriately without hydronephrosis or nephrolithiasis.  The bilateral ureters are grossly unremarkable along their course to the urinary bladder without calculi seen.  There is air within the urinary bladder, possibly from catheterization.  Please see below for complete description.  The prostate is prominent.    Rectal contrast has been administered.  The rectum and distal sigmoid colon is unremarkable without wall thickening.  There is  circumferential wall thickening involving the proximal sigmoid colon, without significant contrast passage through the region of thickening.  Wall thickening in the region measures up to 1.1 cm.  There is focal masslike thickening of the sigmoid colon, that abuts the urinary bladder.  There is urinary bladder wall thickening and distortion of the urinary bladder by this mass.  There is a focus of air and stool insinuating between the region of focal colonic thickening and the urinary bladder, finding is concerning for bladder wall invasion by colonic malignancy, with fistulous formation to the urinary bladder.  This may account for air within the urinary bladder.  Abscess in the region felt less likely.  There is surrounding inflammation, and several adjacent nonenlarged although numerous lymph nodes.  Diverticula are noted throughout the remainder of the colon, with moderate to large amount stool throughout the colon suggesting superimposed constipation.  Oral contrast is noted to the level of the transverse colon.  The terminal ileum is grossly unremarkable.  No pericecal inflammation.  The small bowel is grossly unremarkable.  Scattered shotty periaortic and paracaval lymph nodes are noted.  There is a small amount reactive fluid in the left hemipelvis.    There is a mixed s sclerotic focus within the posterior aspect of the right iliac bone, the appearance is not specific for metastatic disease, however metastatic focus is not excluded in the setting of probable colonic malignancy.  Several additional sclerotic foci are noted throughout the spine, likely degenerative in nature.  Schmorl's node involves the superior endplate of L1.  There is Oddi wall cachexia.  There are bilateral fat containing inguinal hernias.  No significant axillary lymphadenopathy.   Impression       1.  Findings concerning for sigmoid colonic malignancy with bladder wall invasion, and likely fistulous communication with the urinary  bladder.  Air within the urinary bladder is suspected to be on the bases of the same although correlation for recent catheterization and clinical symptomatology.  There is indistinctness about the colon region, with several although nonenlarged lymph nodes, and disorganized fluid.  Please see above for full description.    2.  Vague groundglass focus of attenuation within the right upper lobe of the right lung, nonspecific, could reflect nonspecific inflammation or infection, metastatic disease however cannot be excluded in this setting.  Followup is advised.    3.  Constipation.    4.  Several additional findings described above.     3/12/18 pathology      SPECIMEN  1) Rectal polyps x2, 8 mm polyp and 5 mm polyp, hot snare polypectomies.  FINAL PATHOLOGIC DIAGNOSIS  Rectum, 8 mm and 5 mm polyps ×2, biopsy:  - Hyperplastic polyp, multiple fragments.    3/12/18 FINAL PATHOLOGIC DIAGNOSIS    Supplemental Diagnosis    MICROSATELLITE INSTABILITY, TUMOR:  RESULT SUMMARY:  -AMBERLY/MSI-L  RESULT:  MSI: AMBERLY/MSI-L (instability observed in 0 of 5 informative markers).  INTERPRETATION:  An AMBERLY/MSI-L phenotype suggests the presence of normal DNA mismatch repair function within the tumor .    1. Sigmoid colon) posterior bladder wall, mass, en bloc resection of sigmoid colon with attached posterior bladder wall:    Adenocarcinoma, measuring 4.7 cm in greatest dimension.  Tumor extends through the muscularis propria into the pericolorectal tissue with surrounding abcess formation with adherent bladder. There is no viable tumor seen invading bladder in multiple examined sections.  Proximal and distal colonic surgical margins are negative for malignancy.  Mesenteric surgical margin is negative for malignancy.  17 benign lymph nodes, negative for metastatic carcinoma (0/17).  See synoptic report in comment section for further details.    2. Right lateral bladder wall margin, biopsy:    Negative for malignancy.    3. Inferior bladder  "wall margin, biopsy:    Negative for malignancy.    4. Left lateral bladder wall margin, biopsy:    Negative for malignancy.    5. Superior bladder wall, biopsy:    Negative for malignancy.    6. Second right lateral bladder wall margin, biopsy:    Negative for malignancy.    7. Second left lateral bladder wall margin, biopsy:  Negative for malignancy.    8. Left internal iliac lymph nodes, regional resection:    3 benign lymph nodes, negative for metastatic carcinoma (see 0/3).    9. Proximal sigmoid colon, segmental resection:    Colon with congested vasculature and no evidence of malignancy.  Resection margins are viable and negative for dysplasia or malignancy.  8 benign lymph nodes, negative for metastatic carcinoma (0/8).    10. Proximal donut, biopsy:    Colonic mucosa with no significant histopathologic abnormality.  No evidence of malignancy.    11. Distal donut, biopsy:    Colonic mucosa with no significant histopathologic abnormality.  No evidence of malignancy    Comment: Synoptic report  Procedure: Sigmoid colon with attached bladder wall; en bloc resection  Tumor site: Sigmoid colon  Tumor size:  Greatest dimension: 4.7 cm  Macroscopic tumor perforation: Grossly lesion involves the full extent of the bowel wall and erodes into the bladder  Histologic type: Adenocarcinoma  Histologic grade: G2 moderately differentiated.:  Microscopic tumor extension: Tumor invades through the muscularis propria into pericolic rectal tissue .  Margins:  Proximal margin: Uninvolved by invasive carcinoma  Distal margin: Uninvolved by invasive carcinoma  Mesenteric margin: Uninvolved  Circumferential (radial) margin: Tumor extends into pericolic rectal adipose tissue and has gross tumor perforation with surrounding abscess formation    Distance of invasive carcinoma from closest margin: 9 cm from surgical margin "A"  Treatment effect: No known presurgical treatment  Lymphovascular invasion: Not identified    Perineural " invasion: Not identified  Tumor deposits: Not identified  Regional lymph nodes:  Number of lymph nodes involved: 0  Number of lymph nodes examined: 28  Pathologic staging:  Primary tumor:  pT3: Tumor invades through the muscularis propria into pericolic tissues .  Regional lymph nodes:  pN0: No regional lymph node metastasis  Number examined: 28  Number involved: 0  Distant metastasis:  pMX: Cannot be assessed.      ASSESSMENT:    1. Sigmoid colon adenocarcinoma, [id number]  2. Lung lesion on CT  3. Weight loss, unintentional  4. Chronic hepatitis C, without coma  5. Tachycardia  6. Hypertension, essential  7. Coronary artery disease  8. H/o depression    Plan:    1,2: He has AJCC stage II disease. There were no clear metastatic lesions  On CT done in march 2018. However, there was a groundglass focus of attenuation within the right upper lobe of the right lung. It is unclear if this is atelactasis/pnemonia/metastatic lesion. He cannot have PET CT due to insurance. He will have repeat CT in 2 weeks. He is MSI high. No clear high risk features other than macroscopic perforation. He is MSI stable/low. Margins were clear. He has no fecal matter in urine/air in his urine at thsi time. He will follow with urology in the next 1 week. No clear, established role for adjuvant FOLFOX. . I discussed role for adjuvant 5FU, given weekly for 6 weeks each cycle and off for 2 weeks, for a total of 6 months.  He understands that this will be with a curative intent and compliance is very important.  I discussed the risks and benefits. Consent was obtained.    5FU adverse effects    Angina pectoris, cardiac arrhythmia, cardiac failure, cerebrovascular accident, ischemic heart disease, local thrombophlebitis, myocardial infarction, vasospasm, ventricular ectopy, cerebellar syndrome (acute), confusion, disorientation, euphoria, headache  alopecia, changes in nails (including nail loss), dermatitis, hyperpigmentation (supravenous),  maculopapular rash (pruritic), palmar-plantar erythrodysesthesia, skin fissure, skin photosensitivity, Rogers-Brien syndrome, toxic epidermal necrolysis, xeroderma   Anorexia, diarrhea, esophagopharyngitis, gastrointestinal hemorrhage, gastrointestinal ulcer, mesenteric ischemia (acute), nausea, stomatitis, tissue sloughing (gastrointestinal), vomiting,agranulocytosis, anemia, leukopenia (stephie: days 9 to 14; recovery by day 30), pancytopenia, thrombocytopenia, anaphylaxis, hypersensitivity reaction (generalized), lacrimal stenosis, lacrimation, nystagmus, photophobia, visual disturbance, epistaxis    4. He will have HCV genotyping, quantification today. He has never bene treated. He will be referred to liver clinic. HCV reactivation/ fulminant hepatitis after chemotherapy has been rarely reported.    5,6: He will need treatment with betablocker if persistent.     7. No symptoms at this time. He is on ASA.

## 2018-04-13 ENCOUNTER — LAB VISIT (OUTPATIENT)
Dept: LAB | Facility: HOSPITAL | Age: 53
End: 2018-04-13
Attending: INTERNAL MEDICINE
Payer: MEDICAID

## 2018-04-13 ENCOUNTER — INITIAL CONSULT (OUTPATIENT)
Dept: HEMATOLOGY/ONCOLOGY | Facility: CLINIC | Age: 53
End: 2018-04-13
Payer: MEDICAID

## 2018-04-13 VITALS
HEIGHT: 71 IN | TEMPERATURE: 99 F | HEART RATE: 115 BPM | BODY MASS INDEX: 22.56 KG/M2 | OXYGEN SATURATION: 99 % | SYSTOLIC BLOOD PRESSURE: 132 MMHG | RESPIRATION RATE: 18 BRPM | WEIGHT: 161.19 LBS | DIASTOLIC BLOOD PRESSURE: 95 MMHG

## 2018-04-13 DIAGNOSIS — B18.2 CHRONIC HEPATITIS C WITHOUT HEPATIC COMA: Primary | ICD-10-CM

## 2018-04-13 DIAGNOSIS — R63.4 WEIGHT LOSS, NON-INTENTIONAL: ICD-10-CM

## 2018-04-13 DIAGNOSIS — N32.1 COLOVESICAL FISTULA: ICD-10-CM

## 2018-04-13 DIAGNOSIS — C18.8 OVERLAPPING MALIGNANT NEOPLASM OF COLON: ICD-10-CM

## 2018-04-13 DIAGNOSIS — B18.2 CHRONIC HEPATITIS C WITHOUT HEPATIC COMA: ICD-10-CM

## 2018-04-13 LAB
AFP SERPL-MCNC: 1.8 NG/ML
ALBUMIN SERPL BCP-MCNC: 4.3 G/DL
ALP SERPL-CCNC: 56 U/L
ALT SERPL W/O P-5'-P-CCNC: 29 U/L
ANION GAP SERPL CALC-SCNC: 13 MMOL/L
AST SERPL-CCNC: 23 U/L
BASOPHILS # BLD AUTO: 0.04 K/UL
BASOPHILS NFR BLD: 0.6 %
BILIRUB SERPL-MCNC: 0.2 MG/DL
BUN SERPL-MCNC: 10 MG/DL
CALCIUM SERPL-MCNC: 10.4 MG/DL
CEA SERPL-MCNC: 2 NG/ML
CHLORIDE SERPL-SCNC: 106 MMOL/L
CO2 SERPL-SCNC: 23 MMOL/L
CREAT SERPL-MCNC: 0.9 MG/DL
DIFFERENTIAL METHOD: ABNORMAL
EOSINOPHIL # BLD AUTO: 0.4 K/UL
EOSINOPHIL NFR BLD: 5.7 %
ERYTHROCYTE [DISTWIDTH] IN BLOOD BY AUTOMATED COUNT: 13.8 %
EST. GFR  (AFRICAN AMERICAN): >60 ML/MIN/1.73 M^2
EST. GFR  (NON AFRICAN AMERICAN): >60 ML/MIN/1.73 M^2
GLUCOSE SERPL-MCNC: 89 MG/DL
HCT VFR BLD AUTO: 38.8 %
HGB BLD-MCNC: 12.9 G/DL
IMM GRANULOCYTES # BLD AUTO: 0.01 K/UL
IMM GRANULOCYTES NFR BLD AUTO: 0.1 %
LYMPHOCYTES # BLD AUTO: 3.6 K/UL
LYMPHOCYTES NFR BLD: 51.2 %
MCH RBC QN AUTO: 28.9 PG
MCHC RBC AUTO-ENTMCNC: 33.2 G/DL
MCV RBC AUTO: 87 FL
MONOCYTES # BLD AUTO: 0.5 K/UL
MONOCYTES NFR BLD: 7 %
NEUTROPHILS # BLD AUTO: 2.5 K/UL
NEUTROPHILS NFR BLD: 35.4 %
NRBC BLD-RTO: 0 /100 WBC
PLATELET # BLD AUTO: 290 K/UL
PMV BLD AUTO: 9.5 FL
POTASSIUM SERPL-SCNC: 4.1 MMOL/L
PROT SERPL-MCNC: 8 G/DL
RBC # BLD AUTO: 4.47 M/UL
SODIUM SERPL-SCNC: 142 MMOL/L
WBC # BLD AUTO: 7.05 K/UL

## 2018-04-13 PROCEDURE — 85025 COMPLETE CBC W/AUTO DIFF WBC: CPT

## 2018-04-13 PROCEDURE — 86703 HIV-1/HIV-2 1 RESULT ANTBDY: CPT

## 2018-04-13 PROCEDURE — 87340 HEPATITIS B SURFACE AG IA: CPT

## 2018-04-13 PROCEDURE — 82378 CARCINOEMBRYONIC ANTIGEN: CPT

## 2018-04-13 PROCEDURE — 82105 ALPHA-FETOPROTEIN SERUM: CPT

## 2018-04-13 PROCEDURE — 87902 NFCT AGT GNTYP ALYS HEP C: CPT

## 2018-04-13 PROCEDURE — 99999 PR PBB SHADOW E&M-EST. PATIENT-LVL IV: CPT | Mod: PBBFAC,,, | Performed by: INTERNAL MEDICINE

## 2018-04-13 PROCEDURE — 86704 HEP B CORE ANTIBODY TOTAL: CPT

## 2018-04-13 PROCEDURE — 87522 HEPATITIS C REVRS TRNSCRPJ: CPT

## 2018-04-13 PROCEDURE — 36415 COLL VENOUS BLD VENIPUNCTURE: CPT

## 2018-04-13 PROCEDURE — 80053 COMPREHEN METABOLIC PANEL: CPT

## 2018-04-13 PROCEDURE — 99205 OFFICE O/P NEW HI 60 MIN: CPT | Mod: S$PBB,,, | Performed by: INTERNAL MEDICINE

## 2018-04-13 PROCEDURE — 99214 OFFICE O/P EST MOD 30 MIN: CPT | Mod: PBBFAC | Performed by: INTERNAL MEDICINE

## 2018-04-13 RX ORDER — SODIUM CHLORIDE 0.9 % (FLUSH) 0.9 %
10 SYRINGE (ML) INJECTION
Status: CANCELLED | OUTPATIENT
Start: 2018-05-11

## 2018-04-13 RX ORDER — POLYETHYLENE GLYCOL 3350 17 G/17G
POWDER, FOR SOLUTION ORAL
COMMUNITY
Start: 2018-03-01 | End: 2018-04-13

## 2018-04-13 RX ORDER — HEPARIN 100 UNIT/ML
500 SYRINGE INTRAVENOUS
Status: CANCELLED | OUTPATIENT
Start: 2018-05-11

## 2018-04-13 NOTE — LETTER
April 13, 2018      Mikal Nino MD  [address]  [facility] [address]           [facility]  [address]  [facility] [address]  Phone: [phone]          Patient: Chris Aguirre   MR Number: [id number]   YOB: 1965   Date of Visit: 4/13/2018       Dear Dr. Mikal Nino:    Thank you for referring Chris Aguirre to me for evaluation. Attached you will find relevant portions of my assessment and plan of care.    If you have questions, please do not hesitate to call me. I look forward to following Chris Aguirre along with you.    Sincerely,    Teresa Marin MD    Enclosure  CC:  No Recipients    If you would like to receive this communication electronically, please contact [email or web address] or [phone] to request more information on [facility] [employer] access.    For providers and/or their staff who would like to refer a patient to [facility], please contact us through our one-stop-shop provider referral line, [facility], at [phone].    If you feel you have received this communication in error or would no longer like to receive these types of communications, please e-mail [email or web address]

## 2018-04-13 NOTE — Clinical Note
Cbc, cmp, cea today Port, ct chest, abdomen, pelvis in 2 weeks F/u for chemo after port- in 3 weeks; labs on day 1: cbc, cmp

## 2018-04-16 LAB
HBV CORE AB SERPL QL IA: POSITIVE
HBV SURFACE AG SERPL QL IA: NEGATIVE
HCV LOG: 4.89 LOG (10) IU/ML
HCV RNA QUANT PCR: ABNORMAL IU/ML
HCV, QUALITATIVE: DETECTED IU/ML
HIV 1+2 AB+HIV1 P24 AG SERPL QL IA: NEGATIVE

## 2018-04-18 DIAGNOSIS — C18.8 OVERLAPPING MALIGNANT NEOPLASM OF COLON: Primary | ICD-10-CM

## 2018-04-18 LAB — HCV GENTYP SERPL NAA+PROBE: NORMAL

## 2018-04-20 ENCOUNTER — TELEPHONE (OUTPATIENT)
Dept: RADIOLOGY | Facility: HOSPITAL | Age: 53
End: 2018-04-20

## 2018-04-23 ENCOUNTER — OFFICE VISIT (OUTPATIENT)
Dept: UROLOGY | Facility: CLINIC | Age: 53
End: 2018-04-23
Payer: MEDICAID

## 2018-04-23 ENCOUNTER — HOSPITAL ENCOUNTER (OUTPATIENT)
Dept: RADIOLOGY | Facility: HOSPITAL | Age: 53
Discharge: HOME | End: 2018-04-23
Attending: NURSE PRACTITIONER
Payer: MEDICAID

## 2018-04-23 VITALS
SYSTOLIC BLOOD PRESSURE: 130 MMHG | HEIGHT: 71 IN | WEIGHT: 166.88 LBS | HEART RATE: 86 BPM | DIASTOLIC BLOOD PRESSURE: 78 MMHG | BODY MASS INDEX: 23.36 KG/M2

## 2018-04-23 DIAGNOSIS — N32.1 COLOVESICAL FISTULA: ICD-10-CM

## 2018-04-23 DIAGNOSIS — C18.8 OVERLAPPING MALIGNANT NEOPLASM OF COLON: ICD-10-CM

## 2018-04-23 DIAGNOSIS — R30.0 DYSURIA: ICD-10-CM

## 2018-04-23 DIAGNOSIS — N32.1 COLOVESICAL FISTULA: Primary | ICD-10-CM

## 2018-04-23 PROCEDURE — 74430 CONTRAST X-RAY BLADDER: CPT | Mod: 26,,, | Performed by: RADIOLOGY

## 2018-04-23 PROCEDURE — 51700 IRRIGATION OF BLADDER: CPT | Mod: 58,S$PBB,, | Performed by: NURSE PRACTITIONER

## 2018-04-23 PROCEDURE — 25500020 PHARM REV CODE 255: Performed by: NURSE PRACTITIONER

## 2018-04-23 PROCEDURE — 99213 OFFICE O/P EST LOW 20 MIN: CPT | Mod: PBBFAC,25 | Performed by: NURSE PRACTITIONER

## 2018-04-23 PROCEDURE — 74430 CONTRAST X-RAY BLADDER: CPT | Mod: TC

## 2018-04-23 PROCEDURE — 99999 PR PBB SHADOW E&M-EST. PATIENT-LVL III: CPT | Mod: PBBFAC,,, | Performed by: NURSE PRACTITIONER

## 2018-04-23 PROCEDURE — 51700 IRRIGATION OF BLADDER: CPT | Mod: PBBFAC | Performed by: NURSE PRACTITIONER

## 2018-04-23 PROCEDURE — 99024 POSTOP FOLLOW-UP VISIT: CPT | Mod: ,,, | Performed by: NURSE PRACTITIONER

## 2018-04-23 RX ORDER — PHENAZOPYRIDINE HYDROCHLORIDE 200 MG/1
200 TABLET, FILM COATED ORAL 3 TIMES DAILY PRN
Qty: 16 TABLET | Refills: 0 | Status: SHIPPED | OUTPATIENT
Start: 2018-04-23 | End: 2018-05-03

## 2018-04-23 RX ORDER — LIDOCAINE HYDROCHLORIDE 20 MG/ML
JELLY TOPICAL ONCE
Status: CANCELLED | OUTPATIENT
Start: 2018-04-23 | End: 2018-04-23

## 2018-04-23 RX ORDER — SULFAMETHOXAZOLE AND TRIMETHOPRIM 800; 160 MG/1; MG/1
1 TABLET ORAL ONCE
Status: CANCELLED | OUTPATIENT
Start: 2018-04-23 | End: 2018-04-23

## 2018-04-23 RX ADMIN — IOTHALAMATE MEGLUMINE 175 ML: 172 INJECTION URETERAL at 08:04

## 2018-04-23 NOTE — PROGRESS NOTES
CHIEF COMPLAINT:    Mr. Aguirre is a 53 y.o. male presenting post op for possible cath removal.   PRESENTING ILLNESS:    Chris Aguirre is a 53 y.o. male who is a current everyday 35 pack year smoker presents for post op cath removal.    He initially presented to the ED with fecaluria and pneumaturia. He had a ureteral catheter in place after partial cystectomy with complex cystorrhaphy and partial prostatectomy. He also had bilateral ureteral stent placement and left pelvic lymph node dissection. He reports that it is draining well. Reports the catheter is draining clear yellow urine. Denies hematuria and blood clots. Reports irritation to his urethral meatus. His cystogram last week showed a fistula and the cath remained in place. He had a 2 cystograms that were suggestive of a fistula. He had a repeat cystogram this morning that was normal. He has stopped the ditropan for the last 4 days.     3/13/18 Procedure(s) Performed:   1. Partial cystectomy with complex cystorrhaphy and partial prostatectomy  2. Left neoureterocystotomy with ureteral re-implantation  3. Cystoscopy with bilateral ureteral catheter placement  4. Bilateral ureteral stent placement   5. Left pelvic lymph node dissection    3/13/18 Pathology bladder wall, biopsies: Negative for malignancy.    REVIEW OF SYSTEMS:    Review of Systems    Constitutional: Negative for fever and chills.   HENT: Negative for hearing loss.   Eyes: Negative for visual disturbance.   Respiratory: Negative for shortness of breath.   Cardiovascular: Negative for chest pain.   Gastrointestinal: Negative for nausea and vomiting.  Genitourinary: see above  Neurological: Negative for dizziness.   Hematological: Does not bruise/bleed easily.   Psychiatric/Behavioral: Negative for confusion.       PATIENT HISTORY:    Past Medical History:   Diagnosis Date    Coronary artery disease     Depression     Hepatitis C 3/9/2018    History of psychiatric care     History of  psychiatric hospitalization     Hypertension     MI (myocardial infarction)     6 months ago    Neuropathy     Psychiatric problem     Psychosis     Self-harming behavior     Suicide attempt        Past Surgical History:   Procedure Laterality Date    ABDOMINAL SURGERY      History of perforated ulcer, unknown surgery    APPENDECTOMY         Family History   Problem Relation Age of Onset    No Known Problems Mother     No Known Problems Father     No Known Problems Sister     No Known Problems Brother     No Known Problems Maternal Aunt     No Known Problems Paternal Aunt     No Known Problems Maternal Uncle     No Known Problems Paternal Uncle     No Known Problems Maternal Grandfather     No Known Problems Maternal Grandmother     No Known Problems Paternal Grandfather     No Known Problems Paternal Grandmother     ADD / ADHD Cousin     Alcohol abuse Neg Hx     Anxiety disorder Neg Hx     Bipolar disorder Neg Hx     Dementia Neg Hx     Depression Neg Hx     Drug abuse Neg Hx     OCD Neg Hx     Paranoid behavior Neg Hx     Physical abuse Neg Hx     Schizophrenia Neg Hx     Seizures Neg Hx     Self injury Neg Hx     Sexual abuse Neg Hx     Suicide Neg Hx        Social History     Social History    Marital status: Single     Spouse name: N/A    Number of children: N/A    Years of education: N/A     Occupational History    Not on file.     Social History Main Topics    Smoking status: Current Every Day Smoker     Packs/day: 2.00     Years: 30.00    Smokeless tobacco: Never Used    Alcohol use Yes    Drug use: Yes     Types: Oxycodone    Sexual activity: Yes     Partners: Female     Birth control/ protection: None     Other Topics Concern    Patient Feels They Ought To Cut Down On Drinking/Drug Use No    Patient Annoyed By Others Criticizing Their Drinking/Drug Use No    Patient Has Felt Bad Or Guilty About Drinking/Drug Use No    Patient Has Had A Drink/Used Drugs As An  Eye Opener In The Am No     Social History Narrative    No narrative on file       Allergies:  Patient has no known allergies.    Medications:    Current Outpatient Prescriptions:     oxybutynin (DITROPAN-XL) 10 MG 24 hr tablet, Take 1 tablet (10 mg total) by mouth once daily., Disp: 30 tablet, Rfl: 11    phenazopyridine (PYRIDIUM) 200 MG tablet, Take 1 tablet (200 mg total) by mouth 3 (three) times daily as needed for Pain., Disp: 16 tablet, Rfl: 0  No current facility-administered medications for this visit.     PHYSICAL EXAMINATION:    Constitutional: He is oriented to person, place, and time. He appears well-developed and well-nourished.  He is in no apparent distress.    Neck: No tracheal deviation present.     Cardiovascular: Normal rate.      Pulmonary/Chest: Effort normal. No respiratory distress.     Abdominal:  He exhibits no distension.      Neurological: He is alert and oriented to person, place, and time.     Skin: Skin is warm and dry.     Extremities: No pitting edema noted in lower extremities bilaterally    Psych: Cooperative with normal affect.    Genitourinary: Catheter draining clear yellow urine. No blood clots visualized.     Physical Exam    IMPRESSION:    Encounter Diagnoses   Name Primary?    Colovesical fistula Yes    Overlapping malignant neoplasm of colon     Dysuria          PLAN:  -Discussed cystogram results,   -Voiding trial performed by Nurse De Santiago. 100 ml of sterile water was instilled into bladder.  Mcnally catheter was removed. Patient urinated 80 ml without difficulty.  Voiding trial passed.  Patient was instructed to drink plenty of fluids today.  Informed patient to return to clinic or emergency department (if after clinic hours) to have mcnally catheter put back in if unable to urinate within 5 hours of mcnally catheter removal or starts to experience bladder pressure/pain, decrease flow, straining/difficulty urinating.    Patient voiced understanding  -Discussed side  effects, indications, and MOA for pyridium prn dysuria. Prescription sent to the pharmacy. Pt verbalized understanding.  -RTC in 2 weeks for stent removal.   -I encouraged him or any of his family members to call or email me with questions and/or concerns.      Dilcia Lindo, MICHA-C

## 2018-04-23 NOTE — PATIENT INSTRUCTIONS
Urinary Retention (Male)  Urinary retention is the medical term for difficulty or inability to pass urine, even though your bladder is full.  Causes  The most common cause of urinary retention in men is the bladder outlet being blocked. This can be due to an enlarged prostate gland or a bladder infection. Certain medicines can also cause this problem. This condition is more likely to occur as men get older.    This condition is treated by insertion of a catheter into the bladder to drain the urine. This provides immediate relief. The catheter may need to remain in place for a few days to prevent a recurrence. The catheter has a balloon on the tip which was inflated after insertion. This prevents the catheter from falling out.  Symptoms  Common symptoms of urinary retention include:  · Pain (not experienced by everyone)  · Frequent urination  · Feeling that the bladder is still full after urinating  · Incontinence (not being able to control the release of urine)  · Swollen abdomen  Treatment  This condition is treated by inserting a tube (catheter) into the bladder to drain the urine. This provides immediate relief. The catheter may need to stay in place for a few days. The catheter has a balloon on the tip, which is inflated after insertion. This prevents the catheter from falling out.  Home care  · If you were given antibiotics, take them until they are used up, or your healthcare provider tells you to stop. It is important to finish the antibiotics even though you feel better. This is to make sure your infection has cleared.  · If a catheter was left in place, it is important to keep bacteria from getting into the collection bag. Do not disconnect the catheter from the collection bag.  · Use a leg band to secure the drainage tube, so it does not pull on the catheter. Drain the collection bag when it becomes full using the drain spout at the bottom of the bag.  · Do not pull on or try to remove your catheter.  This will injure your urethra. The catheter must be removed by a healthcare provider.  Follow-up care  Follow up with your healthcare provider, or as advised.  If a catheter was left in place, it can usually be removed within 3 to 7 days. Some conditions require the catheter to stay in longer. Your healthcare provider will tell you when to return to have the catheter removed.  When to seek medical advice  Call your healthcare provider right away if any of these occur:  · Fever of 101.4ºF (38ºC) or higher, or as directed by your healthcare provider  · Bladder or lower-abdominal pain or fullness  · Abdominal swelling, nausea, vomiting, or back pain  · Blood or urine leakage around the catheter  · Bloody urine coming from the catheter (if a new symptom)  · Weakness, dizziness, or fainting  · Confusion or change in usual level of alertness  Date Last Reviewed: 7/26/2015  © 5261-4494 [employer]. [address] [place] [address], [place] [address]. All rights reserved. This information is not intended as a substitute for professional medical care. Always follow your healthcare professional's instructions.

## 2018-04-25 DIAGNOSIS — C18.9 MALIGNANT NEOPLASM OF COLON, UNSPECIFIED PART OF COLON: Primary | ICD-10-CM

## 2018-04-25 DIAGNOSIS — C18.8 OVERLAPPING MALIGNANT NEOPLASM OF COLON: Primary | ICD-10-CM

## 2018-04-27 ENCOUNTER — HOSPITAL ENCOUNTER (OUTPATIENT)
Dept: RADIOLOGY | Facility: HOSPITAL | Age: 53
Discharge: HOME | End: 2018-04-27
Attending: INTERNAL MEDICINE
Payer: MEDICAID

## 2018-04-27 DIAGNOSIS — C18.8 OVERLAPPING MALIGNANT NEOPLASM OF COLON: ICD-10-CM

## 2018-04-27 DIAGNOSIS — C18.9 MALIGNANT NEOPLASM OF COLON, UNSPECIFIED PART OF COLON: ICD-10-CM

## 2018-04-27 PROCEDURE — 71260 CT THORAX DX C+: CPT | Mod: 26,,, | Performed by: RADIOLOGY

## 2018-04-27 PROCEDURE — 74177 CT ABD & PELVIS W/CONTRAST: CPT | Mod: 26,,, | Performed by: RADIOLOGY

## 2018-04-27 PROCEDURE — 71260 CT THORAX DX C+: CPT | Mod: TC

## 2018-04-27 PROCEDURE — 25500020 PHARM REV CODE 255: Performed by: INTERNAL MEDICINE

## 2018-04-27 PROCEDURE — 74177 CT ABD & PELVIS W/CONTRAST: CPT | Mod: TC

## 2018-04-27 RX ADMIN — IOHEXOL 75 ML: 350 INJECTION, SOLUTION INTRAVENOUS at 12:04

## 2018-04-27 RX ADMIN — IOHEXOL 15 ML: 350 INJECTION, SOLUTION INTRAVENOUS at 12:04

## 2018-04-27 RX ADMIN — IOHEXOL 15 ML: 350 INJECTION, SOLUTION INTRAVENOUS at 11:04

## 2018-05-03 ENCOUNTER — OFFICE VISIT (OUTPATIENT)
Dept: WOUND CARE | Facility: CLINIC | Age: 53
End: 2018-05-03
Payer: MEDICAID

## 2018-05-03 DIAGNOSIS — L24.9 IRRITANT CONTACT DERMATITIS DUE TO ILEOSTOMY: Primary | ICD-10-CM

## 2018-05-03 DIAGNOSIS — K94.19 IRRITANT CONTACT DERMATITIS DUE TO ILEOSTOMY: Primary | ICD-10-CM

## 2018-05-03 PROCEDURE — 99211 OFF/OP EST MAY X REQ PHY/QHP: CPT | Mod: PBBFAC | Performed by: CLINICAL NURSE SPECIALIST

## 2018-05-03 PROCEDURE — 99024 POSTOP FOLLOW-UP VISIT: CPT | Mod: ,,, | Performed by: CLINICAL NURSE SPECIALIST

## 2018-05-03 PROCEDURE — 99999 PR PBB SHADOW E&M-EST. PATIENT-LVL I: CPT | Mod: PBBFAC,,, | Performed by: CLINICAL NURSE SPECIALIST

## 2018-05-03 NOTE — PROGRESS NOTES
Having skin issues with pain, burning and leakage, came for more help  I have switched him to [employer] and this should work great going forward   placed [id number] today and samples requested.

## 2018-05-07 ENCOUNTER — HOSPITAL ENCOUNTER (OUTPATIENT)
Dept: INTERVENTIONAL RADIOLOGY/VASCULAR | Facility: HOSPITAL | Age: 53
Discharge: HOME | End: 2018-05-07
Attending: INTERNAL MEDICINE
Payer: MEDICAID

## 2018-05-07 ENCOUNTER — OFFICE VISIT (OUTPATIENT)
Dept: HEMATOLOGY/ONCOLOGY | Facility: CLINIC | Age: 53
End: 2018-05-07
Payer: MEDICAID

## 2018-05-07 ENCOUNTER — INFUSION (OUTPATIENT)
Dept: INFUSION THERAPY | Facility: HOSPITAL | Age: 53
End: 2018-05-07
Attending: INTERNAL MEDICINE
Payer: MEDICAID

## 2018-05-07 VITALS
SYSTOLIC BLOOD PRESSURE: 131 MMHG | HEIGHT: 71 IN | RESPIRATION RATE: 18 BRPM | HEART RATE: 94 BPM | BODY MASS INDEX: 22.77 KG/M2 | WEIGHT: 162.69 LBS | TEMPERATURE: 99 F | DIASTOLIC BLOOD PRESSURE: 90 MMHG

## 2018-05-07 VITALS
SYSTOLIC BLOOD PRESSURE: 142 MMHG | TEMPERATURE: 98 F | HEIGHT: 71 IN | BODY MASS INDEX: 22.84 KG/M2 | DIASTOLIC BLOOD PRESSURE: 96 MMHG | OXYGEN SATURATION: 98 % | WEIGHT: 163.13 LBS | RESPIRATION RATE: 18 BRPM | HEART RATE: 79 BPM

## 2018-05-07 VITALS
SYSTOLIC BLOOD PRESSURE: 163 MMHG | OXYGEN SATURATION: 99 % | DIASTOLIC BLOOD PRESSURE: 101 MMHG | RESPIRATION RATE: 18 BRPM | HEART RATE: 86 BPM

## 2018-05-07 DIAGNOSIS — N32.1 COLOVESICAL FISTULA: ICD-10-CM

## 2018-05-07 DIAGNOSIS — C18.8 OVERLAPPING MALIGNANT NEOPLASM OF COLON: Primary | ICD-10-CM

## 2018-05-07 DIAGNOSIS — R63.4 WEIGHT LOSS, NON-INTENTIONAL: ICD-10-CM

## 2018-05-07 DIAGNOSIS — C18.8 OVERLAPPING MALIGNANT NEOPLASM OF COLON: ICD-10-CM

## 2018-05-07 PROCEDURE — 76937 US GUIDE VASCULAR ACCESS: CPT | Mod: 26,,, | Performed by: RADIOLOGY

## 2018-05-07 PROCEDURE — 76937 US GUIDE VASCULAR ACCESS: CPT | Mod: TC

## 2018-05-07 PROCEDURE — 25000003 PHARM REV CODE 250: Performed by: INTERNAL MEDICINE

## 2018-05-07 PROCEDURE — 96521 REFILL/MAINT PORTABLE PUMP: CPT

## 2018-05-07 PROCEDURE — 99999 PR PBB SHADOW E&M-EST. PATIENT-LVL IV: CPT | Mod: PBBFAC,,, | Performed by: INTERNAL MEDICINE

## 2018-05-07 PROCEDURE — 99214 OFFICE O/P EST MOD 30 MIN: CPT | Mod: S$PBB,,, | Performed by: INTERNAL MEDICINE

## 2018-05-07 PROCEDURE — 63600175 PHARM REV CODE 636 W HCPCS: Performed by: INTERNAL MEDICINE

## 2018-05-07 PROCEDURE — 36569 INSJ PICC 5 YR+ W/O IMAGING: CPT | Mod: LT,,, | Performed by: RADIOLOGY

## 2018-05-07 PROCEDURE — 77001 FLUOROGUIDE FOR VEIN DEVICE: CPT | Mod: 26,,, | Performed by: RADIOLOGY

## 2018-05-07 PROCEDURE — C1751 CATH, INF, PER/CENT/MIDLINE: HCPCS

## 2018-05-07 PROCEDURE — 99214 OFFICE O/P EST MOD 30 MIN: CPT | Mod: PBBFAC,25 | Performed by: INTERNAL MEDICINE

## 2018-05-07 RX ORDER — NAPROXEN 500 MG/1
TABLET ORAL
COMMUNITY
End: 2018-05-07

## 2018-05-07 RX ORDER — POLYETHYLENE GLYCOL 3350 17 G/17G
17 POWDER, FOR SOLUTION ORAL
COMMUNITY
End: 2018-05-07

## 2018-05-07 RX ORDER — PROMETHAZINE HYDROCHLORIDE 25 MG/1
25 TABLET ORAL EVERY 6 HOURS PRN
Qty: 30 TABLET | Refills: 2 | Status: SHIPPED | OUTPATIENT
Start: 2018-05-07 | End: 2018-08-27 | Stop reason: SDUPTHER

## 2018-05-07 RX ORDER — ONDANSETRON 8 MG/1
8 TABLET, ORALLY DISINTEGRATING ORAL EVERY 12 HOURS PRN
Qty: 30 TABLET | Refills: 1 | Status: SHIPPED | OUTPATIENT
Start: 2018-05-07 | End: 2018-05-28

## 2018-05-07 RX ORDER — ONDANSETRON 4 MG/1
TABLET, ORALLY DISINTEGRATING ORAL
COMMUNITY
End: 2018-05-07

## 2018-05-07 RX ADMIN — FLUOROURACIL 2150 MG: 50 INJECTION, SOLUTION INTRAVENOUS at 05:05

## 2018-05-07 NOTE — NURSING
1515  Pump infusing at 2.1mL/hr (not 2 mL/hr) to allow pt to receive full dose with pump d/c on Sat 5/12/18 at 1300, discussed same with pt, pt verbalized understanding

## 2018-05-07 NOTE — PLAN OF CARE
Problem: Patient Care Overview  Goal: Plan of Care Review  Ambulatory pump connected to pt, infusing w/o difficulty; discussed pump operation, home care, troubleshooting, provided/reviewed chemo spill kit, also printed pump operating instruction w/ pt; discussed infusion rate adjustment r/t Saturday pump d/c; discussed PICC dressing changes and next infusion appt, RN will contact MD office to discuss same; pt instructed to contact MD for any needs or concerns, verbalized understanding of all discussed and when to report next

## 2018-05-07 NOTE — PROGRESS NOTES
Cc: Colon cancer, here for follow up     HPI: is a 53yoWM who presented to the ED on March 10th, 2018 with acute onset of stool and gas in his urine over the previous several days. Previously, he had progressively worsening constipation, for which he was on stool softeners. He also had given history of  bloody stools and  lower abdominal pain. He had  15 lbs over the past few months. He denies fatigue, fevers, CP, or SOB.   He was diagnosed with HCV about 15 years ago but never sought treatment for this. He was previously hospitalized and seen by inpatient psychiatry for substance abuse, past history of suicide attempt by cutting. He also has a history of an MI with placement of heart stents, on ASA.He underwent  partial cystectomy with complex cystorrhaphy and partial prostatectomy, left neoureterocystotomy with ureteral re-implantation, cystoscopy with bilateral ureteral catheter placement, bilateral ureteral stent placement, left pelvic lymph node dissection,sigmoid colectomy and ileostomy on 3/13/18.       Interval history: He is here for a follow up visit. He is doing well. He still has no good appetite.      Review of Systems   Constitutional: Positive for malaise/fatigue and weight loss. Negative for chills and fever.   HENT: Negative.    Eyes: Negative.    Respiratory: Negative.    Cardiovascular: Negative.    Gastrointestinal: Positive for nausea. Negative for blood in stool, constipation, heartburn, melena and vomiting.   Genitourinary: Negative.    Musculoskeletal: Negative.    Skin: Negative.    Neurological: Positive for weakness. Negative for dizziness, sensory change, speech change and focal weakness.   Psychiatric/Behavioral: Negative.        Current Outpatient Prescriptions   Medication Sig    ondansetron (ZOFRAN-ODT) 8 MG TbDL Take 1 tablet (8 mg total) by mouth every 12 (twelve) hours as needed.    promethazine (PHENERGAN) 25 MG tablet Take 1 tablet (25 mg total) by mouth every 6 (six)  hours as needed for Nausea.     No current facility-administered medications for this visit.            Vitals:    05/07/18 1333   BP: (!) 142/96   Pulse: 79   Resp: 18   Temp: 98.4 °F (36.9 °C)     Physical Exam   Constitutional: He is oriented to person, place, and time. He appears well-developed. No distress.   HENT:   Head: Normocephalic and atraumatic.   Mouth/Throat: No oropharyngeal exudate.   Eyes: Pupils are equal, round, and reactive to light. No scleral icterus.   Cardiovascular: Normal rate, regular rhythm and normal heart sounds.    No murmur heard.  Pulmonary/Chest: Effort normal and breath sounds normal. No respiratory distress.   Abdominal: Soft.   He has colostomy bag   Musculoskeletal: He exhibits no edema.   Neurological: He is alert and oriented to person, place, and time. No cranial nerve deficit.   Skin: Skin is warm.   Psychiatric: He has a normal mood and affect.       Component      Latest Ref Rng & Units 5/7/2018   WBC      3.90 - 12.70 K/uL 8.91   RBC      4.60 - 6.20 M/uL 4.49 (L)   Hemoglobin      14.0 - 18.0 g/dL 12.6 (L)   Hematocrit      40.0 - 54.0 % 38.1 (L)   MCV      82 - 98 fL 85   MCH      27.0 - 31.0 pg 28.1   MCHC      32.0 - 36.0 g/dL 33.1   RDW      11.5 - 14.5 % 13.8   Platelets      150 - 350 K/uL 439 (H)   MPV      9.2 - 12.9 fL 9.0 (L)   Immature Granulocytes      0.0 - 0.5 % 0.2   Gran # (ANC)      1.8 - 7.7 K/uL 4.1   Immature Grans (Abs)      0.00 - 0.04 K/uL 0.02   Lymph #      1.0 - 4.8 K/uL 3.9   Mono #      0.3 - 1.0 K/uL 0.6   Eos #      0.0 - 0.5 K/uL 0.2   Baso #      0.00 - 0.20 K/uL 0.03   nRBC      0 /100 WBC 0   Gran%      38.0 - 73.0 % 46.3   Lymph%      18.0 - 48.0 % 44.1   Mono%      4.0 - 15.0 % 6.5   Eosinophil%      0.0 - 8.0 % 2.6   Basophil%      0.0 - 1.9 % 0.3   Differential Method       Automated   Sodium      136 - 145 mmol/L 139   Potassium      3.5 - 5.1 mmol/L 3.6   Chloride      95 - 110 mmol/L 107   CO2      23 - 29 mmol/L 21 (L)    Glucose      70 - 110 mg/dL 94   BUN, Bld      6 - 20 mg/dL 10   Creatinine      0.5 - 1.4 mg/dL 0.8   Calcium      8.7 - 10.5 mg/dL 9.6   Total Protein      6.0 - 8.4 g/dL 7.7   Albumin      3.5 - 5.2 g/dL 3.8   Total Bilirubin      0.1 - 1.0 mg/dL 0.6   Alkaline Phosphatase      55 - 135 U/L 75   AST      10 - 40 U/L 27   ALT      10 - 44 U/L 36   Anion Gap      8 - 16 mmol/L 11   eGFR if African American      >60 mL/min/1.73 m:2 >60.0   eGFR if non African American      >60 mL/min/1.73 m:2 >60.0     Component      Latest Ref Rng & Units 5/7/2018 4/13/2018   WBC      3.90 - 12.70 K/uL 8.91    RBC      4.60 - 6.20 M/uL 4.49 (L)    Hemoglobin      14.0 - 18.0 g/dL 12.6 (L)    Hematocrit      40.0 - 54.0 % 38.1 (L)    MCV      82 - 98 fL 85    MCH      27.0 - 31.0 pg 28.1    MCHC      32.0 - 36.0 g/dL 33.1    RDW      11.5 - 14.5 % 13.8    Platelets      150 - 350 K/uL 439 (H)    MPV      9.2 - 12.9 fL 9.0 (L)    Immature Granulocytes      0.0 - 0.5 % 0.2    Gran # (ANC)      1.8 - 7.7 K/uL 4.1    Immature Grans (Abs)      0.00 - 0.04 K/uL 0.02    Lymph #      1.0 - 4.8 K/uL 3.9    Mono #      0.3 - 1.0 K/uL 0.6    Eos #      0.0 - 0.5 K/uL 0.2    Baso #      0.00 - 0.20 K/uL 0.03    nRBC      0 /100 WBC 0    Gran%      38.0 - 73.0 % 46.3    Lymph%      18.0 - 48.0 % 44.1    Mono%      4.0 - 15.0 % 6.5    Eosinophil%      0.0 - 8.0 % 2.6    Basophil%      0.0 - 1.9 % 0.3    Differential Method       Automated    Sodium      136 - 145 mmol/L 139    Potassium      3.5 - 5.1 mmol/L 3.6    Chloride      95 - 110 mmol/L 107    CO2      23 - 29 mmol/L 21 (L)    Glucose      70 - 110 mg/dL 94    BUN, Bld      6 - 20 mg/dL 10    Creatinine      0.5 - 1.4 mg/dL 0.8    Calcium      8.7 - 10.5 mg/dL 9.6    Total Protein      6.0 - 8.4 g/dL 7.7    Albumin      3.5 - 5.2 g/dL 3.8    Total Bilirubin      0.1 - 1.0 mg/dL 0.6    Alkaline Phosphatase      55 - 135 U/L 75    AST      10 - 40 U/L 27    ALT      10 - 44 U/L 36     Anion Gap      8 - 16 mmol/L 11    eGFR if African American      >60 mL/min/1.73 m:2 >60.0    eGFR if non African American      >60 mL/min/1.73 m:2 >60.0    HCV Log      <1.08 Log (10) IU/mL  4.89 (H)   HCV, Qualitative      Not detected IU/mL  DETECTED (A)   HCV RNA Quant PCR      <12 IU/mL  77,442 (H)   CEA      0.0 - 5.0 ng/mL  2.0   Hepatitis C Viral RNA Genotype, LIPA      Not detected  GENOTYPE 1a   Hep B Core Total Ab        Positive (A)   Hepatitis B Surface Ag        Negative   HIV 1/2 Ag/Ab      Negative  Negative   AFP      0.0 - 8.4 ng/mL  1.8       3/9/18 CT abdomen/pelvis with cont        CT chest abdomen and pelvis with contrast    Clinical history: Weight loss    Comparison: None    Technique:  Axial images of the chest, abdomen, and pelvis were obtained at 5 mm intervals following administration of 75 cc Omni 350 IV contrast as well as oral and rectal contrast.  Coronal and sagittal and delayed images were reviewed.    Findings:  The structures at the base of the neck are grossly unremarkable.  No significant mediastinal lymphadenopathy.  The heart is not enlarged.  There is atherosclerotic calcification in the distribution of the coronary arteries.  No significant pericardial effusion.    The airways are patent.    There is a vague focus of groundglass attenuation within the right upper lobe, measuring up to 1.6 cm.  No significant volume of pleural fluid.  No pneumothorax.  No large focal consolidation.    The thoracic aorta tapers normally with atherosclerotic calcification.    The liver is hypoattenuating and enlarged, may reflect steatosis, correlation with LFTs recommended.  There is a subcentimeter hypoattenuating lesion within the left hepatic lobe, too small for characterization.  There are 2 faint foci of irregular peripheral enhancement involving the medial aspect and anterior aspect of the left hepatic lobe.  These foci normalize with hepatic parenchymal enhancement on delayed image,  and may reflect perfusional anomaly versus flash filling hemangiomas.  Punctate hypoattenuating focus within the right hepatic lobe is too small for characterization.  The spleen, pancreas, gallbladder and adrenal glands are grossly unremarkable.  There is no biliary dilation.  There is thickening at the gastric fundus, nonspecific, correlation with direct visualization as warranted.  No high grade obstruction.  The portal vein, splenic vein, SMV, celiac axis and SMA all are grossly patent.  Scattered shotty periaortic and paracaval lymph nodes are noted.    The kidneys enhance symmetrically and excrete contrast appropriately without hydronephrosis or nephrolithiasis.  The bilateral ureters are grossly unremarkable along their course to the urinary bladder without calculi seen.  There is air within the urinary bladder, possibly from catheterization.  Please see below for complete description.  The prostate is prominent.    Rectal contrast has been administered.  The rectum and distal sigmoid colon is unremarkable without wall thickening.  There is circumferential wall thickening involving the proximal sigmoid colon, without significant contrast passage through the region of thickening.  Wall thickening in the region measures up to 1.1 cm.  There is focal masslike thickening of the sigmoid colon, that abuts the urinary bladder.  There is urinary bladder wall thickening and distortion of the urinary bladder by this mass.  There is a focus of air and stool insinuating between the region of focal colonic thickening and the urinary bladder, finding is concerning for bladder wall invasion by colonic malignancy, with fistulous formation to the urinary bladder.  This may account for air within the urinary bladder.  Abscess in the region felt less likely.  There is surrounding inflammation, and several adjacent nonenlarged although numerous lymph nodes.  Diverticula are noted throughout the remainder of the colon, with  moderate to large amount stool throughout the colon suggesting superimposed constipation.  Oral contrast is noted to the level of the transverse colon.  The terminal ileum is grossly unremarkable.  No pericecal inflammation.  The small bowel is grossly unremarkable.  Scattered shotty periaortic and paracaval lymph nodes are noted.  There is a small amount reactive fluid in the left hemipelvis.    There is a mixed s sclerotic focus within the posterior aspect of the right iliac bone, the appearance is not specific for metastatic disease, however metastatic focus is not excluded in the setting of probable colonic malignancy.  Several additional sclerotic foci are noted throughout the spine, likely degenerative in nature.  Schmorl's node involves the superior endplate of L1.  There is Oddi wall cachexia.  There are bilateral fat containing inguinal hernias.  No significant axillary lymphadenopathy.   Impression        1.  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  Air within the urinary bladder is suspected to be on the bases of the same although correlation for recent catheterization and clinical symptomatology.  There is indistinctness about the colon region, with several although nonenlarged lymph nodes, and disorganized fluid.  Please see above for full description.    2.  Vague groundglass focus of attenuation within the right upper lobe of the right lung, nonspecific, could reflect nonspecific inflammation or infection, metastatic disease however cannot be excluded in this setting.  Followup is advised.    3.  Constipation.    4.  Several additional findings described above.      3/12/18 pathology        SPECIMEN  1) Rectal polyps x2, 8 mm polyp and 5 mm polyp, hot snare polypectomies.  FINAL PATHOLOGIC DIAGNOSIS  Rectum, 8 mm and 5 mm polyps ×2, biopsy:  - Hyperplastic polyp, multiple fragments.     3/12/18 FINAL  PATHOLOGIC DIAGNOSIS     Supplemental Diagnosis     MICROSATELLITE INSTABILITY, TUMOR:  RESULT SUMMARY:  -AMBERLY/MSI-L  RESULT:  MSI: AMBERLY/MSI-L (instability observed in 0 of 5 informative markers).  INTERPRETATION:  An AMBERLY/MSI-L phenotype suggests the presence of normal DNA mismatch repair function within the tumor .     1. Sigmoid colon) posterior bladder wall, mass, en bloc resection of sigmoid colon with attached posterior bladder wall:     Adenocarcinoma, measuring 4.7 cm in greatest dimension.  Tumor extends through the muscularis propria into the pericolorectal tissue with surrounding abcess formation with adherent bladder. There is no viable tumor seen invading bladder in multiple examined sections.  Proximal and distal colonic surgical margins are negative for malignancy.  Mesenteric surgical margin is negative for malignancy.  17 benign lymph nodes, negative for metastatic carcinoma (0/17).  See synoptic report in comment section for further details.     2. Right lateral bladder wall margin, biopsy:     Negative for malignancy.     3. Inferior bladder wall margin, biopsy:     Negative for malignancy.     4. Left lateral bladder wall margin, biopsy:     Negative for malignancy.     5. Superior bladder wall, biopsy:     Negative for malignancy.     6. Second right lateral bladder wall margin, biopsy:     Negative for malignancy.     7. Second left lateral bladder wall margin, biopsy:  Negative for malignancy.     8. Left internal iliac lymph nodes, regional resection:     3 benign lymph nodes, negative for metastatic carcinoma (see 0/3).     9. Proximal sigmoid colon, segmental resection:     Colon with congested vasculature and no evidence of malignancy.  Resection margins are viable and negative for dysplasia or malignancy.  8 benign lymph nodes, negative for metastatic carcinoma (0/8).     10. Proximal donut, biopsy:     Colonic mucosa with no significant histopathologic abnormality.  No evidence of  "malignancy.     11. Distal donut, biopsy:     Colonic mucosa with no significant histopathologic abnormality.  No evidence of malignancy     Comment: Synoptic report  Procedure: Sigmoid colon with attached bladder wall; en bloc resection  Tumor site: Sigmoid colon  Tumor size:  Greatest dimension: 4.7 cm  Macroscopic tumor perforation: Grossly lesion involves the full extent of the bowel wall and erodes into the bladder  Histologic type: Adenocarcinoma  Histologic grade: G2 moderately differentiated.:  Microscopic tumor extension: Tumor invades through the muscularis propria into pericolic rectal tissue .  Margins:  Proximal margin: Uninvolved by invasive carcinoma  Distal margin: Uninvolved by invasive carcinoma  Mesenteric margin: Uninvolved  Circumferential (radial) margin: Tumor extends into pericolic rectal adipose tissue and has gross tumor perforation with surrounding abscess formation     Distance of invasive carcinoma from closest margin: 9 cm from surgical margin "A"  Treatment effect: No known presurgical treatment  Lymphovascular invasion: Not identified     Perineural invasion: Not identified  Tumor deposits: Not identified  Regional lymph nodes:  Number of lymph nodes involved: 0  Number of lymph nodes examined: 28  Pathologic staging:  Primary tumor:  pT3: Tumor invades through the muscularis propria into pericolic tissues .  Regional lymph nodes:  pN0: No regional lymph node metastasis  Number examined: 28  Number involved: 0  Distant metastasis:  pMX: Cannot be assessed.               4/27/18 CT    COMPARISON:  CT chest abdomen pelvis 03/09/2018, abdominal radiograph 03/14/2018    FINDINGS:  Thoracic soft tissues: No significant abnormality.    Aorta: Normal in caliber, course, and contour.  There are three branching vessels at the arch. Significant calcific atherosclerosis involving the coronary arteries in a multivessel distribution, the origins of the great vessels,  and the aortic " arch.    Heart: Normal in size with trace pericardial effusion likely of minimal clinical significance.    Myra/Mediastinum: No significant lymphadenopathy    Lungs: Well expanded bilaterally without consolidation, mass, or pleural effusion.  Stable focus of nonspecific ground-glass attenuation within the right upper lobe measuring approximately 1.6 cm (axial series 2, image 23); recommend continued follow-up with expected oncology surveillance.    Liver: Hepatomegaly, similar to prior.  Stable subcentimeter hypodensities within the left and right hepatic lobes, too small to fully characterize but possibly representing cysts.    Gallbladder: Mild wall thickening likely secondary to decompression.  No internal calcified gallstones or pericholecystic fluid.    Bile Ducts: No evidence of dilated ducts.    Pancreas: No mass or peripancreatic fat stranding.    Spleen: Unremarkable.    Adrenals: Unremarkable.    Kidneys: Normal in size and location without focal abnormality.  Normal physiologic ability to concentrate contrast appropriately.    Bladder/ureters/prostate: Postsurgical changes status post partial cystectomy with bladder repair, partial prostatectomy, and left ureteral reimplantation.  Bladder is not distended on today's examination and is thus poorly evaluated.  Bilateral double-J stents extending from the collecting systems to the bladder.  Of note, right double-J ureteral stent begins at the level of the ureteropelvic junction while the left ureteral stent begins within the renal pelvis.  No hydronephrosis or nephrolithiasis. No ureteral dilatation.    GI Tract/Mesentery: Postsurgical changes status post sigmoid colectomy and diverting loop ileostomy.  No evidence of recurrent disease within the surgical bed.    Peritoneal Space: No ascites. No free air.    Retroperitoneum:  No significant adenopathy.    Abdominal wall:  Diverting loop ileostomy exiting the right upper abdominal quadrant.  Healing midline  incision.  No significant abnormalities.    Vasculature: Significant calcific atherosclerosis involving the infrarenal abdominal aorta with extension into the proximal iliac arteries.  No aneurysm    Bones: Moderate degenerative changes of the visualized osseous structures without acute fracture or destructive osseous process.  Grade 1 retrolisthesis of L5 on S1.  Stable wedge compression fracture of L1.   Impression       In this patient with history of colon cancer, there have been extensive interval postsurgical changes involving the bladder and bowel as detailed above.  No evidence of recurrent or metastatic disease.  Of note, bladder is decompressed on today's examination and poorly evaluated.    Stable focus of nonspecific ground-glass attenuation within the right upper lobe; recommend continue follow-up with expected surveillance.    Stable hepatic hypodensities too small to fully characterize but likely representing hepatic cyst.    Additional, stable findings as above.    There are no measurable lesions per RECIST criteria.       ASSESSMENT:     1. Sigmoid colon adenocarcinoma, [id number]  2. Lung lesion on CT  3. Weight loss, unintentional  4. Chronic hepatitis C, without coma  5. Tachycardia  6. Hypertension, essential  7. Coronary artery disease  8. H/o depression     Plan:     1,2: He has AJCC stage II disease. There were no clear metastatic lesions on CT done in March 2018. However, there was a groundglass focus of attenuation within the right upper lobe of the right lung. It is unclear if this is atelactasis/pnemonia/metastatic lesion. He cannot have PET CT due to insurance. Repeat CT done on 4/27/18 again showed a focus of nonspecific ground-glass attenuation within the right upper lobe that was previously noted. No other lesions to suspect metastases. He is MSI stable/low. No clear high risk features other than macroscopic perforation. He is MSI stable/low. Margins were clear. He has no fecal matter  in urine/air in his urine at thsi time. He will follow with urology in the next 1 week. No clear, established role for adjuvant FOLFOX. . I discussed role for adjuvant 5FU, given weekly for 6 weeks each cycle and off for 2 weeks, for a total of 6 months.  He understands that this will be with a curative intent and compliance is very important. He will get a PICC line for the first 6 weeks as he has no port yet.      4. He has untreated chronic hepatitis C genotype 1a.. He has never been treated. He has been referred to liver clinic. HCV reactivation/ fulminant hepatitis after chemotherapy has been rarely reported. He also has positive hepatitis B antibody.      5,6: He will need treatment with betablocker if persistent.      7. No symptoms at this time. He is on ASA.

## 2018-05-07 NOTE — PROCEDURES
Radiology Post-Procedure Note    Pre Op Diagnosis: bladder and colon cancer    Post Op Diagnosis: Same    Procedure: PICC placement    Procedure performed by: Dr. Shashank Parrish and Dr. Venkata Ace    Written Informed Consent Obtained: Yes    Specimen Removed: No    Estimated Blood Loss: Minimal    Findings:   Using realtime U/S guidance a 5 Fr PICC catheter was placed into the left Brachial Vein with tip of the catheter in the SVC.    PICC is ready for use.     Venkata Ace MD  PGY-[id number]  Department of Radiology  [phone]

## 2018-05-07 NOTE — H&P
Radiology History & Physical      SUBJECTIVE:     Chief Complaint: bladder and colon cancer    History of Present Illness:  Chris Aguirre is a 53 y.o. male who presents for left arm picc placement.     Past Medical History:   Diagnosis Date    Coronary artery disease     Depression     Hepatitis C 3/9/2018    History of psychiatric care     History of psychiatric hospitalization     Hypertension     MI (myocardial infarction)     6 months ago    Neuropathy     Psychiatric problem     Psychosis     Self-harming behavior     Suicide attempt      Past Surgical History:   Procedure Laterality Date    ABDOMINAL SURGERY      History of perforated ulcer, unknown surgery    APPENDECTOMY         Home Meds:   Prior to Admission medications    Medication Sig Start Date End Date Taking? Authorizing Provider   ondansetron (ZOFRAN-ODT) 8 MG TbDL Take 1 tablet (8 mg total) by mouth every 12 (twelve) hours as needed. 5/7/18 5/7/19  Teresa Marin MD   promethazine (PHENERGAN) 25 MG tablet Take 1 tablet (25 mg total) by mouth every 6 (six) hours as needed for Nausea. 5/7/18   Teresa Marin MD   naproxen (NAPROSYN) 500 MG tablet naproxen 500 mg tablet   Take 1 tablet twice a day by oral route.  5/7/18  Historical Provider, MD   ondansetron (ZOFRAN ODT) 4 MG TbDL Zofran ODT 4 mg disintegrating tablet   Take 2 tablets every 12 hours by oral route.  5/7/18  Historical Provider, MD   oxybutynin (DITROPAN-XL) 10 MG 24 hr tablet Take 1 tablet (10 mg total) by mouth once daily. 3/18/18 5/7/18  Shawn William MD   polyethylene glycol (GLYCOLAX) 17 gram/dose powder 17 g.  5/7/18  Historical Provider, MD     Anticoagulants/Antiplatelets: no anticoagulation    Allergies: Review of patient's allergies indicates:  No Known Allergies  Sedation History:  no adverse reactions    Review of Systems:   As documented in primary provider H&P.      OBJECTIVE:     Vital Signs (Most Recent)       Physical Exam:  ASA: 3  Mallampati:  1      Laboratory  Lab Results   Component Value Date    INR 1.0 03/17/2018       Lab Results   Component Value Date    WBC 8.91 05/07/2018    HGB 12.6 (L) 05/07/2018    HCT 38.1 (L) 05/07/2018    MCV 85 05/07/2018     (H) 05/07/2018      Lab Results   Component Value Date    GLU 94 05/07/2018     05/07/2018    K 3.6 05/07/2018     05/07/2018    CO2 21 (L) 05/07/2018    BUN 10 05/07/2018    CREATININE 0.8 05/07/2018    CALCIUM 9.6 05/07/2018    MG 1.3 (L) 03/09/2018    ALT 36 05/07/2018    AST 27 05/07/2018    ALBUMIN 3.8 05/07/2018    BILITOT 0.6 05/07/2018       ASSESSMENT/PLAN:     Sedation Plan: local only  Patient will undergo left arm picc placement.

## 2018-05-07 NOTE — PROGRESS NOTES
Double Lumen PICC line placed in the LUE. PICC length of 50 cm to the SVC with the use of ultrasound and Xray guidance.  PICC line flushed and ready for use.

## 2018-05-07 NOTE — DISCHARGE INSTRUCTIONS
For scheduling: Call Perlita at [phone]    For questions or concerns call: [facility] 8 AM- 5PM [phone]. Radiology resident on call [phone].    For immediate concerns that are not emergent, you may call our radiology clinic at: [phone]

## 2018-05-07 NOTE — Clinical Note
--picc line today. Chemo after --picc line care. Pt needs flushes on sat and Sunday. Chemo runs Mon thru Friday --cbc, cmp weekly --f/u in week forlabs, chemo

## 2018-05-07 NOTE — PLAN OF CARE
Problem: Chemotherapy Effects (Adult)  Goal: Signs and Symptoms of Listed Potential Problems Will be Absent, Minimized or Managed (Chemotherapy Effects)  Signs and symptoms of listed potential problems will be absent, minimized or managed by discharge/transition of care (reference Chemotherapy Effects (Adult) CPG).  Outcome: Ongoing (interventions implemented as appropriate)  Pt here for 5FU pump, new PICC to AUGUSTIN placed earlier today; discussed care of PICC line, reviewed treatment regimen, medication, possible side effects and how to treat, home care/troubleshooting of ambulatory pump, when to contact MD, when to report to ER; provided printed ed handout on 5FU medication and reviewed same with pt, all questions answered and pt agrees to proceed

## 2018-05-08 NOTE — NURSING
Late entry:  On 5/7/18 pt did view educational video for CADD pump operation, discussed same and all questions answered

## 2018-05-09 ENCOUNTER — PATIENT MESSAGE (OUTPATIENT)
Dept: RESEARCH | Facility: HOSPITAL | Age: 53
End: 2018-05-09

## 2018-05-09 ENCOUNTER — DOCUMENTATION ONLY (OUTPATIENT)
Dept: HEMATOLOGY/ONCOLOGY | Facility: CLINIC | Age: 53
End: 2018-05-09

## 2018-05-09 NOTE — PROGRESS NOTES
Spoke to Dr Marin after receiving a request for PICC Line care supplies for Saturday and Sunday for the patient. He informed that he decided to arrange for a port to be placed next week. The patient would get his PICC line flushed in the chemo suite (except for Sunday) in the interim. No flushes needed at present.

## 2018-05-11 ENCOUNTER — TELEPHONE (OUTPATIENT)
Dept: HEMATOLOGY/ONCOLOGY | Facility: CLINIC | Age: 53
End: 2018-05-11

## 2018-05-11 NOTE — TELEPHONE ENCOUNTER
----- Message from Teresa Marin MD sent at 5/10/2018  1:01 PM CDT -----  Ok thanks. I need to see him every other week. He needs cbc, cmp weekly.   ----- Message -----  From: Vishal Bill  Sent: 5/10/2018   9:51 AM  To: MD Arnoldo Bhat will you need to see him weekly and will he need labs weekly?   Also chemo said that they can see him on Monday evening to go on the pump and take him off on Friday because they are here till 7pm .   ----- Message -----  From: Teresa Marin MD  Sent: 5/7/2018   4:54 PM  To: Vishal Bill    It is weekly. He gets it Monday, comes for pump exchange Friday.   This goes on EVERY week for 6 weeks.     thanks   ----- Message -----  From: Vishal Bill  Sent: 5/7/2018   2:44 PM  To: Teresa Marin MD, [facility] Staff    Dr. Marin Lizzette Carla chemo is weekly or every 4weeks?   The chemo plan says every 4wk.       MD Vishal Bhat         --picc line today. Chemo after   --picc line care. Pt needs flushes on sat and Sunday. Chemo runs Mon thru Friday   --cbc, cmp weekly   --f/u in week forlabs, chemo

## 2018-05-12 ENCOUNTER — INFUSION (OUTPATIENT)
Dept: INFUSION THERAPY | Facility: HOSPITAL | Age: 53
End: 2018-05-12
Attending: INTERNAL MEDICINE
Payer: MEDICAID

## 2018-05-12 VITALS
SYSTOLIC BLOOD PRESSURE: 136 MMHG | DIASTOLIC BLOOD PRESSURE: 84 MMHG | TEMPERATURE: 98 F | RESPIRATION RATE: 20 BRPM | HEART RATE: 90 BPM

## 2018-05-12 DIAGNOSIS — C18.8 OVERLAPPING MALIGNANT NEOPLASM OF COLON: Primary | ICD-10-CM

## 2018-05-12 PROCEDURE — 25000003 PHARM REV CODE 250: Performed by: INTERNAL MEDICINE

## 2018-05-12 PROCEDURE — 63600175 PHARM REV CODE 636 W HCPCS: Performed by: INTERNAL MEDICINE

## 2018-05-12 PROCEDURE — A4216 STERILE WATER/SALINE, 10 ML: HCPCS | Performed by: INTERNAL MEDICINE

## 2018-05-12 PROCEDURE — 96523 IRRIG DRUG DELIVERY DEVICE: CPT

## 2018-05-12 RX ORDER — HEPARIN 100 UNIT/ML
500 SYRINGE INTRAVENOUS
Status: DISCONTINUED | OUTPATIENT
Start: 2018-05-12 | End: 2018-05-12 | Stop reason: HOSPADM

## 2018-05-12 RX ORDER — SODIUM CHLORIDE 0.9 % (FLUSH) 0.9 %
10 SYRINGE (ML) INJECTION
Status: DISCONTINUED | OUTPATIENT
Start: 2018-05-12 | End: 2018-05-12 | Stop reason: HOSPADM

## 2018-05-12 RX ADMIN — SODIUM CHLORIDE, PRESERVATIVE FREE 10 ML: 5 INJECTION INTRAVENOUS at 10:05

## 2018-05-12 RX ADMIN — HEPARIN 500 UNITS: 100 SYRINGE at 10:05

## 2018-05-14 ENCOUNTER — INFUSION (OUTPATIENT)
Dept: INFUSION THERAPY | Facility: HOSPITAL | Age: 53
End: 2018-05-14
Attending: INTERNAL MEDICINE
Payer: MEDICAID

## 2018-05-14 ENCOUNTER — HOSPITAL ENCOUNTER (OUTPATIENT)
Facility: HOSPITAL | Age: 53
Discharge: HOME | End: 2018-05-14
Attending: INTERNAL MEDICINE | Admitting: INTERNAL MEDICINE
Payer: MEDICAID

## 2018-05-14 ENCOUNTER — TELEPHONE (OUTPATIENT)
Dept: HEMATOLOGY/ONCOLOGY | Facility: CLINIC | Age: 53
End: 2018-05-14

## 2018-05-14 VITALS
DIASTOLIC BLOOD PRESSURE: 65 MMHG | TEMPERATURE: 98 F | RESPIRATION RATE: 18 BRPM | HEART RATE: 80 BPM | SYSTOLIC BLOOD PRESSURE: 105 MMHG

## 2018-05-14 VITALS
SYSTOLIC BLOOD PRESSURE: 109 MMHG | DIASTOLIC BLOOD PRESSURE: 78 MMHG | OXYGEN SATURATION: 100 % | RESPIRATION RATE: 14 BRPM | BODY MASS INDEX: 22.82 KG/M2 | WEIGHT: 163 LBS | HEIGHT: 71 IN | TEMPERATURE: 98 F | HEART RATE: 70 BPM

## 2018-05-14 DIAGNOSIS — C18.8 OVERLAPPING MALIGNANT NEOPLASM OF COLON: Primary | ICD-10-CM

## 2018-05-14 DIAGNOSIS — C18.8 OVERLAPPING MALIGNANT NEOPLASM OF COLON: ICD-10-CM

## 2018-05-14 LAB
APTT BLDCRRT: 28.2 SEC
INR PPP: 1
PROTHROMBIN TIME: 10.6 SEC

## 2018-05-14 PROCEDURE — 96416 CHEMO PROLONG INFUSE W/PUMP: CPT

## 2018-05-14 PROCEDURE — 63600175 PHARM REV CODE 636 W HCPCS: Performed by: RADIOLOGY

## 2018-05-14 PROCEDURE — 85730 THROMBOPLASTIN TIME PARTIAL: CPT

## 2018-05-14 PROCEDURE — 25000003 PHARM REV CODE 250: Performed by: INTERNAL MEDICINE

## 2018-05-14 PROCEDURE — 63600175 PHARM REV CODE 636 W HCPCS: Performed by: INTERNAL MEDICINE

## 2018-05-14 PROCEDURE — 25000003 PHARM REV CODE 250: Performed by: RADIOLOGY

## 2018-05-14 PROCEDURE — 85610 PROTHROMBIN TIME: CPT

## 2018-05-14 PROCEDURE — 36415 COLL VENOUS BLD VENIPUNCTURE: CPT

## 2018-05-14 RX ORDER — HEPARIN 100 UNIT/ML
5 SYRINGE INTRAVENOUS ONCE
Status: COMPLETED | OUTPATIENT
Start: 2018-05-14 | End: 2018-05-14

## 2018-05-14 RX ORDER — SODIUM CHLORIDE 0.9 % (FLUSH) 0.9 %
10 SYRINGE (ML) INJECTION
Status: CANCELLED | OUTPATIENT
Start: 2018-05-18

## 2018-05-14 RX ORDER — LIDOCAINE HYDROCHLORIDE 10 MG/ML
INJECTION INFILTRATION; PERINEURAL CODE/TRAUMA/SEDATION MEDICATION
Status: COMPLETED | OUTPATIENT
Start: 2018-05-14 | End: 2018-05-14

## 2018-05-14 RX ORDER — MIDAZOLAM HYDROCHLORIDE 1 MG/ML
INJECTION INTRAMUSCULAR; INTRAVENOUS CODE/TRAUMA/SEDATION MEDICATION
Status: COMPLETED | OUTPATIENT
Start: 2018-05-14 | End: 2018-05-14

## 2018-05-14 RX ORDER — SODIUM CHLORIDE 9 MG/ML
INJECTION, SOLUTION INTRAVENOUS CONTINUOUS
Status: DISCONTINUED | OUTPATIENT
Start: 2018-05-14 | End: 2018-05-14 | Stop reason: HOSPADM

## 2018-05-14 RX ORDER — CEFAZOLIN SODIUM 1 G/50ML
SOLUTION INTRAVENOUS
Status: COMPLETED | OUTPATIENT
Start: 2018-05-14 | End: 2018-05-14

## 2018-05-14 RX ORDER — OXYCODONE AND ACETAMINOPHEN 10; 325 MG/1; MG/1
1 TABLET ORAL ONCE
Status: COMPLETED | OUTPATIENT
Start: 2018-05-14 | End: 2018-05-14

## 2018-05-14 RX ORDER — FENTANYL CITRATE 50 UG/ML
INJECTION, SOLUTION INTRAMUSCULAR; INTRAVENOUS CODE/TRAUMA/SEDATION MEDICATION
Status: COMPLETED | OUTPATIENT
Start: 2018-05-14 | End: 2018-05-14

## 2018-05-14 RX ORDER — HEPARIN 100 UNIT/ML
500 SYRINGE INTRAVENOUS
Status: CANCELLED | OUTPATIENT
Start: 2018-05-18

## 2018-05-14 RX ADMIN — LIDOCAINE HYDROCHLORIDE 5 ML: 10 INJECTION, SOLUTION INFILTRATION; PERINEURAL at 09:05

## 2018-05-14 RX ADMIN — MIDAZOLAM HYDROCHLORIDE 1 MG: 1 INJECTION, SOLUTION INTRAMUSCULAR; INTRAVENOUS at 09:05

## 2018-05-14 RX ADMIN — FENTANYL CITRATE 50 MCG: 50 INJECTION, SOLUTION INTRAMUSCULAR; INTRAVENOUS at 09:05

## 2018-05-14 RX ADMIN — HEPARIN 500 UNITS: 100 SYRINGE at 09:05

## 2018-05-14 RX ADMIN — FENTANYL CITRATE 25 MCG: 50 INJECTION, SOLUTION INTRAMUSCULAR; INTRAVENOUS at 09:05

## 2018-05-14 RX ADMIN — SODIUM CHLORIDE: 9 INJECTION, SOLUTION INTRAVENOUS at 07:05

## 2018-05-14 RX ADMIN — FLUOROURACIL 2150 MG: 50 INJECTION, SOLUTION INTRAVENOUS at 05:05

## 2018-05-14 RX ADMIN — CEFAZOLIN SODIUM 1 G: 1 SOLUTION INTRAVENOUS at 09:05

## 2018-05-14 RX ADMIN — OXYCODONE HYDROCHLORIDE AND ACETAMINOPHEN 1 TABLET: 10; 325 TABLET ORAL at 10:05

## 2018-05-14 NOTE — NURSING
Here for 5fu CADD pump application. No complaints voiced.  Advised to check pump 2x/day.  Settings verified.  Pt will come off on 5/19/18.  AVS given.  NAD noted upon discharge.

## 2018-05-14 NOTE — TELEPHONE ENCOUNTER
----- Message from Luz Elena Gamble sent at 5/14/2018  8:43 AM CDT -----  Contact: Eleanor from [facility] calling to find out if pt pic line is staying in or being removed        Eleanor call back number [phone]

## 2018-05-14 NOTE — H&P
Radiology History & Physical      SUBJECTIVE:     Chief Complaint: colon cancer    History of Present Illness:  Chris Aguirre Jr. is a 53 y.o. male who presents for port placement  Past Medical History:   Diagnosis Date    Coronary artery disease     Depression     Hepatitis C 3/9/2018    History of psychiatric care     History of psychiatric hospitalization     Hypertension     MI (myocardial infarction)     6 months ago    Neuropathy     Psychiatric problem     Psychosis     Self-harming behavior     Suicide attempt      Past Surgical History:   Procedure Laterality Date    ABDOMINAL SURGERY      History of perforated ulcer, unknown surgery    APPENDECTOMY         Home Meds:   Prior to Admission medications    Medication Sig Start Date End Date Taking? Authorizing Provider   ondansetron (ZOFRAN-ODT) 8 MG TbDL Take 1 tablet (8 mg total) by mouth every 12 (twelve) hours as needed. 5/7/18 5/7/19 Yes Teresa Marin MD   promethazine (PHENERGAN) 25 MG tablet Take 1 tablet (25 mg total) by mouth every 6 (six) hours as needed for Nausea. 5/7/18  Yes Teresa Marin MD     Anticoagulants/Antiplatelets: no anticoagulation    Allergies: Review of patient's allergies indicates:  No Known Allergies  Sedation History:  no adverse reactions          OBJECTIVE:     Vital Signs (Most Recent)  Temp: 98.1 °F (36.7 °C) (05/14/18 0752)  Pulse: 88 (05/14/18 0752)  Resp: 17 (05/14/18 0752)  BP: (!) 129/96 (05/14/18 0752)  SpO2: 100 % (05/14/18 0752)    Physical Exam:  ASA: 3  Mallampati: 2    General: no acute distress  Mental Status: alert and oriented to person, place and time  HEENT: normocephalic, atraumatic  Chest: unlabored breathing  Heart: regular heart rate  Abdomen: nondistended  Extremity: moves all extremities    Laboratory  Lab Results   Component Value Date    INR 1.0 05/14/2018       Lab Results   Component Value Date    WBC 8.91 05/07/2018    HGB 12.6 (L) 05/07/2018    HCT 38.1 (L) 05/07/2018    MCV 85  05/07/2018     (H) 05/07/2018      Lab Results   Component Value Date    GLU 94 05/07/2018     05/07/2018    K 3.6 05/07/2018     05/07/2018    CO2 21 (L) 05/07/2018    BUN 10 05/07/2018    CREATININE 0.8 05/07/2018    CALCIUM 9.6 05/07/2018    MG 1.3 (L) 03/09/2018    ALT 36 05/07/2018    AST 27 05/07/2018    ALBUMIN 3.8 05/07/2018    BILITOT 0.6 05/07/2018       ASSESSMENT/PLAN:     Sedation Plan: Moderate.  Patient will undergo Port placement. Informed consent obtained.    Branden Sheriff MD  Department of Radiology  Pager: [phone]

## 2018-05-14 NOTE — TELEPHONE ENCOUNTER
Spoke with Eleanor, explained that Dr. Marin would like to have the PICC line removed, Eleanor verbalized understanding.  Sandrine

## 2018-05-14 NOTE — DISCHARGE SUMMARY
Radiology Discharge Summary      Hospital Course: No complications    Admit Date: 5/14/2018  Discharge Date: 05/14/2018     Instructions Given to Patient: Yes  Diet: Resume prior diet  Activity: activity as tolerated and no driving for today    Description of Condition on Discharge: Stable  Vital Signs (Most Recent): Temp: 98.1 °F (36.7 °C) (05/14/18 0752)  Pulse: 70 (05/14/18 1115)  Resp: 14 (05/14/18 1115)  BP: 109/78 (05/14/18 1115)  SpO2: 100 % (05/14/18 1115)    Discharge Disposition: Home    Discharge Diagnosis:   Colon Cancer    Procedure: Port Placement     Follow-up:   Per referring physician    Branden Sheriff MD  Department of Radiology  Pager: [phone]

## 2018-05-14 NOTE — PROCEDURES
Radiology Post-Procedure Note    Pre Op Diagnosis: Colon Cancer    Post Op Diagnosis: Same    Procedure: PORT placement    Procedure performed by: Branden Sheriff MD    Written Informed Consent Obtained: Yes    Specimen Removed: No    Estimated Blood Loss: Minimal    Findings:   Using realtime U/S guidance an 8 Fr port catheter was placed into the right internal jugular vein with tip of the catheter in the SVC.    Port is ready for use.     Branden Sheriff MD  Department of Radiology  Pager: [phone]

## 2018-05-14 NOTE — NURSING
Discharge instructions given to pt, given opportunity for questions, no questions asked. PICC line removed per policy as ordered by MD office, pt tolerated well. Pt discharged to home with belongings accompanied by family.

## 2018-05-19 ENCOUNTER — INFUSION (OUTPATIENT)
Dept: INFUSION THERAPY | Facility: HOSPITAL | Age: 53
End: 2018-05-19
Attending: INTERNAL MEDICINE
Payer: MEDICAID

## 2018-05-19 VITALS
RESPIRATION RATE: 16 BRPM | DIASTOLIC BLOOD PRESSURE: 82 MMHG | TEMPERATURE: 98 F | HEART RATE: 73 BPM | SYSTOLIC BLOOD PRESSURE: 120 MMHG

## 2018-05-19 DIAGNOSIS — C18.8 OVERLAPPING MALIGNANT NEOPLASM OF COLON: Primary | ICD-10-CM

## 2018-05-19 PROCEDURE — 25000003 PHARM REV CODE 250: Performed by: INTERNAL MEDICINE

## 2018-05-19 PROCEDURE — A4216 STERILE WATER/SALINE, 10 ML: HCPCS | Performed by: INTERNAL MEDICINE

## 2018-05-19 PROCEDURE — 96523 IRRIG DRUG DELIVERY DEVICE: CPT

## 2018-05-19 PROCEDURE — 63600175 PHARM REV CODE 636 W HCPCS: Performed by: INTERNAL MEDICINE

## 2018-05-19 RX ORDER — HEPARIN 100 UNIT/ML
500 SYRINGE INTRAVENOUS
Status: DISCONTINUED | OUTPATIENT
Start: 2018-05-19 | End: 2018-05-19 | Stop reason: HOSPADM

## 2018-05-19 RX ORDER — SODIUM CHLORIDE 0.9 % (FLUSH) 0.9 %
10 SYRINGE (ML) INJECTION
Status: DISCONTINUED | OUTPATIENT
Start: 2018-05-19 | End: 2018-05-19 | Stop reason: HOSPADM

## 2018-05-19 RX ADMIN — SODIUM CHLORIDE, PRESERVATIVE FREE 10 ML: 5 INJECTION INTRAVENOUS at 12:05

## 2018-05-19 RX ADMIN — HEPARIN 500 UNITS: 100 SYRINGE at 12:05

## 2018-05-19 NOTE — NURSING
Patient here for pump d/c.  Assessment complete and VSS.  Flushed PAC with NS and heparin; de-accessed PAC.  No questions or concerns.  Patient will RTC Monday for Neulasta.  AVS given to patient.  Patient ambulated off unit unassisted.

## 2018-05-21 ENCOUNTER — TELEPHONE (OUTPATIENT)
Dept: HEMATOLOGY/ONCOLOGY | Facility: CLINIC | Age: 53
End: 2018-05-21

## 2018-05-21 ENCOUNTER — PATIENT MESSAGE (OUTPATIENT)
Dept: HEMATOLOGY/ONCOLOGY | Facility: CLINIC | Age: 53
End: 2018-05-21

## 2018-05-23 ENCOUNTER — TELEPHONE (OUTPATIENT)
Dept: HEMATOLOGY/ONCOLOGY | Facility: CLINIC | Age: 53
End: 2018-05-23

## 2018-05-23 NOTE — TELEPHONE ENCOUNTER
Spoke with pt. Explained that his port will be flushed after his labs are drawn. Pt verbalized understanding.  Sandrine

## 2018-05-23 NOTE — TELEPHONE ENCOUNTER
----- Message from Shen Rdz sent at 5/23/2018 10:30 AM CDT -----  Contact: Francesca-Mother   Will like to know should pt get port flushed prior to injection on 5.28.18?    Contact::[phone]

## 2018-05-28 ENCOUNTER — INFUSION (OUTPATIENT)
Dept: INFUSION THERAPY | Facility: HOSPITAL | Age: 53
End: 2018-05-28
Attending: INTERNAL MEDICINE
Payer: MEDICAID

## 2018-05-28 ENCOUNTER — OFFICE VISIT (OUTPATIENT)
Dept: SURGERY | Facility: CLINIC | Age: 53
End: 2018-05-28
Payer: MEDICAID

## 2018-05-28 VITALS
HEIGHT: 71 IN | SYSTOLIC BLOOD PRESSURE: 126 MMHG | BODY MASS INDEX: 23.21 KG/M2 | WEIGHT: 165.81 LBS | DIASTOLIC BLOOD PRESSURE: 83 MMHG | HEART RATE: 105 BPM

## 2018-05-28 VITALS
DIASTOLIC BLOOD PRESSURE: 88 MMHG | HEIGHT: 71 IN | TEMPERATURE: 98 F | SYSTOLIC BLOOD PRESSURE: 129 MMHG | RESPIRATION RATE: 16 BRPM | WEIGHT: 162.69 LBS | BODY MASS INDEX: 22.77 KG/M2 | HEART RATE: 84 BPM

## 2018-05-28 DIAGNOSIS — Z93.2 ILEOSTOMY IN PLACE: ICD-10-CM

## 2018-05-28 DIAGNOSIS — C18.8 OVERLAPPING MALIGNANT NEOPLASM OF COLON: Primary | ICD-10-CM

## 2018-05-28 DIAGNOSIS — G89.18 ACUTE POST-OPERATIVE PAIN: ICD-10-CM

## 2018-05-28 DIAGNOSIS — C18.8 OVERLAPPING MALIGNANT NEOPLASM OF COLON: ICD-10-CM

## 2018-05-28 DIAGNOSIS — N32.1 COLOVESICAL FISTULA: ICD-10-CM

## 2018-05-28 DIAGNOSIS — C18.7 MALIGNANT NEOPLASM OF SIGMOID COLON: Primary | ICD-10-CM

## 2018-05-28 LAB
ALBUMIN SERPL BCP-MCNC: 3.8 G/DL
ALP SERPL-CCNC: 77 U/L
ALT SERPL W/O P-5'-P-CCNC: 47 U/L
ANION GAP SERPL CALC-SCNC: 8 MMOL/L
AST SERPL-CCNC: 29 U/L
BASOPHILS # BLD AUTO: 0.01 K/UL
BASOPHILS NFR BLD: 0.1 %
BILIRUB SERPL-MCNC: 0.2 MG/DL
BUN SERPL-MCNC: 13 MG/DL
CALCIUM SERPL-MCNC: 9.5 MG/DL
CHLORIDE SERPL-SCNC: 105 MMOL/L
CO2 SERPL-SCNC: 24 MMOL/L
CREAT SERPL-MCNC: 0.8 MG/DL
DIFFERENTIAL METHOD: ABNORMAL
EOSINOPHIL # BLD AUTO: 0.4 K/UL
EOSINOPHIL NFR BLD: 5.5 %
ERYTHROCYTE [DISTWIDTH] IN BLOOD BY AUTOMATED COUNT: 15.5 %
EST. GFR  (AFRICAN AMERICAN): >60 ML/MIN/1.73 M^2
EST. GFR  (NON AFRICAN AMERICAN): >60 ML/MIN/1.73 M^2
GLUCOSE SERPL-MCNC: 92 MG/DL
HCT VFR BLD AUTO: 37.7 %
HGB BLD-MCNC: 12.6 G/DL
IMM GRANULOCYTES # BLD AUTO: 0.02 K/UL
IMM GRANULOCYTES NFR BLD AUTO: 0.3 %
LYMPHOCYTES # BLD AUTO: 2.8 K/UL
LYMPHOCYTES NFR BLD: 39.7 %
MCH RBC QN AUTO: 28.8 PG
MCHC RBC AUTO-ENTMCNC: 33.4 G/DL
MCV RBC AUTO: 86 FL
MONOCYTES # BLD AUTO: 0.5 K/UL
MONOCYTES NFR BLD: 6.5 %
NEUTROPHILS # BLD AUTO: 3.3 K/UL
NEUTROPHILS NFR BLD: 47.9 %
NRBC BLD-RTO: 0 /100 WBC
PLATELET # BLD AUTO: 203 K/UL
PMV BLD AUTO: 10.3 FL
POTASSIUM SERPL-SCNC: 3.6 MMOL/L
PROT SERPL-MCNC: 7.6 G/DL
RBC # BLD AUTO: 4.37 M/UL
SODIUM SERPL-SCNC: 137 MMOL/L
WBC # BLD AUTO: 6.96 K/UL

## 2018-05-28 PROCEDURE — 99999 PR PBB SHADOW E&M-EST. PATIENT-LVL III: CPT | Mod: PBBFAC,,, | Performed by: COLON & RECTAL SURGERY

## 2018-05-28 PROCEDURE — 85025 COMPLETE CBC W/AUTO DIFF WBC: CPT

## 2018-05-28 PROCEDURE — 99213 OFFICE O/P EST LOW 20 MIN: CPT | Mod: PBBFAC,25 | Performed by: COLON & RECTAL SURGERY

## 2018-05-28 PROCEDURE — A4216 STERILE WATER/SALINE, 10 ML: HCPCS | Performed by: INTERNAL MEDICINE

## 2018-05-28 PROCEDURE — 80053 COMPREHEN METABOLIC PANEL: CPT

## 2018-05-28 PROCEDURE — 99024 POSTOP FOLLOW-UP VISIT: CPT | Mod: ,,, | Performed by: COLON & RECTAL SURGERY

## 2018-05-28 PROCEDURE — 25000003 PHARM REV CODE 250: Performed by: INTERNAL MEDICINE

## 2018-05-28 PROCEDURE — 36591 DRAW BLOOD OFF VENOUS DEVICE: CPT

## 2018-05-28 PROCEDURE — 63600175 PHARM REV CODE 636 W HCPCS: Performed by: INTERNAL MEDICINE

## 2018-05-28 PROCEDURE — 96416 CHEMO PROLONG INFUSE W/PUMP: CPT

## 2018-05-28 RX ORDER — SODIUM CHLORIDE 0.9 % (FLUSH) 0.9 %
10 SYRINGE (ML) INJECTION
Status: COMPLETED | OUTPATIENT
Start: 2018-05-28 | End: 2018-05-28

## 2018-05-28 RX ORDER — SODIUM CHLORIDE 0.9 % (FLUSH) 0.9 %
10 SYRINGE (ML) INJECTION
Status: CANCELLED | OUTPATIENT
Start: 2018-05-28

## 2018-05-28 RX ORDER — HEPARIN 100 UNIT/ML
500 SYRINGE INTRAVENOUS
Status: COMPLETED | OUTPATIENT
Start: 2018-05-28 | End: 2018-05-28

## 2018-05-28 RX ORDER — OXYCODONE AND ACETAMINOPHEN 5; 325 MG/1; MG/1
1-2 TABLET ORAL EVERY 4 HOURS PRN
Qty: 40 TABLET | Refills: 0 | Status: SHIPPED | OUTPATIENT
Start: 2018-05-28 | End: 2018-06-07

## 2018-05-28 RX ORDER — HEPARIN 100 UNIT/ML
500 SYRINGE INTRAVENOUS
Status: CANCELLED | OUTPATIENT
Start: 2018-05-29

## 2018-05-28 RX ORDER — HEPARIN 100 UNIT/ML
500 SYRINGE INTRAVENOUS
Status: CANCELLED | OUTPATIENT
Start: 2018-05-28

## 2018-05-28 RX ORDER — SODIUM CHLORIDE 0.9 % (FLUSH) 0.9 %
10 SYRINGE (ML) INJECTION
Status: DISCONTINUED | OUTPATIENT
Start: 2018-05-28 | End: 2018-05-28 | Stop reason: HOSPADM

## 2018-05-28 RX ORDER — SODIUM CHLORIDE 0.9 % (FLUSH) 0.9 %
10 SYRINGE (ML) INJECTION
Status: CANCELLED | OUTPATIENT
Start: 2018-05-29

## 2018-05-28 RX ADMIN — Medication 500 UNITS: at 10:05

## 2018-05-28 RX ADMIN — SODIUM CHLORIDE, PRESERVATIVE FREE 10 ML: 5 INJECTION INTRAVENOUS at 10:05

## 2018-05-28 RX ADMIN — FLUOROURACIL 2150 MG: 50 INJECTION, SOLUTION INTRAVENOUS at 12:05

## 2018-05-28 NOTE — PROGRESS NOTES
CRS Office Visit    SUBJECTIVE:     Chief Complaint: Follow up for colon cancer s/p resection    PROCEDURE:  3/13/2018  1. Sigmoid colectomy with en bloc partial cystectomy.  2. Diverting loop ileostomy.  3. Splenic flexure mobilization.  4. Omental pedicle flap.    Procedure(s) Performed: (urology)  1. Partial cystectomy with complex cystorrhaphy and partial prostatectomy- 22 MODIFIER  2. Left neoureterocystotomy with ureteral re-implantation  3. Cystoscopy with bilateral ureteral catheter placement  4. Bilateral ureteral stent placement   5. Left pelvic lymph node dissection    Pathologic staging:  Primary tumor:  pT3: Tumor invades through the muscularis propria into pericolic tissues .  Regional lymph nodes:  pN0: No regional lymph node metastasis  Number examined: 28  Number involved: 0  Distant metastasis:  pMX: Cannot be assessed.  MSI - low     History of Present Illness:  Patient is a 53 y.o. male with T3N0Mx sigmoid colon cancer which presented as fecaluria s/p above procedures over 2 months ago who presents for follow up.     Undergoing adjuvant chemo with 5FU only - on week 3 of 6 months total.     He reports his appetite is good. He is having good output from his ileostomy. He was surprised to find that what he eats or drinks directly comes from the stoma. He is not taking any imodium. He complains mostly of pain in his left testicle and pain with urination.  Had mcnally removed 4/23 - was supposed to f/u with urology for ureteral stent removal but reports he never heard back from them.       Review of patient's allergies indicates:  No Known Allergies    Past Medical History:   Diagnosis Date    Coronary artery disease     Depression     Hepatitis C 3/9/2018    History of psychiatric care     History of psychiatric hospitalization     Hypertension     MI (myocardial infarction)     6 months ago    Neuropathy     Psychiatric problem     Psychosis     Self-harming behavior     Suicide attempt       Past Surgical History:   Procedure Laterality Date    ABDOMINAL SURGERY      History of perforated ulcer, unknown surgery    APPENDECTOMY       Family History   Problem Relation Age of Onset    No Known Problems Mother     No Known Problems Father     No Known Problems Sister     No Known Problems Brother     No Known Problems Maternal Aunt     No Known Problems Paternal Aunt     No Known Problems Maternal Uncle     No Known Problems Paternal Uncle     No Known Problems Maternal Grandfather     No Known Problems Maternal Grandmother     No Known Problems Paternal Grandfather     No Known Problems Paternal Grandmother     ADD / ADHD Cousin     Alcohol abuse Neg Hx     Anxiety disorder Neg Hx     Bipolar disorder Neg Hx     Dementia Neg Hx     Depression Neg Hx     Drug abuse Neg Hx     OCD Neg Hx     Paranoid behavior Neg Hx     Physical abuse Neg Hx     Schizophrenia Neg Hx     Seizures Neg Hx     Self injury Neg Hx     Sexual abuse Neg Hx     Suicide Neg Hx      Social History   Substance Use Topics    Smoking status: Current Every Day Smoker     Packs/day: 2.00     Years: 30.00    Smokeless tobacco: Never Used    Alcohol use Yes        Review of Systems:  Constitutional: no fever or chills  Eyes: no visual changes  ENT: no nasal congestion or sore throat  Respiratory: no cough or shortness of breath  Cardiovascular: no chest pain or palpitations  Gastrointestinal: no nausea or vomiting, tolerating diet  Genitourinary: no hematuria or dysuria  Integument/Breast: no rash or pruritis  Hematologic/Lymphatic: no easy bruising or lymphadenopathy  Musculoskeletal: no arthralgias or myalgias  Neurological: no seizures or tremors    OBJECTIVE:     Vital Signs (Most Recent)  Pulse: 105 (05/28/18 1513)  BP: 126/83 (05/28/18 1513)    Physical Exam:  General: White male in NAD sitting in chair in clinic  Neuro: aaox4 maex4 perrl  Respiratory: resps even unlabored  Cardiac: cap refill <2  sec  Abdomen: soft, nontender, nondistended. Incision c/d/i healing well. Ileostomy pink and healthy with succus in bag.   Extremities: Warm dry and intact  Anorectal: deferred    ASSESSMENT/PLAN:     Chris was seen today for post-op problem.    Diagnoses and all orders for this visit:    Malignant neoplasm of sigmoid colon  -     oxyCODONE-acetaminophen (PERCOCET) 5-325 mg per tablet; Take 1-2 tablets by mouth every 4 (four) hours as needed for Pain.    Colovesical fistula  -     oxyCODONE-acetaminophen (PERCOCET) 5-325 mg per tablet; Take 1-2 tablets by mouth every 4 (four) hours as needed for Pain.    Ileostomy in place    Acute post-operative pain  -     oxyCODONE-acetaminophen (PERCOCET) 5-325 mg per tablet; Take 1-2 tablets by mouth every 4 (four) hours as needed for Pain.    Overlapping malignant neoplasm of colon      - F/U after chemotherapy for reversal of ileostomy - will need contrast study beforehand  - Scheduled follow up with urology for stent removal  - Needs setup with PCP    Genia Waller MD  Colon and Rectal Surgery Fellow    I have interviewed and examined the patient, reviewed the notes and assessments, and/or personally supervised the procedure(s) performed by Dr. Waller, and I concur with her/his documentation of Chris Aguirre .  See below addendum for my evaluation and additional findings.    Overall doing well.  Tolerating a diet with no nausea or vomiting.  Ostomy functioning well.  Still complains of some incisional and lower abdominal pain.    He needs follow-up with urology for stent removal.  Will hold on ileostomy reversal until he has completed adjuvant chemotherapy. RTO after chemotherapy to arrange for a contrast enema to evaluate his anastomosis and plan stoma reversal.  I renewed his prescription for Percocet (5/325, #40).  ( data reviewed.)      Mikal Nino MD, FACS, FASCRS  Staff Surgeon  Department of Colon & Rectal Surgery

## 2018-05-28 NOTE — NURSING
1110:  Patient arrived at Infusion Center, VSS, assessment completed.  RCW PAC already accessed earlier this morning, flushes easily, brisk blood return noted.  Awaiting lab results and MD signature.

## 2018-05-28 NOTE — LETTER
June 5, 2018      Ash Hung, NP  [address] [facility]  [facility] [address]           [facility]  [address]  [facility] [address]  Phone: [phone]          Patient: Chris Aguirre Jr.   MR Number: [id number]   YOB: 1965   Date of Visit: 5/28/2018       Dear Ash Hung:    Thank you for referring Chris Aguirre to me for evaluation. Attached you will find relevant portions of my assessment and plan of care.    If you have questions, please do not hesitate to call me. I look forward to following Chris Aguirre along with you.    Sincerely,    Mikal Nino MD    Enclosure  CC:  MD Meng Bhat MD    If you would like to receive this communication electronically, please contact [email or web address] or [phone] to request more information on [employer] Link access.    For providers and/or their staff who would like to refer a patient to [facility], please contact us through our one-stop-shop provider referral line, [facility], at [phone].    If you feel you have received this communication in error or would no longer like to receive these types of communications, please e-mail [email or web address]

## 2018-05-28 NOTE — PLAN OF CARE
Problem: Patient Care Overview  Goal: Plan of Care Review  Outcome: Ongoing (interventions implemented as appropriate)  1210:  Patient released to home in stable condition, NAD noted.  5-FU infusing via CADD pump to portacath at 2.1cc/hr x 5 days.  Patient understands to return on Saturday morning about 11am for pump d/c.

## 2018-06-02 ENCOUNTER — INFUSION (OUTPATIENT)
Dept: INFUSION THERAPY | Facility: HOSPITAL | Age: 53
End: 2018-06-02
Attending: INTERNAL MEDICINE
Payer: MEDICAID

## 2018-06-02 VITALS
DIASTOLIC BLOOD PRESSURE: 83 MMHG | HEART RATE: 74 BPM | RESPIRATION RATE: 18 BRPM | TEMPERATURE: 98 F | SYSTOLIC BLOOD PRESSURE: 129 MMHG

## 2018-06-02 DIAGNOSIS — C18.8 OVERLAPPING MALIGNANT NEOPLASM OF COLON: Primary | ICD-10-CM

## 2018-06-02 PROCEDURE — 25000003 PHARM REV CODE 250: Performed by: INTERNAL MEDICINE

## 2018-06-02 PROCEDURE — 96523 IRRIG DRUG DELIVERY DEVICE: CPT

## 2018-06-02 PROCEDURE — 63600175 PHARM REV CODE 636 W HCPCS: Performed by: INTERNAL MEDICINE

## 2018-06-02 PROCEDURE — A4216 STERILE WATER/SALINE, 10 ML: HCPCS | Performed by: INTERNAL MEDICINE

## 2018-06-02 RX ORDER — HEPARIN 100 UNIT/ML
500 SYRINGE INTRAVENOUS
Status: DISCONTINUED | OUTPATIENT
Start: 2018-06-02 | End: 2018-06-02 | Stop reason: HOSPADM

## 2018-06-02 RX ORDER — SODIUM CHLORIDE 0.9 % (FLUSH) 0.9 %
10 SYRINGE (ML) INJECTION
Status: DISCONTINUED | OUTPATIENT
Start: 2018-06-02 | End: 2018-06-02 | Stop reason: HOSPADM

## 2018-06-02 RX ADMIN — HEPARIN SODIUM (PORCINE) LOCK FLUSH IV SOLN 100 UNIT/ML 500 UNITS: 100 SOLUTION at 09:06

## 2018-06-02 RX ADMIN — SODIUM CHLORIDE, PRESERVATIVE FREE 10 ML: 5 INJECTION INTRAVENOUS at 09:06

## 2018-06-02 NOTE — NURSING
Pt disconnected from 5fu CADD pump with no complications. Infusion complete. VSS. Pt instructed to call MD with any problems. NAD. Pt discharged home independently.

## 2018-06-04 ENCOUNTER — TELEPHONE (OUTPATIENT)
Dept: UROLOGY | Facility: CLINIC | Age: 53
End: 2018-06-04

## 2018-06-04 ENCOUNTER — INFUSION (OUTPATIENT)
Dept: INFUSION THERAPY | Facility: HOSPITAL | Age: 53
End: 2018-06-04
Attending: INTERNAL MEDICINE
Payer: MEDICAID

## 2018-06-04 VITALS — DIASTOLIC BLOOD PRESSURE: 74 MMHG | HEART RATE: 82 BPM | RESPIRATION RATE: 20 BRPM | SYSTOLIC BLOOD PRESSURE: 118 MMHG

## 2018-06-04 DIAGNOSIS — C18.8 MALIGNANT NEOPLASM OF OVERLAPPING SITES OF COLON: Primary | ICD-10-CM

## 2018-06-04 DIAGNOSIS — C18.8 OVERLAPPING MALIGNANT NEOPLASM OF COLON: ICD-10-CM

## 2018-06-04 LAB
ALBUMIN SERPL BCP-MCNC: 3.8 G/DL
ALP SERPL-CCNC: 63 U/L
ALT SERPL W/O P-5'-P-CCNC: 60 U/L
ANION GAP SERPL CALC-SCNC: 9 MMOL/L
AST SERPL-CCNC: 47 U/L
BASOPHILS # BLD AUTO: 0.02 K/UL
BASOPHILS NFR BLD: 0.4 %
BILIRUB SERPL-MCNC: 0.2 MG/DL
BUN SERPL-MCNC: 9 MG/DL
CALCIUM SERPL-MCNC: 9 MG/DL
CHLORIDE SERPL-SCNC: 107 MMOL/L
CO2 SERPL-SCNC: 25 MMOL/L
CREAT SERPL-MCNC: 0.9 MG/DL
DIFFERENTIAL METHOD: ABNORMAL
EOSINOPHIL # BLD AUTO: 0.1 K/UL
EOSINOPHIL NFR BLD: 2.2 %
ERYTHROCYTE [DISTWIDTH] IN BLOOD BY AUTOMATED COUNT: 15.9 %
EST. GFR  (AFRICAN AMERICAN): >60 ML/MIN/1.73 M^2
EST. GFR  (NON AFRICAN AMERICAN): >60 ML/MIN/1.73 M^2
GLUCOSE SERPL-MCNC: 102 MG/DL
HCT VFR BLD AUTO: 35.2 %
HGB BLD-MCNC: 11.8 G/DL
IMM GRANULOCYTES # BLD AUTO: 0.02 K/UL
IMM GRANULOCYTES NFR BLD AUTO: 0.4 %
LYMPHOCYTES # BLD AUTO: 3.4 K/UL
LYMPHOCYTES NFR BLD: 62.8 %
MCH RBC QN AUTO: 28.8 PG
MCHC RBC AUTO-ENTMCNC: 33.5 G/DL
MCV RBC AUTO: 86 FL
MONOCYTES # BLD AUTO: 0.5 K/UL
MONOCYTES NFR BLD: 8.4 %
NEUTROPHILS # BLD AUTO: 1.4 K/UL
NEUTROPHILS NFR BLD: 25.8 %
NRBC BLD-RTO: 0 /100 WBC
PLATELET # BLD AUTO: 264 K/UL
PMV BLD AUTO: 9.4 FL
POTASSIUM SERPL-SCNC: 3.6 MMOL/L
PROT SERPL-MCNC: 7 G/DL
RBC # BLD AUTO: 4.1 M/UL
SODIUM SERPL-SCNC: 141 MMOL/L
WBC # BLD AUTO: 5.35 K/UL

## 2018-06-04 PROCEDURE — 25000003 PHARM REV CODE 250: Performed by: INTERNAL MEDICINE

## 2018-06-04 PROCEDURE — 96360 HYDRATION IV INFUSION INIT: CPT

## 2018-06-04 PROCEDURE — 63600175 PHARM REV CODE 636 W HCPCS: Performed by: INTERNAL MEDICINE

## 2018-06-04 PROCEDURE — 85025 COMPLETE CBC W/AUTO DIFF WBC: CPT

## 2018-06-04 PROCEDURE — 80053 COMPREHEN METABOLIC PANEL: CPT

## 2018-06-04 RX ORDER — SODIUM CHLORIDE 0.9 % (FLUSH) 0.9 %
10 SYRINGE (ML) INJECTION
Status: DISCONTINUED | OUTPATIENT
Start: 2018-06-04 | End: 2018-06-04 | Stop reason: HOSPADM

## 2018-06-04 RX ORDER — HEPARIN 100 UNIT/ML
500 SYRINGE INTRAVENOUS
Status: COMPLETED | OUTPATIENT
Start: 2018-06-04 | End: 2018-06-04

## 2018-06-04 RX ORDER — HEPARIN 100 UNIT/ML
500 SYRINGE INTRAVENOUS
Status: CANCELLED | OUTPATIENT
Start: 2018-06-04

## 2018-06-04 RX ORDER — SODIUM CHLORIDE 0.9 % (FLUSH) 0.9 %
10 SYRINGE (ML) INJECTION
Status: CANCELLED | OUTPATIENT
Start: 2018-06-04

## 2018-06-04 RX ADMIN — HEPARIN 500 UNITS: 100 SYRINGE at 09:06

## 2018-06-04 RX ADMIN — SODIUM CHLORIDE 1000 ML: 0.9 INJECTION, SOLUTION INTRAVENOUS at 08:06

## 2018-06-04 NOTE — PLAN OF CARE
Problem: Patient Care Overview  Goal: Plan of Care Review  Outcome: Ongoing (interventions implemented as appropriate)  Pt arrived to infusion unit this AM speaking incoherently and unable to hold a conversation; falling asleep in middle of conversation. Pt appeared intoxicated. Pt's mother stated pt had been drinking all day yesterday r/t anxiety about upcoming cystoscopy stent removal. MD notified who ordered labs and 1 L IVF. Pt tolerated 1 L without issue. PAC hep locked and deaccessed. Remaining pt appts for the day cancelled. Pt discharging via wheelchair and mother at side.

## 2018-06-08 ENCOUNTER — HOSPITAL ENCOUNTER (OUTPATIENT)
Dept: UROLOGY | Facility: HOSPITAL | Age: 53
Discharge: HOME | End: 2018-06-08
Attending: UROLOGY
Payer: MEDICAID

## 2018-06-08 DIAGNOSIS — C18.8 OVERLAPPING MALIGNANT NEOPLASM OF COLON: ICD-10-CM

## 2018-06-08 DIAGNOSIS — R39.89 PNEUMATURIA: Primary | ICD-10-CM

## 2018-06-08 DIAGNOSIS — N32.1 COLOVESICAL FISTULA: ICD-10-CM

## 2018-06-08 PROCEDURE — 99024 POSTOP FOLLOW-UP VISIT: CPT | Mod: S$PBB,,, | Performed by: UROLOGY

## 2018-06-08 RX ORDER — LIDOCAINE HYDROCHLORIDE 20 MG/ML
JELLY TOPICAL ONCE
Status: DISCONTINUED | OUTPATIENT
Start: 2018-06-08 | End: 2018-08-16

## 2018-06-08 RX ORDER — SULFAMETHOXAZOLE AND TRIMETHOPRIM 800; 160 MG/1; MG/1
1 TABLET ORAL ONCE
Status: DISCONTINUED | OUTPATIENT
Start: 2018-06-08 | End: 2018-08-16

## 2018-06-08 RX ORDER — SULFAMETHOXAZOLE AND TRIMETHOPRIM 800; 160 MG/1; MG/1
1 TABLET ORAL 2 TIMES DAILY
Qty: 20 TABLET | Refills: 0 | Status: SHIPPED | OUTPATIENT
Start: 2018-06-08 | End: 2018-06-18

## 2018-06-08 NOTE — INTERVAL H&P NOTE
The patient has been examined and the H&P has been reviewed:    I concur with the findings and no changes have occurred since H&P was written.     Patient is here for JJ stent removal.        There are no hospital problems to display for this patient.

## 2018-06-08 NOTE — PROCEDURES
Patient had nitrate positive urine today. Procedure postponed for one week. Antibiotics prescribed (Bactrim DS).

## 2018-06-11 ENCOUNTER — DOCUMENTATION ONLY (OUTPATIENT)
Dept: HEMATOLOGY/ONCOLOGY | Facility: CLINIC | Age: 53
End: 2018-06-11

## 2018-06-11 ENCOUNTER — TELEPHONE (OUTPATIENT)
Dept: HEMATOLOGY/ONCOLOGY | Facility: CLINIC | Age: 53
End: 2018-06-11

## 2018-06-11 NOTE — TELEPHONE ENCOUNTER
Elena called me stating that the patient's mother reached out to her regarding concerns of patient conditions of this morning. Elena stated that per patient's mother that patient is drunk and keeps stating that he doesn't want to live anymore. Elena will call patient's mother  Back and advise her to bring the patient to the ED for these reasons and to let Dr. Marin know the update. I advised Elena that I will Dr. Marin know the update and inform him information.    ---Michaela Cisneros

## 2018-06-11 NOTE — PROGRESS NOTES
"The patient's mother called: She was crying and very upset. "I cannot wake my son up". She stated that he was drinking heavily last night and that she could only wake him once this morning and he "just yelled at me". He is her only child -  her  . She was afraid "that he would do something to himself". He made statements to her that the physician's were not honest with him and that he was not going to live long. Told her to call 911 and they would be able to transport him to the Emergency Room. She was in agreement. Notified Michaela Cisneros in Dr Marin's office. The patient's mother has my contact information.   "

## 2018-06-11 NOTE — TELEPHONE ENCOUNTER
----- Message from Petra Alberts sent at 6/11/2018  9:54 AM CDT -----  Contact: pt mom  Pt mom called and states that pt needs to speak with nurse he is feel down   Callback#[phone]  Thank You  SALONI Alberts

## 2018-06-12 ENCOUNTER — DOCUMENTATION ONLY (OUTPATIENT)
Dept: HEMATOLOGY/ONCOLOGY | Facility: CLINIC | Age: 53
End: 2018-06-12

## 2018-06-12 NOTE — PROGRESS NOTES
"Contacted the patient's mother in follow up of the conversation with her yesterday. She informed that she did call 911, but that her son woke up and was more responsive by the time they got to the home. They spoke to his physician who agreed that they did not have to take the patient to the ER since he refused. She stated that he was again "up all night - drinking" and that he was "very unhappy about the bag and that he could not go to work". His friend convinced him to go to the ER and was going to pick him up today. The patient's mother has contact information for me. Again urged her to call 911 if she should feel at any time in the future that the patient may be a danger to himself or others.   "

## 2018-06-15 ENCOUNTER — HOSPITAL ENCOUNTER (OUTPATIENT)
Dept: UROLOGY | Facility: HOSPITAL | Age: 53
Discharge: HOME | End: 2018-06-15
Attending: UROLOGY
Payer: MEDICAID

## 2018-06-15 ENCOUNTER — TELEPHONE (OUTPATIENT)
Dept: HEMATOLOGY/ONCOLOGY | Facility: CLINIC | Age: 53
End: 2018-06-15

## 2018-06-15 ENCOUNTER — PATIENT MESSAGE (OUTPATIENT)
Dept: HEMATOLOGY/ONCOLOGY | Facility: CLINIC | Age: 53
End: 2018-06-15

## 2018-06-15 VITALS
WEIGHT: 159.81 LBS | TEMPERATURE: 98 F | HEART RATE: 103 BPM | HEIGHT: 71 IN | SYSTOLIC BLOOD PRESSURE: 147 MMHG | DIASTOLIC BLOOD PRESSURE: 100 MMHG | RESPIRATION RATE: 18 BRPM | BODY MASS INDEX: 22.37 KG/M2

## 2018-06-15 DIAGNOSIS — C18.8 OVERLAPPING MALIGNANT NEOPLASM OF COLON: Primary | ICD-10-CM

## 2018-06-15 DIAGNOSIS — R39.89 PNEUMATURIA: ICD-10-CM

## 2018-06-15 PROCEDURE — 52310 CYSTOSCOPY AND TREATMENT: CPT

## 2018-06-15 PROCEDURE — 52000 CYSTOURETHROSCOPY: CPT

## 2018-06-15 PROCEDURE — 52310 CYSTOSCOPY AND TREATMENT: CPT | Mod: ,,, | Performed by: UROLOGY

## 2018-06-15 RX ORDER — LIDOCAINE HYDROCHLORIDE 20 MG/ML
JELLY TOPICAL
Status: COMPLETED | OUTPATIENT
Start: 2018-06-15 | End: 2018-06-15

## 2018-06-15 RX ADMIN — LIDOCAINE HYDROCHLORIDE: 20 JELLY TOPICAL at 01:06

## 2018-06-15 NOTE — PATIENT INSTRUCTIONS
What to Expect After a Cystoscopy  For the next 24-48 hours, you may feel a mild burning when you urinate. This burning is normal and expected. Drink plenty of water to dilute the urine to help relieve the burning sensation. You may also see a small amount of blood in your urine after the procedure.    Unless you are already taking antibiotics, you may be given an antibiotic after the test to prevent infection.    Signs and Symptoms to Report  Call the [facility] at [phone] if you develop any of the following:  · Fever of 101 degrees or higher  · Chills or persistent bleeding  · Inability to urinate

## 2018-06-15 NOTE — TELEPHONE ENCOUNTER
Attempted to contact patient no answer sent information through [employer] about needing an appointment to see Dr. Marin prior to any chemo. Schedule patient for this coming Wednesday June 20th at 9am with  Labs at 8am.     ---Michaela Cisneros

## 2018-06-15 NOTE — PROCEDURES
CYSTOSCOPY REPORT    6/15/2018     Procedure: Cystoscopy, bilateral JJ stent removal    Pre Procedure Diagnosis: 1. S/p partial cystectomy with ureteral reimplantation            2. Indwelling bilateral JJ stents    Post Procedure Diagnosis: Same    Anesthesia: 10 cc 2% lidocaine jelly applied per urethra.    14 FR Flexible Olympus cystoscope used.    FINDINGS: Two indwelling JJ stents    Specimen:  Stent irritation    The patient was taken to the cystoscopy suite and placed in supine position.  The genitalia was prepped and draped  in the usual sterile fashion.  Two percent lidocaine jelly was inserted in the urethra and held in place with a penile clamp.  After sufficent time had passed to allow good local anesthesia, the cystoscope was inserted in the urethra and passed into the bladder visualizing the urethra along its entire course.  The cystoscope was then brought to the level of the bladder neck, and the indwelling JJ stents were visualized and grasped with the stent grasper. The cystoscope and JJ stents were removed intact in toto. The patient was instructed to urinate prior to leaving the office.     ASSESSMENT/PLAN:  Patient status post flexible cystoscopy and JJ stent removal.  1. Push fluids for 24 hours.  2. May see blood in the urine, this should gradually improve over the next 2-3 days.  3. The patient was instructed to return to the office or go to the emergency should fever, chills, cloudy urine, or inability to urinate develop.  4. Follow up as needed.

## 2018-06-15 NOTE — TELEPHONE ENCOUNTER
----- Message from Teresa Marin MD sent at 6/15/2018 12:17 PM CDT -----  If he is not compliant with his visits he will not benefit from chemo. He needs to come and see me or another MD, before he can even get his next chemo   ----- Message -----  From: Michaela Cisneros MA  Sent: 6/15/2018  10:48 AM  To: MD Dr. Tomas Bhat,    Can you please let me know the new plan of care for this patient. This patient has missed many appointments due to high alert concerns (please read Elena notes in the chart) Maryanne stated the patient called her stating that he needs to come in on Monday to get his treatment, but we need to know from you what the plan is for you.    Please let us know the plan and please send detail information so we can explain to patient    Thanks  Michaela

## 2018-06-20 ENCOUNTER — LAB VISIT (OUTPATIENT)
Dept: LAB | Facility: HOSPITAL | Age: 53
End: 2018-06-20
Attending: INTERNAL MEDICINE
Payer: MEDICAID

## 2018-06-20 ENCOUNTER — TELEPHONE (OUTPATIENT)
Dept: INFUSION THERAPY | Facility: HOSPITAL | Age: 53
End: 2018-06-20

## 2018-06-20 ENCOUNTER — OFFICE VISIT (OUTPATIENT)
Dept: HEMATOLOGY/ONCOLOGY | Facility: CLINIC | Age: 53
End: 2018-06-20
Payer: MEDICAID

## 2018-06-20 VITALS
BODY MASS INDEX: 23.42 KG/M2 | HEIGHT: 71 IN | OXYGEN SATURATION: 100 % | TEMPERATURE: 99 F | SYSTOLIC BLOOD PRESSURE: 130 MMHG | RESPIRATION RATE: 18 BRPM | DIASTOLIC BLOOD PRESSURE: 83 MMHG | HEART RATE: 73 BPM | WEIGHT: 167.31 LBS

## 2018-06-20 DIAGNOSIS — R45.89 ANXIETY ABOUT HEALTH: ICD-10-CM

## 2018-06-20 DIAGNOSIS — F10.20 UNCOMPLICATED ALCOHOL DEPENDENCE: Chronic | ICD-10-CM

## 2018-06-20 DIAGNOSIS — C18.8 OVERLAPPING MALIGNANT NEOPLASM OF COLON: ICD-10-CM

## 2018-06-20 DIAGNOSIS — R63.4 WEIGHT LOSS, NON-INTENTIONAL: ICD-10-CM

## 2018-06-20 DIAGNOSIS — B18.2 CHRONIC HEPATITIS C WITHOUT HEPATIC COMA: Primary | ICD-10-CM

## 2018-06-20 LAB
ALBUMIN SERPL BCP-MCNC: 3.8 G/DL
ALP SERPL-CCNC: 65 U/L
ALT SERPL W/O P-5'-P-CCNC: 37 U/L
ANION GAP SERPL CALC-SCNC: 7 MMOL/L
AST SERPL-CCNC: 28 U/L
BASOPHILS # BLD AUTO: 0.03 K/UL
BASOPHILS NFR BLD: 0.4 %
BILIRUB SERPL-MCNC: 0.2 MG/DL
BUN SERPL-MCNC: 12 MG/DL
CALCIUM SERPL-MCNC: 9.4 MG/DL
CHLORIDE SERPL-SCNC: 107 MMOL/L
CO2 SERPL-SCNC: 22 MMOL/L
CREAT SERPL-MCNC: 0.9 MG/DL
DIFFERENTIAL METHOD: ABNORMAL
EOSINOPHIL # BLD AUTO: 0.3 K/UL
EOSINOPHIL NFR BLD: 3.1 %
ERYTHROCYTE [DISTWIDTH] IN BLOOD BY AUTOMATED COUNT: 16.4 %
EST. GFR  (AFRICAN AMERICAN): >60 ML/MIN/1.73 M^2
EST. GFR  (NON AFRICAN AMERICAN): >60 ML/MIN/1.73 M^2
GLUCOSE SERPL-MCNC: 87 MG/DL
HCT VFR BLD AUTO: 35.6 %
HGB BLD-MCNC: 11.7 G/DL
IMM GRANULOCYTES # BLD AUTO: 0.01 K/UL
IMM GRANULOCYTES NFR BLD AUTO: 0.1 %
LYMPHOCYTES # BLD AUTO: 4.5 K/UL
LYMPHOCYTES NFR BLD: 55.9 %
MCH RBC QN AUTO: 29.5 PG
MCHC RBC AUTO-ENTMCNC: 32.9 G/DL
MCV RBC AUTO: 90 FL
MONOCYTES # BLD AUTO: 0.7 K/UL
MONOCYTES NFR BLD: 8.5 %
NEUTROPHILS # BLD AUTO: 2.6 K/UL
NEUTROPHILS NFR BLD: 32 %
NRBC BLD-RTO: 0 /100 WBC
PLATELET # BLD AUTO: 185 K/UL
PMV BLD AUTO: 9.7 FL
POTASSIUM SERPL-SCNC: 4.2 MMOL/L
PROT SERPL-MCNC: 7.2 G/DL
RBC # BLD AUTO: 3.96 M/UL
SODIUM SERPL-SCNC: 136 MMOL/L
WBC # BLD AUTO: 8.11 K/UL

## 2018-06-20 PROCEDURE — 36415 COLL VENOUS BLD VENIPUNCTURE: CPT

## 2018-06-20 PROCEDURE — 85025 COMPLETE CBC W/AUTO DIFF WBC: CPT

## 2018-06-20 PROCEDURE — 99213 OFFICE O/P EST LOW 20 MIN: CPT | Mod: PBBFAC | Performed by: INTERNAL MEDICINE

## 2018-06-20 PROCEDURE — 80053 COMPREHEN METABOLIC PANEL: CPT

## 2018-06-20 PROCEDURE — 99999 PR PBB SHADOW E&M-EST. PATIENT-LVL III: CPT | Mod: PBBFAC,,, | Performed by: INTERNAL MEDICINE

## 2018-06-20 PROCEDURE — 99214 OFFICE O/P EST MOD 30 MIN: CPT | Mod: S$PBB,,, | Performed by: INTERNAL MEDICINE

## 2018-06-20 RX ORDER — SODIUM CHLORIDE 0.9 % (FLUSH) 0.9 %
10 SYRINGE (ML) INJECTION
Status: CANCELLED | OUTPATIENT
Start: 2018-06-26

## 2018-06-20 RX ORDER — HEPARIN 100 UNIT/ML
500 SYRINGE INTRAVENOUS
Status: CANCELLED | OUTPATIENT
Start: 2018-06-26

## 2018-06-20 NOTE — PROGRESS NOTES
Cc: Colon cancer, here for follow up     HPI: is a 53yoWM who presented to the ED on March 10th, 2018 with acute onset of stool and gas in his urine over the previous several days. Previously, he had progressively worsening constipation, for which he was on stool softeners. He also had given history of  bloody stools and  lower abdominal pain. He had  15 lbs over the past few months. He denies fatigue, fevers, CP, or SOB.   He was diagnosed with HCV about 15 years ago but never sought treatment for this. He was previously hospitalized and seen by inpatient psychiatry for substance abuse, past history of suicide attempt by cutting. He also has a history of an MI with placement of heart stents, on ASA.He underwent  partial cystectomy with complex cystorrhaphy and partial prostatectomy, left neoureterocystotomy with ureteral re-implantation, cystoscopy with bilateral ureteral catheter placement, bilateral ureteral stent placement, left pelvic lymph node dissection,sigmoid colectomy and ileostomy on 3/13/18.         Interval history: He is here for a follow up visit. He is doing well. He still has no good appetite.He started adjuvant 5FU/LVon 5/7/18. He only received 3 weekly treatments. He missed chemotherapy as he lost his friend and was depressed. He feels better now. He is not drinking regularly.    Review of Systems   Constitutional: Positive for malaise/fatigue. Negative for chills, fever and weight loss.   HENT: Negative.    Eyes: Negative.    Respiratory: Negative.    Cardiovascular: Negative.    Gastrointestinal: Negative.    Genitourinary: Negative.    Musculoskeletal: Negative.    Skin: Negative.    Neurological: Negative.    Endo/Heme/Allergies: Negative.    Psychiatric/Behavioral: Negative for substance abuse and suicidal ideas. The patient is nervous/anxious.            Current Outpatient Prescriptions   Medication Sig    promethazine (PHENERGAN) 25 MG tablet Take 1 tablet (25 mg total) by  mouth every 6 (six) hours as needed for Nausea.     Current Facility-Administered Medications   Medication    lidocaine HCl 2% urojet    sulfamethoxazole-trimethoprim 800-160mg per tablet 1 tablet         Vitals:    06/20/18 0905   BP: 130/83   Pulse: 73   Resp: 18   Temp: 98.8 °F (37.1 °C)       Physical Exam   Constitutional: He is oriented to person, place, and time. He appears well-developed. No distress.   HENT:   Head: Normocephalic and atraumatic.   Mouth/Throat: No oropharyngeal exudate.   Eyes: Pupils are equal, round, and reactive to light. No scleral icterus.   Neck: Normal range of motion.   Cardiovascular: Normal rate and regular rhythm.    Pulmonary/Chest: Effort normal and breath sounds normal. No respiratory distress. He has no wheezes.   Abdominal: He exhibits no distension. There is no tenderness. There is no rebound.   He has a colostomy bag in RLQ   Musculoskeletal: He exhibits no edema.   Lymphadenopathy:     He has no cervical adenopathy.   Neurological: He is alert and oriented to person, place, and time. No cranial nerve deficit.   Skin: Skin is warm.   Psychiatric: He has a normal mood and affect.     Component      Latest Ref Rng & Units 6/20/2018   WBC      3.90 - 12.70 K/uL 8.11   RBC      4.60 - 6.20 M/uL 3.96 (L)   Hemoglobin      14.0 - 18.0 g/dL 11.7 (L)   Hematocrit      40.0 - 54.0 % 35.6 (L)   MCV      82 - 98 fL 90   MCH      27.0 - 31.0 pg 29.5   MCHC      32.0 - 36.0 g/dL 32.9   RDW      11.5 - 14.5 % 16.4 (H)   Platelets      150 - 350 K/uL 185   MPV      9.2 - 12.9 fL 9.7   Immature Granulocytes      0.0 - 0.5 % 0.1   Gran # (ANC)      1.8 - 7.7 K/uL 2.6   Immature Grans (Abs)      0.00 - 0.04 K/uL 0.01   Lymph #      1.0 - 4.8 K/uL 4.5   Mono #      0.3 - 1.0 K/uL 0.7   Eos #      0.0 - 0.5 K/uL 0.3   Baso #      0.00 - 0.20 K/uL 0.03   nRBC      0 /100 WBC 0   Gran%      38.0 - 73.0 % 32.0 (L)   Lymph%      18.0 - 48.0 % 55.9 (H)   Mono%      4.0 - 15.0 % 8.5    Eosinophil%      0.0 - 8.0 % 3.1   Basophil%      0.0 - 1.9 % 0.4   Differential Method       Automated   Sodium      136 - 145 mmol/L 136   Potassium      3.5 - 5.1 mmol/L 4.2   Chloride      95 - 110 mmol/L 107   CO2      23 - 29 mmol/L 22 (L)   Glucose      70 - 110 mg/dL 87   BUN, Bld      6 - 20 mg/dL 12   Creatinine      0.5 - 1.4 mg/dL 0.9   Calcium      8.7 - 10.5 mg/dL 9.4   Total Protein      6.0 - 8.4 g/dL 7.2   Albumin      3.5 - 5.2 g/dL 3.8   Total Bilirubin      0.1 - 1.0 mg/dL 0.2   Alkaline Phosphatase      55 - 135 U/L 65   AST      10 - 40 U/L 28   ALT      10 - 44 U/L 37   Anion Gap      8 - 16 mmol/L 7 (L)   eGFR if African American      >60 mL/min/1.73 m:2 >60.0   eGFR if non African American      >60 mL/min/1.73 m:2 >60.0     3/9/18 CT abdomen/pelvis with cont        CT chest abdomen and pelvis with contrast    Clinical history: Weight loss    Comparison: None    Technique:  Axial images of the chest, abdomen, and pelvis were obtained at 5 mm intervals following administration of 75 cc Omni 350 IV contrast as well as oral and rectal contrast.  Coronal and sagittal and delayed images were reviewed.    Findings:  The structures at the base of the neck are grossly unremarkable.  No significant mediastinal lymphadenopathy.  The heart is not enlarged.  There is atherosclerotic calcification in the distribution of the coronary arteries.  No significant pericardial effusion.    The airways are patent.    There is a vague focus of groundglass attenuation within the right upper lobe, measuring up to 1.6 cm.  No significant volume of pleural fluid.  No pneumothorax.  No large focal consolidation.    The thoracic aorta tapers normally with atherosclerotic calcification.    The liver is hypoattenuating and enlarged, may reflect steatosis, correlation with LFTs recommended.  There is a subcentimeter hypoattenuating lesion within the left hepatic lobe, too small for characterization.  There are 2 faint  foci of irregular peripheral enhancement involving the medial aspect and anterior aspect of the left hepatic lobe.  These foci normalize with hepatic parenchymal enhancement on delayed image, and may reflect perfusional anomaly versus flash filling hemangiomas.  Punctate hypoattenuating focus within the right hepatic lobe is too small for characterization.  The spleen, pancreas, gallbladder and adrenal glands are grossly unremarkable.  There is no biliary dilation.  There is thickening at the gastric fundus, nonspecific, correlation with direct visualization as warranted.  No high grade obstruction.  The portal vein, splenic vein, SMV, celiac axis and SMA all are grossly patent.  Scattered shotty periaortic and paracaval lymph nodes are noted.    The kidneys enhance symmetrically and excrete contrast appropriately without hydronephrosis or nephrolithiasis.  The bilateral ureters are grossly unremarkable along their course to the urinary bladder without calculi seen.  There is air within the urinary bladder, possibly from catheterization.  Please see below for complete description.  The prostate is prominent.    Rectal contrast has been administered.  The rectum and distal sigmoid colon is unremarkable without wall thickening.  There is circumferential wall thickening involving the proximal sigmoid colon, without significant contrast passage through the region of thickening.  Wall thickening in the region measures up to 1.1 cm.  There is focal masslike thickening of the sigmoid colon, that abuts the urinary bladder.  There is urinary bladder wall thickening and distortion of the urinary bladder by this mass.  There is a focus of air and stool insinuating between the region of focal colonic thickening and the urinary bladder, finding is concerning for bladder wall invasion by colonic malignancy, with fistulous formation to the urinary bladder.  This may account for air within the urinary bladder.  Abscess in the  region felt less likely.  There is surrounding inflammation, and several adjacent nonenlarged although numerous lymph nodes.  Diverticula are noted throughout the remainder of the colon, with moderate to large amount stool throughout the colon suggesting superimposed constipation.  Oral contrast is noted to the level of the transverse colon.  The terminal ileum is grossly unremarkable.  No pericecal inflammation.  The small bowel is grossly unremarkable.  Scattered shotty periaortic and paracaval lymph nodes are noted.  There is a small amount reactive fluid in the left hemipelvis.    There is a mixed s sclerotic focus within the posterior aspect of the right iliac bone, the appearance is not specific for metastatic disease, however metastatic focus is not excluded in the setting of probable colonic malignancy.  Several additional sclerotic foci are noted throughout the spine, likely degenerative in nature.  Schmorl's node involves the superior endplate of L1.  There is Oddi wall cachexia.  There are bilateral fat containing inguinal hernias.  No significant axillary lymphadenopathy.   Impression        1.  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  Air within the urinary bladder is suspected to be on the bases of the same although correlation for recent catheterization and clinical symptomatology.  There is indistinctness about the colon region, with several although nonenlarged lymph nodes, and disorganized fluid.  Please see above for full description.    2.  Vague groundglass focus of attenuation within the right upper lobe of the right lung, nonspecific, could reflect nonspecific inflammation or infection, metastatic disease however cannot be excluded in this setting.  Followup is advised.    3.  Constipation.    4.  Several additional findings described above.      3/12/18 pathology        SPECIMEN  1) Rectal  polyps x2, 8 mm polyp and 5 mm polyp, hot snare polypectomies.  FINAL PATHOLOGIC DIAGNOSIS  Rectum, 8 mm and 5 mm polyps ×2, biopsy:  - Hyperplastic polyp, multiple fragments.     3/12/18 FINAL PATHOLOGIC DIAGNOSIS     Supplemental Diagnosis     MICROSATELLITE INSTABILITY, TUMOR:  RESULT SUMMARY:  -AMBERLY/MSI-L  RESULT:  MSI: AMBERLY/MSI-L (instability observed in 0 of 5 informative markers).  INTERPRETATION:  An AMBERLY/MSI-L phenotype suggests the presence of normal DNA mismatch repair function within the tumor .     1. Sigmoid colon) posterior bladder wall, mass, en bloc resection of sigmoid colon with attached posterior bladder wall:     Adenocarcinoma, measuring 4.7 cm in greatest dimension.  Tumor extends through the muscularis propria into the pericolorectal tissue with surrounding abcess formation with adherent bladder. There is no viable tumor seen invading bladder in multiple examined sections.  Proximal and distal colonic surgical margins are negative for malignancy.  Mesenteric surgical margin is negative for malignancy.  17 benign lymph nodes, negative for metastatic carcinoma (0/17).  See synoptic report in comment section for further details.     2. Right lateral bladder wall margin, biopsy:     Negative for malignancy.     3. Inferior bladder wall margin, biopsy:     Negative for malignancy.     4. Left lateral bladder wall margin, biopsy:     Negative for malignancy.     5. Superior bladder wall, biopsy:     Negative for malignancy.     6. Second right lateral bladder wall margin, biopsy:     Negative for malignancy.     7. Second left lateral bladder wall margin, biopsy:  Negative for malignancy.     8. Left internal iliac lymph nodes, regional resection:     3 benign lymph nodes, negative for metastatic carcinoma (see 0/3).     9. Proximal sigmoid colon, segmental resection:     Colon with congested vasculature and no evidence of malignancy.  Resection margins are viable and negative for dysplasia or  "malignancy.  8 benign lymph nodes, negative for metastatic carcinoma (0/8).     10. Proximal donut, biopsy:     Colonic mucosa with no significant histopathologic abnormality.  No evidence of malignancy.     11. Distal donut, biopsy:     Colonic mucosa with no significant histopathologic abnormality.  No evidence of malignancy     Comment: Synoptic report  Procedure: Sigmoid colon with attached bladder wall; en bloc resection  Tumor site: Sigmoid colon  Tumor size:  Greatest dimension: 4.7 cm  Macroscopic tumor perforation: Grossly lesion involves the full extent of the bowel wall and erodes into the bladder  Histologic type: Adenocarcinoma  Histologic grade: G2 moderately differentiated.:  Microscopic tumor extension: Tumor invades through the muscularis propria into pericolic rectal tissue .  Margins:  Proximal margin: Uninvolved by invasive carcinoma  Distal margin: Uninvolved by invasive carcinoma  Mesenteric margin: Uninvolved  Circumferential (radial) margin: Tumor extends into pericolic rectal adipose tissue and has gross tumor perforation with surrounding abscess formation     Distance of invasive carcinoma from closest margin: 9 cm from surgical margin "A"  Treatment effect: No known presurgical treatment  Lymphovascular invasion: Not identified     Perineural invasion: Not identified  Tumor deposits: Not identified  Regional lymph nodes:  Number of lymph nodes involved: 0  Number of lymph nodes examined: 28  Pathologic staging:  Primary tumor:  pT3: Tumor invades through the muscularis propria into pericolic tissues .  Regional lymph nodes:  pN0: No regional lymph node metastasis  Number examined: 28  Number involved: 0  Distant metastasis:  pMX: Cannot be assessed.                  4/27/18 CT     COMPARISON:  CT chest abdomen pelvis 03/09/2018, abdominal radiograph 03/14/2018    FINDINGS:  Thoracic soft tissues: No significant abnormality.    Aorta: Normal in caliber, course, and contour.  There are three " branching vessels at the arch. Significant calcific atherosclerosis involving the coronary arteries in a multivessel distribution, the origins of the great vessels,  and the aortic arch.    Heart: Normal in size with trace pericardial effusion likely of minimal clinical significance.    Myra/Mediastinum: No significant lymphadenopathy    Lungs: Well expanded bilaterally without consolidation, mass, or pleural effusion.  Stable focus of nonspecific ground-glass attenuation within the right upper lobe measuring approximately 1.6 cm (axial series 2, image 23); recommend continued follow-up with expected oncology surveillance.    Liver: Hepatomegaly, similar to prior.  Stable subcentimeter hypodensities within the left and right hepatic lobes, too small to fully characterize but possibly representing cysts.    Gallbladder: Mild wall thickening likely secondary to decompression.  No internal calcified gallstones or pericholecystic fluid.    Bile Ducts: No evidence of dilated ducts.    Pancreas: No mass or peripancreatic fat stranding.    Spleen: Unremarkable.    Adrenals: Unremarkable.    Kidneys: Normal in size and location without focal abnormality.  Normal physiologic ability to concentrate contrast appropriately.    Bladder/ureters/prostate: Postsurgical changes status post partial cystectomy with bladder repair, partial prostatectomy, and left ureteral reimplantation.  Bladder is not distended on today's examination and is thus poorly evaluated.  Bilateral double-J stents extending from the collecting systems to the bladder.  Of note, right double-J ureteral stent begins at the level of the ureteropelvic junction while the left ureteral stent begins within the renal pelvis.  No hydronephrosis or nephrolithiasis. No ureteral dilatation.    GI Tract/Mesentery: Postsurgical changes status post sigmoid colectomy and diverting loop ileostomy.  No evidence of recurrent disease within the surgical bed.    Peritoneal Space:  No ascites. No free air.    Retroperitoneum:  No significant adenopathy.    Abdominal wall:  Diverting loop ileostomy exiting the right upper abdominal quadrant.  Healing midline incision.  No significant abnormalities.    Vasculature: Significant calcific atherosclerosis involving the infrarenal abdominal aorta with extension into the proximal iliac arteries.  No aneurysm    Bones: Moderate degenerative changes of the visualized osseous structures without acute fracture or destructive osseous process.  Grade 1 retrolisthesis of L5 on S1.  Stable wedge compression fracture of L1.   Impression        In this patient with history of colon cancer, there have been extensive interval postsurgical changes involving the bladder and bowel as detailed above.  No evidence of recurrent or metastatic disease.  Of note, bladder is decompressed on today's examination and poorly evaluated.    Stable focus of nonspecific ground-glass attenuation within the right upper lobe; recommend continue follow-up with expected surveillance.    Stable hepatic hypodensities too small to fully characterize but likely representing hepatic cyst.    Additional, stable findings as above.    There are no measurable lesions per RECIST criteria.         ASSESSMENT:     1. Sigmoid colon adenocarcinoma, [id number]  2. Lung lesion on CT  3. Weight loss, unintentional  4. Chronic hepatitis C, without coma  5. Tachycardia  6. Hypertension, essential  7. Coronary artery disease  8. H/o depression  9. Anxiety  10. Insomnia  11. Anemia     Plan:     1,2: He has AJCC stage II disease. There were no clear metastatic lesions on CT done in March 2018. However, there was a groundglass focus of attenuation within the right upper lobe of the right lung. It is unclear if this is atelactasis/pnemonia/metastatic lesion. He cannot have PET CT due to insurance. Repeat CT done on 4/27/18 again showed a focus of nonspecific ground-glass attenuation within the right upper lobe  that was previously noted. No other lesions to suspect metastases. He is MSI stable/low. No clear high risk features other than macroscopic perforation. He is MSI stable/low. Margins were clear. He has no fecal matter in urine/air in his urine at thsi time. He will follow with urology in the next 1 week. No clear, established role for adjuvant FOLFOX. . I discussed role for adjuvant 5FU, given weekly for 6 weeks each cycle and off for 2 weeks, for a total of 6 months.  He started 5FU on 5/7/18. However, he received only 3 infusions ( once weekly x 7 days). He was very agitated and inebriated when he presnted for subsequent treatments and had to be held. He is now sober and wants to resume chemotherapy.         4. He has untreated chronic hepatitis C genotype 1a.. He has never been treated. He has been referred to liver clinic. HCV reactivation/ fulminant hepatitis after chemotherapy has been rarely reported. He also has positive hepatitis B antibody.      5,6: He will need treatment with betablocker if persistent.      7. No symptoms at this time. He is on Aspirin    8,9,10. He will be referred to psychologist    Distress Screening Results: Psychosocial Distress screening score of Distress Score: 1 noted and reviewed. No intervention indicated.

## 2018-06-21 ENCOUNTER — TELEPHONE (OUTPATIENT)
Dept: HEMATOLOGY/ONCOLOGY | Facility: CLINIC | Age: 53
End: 2018-06-21

## 2018-06-21 ENCOUNTER — TELEPHONE (OUTPATIENT)
Dept: INFUSION THERAPY | Facility: HOSPITAL | Age: 53
End: 2018-06-21

## 2018-06-21 NOTE — TELEPHONE ENCOUNTER
----- Message from Shen Rdz sent at 6/21/2018  2:27 PM CDT -----  Contact: Pt   Pt will like a call from staff in regards to scheduled 7.2.19 chemo appt     Contact::[phone]

## 2018-06-22 ENCOUNTER — TELEPHONE (OUTPATIENT)
Dept: HEMATOLOGY/ONCOLOGY | Facility: CLINIC | Age: 53
End: 2018-06-22

## 2018-06-25 ENCOUNTER — INFUSION (OUTPATIENT)
Dept: INFUSION THERAPY | Facility: HOSPITAL | Age: 53
End: 2018-06-25
Attending: INTERNAL MEDICINE
Payer: MEDICAID

## 2018-06-25 VITALS
DIASTOLIC BLOOD PRESSURE: 68 MMHG | TEMPERATURE: 98 F | SYSTOLIC BLOOD PRESSURE: 135 MMHG | HEART RATE: 80 BPM | RESPIRATION RATE: 18 BRPM

## 2018-06-25 DIAGNOSIS — C18.8 OVERLAPPING MALIGNANT NEOPLASM OF COLON: Primary | ICD-10-CM

## 2018-06-25 PROCEDURE — 63600175 PHARM REV CODE 636 W HCPCS: Performed by: INTERNAL MEDICINE

## 2018-06-25 PROCEDURE — 96416 CHEMO PROLONG INFUSE W/PUMP: CPT

## 2018-06-25 PROCEDURE — 25000003 PHARM REV CODE 250: Performed by: INTERNAL MEDICINE

## 2018-06-25 RX ADMIN — FLUOROURACIL 2150 MG: 50 INJECTION, SOLUTION INTRAVENOUS at 04:06

## 2018-06-25 NOTE — PLAN OF CARE
Problem: Patient Care Overview  Goal: Plan of Care Review  Outcome: Ongoing (interventions implemented as appropriate)  Pt connected to 5FU CADD pump. Pt instructed to return Friday for pump d/c. Pt instructed to call MD with any concerns. Pt discharged home independently. CADD pump infusing without complications.

## 2018-06-26 ENCOUNTER — INFUSION (OUTPATIENT)
Dept: INFUSION THERAPY | Facility: HOSPITAL | Age: 53
End: 2018-06-26
Attending: INTERNAL MEDICINE
Payer: MEDICAID

## 2018-06-26 NOTE — NURSING
"Pt here bc of issue with chemo pump.  Reported beeping started about 30 min prior and unable to stop.  Pump reading "error [id number]".  Assessed pump and flushed line, flushing well with no kinks.  Switched out CADD pump to new one.  Reprogrammed with previous settings.  Started pump and running fine.  Instructed patient to call the number on the side of the pump with any future issues and to report here if unable to resolve.  Pt voiced understanding.   "

## 2018-06-30 ENCOUNTER — INFUSION (OUTPATIENT)
Dept: INFUSION THERAPY | Facility: HOSPITAL | Age: 53
End: 2018-06-30
Attending: INTERNAL MEDICINE
Payer: MEDICAID

## 2018-06-30 VITALS
SYSTOLIC BLOOD PRESSURE: 130 MMHG | TEMPERATURE: 98 F | RESPIRATION RATE: 18 BRPM | DIASTOLIC BLOOD PRESSURE: 82 MMHG | HEART RATE: 76 BPM

## 2018-06-30 DIAGNOSIS — C18.8 OVERLAPPING MALIGNANT NEOPLASM OF COLON: Primary | ICD-10-CM

## 2018-06-30 PROCEDURE — 96523 IRRIG DRUG DELIVERY DEVICE: CPT

## 2018-06-30 PROCEDURE — A4216 STERILE WATER/SALINE, 10 ML: HCPCS | Performed by: INTERNAL MEDICINE

## 2018-06-30 PROCEDURE — 25000003 PHARM REV CODE 250: Performed by: INTERNAL MEDICINE

## 2018-06-30 PROCEDURE — 63600175 PHARM REV CODE 636 W HCPCS: Performed by: INTERNAL MEDICINE

## 2018-06-30 RX ORDER — HEPARIN 100 UNIT/ML
500 SYRINGE INTRAVENOUS
Status: DISCONTINUED | OUTPATIENT
Start: 2018-06-30 | End: 2018-06-30 | Stop reason: HOSPADM

## 2018-06-30 RX ORDER — SODIUM CHLORIDE 0.9 % (FLUSH) 0.9 %
10 SYRINGE (ML) INJECTION
Status: DISCONTINUED | OUTPATIENT
Start: 2018-06-30 | End: 2018-06-30 | Stop reason: HOSPADM

## 2018-06-30 RX ADMIN — HEPARIN 500 UNITS: 100 SYRINGE at 12:06

## 2018-06-30 RX ADMIN — SODIUM CHLORIDE, PRESERVATIVE FREE 10 ML: 5 INJECTION INTRAVENOUS at 12:06

## 2018-06-30 NOTE — NURSING
Pt arrives to clinic for CADD pump d/c no adverse events reported, tolerated infusion well, PAC flushed hep locked de accessed, site covered with band aid, leaves clinic ambulatory NAD noted.

## 2018-07-02 ENCOUNTER — OFFICE VISIT (OUTPATIENT)
Dept: HEMATOLOGY/ONCOLOGY | Facility: CLINIC | Age: 53
End: 2018-07-02
Payer: MEDICAID

## 2018-07-02 ENCOUNTER — INFUSION (OUTPATIENT)
Dept: INFUSION THERAPY | Facility: HOSPITAL | Age: 53
End: 2018-07-02
Attending: INTERNAL MEDICINE
Payer: MEDICAID

## 2018-07-02 VITALS
DIASTOLIC BLOOD PRESSURE: 99 MMHG | SYSTOLIC BLOOD PRESSURE: 136 MMHG | OXYGEN SATURATION: 99 % | RESPIRATION RATE: 18 BRPM | BODY MASS INDEX: 24.1 KG/M2 | WEIGHT: 172.19 LBS | HEIGHT: 71 IN | TEMPERATURE: 98 F | HEART RATE: 71 BPM

## 2018-07-02 VITALS
SYSTOLIC BLOOD PRESSURE: 147 MMHG | RESPIRATION RATE: 18 BRPM | DIASTOLIC BLOOD PRESSURE: 85 MMHG | HEART RATE: 68 BPM | TEMPERATURE: 99 F

## 2018-07-02 DIAGNOSIS — F10.20 UNCOMPLICATED ALCOHOL DEPENDENCE: Chronic | ICD-10-CM

## 2018-07-02 DIAGNOSIS — R63.4 WEIGHT LOSS, NON-INTENTIONAL: ICD-10-CM

## 2018-07-02 DIAGNOSIS — B18.2 CHRONIC HEPATITIS C WITHOUT HEPATIC COMA: ICD-10-CM

## 2018-07-02 DIAGNOSIS — C18.8 OVERLAPPING MALIGNANT NEOPLASM OF COLON: Primary | ICD-10-CM

## 2018-07-02 DIAGNOSIS — F17.200 SMOKING ADDICTION: ICD-10-CM

## 2018-07-02 PROCEDURE — 99999 PR PBB SHADOW E&M-EST. PATIENT-LVL III: CPT | Mod: PBBFAC,,, | Performed by: INTERNAL MEDICINE

## 2018-07-02 PROCEDURE — 99214 OFFICE O/P EST MOD 30 MIN: CPT | Mod: S$PBB,,, | Performed by: INTERNAL MEDICINE

## 2018-07-02 PROCEDURE — 63600175 PHARM REV CODE 636 W HCPCS: Performed by: INTERNAL MEDICINE

## 2018-07-02 PROCEDURE — 99213 OFFICE O/P EST LOW 20 MIN: CPT | Mod: PBBFAC,25 | Performed by: INTERNAL MEDICINE

## 2018-07-02 PROCEDURE — 96416 CHEMO PROLONG INFUSE W/PUMP: CPT

## 2018-07-02 PROCEDURE — 25000003 PHARM REV CODE 250: Performed by: INTERNAL MEDICINE

## 2018-07-02 RX ORDER — SODIUM CHLORIDE 0.9 % (FLUSH) 0.9 %
10 SYRINGE (ML) INJECTION
Status: CANCELLED | OUTPATIENT
Start: 2018-07-05

## 2018-07-02 RX ORDER — SODIUM CHLORIDE 0.9 % (FLUSH) 0.9 %
10 SYRINGE (ML) INJECTION
Status: CANCELLED | OUTPATIENT
Start: 2018-07-12

## 2018-07-02 RX ORDER — HEPARIN 100 UNIT/ML
500 SYRINGE INTRAVENOUS
Status: CANCELLED | OUTPATIENT
Start: 2018-07-05

## 2018-07-02 RX ORDER — HEPARIN 100 UNIT/ML
500 SYRINGE INTRAVENOUS
Status: CANCELLED | OUTPATIENT
Start: 2018-07-12

## 2018-07-02 RX ADMIN — FLUOROURACIL 2150 MG: 50 INJECTION, SOLUTION INTRAVENOUS at 03:07

## 2018-07-02 NOTE — PROGRESS NOTES
Cc: Colon cancer, here for follow up     HPI: is a 53yoWM who presented to the ED on March 10th, 2018 with acute onset of stool and gas in his urine over the previous several days. Previously, he had progressively worsening constipation, for which he was on stool softeners. He also had given history of  bloody stools and  lower abdominal pain. He had  15 lbs over the past few months. He denies fatigue, fevers, CP, or SOB.   He was diagnosed with HCV about 15 years ago but never sought treatment for this. He was previously hospitalized and seen by inpatient psychiatry for substance abuse, past history of suicide attempt by cutting. He also has a history of an MI with placement of heart stents, on ASA.He underwent  partial cystectomy with complex cystorrhaphy and partial prostatectomy, left neoureterocystotomy with ureteral re-implantation, cystoscopy with bilateral ureteral catheter placement, bilateral ureteral stent placement, left pelvic lymph node dissection,sigmoid colectomy and ileostomy on 3/13/18.         Interval history: He is here for a follow up visit. He is doing well. He still has no good appetite.He started adjuvant 5FU/LVon 5/7/18. He only received 3 weekly treatments. He missed chemotherapy as he lost his friend and was depressed. He feels better now. He is not drinking regularly.    Review of Systems   Constitutional: Positive for malaise/fatigue and weight loss. Negative for chills and fever.   HENT: Negative for congestion, ear discharge, hearing loss and nosebleeds.    Eyes: Negative for blurred vision and double vision.   Respiratory: Negative for cough, hemoptysis and sputum production.    Cardiovascular: Negative for chest pain, palpitations, orthopnea and claudication.   Gastrointestinal: Negative for abdominal pain, blood in stool, constipation, diarrhea, heartburn, nausea and vomiting.   Genitourinary: Negative for dysuria, hematuria and urgency.   Musculoskeletal: Negative for  myalgias.   Skin: Negative for itching and rash.   Neurological: Negative for dizziness, sensory change, speech change and headaches.   Endo/Heme/Allergies: Does not bruise/bleed easily.   Psychiatric/Behavioral: Negative for depression, hallucinations and suicidal ideas.         Current Outpatient Prescriptions   Medication Sig    promethazine (PHENERGAN) 25 MG tablet Take 1 tablet (25 mg total) by mouth every 6 (six) hours as needed for Nausea.     Current Facility-Administered Medications   Medication    lidocaine HCl 2% urojet    sulfamethoxazole-trimethoprim 800-160mg per tablet 1 tablet         Vitals:    07/02/18 1430   BP: (!) 136/99   Pulse: 71   Resp: 18   Temp: 98.3 °F (36.8 °C)     Physical Exam   Constitutional: He is oriented to person, place, and time. He appears well-developed. No distress.   HENT:   Head: Normocephalic and atraumatic.   Mouth/Throat: No oropharyngeal exudate.   Eyes: Pupils are equal, round, and reactive to light. No scleral icterus.   Cardiovascular: Normal rate and regular rhythm.    No murmur heard.  Pulmonary/Chest: Effort normal and breath sounds normal. No respiratory distress. He has no wheezes.   Abdominal: Soft. He exhibits no distension. There is no tenderness. There is no rebound.   Ileostomy bag noted in RLQ   Musculoskeletal: He exhibits no edema.   Lymphadenopathy:     He has no cervical adenopathy.   Neurological: He is alert and oriented to person, place, and time. No cranial nerve deficit.   Psychiatric: He has a normal mood and affect.       Component      Latest Ref Rng & Units 7/2/2018   WBC      3.90 - 12.70 K/uL 8.62   RBC      4.60 - 6.20 M/uL 4.24 (L)   Hemoglobin      14.0 - 18.0 g/dL 12.7 (L)   Hematocrit      40.0 - 54.0 % 37.9 (L)   MCV      82 - 98 fL 89   MCH      27.0 - 31.0 pg 30.0   MCHC      32.0 - 36.0 g/dL 33.5   RDW      11.5 - 14.5 % 16.3 (H)   Platelets      150 - 350 K/uL 295   MPV      9.2 - 12.9 fL 9.4   Immature Granulocytes      0.0 -  0.5 % 0.3   Gran # (ANC)      1.8 - 7.7 K/uL 4.4   Immature Grans (Abs)      0.00 - 0.04 K/uL 0.03   Lymph #      1.0 - 4.8 K/uL 3.4   Mono #      0.3 - 1.0 K/uL 0.6   Eos #      0.0 - 0.5 K/uL 0.2   Baso #      0.00 - 0.20 K/uL 0.05   nRBC      0 /100 WBC 0   Gran%      38.0 - 73.0 % 50.6   Lymph%      18.0 - 48.0 % 39.6   Mono%      4.0 - 15.0 % 7.2   Eosinophil%      0.0 - 8.0 % 1.7   Basophil%      0.0 - 1.9 % 0.6   Differential Method       Automated   Sodium      136 - 145 mmol/L 142   Potassium      3.5 - 5.1 mmol/L 4.0   Chloride      95 - 110 mmol/L 109   CO2      23 - 29 mmol/L 24   Glucose      70 - 110 mg/dL 112 (H)   BUN, Bld      6 - 20 mg/dL 16   Creatinine      0.5 - 1.4 mg/dL 0.8   Calcium      8.7 - 10.5 mg/dL 9.4   Total Protein      6.0 - 8.4 g/dL 7.3   Albumin      3.5 - 5.2 g/dL 3.9   Total Bilirubin      0.1 - 1.0 mg/dL 0.4   Alkaline Phosphatase      55 - 135 U/L 78   AST      10 - 40 U/L 47 (H)   ALT      10 - 44 U/L 57 (H)   Anion Gap      8 - 16 mmol/L 9   eGFR if African American      >60 mL/min/1.73 m:2 >60.0   eGFR if non African American      >60 mL/min/1.73 m:2 >60.0          3/9/18 CT abdomen/pelvis with cont        CT chest abdomen and pelvis with contrast    Clinical history: Weight loss    Comparison: None    Technique:  Axial images of the chest, abdomen, and pelvis were obtained at 5 mm intervals following administration of 75 cc Omni 350 IV contrast as well as oral and rectal contrast.  Coronal and sagittal and delayed images were reviewed.    Findings:  The structures at the base of the neck are grossly unremarkable.  No significant mediastinal lymphadenopathy.  The heart is not enlarged.  There is atherosclerotic calcification in the distribution of the coronary arteries.  No significant pericardial effusion.    The airways are patent.    There is a vague focus of groundglass attenuation within the right upper lobe, measuring up to 1.6 cm.  No significant volume of pleural  fluid.  No pneumothorax.  No large focal consolidation.    The thoracic aorta tapers normally with atherosclerotic calcification.    The liver is hypoattenuating and enlarged, may reflect steatosis, correlation with LFTs recommended.  There is a subcentimeter hypoattenuating lesion within the left hepatic lobe, too small for characterization.  There are 2 faint foci of irregular peripheral enhancement involving the medial aspect and anterior aspect of the left hepatic lobe.  These foci normalize with hepatic parenchymal enhancement on delayed image, and may reflect perfusional anomaly versus flash filling hemangiomas.  Punctate hypoattenuating focus within the right hepatic lobe is too small for characterization.  The spleen, pancreas, gallbladder and adrenal glands are grossly unremarkable.  There is no biliary dilation.  There is thickening at the gastric fundus, nonspecific, correlation with direct visualization as warranted.  No high grade obstruction.  The portal vein, splenic vein, SMV, celiac axis and SMA all are grossly patent.  Scattered shotty periaortic and paracaval lymph nodes are noted.    The kidneys enhance symmetrically and excrete contrast appropriately without hydronephrosis or nephrolithiasis.  The bilateral ureters are grossly unremarkable along their course to the urinary bladder without calculi seen.  There is air within the urinary bladder, possibly from catheterization.  Please see below for complete description.  The prostate is prominent.    Rectal contrast has been administered.  The rectum and distal sigmoid colon is unremarkable without wall thickening.  There is circumferential wall thickening involving the proximal sigmoid colon, without significant contrast passage through the region of thickening.  Wall thickening in the region measures up to 1.1 cm.  There is focal masslike thickening of the sigmoid colon, that abuts the urinary bladder.  There is urinary bladder wall thickening  and distortion of the urinary bladder by this mass.  There is a focus of air and stool insinuating between the region of focal colonic thickening and the urinary bladder, finding is concerning for bladder wall invasion by colonic malignancy, with fistulous formation to the urinary bladder.  This may account for air within the urinary bladder.  Abscess in the region felt less likely.  There is surrounding inflammation, and several adjacent nonenlarged although numerous lymph nodes.  Diverticula are noted throughout the remainder of the colon, with moderate to large amount stool throughout the colon suggesting superimposed constipation.  Oral contrast is noted to the level of the transverse colon.  The terminal ileum is grossly unremarkable.  No pericecal inflammation.  The small bowel is grossly unremarkable.  Scattered shotty periaortic and paracaval lymph nodes are noted.  There is a small amount reactive fluid in the left hemipelvis.    There is a mixed s sclerotic focus within the posterior aspect of the right iliac bone, the appearance is not specific for metastatic disease, however metastatic focus is not excluded in the setting of probable colonic malignancy.  Several additional sclerotic foci are noted throughout the spine, likely degenerative in nature.  Schmorl's node involves the superior endplate of L1.  There is Oddi wall cachexia.  There are bilateral fat containing inguinal hernias.  No significant axillary lymphadenopathy.   Impression        1.  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  Air within the urinary bladder is suspected to be on the bases of the same although correlation for recent catheterization and clinical symptomatology.  There is indistinctness about the colon region, with several although nonenlarged lymph nodes, and disorganized fluid.  Please see above for full description.    2.   Vague groundglass focus of attenuation within the right upper lobe of the right lung, nonspecific, could reflect nonspecific inflammation or infection, metastatic disease however cannot be excluded in this setting.  Followup is advised.    3.  Constipation.    4.  Several additional findings described above.      3/12/18 pathology        SPECIMEN  1) Rectal polyps x2, 8 mm polyp and 5 mm polyp, hot snare polypectomies.  FINAL PATHOLOGIC DIAGNOSIS  Rectum, 8 mm and 5 mm polyps ×2, biopsy:  - Hyperplastic polyp, multiple fragments.     3/12/18 FINAL PATHOLOGIC DIAGNOSIS     Supplemental Diagnosis     MICROSATELLITE INSTABILITY, TUMOR:  RESULT SUMMARY:  -AMBERLY/MSI-L  RESULT:  MSI: AMBERLY/MSI-L (instability observed in 0 of 5 informative markers).  INTERPRETATION:  An AMBERLY/MSI-L phenotype suggests the presence of normal DNA mismatch repair function within the tumor .     1. Sigmoid colon) posterior bladder wall, mass, en bloc resection of sigmoid colon with attached posterior bladder wall:     Adenocarcinoma, measuring 4.7 cm in greatest dimension.  Tumor extends through the muscularis propria into the pericolorectal tissue with surrounding abcess formation with adherent bladder. There is no viable tumor seen invading bladder in multiple examined sections.  Proximal and distal colonic surgical margins are negative for malignancy.  Mesenteric surgical margin is negative for malignancy.  17 benign lymph nodes, negative for metastatic carcinoma (0/17).  See synoptic report in comment section for further details.     2. Right lateral bladder wall margin, biopsy:     Negative for malignancy.     3. Inferior bladder wall margin, biopsy:     Negative for malignancy.     4. Left lateral bladder wall margin, biopsy:     Negative for malignancy.     5. Superior bladder wall, biopsy:     Negative for malignancy.     6. Second right lateral bladder wall margin, biopsy:     Negative for malignancy.     7. Second left lateral bladder wall  "margin, biopsy:  Negative for malignancy.     8. Left internal iliac lymph nodes, regional resection:     3 benign lymph nodes, negative for metastatic carcinoma (see 0/3).     9. Proximal sigmoid colon, segmental resection:     Colon with congested vasculature and no evidence of malignancy.  Resection margins are viable and negative for dysplasia or malignancy.  8 benign lymph nodes, negative for metastatic carcinoma (0/8).     10. Proximal donut, biopsy:     Colonic mucosa with no significant histopathologic abnormality.  No evidence of malignancy.     11. Distal donut, biopsy:     Colonic mucosa with no significant histopathologic abnormality.  No evidence of malignancy     Comment: Synoptic report  Procedure: Sigmoid colon with attached bladder wall; en bloc resection  Tumor site: Sigmoid colon  Tumor size:  Greatest dimension: 4.7 cm  Macroscopic tumor perforation: Grossly lesion involves the full extent of the bowel wall and erodes into the bladder  Histologic type: Adenocarcinoma  Histologic grade: G2 moderately differentiated.:  Microscopic tumor extension: Tumor invades through the muscularis propria into pericolic rectal tissue .  Margins:  Proximal margin: Uninvolved by invasive carcinoma  Distal margin: Uninvolved by invasive carcinoma  Mesenteric margin: Uninvolved  Circumferential (radial) margin: Tumor extends into pericolic rectal adipose tissue and has gross tumor perforation with surrounding abscess formation     Distance of invasive carcinoma from closest margin: 9 cm from surgical margin "A"  Treatment effect: No known presurgical treatment  Lymphovascular invasion: Not identified     Perineural invasion: Not identified  Tumor deposits: Not identified  Regional lymph nodes:  Number of lymph nodes involved: 0  Number of lymph nodes examined: 28  Pathologic staging:  Primary tumor:  pT3: Tumor invades through the muscularis propria into pericolic tissues .  Regional lymph nodes:  pN0: No regional " lymph node metastasis  Number examined: 28  Number involved: 0  Distant metastasis:  pMX: Cannot be assessed.                  4/27/18 CT     COMPARISON:  CT chest abdomen pelvis 03/09/2018, abdominal radiograph 03/14/2018    FINDINGS:  Thoracic soft tissues: No significant abnormality.    Aorta: Normal in caliber, course, and contour.  There are three branching vessels at the arch. Significant calcific atherosclerosis involving the coronary arteries in a multivessel distribution, the origins of the great vessels,  and the aortic arch.    Heart: Normal in size with trace pericardial effusion likely of minimal clinical significance.    Myra/Mediastinum: No significant lymphadenopathy    Lungs: Well expanded bilaterally without consolidation, mass, or pleural effusion.  Stable focus of nonspecific ground-glass attenuation within the right upper lobe measuring approximately 1.6 cm (axial series 2, image 23); recommend continued follow-up with expected oncology surveillance.    Liver: Hepatomegaly, similar to prior.  Stable subcentimeter hypodensities within the left and right hepatic lobes, too small to fully characterize but possibly representing cysts.    Gallbladder: Mild wall thickening likely secondary to decompression.  No internal calcified gallstones or pericholecystic fluid.    Bile Ducts: No evidence of dilated ducts.    Pancreas: No mass or peripancreatic fat stranding.    Spleen: Unremarkable.    Adrenals: Unremarkable.    Kidneys: Normal in size and location without focal abnormality.  Normal physiologic ability to concentrate contrast appropriately.    Bladder/ureters/prostate: Postsurgical changes status post partial cystectomy with bladder repair, partial prostatectomy, and left ureteral reimplantation.  Bladder is not distended on today's examination and is thus poorly evaluated.  Bilateral double-J stents extending from the collecting systems to the bladder.  Of note, right double-J ureteral stent  begins at the level of the ureteropelvic junction while the left ureteral stent begins within the renal pelvis.  No hydronephrosis or nephrolithiasis. No ureteral dilatation.    GI Tract/Mesentery: Postsurgical changes status post sigmoid colectomy and diverting loop ileostomy.  No evidence of recurrent disease within the surgical bed.    Peritoneal Space: No ascites. No free air.    Retroperitoneum:  No significant adenopathy.    Abdominal wall:  Diverting loop ileostomy exiting the right upper abdominal quadrant.  Healing midline incision.  No significant abnormalities.    Vasculature: Significant calcific atherosclerosis involving the infrarenal abdominal aorta with extension into the proximal iliac arteries.  No aneurysm    Bones: Moderate degenerative changes of the visualized osseous structures without acute fracture or destructive osseous process.  Grade 1 retrolisthesis of L5 on S1.  Stable wedge compression fracture of L1.   Impression        In this patient with history of colon cancer, there have been extensive interval postsurgical changes involving the bladder and bowel as detailed above.  No evidence of recurrent or metastatic disease.  Of note, bladder is decompressed on today's examination and poorly evaluated.    Stable focus of nonspecific ground-glass attenuation within the right upper lobe; recommend continue follow-up with expected surveillance.    Stable hepatic hypodensities too small to fully characterize but likely representing hepatic cyst.    Additional, stable findings as above.    There are no measurable lesions per RECIST criteria.         ASSESSMENT:     1. Sigmoid colon adenocarcinoma, cT5pS7Cl  2. Lung lesion on CT  3. Weight loss, unintentional  4. Chronic hepatitis C, without coma  5. Tachycardia  6. Hypertension, essential  7. Coronary artery disease  8. H/o depression  9. Anxiety  10. Insomnia  11. Anemia  12. Transaminitis  13. Chronic smoker       Plan:     1,2: He has AJCC  stage II disease. There were no clear metastatic lesions on CT done in March 2018. However, there was a groundglass focus of attenuation within the right upper lobe of the right lung. It is unclear if this is atelactasis/pnemonia/metastatic lesion. He cannot have PET CT due to insurance. Repeat CT done on 4/27/18 again showed a focus of nonspecific ground-glass attenuation within the right upper lobe that was previously noted. No other lesions to suspect metastases. He is MSI stable/low. No clear high risk features other than macroscopic perforation. He is MSI stable/low. Margins were clear. He has no fecal matter in urine/air in his urine at si time. He will follow with urology in the next 1 week. No clear, established role for adjuvant FOLFOX. . I discussed role for adjuvant 5FU, given weekly for 6 weeks each cycle and off for 2 weeks, for a total of 6 months.  He started 5FU on 5/7/18. However, he received only 3 infusions ( once weekly x 7 days). He was very agitated and inebriated when he presnted for subsequent treatments and had to be held. He is now sober.  He is here before week 5 ( adjusted for delays), cycle 1.        4. He has untreated chronic hepatitis C genotype 1a.. He has never been treated. He has been referred to liver clinic. HCV reactivation/ fulminant hepatitis after chemotherapy has been rarely reported. He also has positive hepatitis B antibody.      5,6: He will need treatment with betablocker if persistent.      7. No symptoms at this time. He is on Aspirin     8,9,10. He has been referred to psychologist    11. Possibly due to chemotherapy    12.  Grade 1. Will monitor. From chemotherapy versus Hepatitis C. Again stressed the importance of stopping alcohol use    13.  Assistance with smoking cessation was offered, including:  []  Medications  [x]  Counseling  []  Printed Information on Smoking Cessation  []  Referral to a Smoking Cessation Program    Patient was counseled regarding  smoking for 3-10 minutes.           Distress Screening Results: Psychosocial Distress screening score of Distress Score: 0 noted and reviewed. No intervention indicated.

## 2018-07-02 NOTE — PLAN OF CARE
Problem: Patient Care Overview  Goal: Discharge Needs Assessment  Outcome: Ongoing (interventions implemented as appropriate)  Pt arrives for 5FU CADD pump application, vitals stable, settings checked x 2 RN's all connections secured, pt instructed to contact MD office with any questions or concerns. Pump trouble shooting hotline for pump emergencies reviewed with pt understanding verbalized.

## 2018-07-07 ENCOUNTER — INFUSION (OUTPATIENT)
Dept: INFUSION THERAPY | Facility: HOSPITAL | Age: 53
End: 2018-07-07
Attending: INTERNAL MEDICINE
Payer: MEDICAID

## 2018-07-07 DIAGNOSIS — C18.8 OVERLAPPING MALIGNANT NEOPLASM OF COLON: Primary | ICD-10-CM

## 2018-07-07 PROCEDURE — 25000003 PHARM REV CODE 250: Performed by: INTERNAL MEDICINE

## 2018-07-07 PROCEDURE — A4216 STERILE WATER/SALINE, 10 ML: HCPCS | Performed by: INTERNAL MEDICINE

## 2018-07-07 PROCEDURE — 63600175 PHARM REV CODE 636 W HCPCS: Performed by: INTERNAL MEDICINE

## 2018-07-07 PROCEDURE — 96523 IRRIG DRUG DELIVERY DEVICE: CPT

## 2018-07-07 RX ORDER — SODIUM CHLORIDE 0.9 % (FLUSH) 0.9 %
10 SYRINGE (ML) INJECTION
Status: DISCONTINUED | OUTPATIENT
Start: 2018-07-07 | End: 2018-07-07 | Stop reason: HOSPADM

## 2018-07-07 RX ORDER — HEPARIN 100 UNIT/ML
500 SYRINGE INTRAVENOUS
Status: DISCONTINUED | OUTPATIENT
Start: 2018-07-07 | End: 2018-07-07 | Stop reason: HOSPADM

## 2018-07-07 RX ADMIN — HEPARIN 500 UNITS: 100 SYRINGE at 12:07

## 2018-07-07 RX ADMIN — SODIUM CHLORIDE, PRESERVATIVE FREE 10 ML: 5 INJECTION INTRAVENOUS at 12:07

## 2018-07-07 NOTE — NURSING
Pt's CADD pump has 7.5cc to be infused. Dr. Marin ok'd to waste. Pt tolerated infusion without complications. Port was flushed and heparinized. Pt instructed to call MD with any concerns. Pt discharged home independently

## 2018-07-09 ENCOUNTER — INFUSION (OUTPATIENT)
Dept: INFUSION THERAPY | Facility: HOSPITAL | Age: 53
End: 2018-07-09
Attending: INTERNAL MEDICINE
Payer: MEDICAID

## 2018-07-09 VITALS
SYSTOLIC BLOOD PRESSURE: 140 MMHG | RESPIRATION RATE: 20 BRPM | HEART RATE: 60 BPM | TEMPERATURE: 98 F | DIASTOLIC BLOOD PRESSURE: 80 MMHG

## 2018-07-09 DIAGNOSIS — C18.8 OVERLAPPING MALIGNANT NEOPLASM OF COLON: Primary | ICD-10-CM

## 2018-07-09 PROCEDURE — 63600175 PHARM REV CODE 636 W HCPCS: Performed by: INTERNAL MEDICINE

## 2018-07-09 PROCEDURE — 25000003 PHARM REV CODE 250: Performed by: INTERNAL MEDICINE

## 2018-07-09 PROCEDURE — 96416 CHEMO PROLONG INFUSE W/PUMP: CPT

## 2018-07-09 RX ADMIN — FLUOROURACIL 2150 MG: 50 INJECTION, SOLUTION INTRAVENOUS at 03:07

## 2018-07-09 NOTE — PLAN OF CARE
Problem: Patient Care Overview  Goal: Discharge Needs Assessment  Outcome: Ongoing (interventions implemented as appropriate)  Pt arrives at clinic for 5fu CADD pump connection ambulatory with family member, vitals stable, PAC accessed flushed positive blood rtn, settings checked x 2 rn's connections re enforced, pt leaves clinic no distress noted. No questions or concerns at this time. Instructed to contact MD office with future concerns.

## 2018-07-10 ENCOUNTER — INFUSION (OUTPATIENT)
Dept: INFUSION THERAPY | Facility: HOSPITAL | Age: 53
End: 2018-07-10
Attending: INTERNAL MEDICINE
Payer: MEDICAID

## 2018-07-10 NOTE — NURSING
Patient presented to infusion clinic with needle and dressing hanging off PAC.  Pump turned off.  Re-accessed PAC and restarted 5 FU pump; pump infusing without difficulty upon discharge.  No questions or concerns.  Patient ambulated off unit unassisted.

## 2018-07-14 ENCOUNTER — INFUSION (OUTPATIENT)
Dept: INFUSION THERAPY | Facility: HOSPITAL | Age: 53
End: 2018-07-14
Attending: INTERNAL MEDICINE
Payer: MEDICAID

## 2018-07-14 VITALS
DIASTOLIC BLOOD PRESSURE: 99 MMHG | RESPIRATION RATE: 20 BRPM | SYSTOLIC BLOOD PRESSURE: 148 MMHG | HEART RATE: 104 BPM | TEMPERATURE: 99 F

## 2018-07-14 DIAGNOSIS — C18.8 OVERLAPPING MALIGNANT NEOPLASM OF COLON: Primary | ICD-10-CM

## 2018-07-14 PROCEDURE — 63600175 PHARM REV CODE 636 W HCPCS: Performed by: INTERNAL MEDICINE

## 2018-07-14 PROCEDURE — 96523 IRRIG DRUG DELIVERY DEVICE: CPT

## 2018-07-14 PROCEDURE — A4216 STERILE WATER/SALINE, 10 ML: HCPCS | Performed by: INTERNAL MEDICINE

## 2018-07-14 PROCEDURE — 25000003 PHARM REV CODE 250: Performed by: INTERNAL MEDICINE

## 2018-07-14 RX ORDER — HEPARIN 100 UNIT/ML
500 SYRINGE INTRAVENOUS
Status: DISCONTINUED | OUTPATIENT
Start: 2018-07-14 | End: 2018-07-14 | Stop reason: HOSPADM

## 2018-07-14 RX ORDER — SODIUM CHLORIDE 0.9 % (FLUSH) 0.9 %
10 SYRINGE (ML) INJECTION
Status: DISCONTINUED | OUTPATIENT
Start: 2018-07-14 | End: 2018-07-14 | Stop reason: HOSPADM

## 2018-07-14 RX ADMIN — SODIUM CHLORIDE, PRESERVATIVE FREE 10 ML: 5 INJECTION INTRAVENOUS at 01:07

## 2018-07-14 RX ADMIN — HEPARIN 500 UNITS: 100 SYRINGE at 01:07

## 2018-07-14 NOTE — NURSING
Patient had 18 ml left to infuse. Patient refused to stay and asked pump to be discontinued because his ride was waiting for him so he could not stay any longer. Pump discontinued per patient request. Pt. Tolerated treatment well. Discharged without complaints or signs of adverse effects.

## 2018-07-16 ENCOUNTER — DOCUMENTATION ONLY (OUTPATIENT)
Dept: PSYCHIATRY | Facility: CLINIC | Age: 53
End: 2018-07-16

## 2018-07-30 ENCOUNTER — OFFICE VISIT (OUTPATIENT)
Dept: HEMATOLOGY/ONCOLOGY | Facility: CLINIC | Age: 53
End: 2018-07-30
Payer: MEDICAID

## 2018-07-30 ENCOUNTER — INFUSION (OUTPATIENT)
Dept: INFUSION THERAPY | Facility: HOSPITAL | Age: 53
End: 2018-07-30
Attending: INTERNAL MEDICINE
Payer: MEDICAID

## 2018-07-30 ENCOUNTER — TELEPHONE (OUTPATIENT)
Dept: HEMATOLOGY/ONCOLOGY | Facility: CLINIC | Age: 53
End: 2018-07-30

## 2018-07-30 VITALS
WEIGHT: 158.94 LBS | OXYGEN SATURATION: 98 % | DIASTOLIC BLOOD PRESSURE: 95 MMHG | SYSTOLIC BLOOD PRESSURE: 143 MMHG | RESPIRATION RATE: 18 BRPM | TEMPERATURE: 98 F | HEART RATE: 102 BPM | RESPIRATION RATE: 18 BRPM | DIASTOLIC BLOOD PRESSURE: 86 MMHG | HEART RATE: 87 BPM | TEMPERATURE: 98 F | BODY MASS INDEX: 22.25 KG/M2 | HEIGHT: 71 IN | SYSTOLIC BLOOD PRESSURE: 133 MMHG

## 2018-07-30 DIAGNOSIS — C18.8 OVERLAPPING MALIGNANT NEOPLASM OF COLON: Primary | ICD-10-CM

## 2018-07-30 DIAGNOSIS — C18.8 OVERLAPPING MALIGNANT NEOPLASM OF COLON: ICD-10-CM

## 2018-07-30 DIAGNOSIS — F19.94 SUBSTANCE INDUCED MOOD DISORDER: ICD-10-CM

## 2018-07-30 DIAGNOSIS — R45.89 ANXIETY ABOUT HEALTH: ICD-10-CM

## 2018-07-30 DIAGNOSIS — B18.2 CHRONIC HEPATITIS C WITHOUT HEPATIC COMA: Primary | ICD-10-CM

## 2018-07-30 DIAGNOSIS — F17.200 SMOKING ADDICTION: ICD-10-CM

## 2018-07-30 PROCEDURE — 99214 OFFICE O/P EST MOD 30 MIN: CPT | Mod: S$PBB,,, | Performed by: INTERNAL MEDICINE

## 2018-07-30 PROCEDURE — 96416 CHEMO PROLONG INFUSE W/PUMP: CPT

## 2018-07-30 PROCEDURE — 25000003 PHARM REV CODE 250: Performed by: INTERNAL MEDICINE

## 2018-07-30 PROCEDURE — 99213 OFFICE O/P EST LOW 20 MIN: CPT | Mod: PBBFAC,25 | Performed by: INTERNAL MEDICINE

## 2018-07-30 PROCEDURE — 99999 PR PBB SHADOW E&M-EST. PATIENT-LVL III: CPT | Mod: PBBFAC,,, | Performed by: INTERNAL MEDICINE

## 2018-07-30 PROCEDURE — 63600175 PHARM REV CODE 636 W HCPCS: Performed by: INTERNAL MEDICINE

## 2018-07-30 RX ORDER — SODIUM CHLORIDE 0.9 % (FLUSH) 0.9 %
10 SYRINGE (ML) INJECTION
Status: CANCELLED | OUTPATIENT
Start: 2018-08-16

## 2018-07-30 RX ORDER — HEPARIN 100 UNIT/ML
500 SYRINGE INTRAVENOUS
Status: CANCELLED | OUTPATIENT
Start: 2018-08-02

## 2018-07-30 RX ORDER — SODIUM CHLORIDE 0.9 % (FLUSH) 0.9 %
10 SYRINGE (ML) INJECTION
Status: CANCELLED | OUTPATIENT
Start: 2018-08-23

## 2018-07-30 RX ORDER — HEPARIN 100 UNIT/ML
500 SYRINGE INTRAVENOUS
Status: CANCELLED | OUTPATIENT
Start: 2018-08-09

## 2018-07-30 RX ORDER — HEPARIN 100 UNIT/ML
500 SYRINGE INTRAVENOUS
Status: CANCELLED | OUTPATIENT
Start: 2018-08-23

## 2018-07-30 RX ORDER — SODIUM CHLORIDE 0.9 % (FLUSH) 0.9 %
10 SYRINGE (ML) INJECTION
Status: CANCELLED | OUTPATIENT
Start: 2018-08-09

## 2018-07-30 RX ORDER — LORAZEPAM 0.5 MG/1
0.5 TABLET ORAL EVERY 6 HOURS PRN
Qty: 60 TABLET | Refills: 0 | Status: SHIPPED | OUTPATIENT
Start: 2018-07-30 | End: 2018-08-27 | Stop reason: SDUPTHER

## 2018-07-30 RX ORDER — SODIUM CHLORIDE 0.9 % (FLUSH) 0.9 %
10 SYRINGE (ML) INJECTION
Status: CANCELLED | OUTPATIENT
Start: 2018-08-02

## 2018-07-30 RX ORDER — HEPARIN 100 UNIT/ML
500 SYRINGE INTRAVENOUS
Status: CANCELLED | OUTPATIENT
Start: 2018-08-16

## 2018-07-30 RX ADMIN — FLUOROURACIL 2150 MG: 50 INJECTION, SOLUTION INTRAVENOUS at 03:07

## 2018-07-30 NOTE — TELEPHONE ENCOUNTER
----- Message from Shen Rdz sent at 7/30/2018 12:07 PM CDT -----  Contact: Pt   Will like office to know that pt will be running late for today's appt     Contact::[phone]

## 2018-07-30 NOTE — PLAN OF CARE
Problem: Patient Care Overview  Goal: Plan of Care Review  Outcome: Ongoing (interventions implemented as appropriate)  1530:  Pt released in stable condition.  5-FU CIV in progress to portacath to infuse over 120 hours.  Dose and settings verified by 2 RNs, connections secured.  Pt anticipated to return at approx. 0930, on Saturday for pump disconnect.

## 2018-07-30 NOTE — PROGRESS NOTES
Cc: Colon cancer, here for follow up     HPI: is a 53yoWM who presented to the ED on March 10th, 2018 with acute onset of stool and gas in his urine over the previous several days. Previously, he had progressively worsening constipation, for which he was on stool softeners. He also had given history of  bloody stools and  lower abdominal pain. He had  15 lbs over the past few months. He denies fatigue, fevers, CP, or SOB.   He was diagnosed with HCV about 15 years ago but never sought treatment for this. He was previously hospitalized and seen by inpatient psychiatry for substance abuse, past history of suicide attempt by cutting. He also has a history of an MI with placement of heart stents, on ASA.He underwent  partial cystectomy with complex cystorrhaphy and partial prostatectomy, left neoureterocystotomy with ureteral re-implantation, cystoscopy with bilateral ureteral catheter placement, bilateral ureteral stent placement, left pelvic lymph node dissection,sigmoid colectomy and ileostomy on 3/13/18.         Interval history: He is here for a follow up visit. He is doing well. He still has no good appetite.He started adjuvant 5FU/LVon 5/7/18. He only received 3 weekly treatments. He missed chemotherapy as he lost his friend and was depressed. He feels better now. He is not drinking regularly.He is here to start C2 5FU      Review of Systems   Constitutional: Positive for malaise/fatigue. Negative for chills, fever and weight loss.   HENT: Negative for congestion, nosebleeds and sinus pain.    Eyes: Negative for blurred vision, double vision, photophobia and pain.   Respiratory: Negative for cough and hemoptysis.    Cardiovascular: Negative for chest pain, palpitations, orthopnea and claudication.   Gastrointestinal: Negative for abdominal pain, heartburn, nausea and vomiting.   Genitourinary: Negative for dysuria, frequency and urgency.   Musculoskeletal: Negative for back pain, myalgias and neck pain.    Skin: Negative for rash.   Neurological: Negative for dizziness, tingling, tremors, weakness and headaches.   Endo/Heme/Allergies: Does not bruise/bleed easily.   Psychiatric/Behavioral: Negative for depression.         Current Outpatient Prescriptions   Medication Sig    promethazine (PHENERGAN) 25 MG tablet Take 1 tablet (25 mg total) by mouth every 6 (six) hours as needed for Nausea.     Current Facility-Administered Medications   Medication    lidocaine HCl 2% urojet    sulfamethoxazole-trimethoprim 800-160mg per tablet 1 tablet           Vitals:    07/30/18 1356   BP: 133/86   Pulse: 102   Resp: 18   Temp: 98 °F (36.7 °C)         Physical Exam   Constitutional: He is oriented to person, place, and time. He appears well-developed. No distress.   HENT:   Head: Normocephalic and atraumatic.   Mouth/Throat: No oropharyngeal exudate.   Eyes: Pupils are equal, round, and reactive to light. No scleral icterus.   Neck: Normal range of motion.   Cardiovascular: Normal rate, regular rhythm and normal heart sounds.    No murmur heard.  Pulmonary/Chest: Effort normal and breath sounds normal. No respiratory distress. He has no wheezes. He has no rales.   Abdominal: Soft. He exhibits no distension and no mass. There is no tenderness. There is no guarding.   He has a colostomy bag with fecal contents, no blood   Musculoskeletal: He exhibits no edema.   Lymphadenopathy:     He has no cervical adenopathy.   Neurological: He is alert and oriented to person, place, and time. No cranial nerve deficit.   Skin: Skin is warm.   Psychiatric: He has a normal mood and affect.       3/9/18 CT abdomen/pelvis with cont        CT chest abdomen and pelvis with contrast    Clinical history: Weight loss    Comparison: None    Technique:  Axial images of the chest, abdomen, and pelvis were obtained at 5 mm intervals following administration of 75 cc Omni 350 IV contrast as well as oral and rectal contrast.  Coronal and sagittal and  delayed images were reviewed.    Findings:  The structures at the base of the neck are grossly unremarkable.  No significant mediastinal lymphadenopathy.  The heart is not enlarged.  There is atherosclerotic calcification in the distribution of the coronary arteries.  No significant pericardial effusion.    The airways are patent.    There is a vague focus of groundglass attenuation within the right upper lobe, measuring up to 1.6 cm.  No significant volume of pleural fluid.  No pneumothorax.  No large focal consolidation.    The thoracic aorta tapers normally with atherosclerotic calcification.    The liver is hypoattenuating and enlarged, may reflect steatosis, correlation with LFTs recommended.  There is a subcentimeter hypoattenuating lesion within the left hepatic lobe, too small for characterization.  There are 2 faint foci of irregular peripheral enhancement involving the medial aspect and anterior aspect of the left hepatic lobe.  These foci normalize with hepatic parenchymal enhancement on delayed image, and may reflect perfusional anomaly versus flash filling hemangiomas.  Punctate hypoattenuating focus within the right hepatic lobe is too small for characterization.  The spleen, pancreas, gallbladder and adrenal glands are grossly unremarkable.  There is no biliary dilation.  There is thickening at the gastric fundus, nonspecific, correlation with direct visualization as warranted.  No high grade obstruction.  The portal vein, splenic vein, SMV, celiac axis and SMA all are grossly patent.  Scattered shotty periaortic and paracaval lymph nodes are noted.    The kidneys enhance symmetrically and excrete contrast appropriately without hydronephrosis or nephrolithiasis.  The bilateral ureters are grossly unremarkable along their course to the urinary bladder without calculi seen.  There is air within the urinary bladder, possibly from catheterization.  Please see below for complete description.  The prostate  is prominent.    Rectal contrast has been administered.  The rectum and distal sigmoid colon is unremarkable without wall thickening.  There is circumferential wall thickening involving the proximal sigmoid colon, without significant contrast passage through the region of thickening.  Wall thickening in the region measures up to 1.1 cm.  There is focal masslike thickening of the sigmoid colon, that abuts the urinary bladder.  There is urinary bladder wall thickening and distortion of the urinary bladder by this mass.  There is a focus of air and stool insinuating between the region of focal colonic thickening and the urinary bladder, finding is concerning for bladder wall invasion by colonic malignancy, with fistulous formation to the urinary bladder.  This may account for air within the urinary bladder.  Abscess in the region felt less likely.  There is surrounding inflammation, and several adjacent nonenlarged although numerous lymph nodes.  Diverticula are noted throughout the remainder of the colon, with moderate to large amount stool throughout the colon suggesting superimposed constipation.  Oral contrast is noted to the level of the transverse colon.  The terminal ileum is grossly unremarkable.  No pericecal inflammation.  The small bowel is grossly unremarkable.  Scattered shotty periaortic and paracaval lymph nodes are noted.  There is a small amount reactive fluid in the left hemipelvis.    There is a mixed s sclerotic focus within the posterior aspect of the right iliac bone, the appearance is not specific for metastatic disease, however metastatic focus is not excluded in the setting of probable colonic malignancy.  Several additional sclerotic foci are noted throughout the spine, likely degenerative in nature.  Schmorl's node involves the superior endplate of L1.  There is Oddi wall cachexia.  There are bilateral fat containing inguinal hernias.  No significant axillary lymphadenopathy.   Impression         1.  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  Air within the urinary bladder is suspected to be on the bases of the same although correlation for recent catheterization and clinical symptomatology.  There is indistinctness about the colon region, with several although nonenlarged lymph nodes, and disorganized fluid.  Please see above for full description.    2.  Vague groundglass focus of attenuation within the right upper lobe of the right lung, nonspecific, could reflect nonspecific inflammation or infection, metastatic disease however cannot be excluded in this setting.  Followup is advised.    3.  Constipation.    4.  Several additional findings described above.      3/12/18 pathology        SPECIMEN  1) Rectal polyps x2, 8 mm polyp and 5 mm polyp, hot snare polypectomies.  FINAL PATHOLOGIC DIAGNOSIS  Rectum, 8 mm and 5 mm polyps ×2, biopsy:  - Hyperplastic polyp, multiple fragments.     3/12/18 FINAL PATHOLOGIC DIAGNOSIS     Supplemental Diagnosis     MICROSATELLITE INSTABILITY, TUMOR:  RESULT SUMMARY:  -AMBERLY/MSI-L  RESULT:  MSI: AMBERLY/MSI-L (instability observed in 0 of 5 informative markers).  INTERPRETATION:  An AMBERLY/MSI-L phenotype suggests the presence of normal DNA mismatch repair function within the tumor .     1. Sigmoid colon) posterior bladder wall, mass, en bloc resection of sigmoid colon with attached posterior bladder wall:     Adenocarcinoma, measuring 4.7 cm in greatest dimension.  Tumor extends through the muscularis propria into the pericolorectal tissue with surrounding abcess formation with adherent bladder. There is no viable tumor seen invading bladder in multiple examined sections.  Proximal and distal colonic surgical margins are negative for malignancy.  Mesenteric surgical margin is negative for malignancy.  17 benign lymph nodes, negative for metastatic carcinoma (0/17).  See synoptic  report in comment section for further details.     2. Right lateral bladder wall margin, biopsy:     Negative for malignancy.     3. Inferior bladder wall margin, biopsy:     Negative for malignancy.     4. Left lateral bladder wall margin, biopsy:     Negative for malignancy.     5. Superior bladder wall, biopsy:     Negative for malignancy.     6. Second right lateral bladder wall margin, biopsy:     Negative for malignancy.     7. Second left lateral bladder wall margin, biopsy:  Negative for malignancy.     8. Left internal iliac lymph nodes, regional resection:     3 benign lymph nodes, negative for metastatic carcinoma (see 0/3).     9. Proximal sigmoid colon, segmental resection:     Colon with congested vasculature and no evidence of malignancy.  Resection margins are viable and negative for dysplasia or malignancy.  8 benign lymph nodes, negative for metastatic carcinoma (0/8).     10. Proximal donut, biopsy:     Colonic mucosa with no significant histopathologic abnormality.  No evidence of malignancy.     11. Distal donut, biopsy:     Colonic mucosa with no significant histopathologic abnormality.  No evidence of malignancy     Comment: Synoptic report  Procedure: Sigmoid colon with attached bladder wall; en bloc resection  Tumor site: Sigmoid colon  Tumor size:  Greatest dimension: 4.7 cm  Macroscopic tumor perforation: Grossly lesion involves the full extent of the bowel wall and erodes into the bladder  Histologic type: Adenocarcinoma  Histologic grade: G2 moderately differentiated.:  Microscopic tumor extension: Tumor invades through the muscularis propria into pericolic rectal tissue .  Margins:  Proximal margin: Uninvolved by invasive carcinoma  Distal margin: Uninvolved by invasive carcinoma  Mesenteric margin: Uninvolved  Circumferential (radial) margin: Tumor extends into pericolic rectal adipose tissue and has gross tumor perforation with surrounding abscess formation     Distance of invasive  "carcinoma from closest margin: 9 cm from surgical margin "A"  Treatment effect: No known presurgical treatment  Lymphovascular invasion: Not identified     Perineural invasion: Not identified  Tumor deposits: Not identified  Regional lymph nodes:  Number of lymph nodes involved: 0  Number of lymph nodes examined: 28  Pathologic staging:  Primary tumor:  pT3: Tumor invades through the muscularis propria into pericolic tissues .  Regional lymph nodes:  pN0: No regional lymph node metastasis  Number examined: 28  Number involved: 0  Distant metastasis:  pMX: Cannot be assessed.                  4/27/18 CT     COMPARISON:  CT chest abdomen pelvis 03/09/2018, abdominal radiograph 03/14/2018    FINDINGS:  Thoracic soft tissues: No significant abnormality.    Aorta: Normal in caliber, course, and contour.  There are three branching vessels at the arch. Significant calcific atherosclerosis involving the coronary arteries in a multivessel distribution, the origins of the great vessels,  and the aortic arch.    Heart: Normal in size with trace pericardial effusion likely of minimal clinical significance.    Myra/Mediastinum: No significant lymphadenopathy    Lungs: Well expanded bilaterally without consolidation, mass, or pleural effusion.  Stable focus of nonspecific ground-glass attenuation within the right upper lobe measuring approximately 1.6 cm (axial series 2, image 23); recommend continued follow-up with expected oncology surveillance.    Liver: Hepatomegaly, similar to prior.  Stable subcentimeter hypodensities within the left and right hepatic lobes, too small to fully characterize but possibly representing cysts.    Gallbladder: Mild wall thickening likely secondary to decompression.  No internal calcified gallstones or pericholecystic fluid.    Bile Ducts: No evidence of dilated ducts.    Pancreas: No mass or peripancreatic fat stranding.    Spleen: Unremarkable.    Adrenals: Unremarkable.    Kidneys: Normal in " size and location without focal abnormality.  Normal physiologic ability to concentrate contrast appropriately.    Bladder/ureters/prostate: Postsurgical changes status post partial cystectomy with bladder repair, partial prostatectomy, and left ureteral reimplantation.  Bladder is not distended on today's examination and is thus poorly evaluated.  Bilateral double-J stents extending from the collecting systems to the bladder.  Of note, right double-J ureteral stent begins at the level of the ureteropelvic junction while the left ureteral stent begins within the renal pelvis.  No hydronephrosis or nephrolithiasis. No ureteral dilatation.    GI Tract/Mesentery: Postsurgical changes status post sigmoid colectomy and diverting loop ileostomy.  No evidence of recurrent disease within the surgical bed.    Peritoneal Space: No ascites. No free air.    Retroperitoneum:  No significant adenopathy.    Abdominal wall:  Diverting loop ileostomy exiting the right upper abdominal quadrant.  Healing midline incision.  No significant abnormalities.    Vasculature: Significant calcific atherosclerosis involving the infrarenal abdominal aorta with extension into the proximal iliac arteries.  No aneurysm    Bones: Moderate degenerative changes of the visualized osseous structures without acute fracture or destructive osseous process.  Grade 1 retrolisthesis of L5 on S1.  Stable wedge compression fracture of L1.   Impression        In this patient with history of colon cancer, there have been extensive interval postsurgical changes involving the bladder and bowel as detailed above.  No evidence of recurrent or metastatic disease.  Of note, bladder is decompressed on today's examination and poorly evaluated.    Stable focus of nonspecific ground-glass attenuation within the right upper lobe; recommend continue follow-up with expected surveillance.    Stable hepatic hypodensities too small to fully characterize but likely representing  hepatic cyst.    Additional, stable findings as above.    There are no measurable lesions per RECIST criteria.        Component      Latest Ref Rng & Units 7/30/2018   WBC      3.90 - 12.70 K/uL 6.45   RBC      4.60 - 6.20 M/uL 4.72   Hemoglobin      14.0 - 18.0 g/dL 14.3   Hematocrit      40.0 - 54.0 % 42.6   MCV      82 - 98 fL 90   MCH      27.0 - 31.0 pg 30.3   MCHC      32.0 - 36.0 g/dL 33.6   RDW      11.5 - 14.5 % 16.4 (H)   Platelets      150 - 350 K/uL 296   MPV      9.2 - 12.9 fL 9.8   Immature Granulocytes      0.0 - 0.5 % 0.5   Gran # (ANC)      1.8 - 7.7 K/uL 2.1   Immature Grans (Abs)      0.00 - 0.04 K/uL 0.03   Lymph #      1.0 - 4.8 K/uL 3.8   Mono #      0.3 - 1.0 K/uL 0.4   Eos #      0.0 - 0.5 K/uL 0.1   Baso #      0.00 - 0.20 K/uL 0.04   nRBC      0 /100 WBC 0   Gran%      38.0 - 73.0 % 33.0 (L)   Lymph%      18.0 - 48.0 % 58.3 (H)   Mono%      4.0 - 15.0 % 6.5   Eosinophil%      0.0 - 8.0 % 1.1   Basophil%      0.0 - 1.9 % 0.6   Differential Method       Automated   Sodium      136 - 145 mmol/L 140   Potassium      3.5 - 5.1 mmol/L 4.0   Chloride      95 - 110 mmol/L 111 (H)   CO2      23 - 29 mmol/L 17 (L)   Glucose      70 - 110 mg/dL 119 (H)   BUN, Bld      6 - 20 mg/dL 15   Creatinine      0.5 - 1.4 mg/dL 1.0   Calcium      8.7 - 10.5 mg/dL 9.3   Total Protein      6.0 - 8.4 g/dL 8.4   Albumin      3.5 - 5.2 g/dL 4.3   Total Bilirubin      0.1 - 1.0 mg/dL 0.4   Alkaline Phosphatase      55 - 135 U/L 81   AST      10 - 40 U/L 49 (H)   ALT      10 - 44 U/L 66 (H)   Anion Gap      8 - 16 mmol/L 12   eGFR if African American      >60 mL/min/1.73 m:2 >60.0   eGFR if non African American      >60 mL/min/1.73 m:2 >60.0        ASSESSMENT:     1. Sigmoid colon adenocarcinoma, [id number]  2. Lung lesion on CT  3. Weight loss, unintentional  4. Chronic hepatitis C, without coma  5. Tachycardia  6. Hypertension, essential  7. Coronary artery disease  8. H/o depression  9. Anxiety  10. Insomnia  11.  Anemia  12. Transaminitis  13. Chronic smoker        Plan:     1,2: He has AJCC stage II disease. There were no clear metastatic lesions on CT done in March 2018. However, there was a groundglass focus of attenuation within the right upper lobe of the right lung. It is unclear if this is atelactasis/pnemonia/metastatic lesion. He cannot have PET CT due to insurance. Repeat CT done on 4/27/18 again showed a focus of nonspecific ground-glass attenuation within the right upper lobe that was previously noted. No other lesions to suspect metastases. He is MSI stable/low. No clear high risk features other than macroscopic perforation. Margins were clear. He has no fecal matter in urine/air in his urine at this time. No clear, established role for adjuvant FOLFOX. . I discussed role for adjuvant 5FU, given weekly for 6 weeks each cycle and off for 2 weeks, for a total of 6 months.  He started 5FU on 5/7/18. However, he received only 3 infusions ( once weekly x 7 days). He was very agitated and inebriated when he presented for subsequent treatments and had to be held. He is now sober.  He has finished cycle 1 and is here before C2 D1. .        4. He has untreated chronic hepatitis C genotype 1a.. He has been referred to liver clinic. HCV reactivation/ fulminant hepatitis after chemotherapy has been rarely reported. He also has positive hepatitis B antibody.      5,6: He will need treatment with betablocker if persistent.      7. No symptoms at this time. He is on Aspirin     8,9,10. He has been referred to psychologist. He requested Ativan for chronic anxiety and was prescribed on 7/30/18     11. Possibly due to chemotherapy     12.  Grade 1. Will monitor. From chemotherapy versus Hepatitis C. Again stressed the importance of stopping alcohol use    13. Assistance with smoking cessation was offered, including:  []  Medications  [x]  Counseling  []  Printed Information on Smoking Cessation  []  Referral to a Smoking  Cessation Program    Patient was counseled regarding smoking for 3-10 minutes.

## 2018-08-02 ENCOUNTER — DOCUMENTATION ONLY (OUTPATIENT)
Dept: TRANSPLANT | Facility: CLINIC | Age: 53
End: 2018-08-02

## 2018-08-02 NOTE — NURSING
Pt records reviewed.   Pt will be referred to Hepatitis C clinic    Initial referral received  from the [facility].   Referring Provider/diagnosis    [facility] Provider: Cole Marin MD   Diagnosis: Chronic hepatitis C without hepatic coma        Referral letter sent to  patient.

## 2018-08-02 NOTE — LETTER
August 2, 2018    Chris Aguirre  [address] [place] [address]      Dear Chris Aguirre:    Your doctor has referred you to the [facility]. We are sending this letter as a reminder for you to make an appointment with us to complete the referral process.   Please call us at your earliest convenience at [phone] to schedule your appointment.  We look forward to seeing you soon.  If you have already been scheduled, please disregard this letter.      Sincerely,        [facility]   [address] [place] [id number]  [phone]

## 2018-08-04 ENCOUNTER — INFUSION (OUTPATIENT)
Dept: INFUSION THERAPY | Facility: HOSPITAL | Age: 53
End: 2018-08-04
Attending: INTERNAL MEDICINE
Payer: MEDICAID

## 2018-08-04 VITALS — HEART RATE: 105 BPM | SYSTOLIC BLOOD PRESSURE: 135 MMHG | RESPIRATION RATE: 18 BRPM | DIASTOLIC BLOOD PRESSURE: 95 MMHG

## 2018-08-04 DIAGNOSIS — C18.8 OVERLAPPING MALIGNANT NEOPLASM OF COLON: Primary | ICD-10-CM

## 2018-08-04 PROCEDURE — 25000003 PHARM REV CODE 250: Performed by: INTERNAL MEDICINE

## 2018-08-04 PROCEDURE — 63600175 PHARM REV CODE 636 W HCPCS: Performed by: INTERNAL MEDICINE

## 2018-08-04 PROCEDURE — 96523 IRRIG DRUG DELIVERY DEVICE: CPT

## 2018-08-04 PROCEDURE — A4216 STERILE WATER/SALINE, 10 ML: HCPCS | Performed by: INTERNAL MEDICINE

## 2018-08-04 RX ORDER — SODIUM CHLORIDE 0.9 % (FLUSH) 0.9 %
10 SYRINGE (ML) INJECTION
Status: DISCONTINUED | OUTPATIENT
Start: 2018-08-04 | End: 2018-08-04 | Stop reason: HOSPADM

## 2018-08-04 RX ORDER — HEPARIN 100 UNIT/ML
500 SYRINGE INTRAVENOUS
Status: DISCONTINUED | OUTPATIENT
Start: 2018-08-04 | End: 2018-08-04 | Stop reason: HOSPADM

## 2018-08-04 RX ADMIN — HEPARIN 500 UNITS: 100 SYRINGE at 12:08

## 2018-08-04 RX ADMIN — SODIUM CHLORIDE, PRESERVATIVE FREE 10 ML: 5 INJECTION INTRAVENOUS at 12:08

## 2018-08-06 ENCOUNTER — INFUSION (OUTPATIENT)
Dept: INFUSION THERAPY | Facility: HOSPITAL | Age: 53
End: 2018-08-06
Attending: INTERNAL MEDICINE
Payer: MEDICAID

## 2018-08-06 VITALS
HEIGHT: 71 IN | WEIGHT: 165 LBS | SYSTOLIC BLOOD PRESSURE: 137 MMHG | RESPIRATION RATE: 18 BRPM | HEART RATE: 102 BPM | BODY MASS INDEX: 23.1 KG/M2 | DIASTOLIC BLOOD PRESSURE: 97 MMHG

## 2018-08-06 DIAGNOSIS — C18.8 OVERLAPPING MALIGNANT NEOPLASM OF COLON: Primary | ICD-10-CM

## 2018-08-06 LAB
ALBUMIN SERPL BCP-MCNC: 3.7 G/DL
ALP SERPL-CCNC: 92 U/L
ALT SERPL W/O P-5'-P-CCNC: 53 U/L
ANION GAP SERPL CALC-SCNC: 10 MMOL/L
AST SERPL-CCNC: 52 U/L
BASOPHILS # BLD AUTO: 0.02 K/UL
BASOPHILS NFR BLD: 0.3 %
BILIRUB SERPL-MCNC: 0.2 MG/DL
BUN SERPL-MCNC: 7 MG/DL
CALCIUM SERPL-MCNC: 9.7 MG/DL
CHLORIDE SERPL-SCNC: 105 MMOL/L
CO2 SERPL-SCNC: 24 MMOL/L
CREAT SERPL-MCNC: 0.9 MG/DL
DIFFERENTIAL METHOD: ABNORMAL
EOSINOPHIL # BLD AUTO: 0.1 K/UL
EOSINOPHIL NFR BLD: 0.8 %
ERYTHROCYTE [DISTWIDTH] IN BLOOD BY AUTOMATED COUNT: 15.9 %
EST. GFR  (AFRICAN AMERICAN): >60 ML/MIN/1.73 M^2
EST. GFR  (NON AFRICAN AMERICAN): >60 ML/MIN/1.73 M^2
GLUCOSE SERPL-MCNC: 133 MG/DL
HCT VFR BLD AUTO: 42.2 %
HGB BLD-MCNC: 14.3 G/DL
IMM GRANULOCYTES # BLD AUTO: 0.02 K/UL
IMM GRANULOCYTES NFR BLD AUTO: 0.3 %
LYMPHOCYTES # BLD AUTO: 4 K/UL
LYMPHOCYTES NFR BLD: 65.6 %
MCH RBC QN AUTO: 30.6 PG
MCHC RBC AUTO-ENTMCNC: 33.9 G/DL
MCV RBC AUTO: 90 FL
MONOCYTES # BLD AUTO: 0.3 K/UL
MONOCYTES NFR BLD: 5 %
NEUTROPHILS # BLD AUTO: 1.7 K/UL
NEUTROPHILS NFR BLD: 28 %
NRBC BLD-RTO: 0 /100 WBC
PLATELET # BLD AUTO: 235 K/UL
PMV BLD AUTO: 10.1 FL
POTASSIUM SERPL-SCNC: 3.2 MMOL/L
PROT SERPL-MCNC: 7.7 G/DL
RBC # BLD AUTO: 4.67 M/UL
SODIUM SERPL-SCNC: 139 MMOL/L
WBC # BLD AUTO: 6.04 K/UL

## 2018-08-06 PROCEDURE — 85025 COMPLETE CBC W/AUTO DIFF WBC: CPT

## 2018-08-06 PROCEDURE — 25000003 PHARM REV CODE 250: Performed by: INTERNAL MEDICINE

## 2018-08-06 PROCEDURE — 63600175 PHARM REV CODE 636 W HCPCS: Performed by: INTERNAL MEDICINE

## 2018-08-06 PROCEDURE — 96416 CHEMO PROLONG INFUSE W/PUMP: CPT

## 2018-08-06 PROCEDURE — 80053 COMPREHEN METABOLIC PANEL: CPT

## 2018-08-06 PROCEDURE — A4216 STERILE WATER/SALINE, 10 ML: HCPCS | Performed by: INTERNAL MEDICINE

## 2018-08-06 RX ORDER — SODIUM CHLORIDE 0.9 % (FLUSH) 0.9 %
10 SYRINGE (ML) INJECTION
Status: COMPLETED | OUTPATIENT
Start: 2018-08-06 | End: 2018-08-06

## 2018-08-06 RX ORDER — HEPARIN 100 UNIT/ML
500 SYRINGE INTRAVENOUS
Status: CANCELLED | OUTPATIENT
Start: 2018-08-06

## 2018-08-06 RX ORDER — SODIUM CHLORIDE 0.9 % (FLUSH) 0.9 %
10 SYRINGE (ML) INJECTION
Status: CANCELLED | OUTPATIENT
Start: 2018-08-06

## 2018-08-06 RX ORDER — HEPARIN 100 UNIT/ML
500 SYRINGE INTRAVENOUS
Status: COMPLETED | OUTPATIENT
Start: 2018-08-06 | End: 2018-08-06

## 2018-08-06 RX ADMIN — HEPARIN 500 UNITS: 100 SYRINGE at 01:08

## 2018-08-06 RX ADMIN — FLUOROURACIL 2150 MG: 50 INJECTION, SOLUTION INTRAVENOUS at 02:08

## 2018-08-06 RX ADMIN — Medication 10 ML: at 01:08

## 2018-08-06 NOTE — PLAN OF CARE
Problem: Patient Care Overview  Goal: Plan of Care Review  Outcome: Ongoing (interventions implemented as appropriate)  Patient placed on 5fu Cadd pump, infusing at 2.1 ml/hr. Settings verified by 2 RNs. Labs also reviewed prior by Dr. Marin. Plan to rtc Saturday to dc pump. Verbalized understanding to call MD for any questions/concerns. Discharged home, ambulated independently with mother by side.

## 2018-08-11 ENCOUNTER — INFUSION (OUTPATIENT)
Dept: INFUSION THERAPY | Facility: HOSPITAL | Age: 53
End: 2018-08-11
Attending: INTERNAL MEDICINE
Payer: MEDICAID

## 2018-08-11 VITALS — SYSTOLIC BLOOD PRESSURE: 150 MMHG | DIASTOLIC BLOOD PRESSURE: 98 MMHG | RESPIRATION RATE: 18 BRPM | HEART RATE: 88 BPM

## 2018-08-11 DIAGNOSIS — C18.8 OVERLAPPING MALIGNANT NEOPLASM OF COLON: Primary | ICD-10-CM

## 2018-08-11 PROCEDURE — A4216 STERILE WATER/SALINE, 10 ML: HCPCS | Performed by: INTERNAL MEDICINE

## 2018-08-11 PROCEDURE — 25000003 PHARM REV CODE 250: Performed by: INTERNAL MEDICINE

## 2018-08-11 PROCEDURE — 96523 IRRIG DRUG DELIVERY DEVICE: CPT

## 2018-08-11 PROCEDURE — 63600175 PHARM REV CODE 636 W HCPCS: Performed by: INTERNAL MEDICINE

## 2018-08-11 RX ORDER — SODIUM CHLORIDE 0.9 % (FLUSH) 0.9 %
10 SYRINGE (ML) INJECTION
Status: DISCONTINUED | OUTPATIENT
Start: 2018-08-11 | End: 2018-08-11 | Stop reason: HOSPADM

## 2018-08-11 RX ORDER — HEPARIN 100 UNIT/ML
500 SYRINGE INTRAVENOUS
Status: DISCONTINUED | OUTPATIENT
Start: 2018-08-11 | End: 2018-08-11 | Stop reason: HOSPADM

## 2018-08-11 RX ADMIN — HEPARIN 500 UNITS: 100 SYRINGE at 12:08

## 2018-08-11 RX ADMIN — Medication 10 ML: at 12:08

## 2018-08-13 ENCOUNTER — INFUSION (OUTPATIENT)
Dept: INFUSION THERAPY | Facility: HOSPITAL | Age: 53
End: 2018-08-13
Attending: INTERNAL MEDICINE
Payer: MEDICAID

## 2018-08-13 VITALS
SYSTOLIC BLOOD PRESSURE: 125 MMHG | BODY MASS INDEX: 22.72 KG/M2 | WEIGHT: 162.25 LBS | DIASTOLIC BLOOD PRESSURE: 93 MMHG | HEART RATE: 83 BPM | TEMPERATURE: 99 F | HEIGHT: 71 IN | RESPIRATION RATE: 18 BRPM

## 2018-08-13 DIAGNOSIS — C18.8 OVERLAPPING MALIGNANT NEOPLASM OF COLON: Primary | ICD-10-CM

## 2018-08-13 PROCEDURE — 63600175 PHARM REV CODE 636 W HCPCS: Performed by: INTERNAL MEDICINE

## 2018-08-13 PROCEDURE — 25000003 PHARM REV CODE 250: Performed by: INTERNAL MEDICINE

## 2018-08-13 PROCEDURE — 96416 CHEMO PROLONG INFUSE W/PUMP: CPT

## 2018-08-13 RX ADMIN — FLUOROURACIL 2150 MG: 50 INJECTION, SOLUTION INTRAVENOUS at 02:08

## 2018-08-13 NOTE — PLAN OF CARE
Problem: Patient Care Overview  Goal: Plan of Care Review  Outcome: Ongoing (interventions implemented as appropriate)  Pt connected to 5fu CADD pump with no complications. VSS. Pt instructed to call MD with any problems and RTC on Saturday AM for pump dc. NAD. Pt discharged home independently with CADD pump infusing.

## 2018-08-16 ENCOUNTER — INITIAL CONSULT (OUTPATIENT)
Dept: HEPATOLOGY | Facility: CLINIC | Age: 53
End: 2018-08-16
Payer: MEDICAID

## 2018-08-16 ENCOUNTER — LAB VISIT (OUTPATIENT)
Dept: LAB | Facility: HOSPITAL | Age: 53
End: 2018-08-16
Payer: MEDICAID

## 2018-08-16 VITALS
RESPIRATION RATE: 18 BRPM | HEART RATE: 85 BPM | OXYGEN SATURATION: 100 % | TEMPERATURE: 98 F | SYSTOLIC BLOOD PRESSURE: 130 MMHG | WEIGHT: 161.81 LBS | DIASTOLIC BLOOD PRESSURE: 84 MMHG | BODY MASS INDEX: 22.65 KG/M2 | HEIGHT: 71 IN

## 2018-08-16 DIAGNOSIS — R74.8 ABNORMAL TRANSAMINASES: ICD-10-CM

## 2018-08-16 DIAGNOSIS — B18.2 CHRONIC HEPATITIS C WITHOUT HEPATIC COMA: ICD-10-CM

## 2018-08-16 DIAGNOSIS — B18.2 CHRONIC HEPATITIS C WITHOUT HEPATIC COMA: Primary | ICD-10-CM

## 2018-08-16 LAB
HEPATITIS A ANTIBODY, IGG: NEGATIVE
HIV 1+2 AB+HIV1 P24 AG SERPL QL IA: NEGATIVE
INR PPP: 0.9
PROTHROMBIN TIME: 9.9 SEC

## 2018-08-16 PROCEDURE — 85610 PROTHROMBIN TIME: CPT

## 2018-08-16 PROCEDURE — 99214 OFFICE O/P EST MOD 30 MIN: CPT | Mod: PBBFAC | Performed by: PHYSICIAN ASSISTANT

## 2018-08-16 PROCEDURE — 86790 VIRUS ANTIBODY NOS: CPT

## 2018-08-16 PROCEDURE — 99999 PR PBB SHADOW E&M-EST. PATIENT-LVL IV: CPT | Mod: PBBFAC,,, | Performed by: PHYSICIAN ASSISTANT

## 2018-08-16 PROCEDURE — 86703 HIV-1/HIV-2 1 RESULT ANTBDY: CPT

## 2018-08-16 PROCEDURE — 99214 OFFICE O/P EST MOD 30 MIN: CPT | Mod: S$PBB,,, | Performed by: PHYSICIAN ASSISTANT

## 2018-08-16 PROCEDURE — 87522 HEPATITIS C REVRS TRNSCRPJ: CPT

## 2018-08-16 NOTE — PROGRESS NOTES
HEPATOLOGY CLINIC VISIT NOTE - HCV clinic    REFERRING PROVIDER: Teresa Marin MD    CHIEF COMPLAINT: Hepatitis C   (here w/ mother)    HISTORY: This is a 53 y.o. White male with recently diagnosed stage II sigmoid colon cancer (s/p sigmoid colectomy and ileostomy and partial cystectomy 3/2018, now on chemo w/ 5FU) as well as chronic hepatitis C, here for further eval / mngmt.     HCV history:  Originally diagnosed 2001 while in [facility]  Risks for HCV:  Tattoo placement - first one age 25    IVDA and nasal 18-20 yrs old    In [facility] 9240-1993  - Treatment naive  - Genotype 1a  - NS5A resistance not known  - HCV RNA 77,442 IU/mL - 4/2018    Liver staging:  No formal liver staging  Recent labs and imaging earlier this year reveal no evidence of advanced fibrosis or portal HTN    Feels okay  Some fatigue  Denies jaundice, dark urine, abdominal distention, hematemesis, melena, slowed mentation.   No abnormal skin rashes. No generalized joint pain.                     Past Medical History:   Diagnosis Date    Anxiety about health 6/20/2018    Colon cancer     Coronary artery disease     Depression     Hepatitis C 3/9/2018    History of psychiatric care     History of psychiatric hospitalization     Hypertension     MI (myocardial infarction)     6 months ago    Neuropathy     Psychiatric problem     Psychosis     Self-harming behavior     Suicide attempt        Past Surgical History:   Procedure Laterality Date    ABDOMINAL SURGERY      History of perforated ulcer, unknown surgery    APPENDECTOMY         FAMILY HISTORY: Negative for liver disease    SOCIAL HISTORY:   Social History     Tobacco Use   Smoking Status Current Every Day Smoker    Packs/day: 2.00    Years: 30.00    Pack years: 60.00   Smokeless Tobacco Never Used     Alcohol - daily alcohol for many years but has decreased as of 3/2018, now has pint monthly  Drugs - IVDA and nasal 18-20 yrs old      ROS:   No fever, chills, weight loss, (+)  fatigue  No chest pain, palpitations, dyspnea, cough  No abdominal pain, nausea, vomiting, GERD  No skin rashes   No headaches  No lower extremity edema  No depression or anxiety      PHYSICAL EXAM:  Friendly White male, in no acute distress; alert and oriented to person, place and time  VITALS: reviewed  HEENT: Sclerae anicteric.   NECK: Supple  CVS: Regular rate and rhythm. No murmurs  CHEST: Port in place on right  LUNGS: Normal respiratory effort. Clear bilaterally  ABDOMEN: Flat, soft, nontender. Stoma in place on right side of abdomen. No organomegaly or masses. No ascites or hernias. Good bowel sounds.    SKIN: Warm and dry. No jaundice, No obvious rashes. (+) tattoos  EXTREMITIES: No lower extremity edema  NEURO/PSYCH: Normal gate. Memory intact. Thought and speech pattern appropriate. Behavior normal. No depression or anxiety noted.      RECENT LABS:  Lab Results   Component Value Date    WBC 9.66 08/13/2018    HGB 13.6 (L) 08/13/2018     08/13/2018     Lab Results   Component Value Date    INR 1.0 05/14/2018     Lab Results   Component Value Date    AST 44 (H) 08/13/2018    ALT 48 (H) 08/13/2018    BILITOT 0.6 08/13/2018    ALBUMIN 4.1 08/13/2018    ALKPHOS 84 08/13/2018    CREATININE 0.9 08/13/2018    BUN 8 08/13/2018     (L) 08/13/2018    K 3.5 08/13/2018    AFP 1.8 04/13/2018         RECENT IMAGING:  Results for orders placed during the hospital encounter of 04/27/18   CT Abdomen Pelvis With Contrast    Narrative EXAMINATION:  CT ABDOMEN PELVIS WITH CONTRAST    CLINICAL HISTORY:  colon cancer staging; Malignant neoplasm of overlapping sites of colon    TECHNIQUE:  Axial images of the chest, abdomen, and pelvis were acquired after the use of 75 cc [id number] IV contrast. 30 cc Omnipaque 350 oral contrast also administered.  Coronal and sagittal reconstructions were also obtained    COMPARISON:  CT chest abdomen pelvis 03/09/2018, abdominal radiograph 03/14/2018    FINDINGS:  Thoracic soft  tissues: No significant abnormality.    Aorta: Normal in caliber, course, and contour.  There are three branching vessels at the arch. Significant calcific atherosclerosis involving the coronary arteries in a multivessel distribution, the origins of the great vessels,  and the aortic arch.    Heart: Normal in size with trace pericardial effusion likely of minimal clinical significance.    Myra/Mediastinum: No significant lymphadenopathy    Lungs: Well expanded bilaterally without consolidation, mass, or pleural effusion.  Stable focus of nonspecific ground-glass attenuation within the right upper lobe measuring approximately 1.6 cm (axial series 2, image 23); recommend continued follow-up with expected oncology surveillance.    Liver: Hepatomegaly, similar to prior.  Stable subcentimeter hypodensities within the left and right hepatic lobes, too small to fully characterize but possibly representing cysts.    Gallbladder: Mild wall thickening likely secondary to decompression.  No internal calcified gallstones or pericholecystic fluid.    Bile Ducts: No evidence of dilated ducts.    Pancreas: No mass or peripancreatic fat stranding.    Spleen: Unremarkable.    Adrenals: Unremarkable.    Kidneys: Normal in size and location without focal abnormality.  Normal physiologic ability to concentrate contrast appropriately.    Bladder/ureters/prostate: Postsurgical changes status post partial cystectomy with bladder repair, partial prostatectomy, and left ureteral reimplantation.  Bladder is not distended on today's examination and is thus poorly evaluated.  Bilateral double-J stents extending from the collecting systems to the bladder.  Of note, right double-J ureteral stent begins at the level of the ureteropelvic junction while the left ureteral stent begins within the renal pelvis.  No hydronephrosis or nephrolithiasis. No ureteral dilatation.    GI Tract/Mesentery: Postsurgical changes status post sigmoid colectomy and  diverting loop ileostomy.  No evidence of recurrent disease within the surgical bed.    Peritoneal Space: No ascites. No free air.    Retroperitoneum:  No significant adenopathy.    Abdominal wall:  Diverting loop ileostomy exiting the right upper abdominal quadrant.  Healing midline incision.  No significant abnormalities.    Vasculature: Significant calcific atherosclerosis involving the infrarenal abdominal aorta with extension into the proximal iliac arteries.  No aneurysm    Bones: Moderate degenerative changes of the visualized osseous structures without acute fracture or destructive osseous process.  Grade 1 retrolisthesis of L5 on S1.  Stable wedge compression fracture of L1.      Impression In this patient with history of colon cancer, there have been extensive interval postsurgical changes involving the bladder and bowel as detailed above.  No evidence of recurrent or metastatic disease.  Of note, bladder is decompressed on today's examination and poorly evaluated.    Stable focus of nonspecific ground-glass attenuation within the right upper lobe; recommend continue follow-up with expected surveillance.    Stable hepatic hypodensities too small to fully characterize but likely representing hepatic cyst.    Additional, stable findings as above.    There are no measurable lesions per RECIST criteria.    This report was flagged in Epic as abnormal.    Electronically signed by resident: Zackery Rob  Date:    04/27/2018  Time:    13:57    Electronically signed by: Maurice Mckay MD  Date:    04/27/2018  Time:    15:24         ASSESSMENT  53 y.o. White male with:  1. CHRONIC HEPATITIS C, GENOTYPE 1a - treatment naive  -- Unknown Immunity to HAV   -- risk of HCV flare w/ chemo    2. PRIOR HBV EXPOSURE  -- Pos HBcAb, neg HBsAg -- risk of reactivation w/ chemo    3. MILD TRANSAMINASE ELEVATION  -- due to HCV? Due to Chemo?    4.  SIGMOID COLON CANCER  -- s/p surgery 3/2018, now on 5FU     5. HISTORY OF ALCOHOL  USE  -- has decreased this but still drinking some as noted above      EDUCATION:  The natural history of Hepatitis C, including potential progression to cirrhosis was reviewed. We discussed the increased progression of liver disease secondary to alcohol use; patient was advised to avoid alcohol completely.     Transmission of Hepatitis C was reviewed, including possible sexual transmission. Sexual contacts should be screened.   Patient should avoid sharing personal products such as razors, toothbrushes, etc.     Recommend avoiding raw seafood.  Limit acetaminophen to 2000mg daily.          PLAN:  1. Labs  Orders Placed This Encounter   Procedures    US Elastography Liver    Hepatitis A antibody, IgG    Protime-INR    Hepatitis C RNA, quantitative, PCR    HIV-1 and HIV-2 antibodies     2. F/u visit w/ goal of antiviral therapy - especially in light of chemo and risk for HCV flare

## 2018-08-16 NOTE — LETTER
August 16, 2018      Teresa Marin MD  [address]  [facility] [address]           Ovi Fishman - Hepatitis C  [address]  [facility] [address]  Phone: [phone]  Fax: [phone]          Patient: Chris Aguirre Jr.   MR Number: [id number]   YOB: 1965   Date of Visit: 8/16/2018       Dear Dr. Teresa Marin:    Thank you for referring Chris Aguirre to me for evaluation. Attached you will find relevant portions of my assessment and plan of care.    If you have questions, please do not hesitate to call me. I look forward to following Chris Aguirre along with you.    Sincerely,    Jennifer B. Scheuermann, PA    Enclosure  CC:  No Recipients    If you would like to receive this communication electronically, please contact [email or web address] or [phone] to request more information on [employer] Link access.    For providers and/or their staff who would like to refer a patient to [facility], please contact us through our one-stop-shop provider referral line, [facility] [place], at [phone].    If you feel you have received this communication in error or would no longer like to receive these types of communications, please e-mail [email or web address]

## 2018-08-18 ENCOUNTER — INFUSION (OUTPATIENT)
Dept: INFUSION THERAPY | Facility: HOSPITAL | Age: 53
End: 2018-08-18
Attending: INTERNAL MEDICINE
Payer: MEDICAID

## 2018-08-18 VITALS
TEMPERATURE: 98 F | HEART RATE: 88 BPM | DIASTOLIC BLOOD PRESSURE: 82 MMHG | SYSTOLIC BLOOD PRESSURE: 129 MMHG | RESPIRATION RATE: 18 BRPM

## 2018-08-18 DIAGNOSIS — C18.8 OVERLAPPING MALIGNANT NEOPLASM OF COLON: Primary | ICD-10-CM

## 2018-08-18 PROCEDURE — 96523 IRRIG DRUG DELIVERY DEVICE: CPT

## 2018-08-18 PROCEDURE — A4216 STERILE WATER/SALINE, 10 ML: HCPCS | Performed by: INTERNAL MEDICINE

## 2018-08-18 PROCEDURE — 63600175 PHARM REV CODE 636 W HCPCS: Performed by: INTERNAL MEDICINE

## 2018-08-18 PROCEDURE — 25000003 PHARM REV CODE 250: Performed by: INTERNAL MEDICINE

## 2018-08-18 RX ORDER — HEPARIN 100 UNIT/ML
500 SYRINGE INTRAVENOUS
Status: DISCONTINUED | OUTPATIENT
Start: 2018-08-18 | End: 2018-08-18 | Stop reason: HOSPADM

## 2018-08-18 RX ORDER — SODIUM CHLORIDE 0.9 % (FLUSH) 0.9 %
10 SYRINGE (ML) INJECTION
Status: DISCONTINUED | OUTPATIENT
Start: 2018-08-18 | End: 2018-08-18 | Stop reason: HOSPADM

## 2018-08-18 RX ADMIN — HEPARIN 500 UNITS: 100 SYRINGE at 10:08

## 2018-08-18 RX ADMIN — SODIUM CHLORIDE, PRESERVATIVE FREE 10 ML: 5 INJECTION INTRAVENOUS at 10:08

## 2018-08-18 NOTE — NURSING
Pt. Here today for ambulatory pump d/c. Infusion complete. VSS. Port hep-locked and de-accessed. No questions at this time.

## 2018-08-20 ENCOUNTER — INFUSION (OUTPATIENT)
Dept: INFUSION THERAPY | Facility: HOSPITAL | Age: 53
End: 2018-08-20
Attending: INTERNAL MEDICINE
Payer: MEDICAID

## 2018-08-20 ENCOUNTER — OFFICE VISIT (OUTPATIENT)
Dept: WOUND CARE | Facility: CLINIC | Age: 53
End: 2018-08-20
Payer: MEDICAID

## 2018-08-20 VITALS
SYSTOLIC BLOOD PRESSURE: 119 MMHG | HEART RATE: 63 BPM | TEMPERATURE: 98 F | RESPIRATION RATE: 16 BRPM | DIASTOLIC BLOOD PRESSURE: 72 MMHG

## 2018-08-20 DIAGNOSIS — Z93.2 ILEOSTOMY STATUS: Primary | ICD-10-CM

## 2018-08-20 DIAGNOSIS — L24.9 IRRITANT CONTACT DERMATITIS DUE TO ILEOSTOMY: ICD-10-CM

## 2018-08-20 DIAGNOSIS — C18.8 OVERLAPPING MALIGNANT NEOPLASM OF COLON: Primary | ICD-10-CM

## 2018-08-20 DIAGNOSIS — K94.19 IRRITANT CONTACT DERMATITIS DUE TO ILEOSTOMY: ICD-10-CM

## 2018-08-20 LAB
HCV LOG: 3.8 LOG (10) IU/ML
HCV RNA QUANT PCR: ABNORMAL IU/ML
HCV, QUALITATIVE: DETECTED IU/ML

## 2018-08-20 PROCEDURE — 25000003 PHARM REV CODE 250: Performed by: INTERNAL MEDICINE

## 2018-08-20 PROCEDURE — 96416 CHEMO PROLONG INFUSE W/PUMP: CPT

## 2018-08-20 PROCEDURE — 99999 PR PBB SHADOW E&M-EST. PATIENT-LVL II: CPT | Mod: PBBFAC,,, | Performed by: CLINICAL NURSE SPECIALIST

## 2018-08-20 PROCEDURE — 99212 OFFICE O/P EST SF 10 MIN: CPT | Mod: PBBFAC,25 | Performed by: CLINICAL NURSE SPECIALIST

## 2018-08-20 PROCEDURE — 99213 OFFICE O/P EST LOW 20 MIN: CPT | Mod: S$PBB,,, | Performed by: CLINICAL NURSE SPECIALIST

## 2018-08-20 PROCEDURE — 63600175 PHARM REV CODE 636 W HCPCS: Performed by: INTERNAL MEDICINE

## 2018-08-20 RX ORDER — HEPARIN 100 UNIT/ML
500 SYRINGE INTRAVENOUS
Status: CANCELLED | OUTPATIENT
Start: 2018-08-30

## 2018-08-20 RX ORDER — SODIUM CHLORIDE 0.9 % (FLUSH) 0.9 %
10 SYRINGE (ML) INJECTION
Status: CANCELLED | OUTPATIENT
Start: 2018-08-30

## 2018-08-20 RX ADMIN — FLUOROURACIL 2150 MG: 50 INJECTION, SOLUTION INTRAVENOUS at 03:08

## 2018-08-20 NOTE — NURSING
Patient here for 5 FU pump.  Assessment complete and labs reviewed.  VSS.  Placed 5 FU pump on patient; pump infusing without difficulty.  No questions or concerns.  AVS given to patient.  Patient ambulated off unit unassisted.

## 2018-08-20 NOTE — PROGRESS NOTES
"Subjective:       Patient ID: Chris Aguirre Jr. is a 53 y.o. male.    Chief Complaint: Ileostomy and Skin Problem    This is pt known to me, he has temp ileostomy, going through chemo and has come for help again with his ileostomy, it seems he has not been able to get the pouches I suggested for his ileo on last visit in May , his skin is now red and painful      Review of Systems   Constitutional: Negative for chills and fever.   Respiratory: Negative for cough and shortness of breath.    Cardiovascular: Negative for chest pain and leg swelling.   Gastrointestinal: Negative for abdominal distention.   Genitourinary: Negative.    Musculoskeletal: Negative for arthralgias and back pain.   Skin: Positive for rash. Negative for color change and wound.   Neurological: Negative for weakness and headaches.       Objective:      Physical Exam   Constitutional: He is oriented to person, place, and time. He appears well-developed and well-nourished.   Pulmonary/Chest: Effort normal. No respiratory distress.   Abdominal: Soft. Bowel sounds are normal.   Musculoskeletal: Normal range of motion. He exhibits no edema.   Neurological: He is alert and oriented to person, place, and time.   Skin: Skin is warm and dry. No erythema.   Psychiatric: He has a normal mood and affect.     stoma is 1" low budded loop with erosion around stoma,  Again I suggested convex pouch and pre cut preferable, gave him samples and what numbers to order ( sensura ) xpro   Assessment:       1. Ileostomy status    2. Irritant contact dermatitis due to ileostomy        Plan:       refitted with convex sensura pouch, use cavilon   Message to , but pt to call and place order himself  I have reviewed the plan of care with the patient  and he express understanding. I spent over 50% of this 15 minute visit in face to face counseling.       "

## 2018-08-25 ENCOUNTER — INFUSION (OUTPATIENT)
Dept: INFUSION THERAPY | Facility: HOSPITAL | Age: 53
End: 2018-08-25
Attending: INTERNAL MEDICINE
Payer: MEDICAID

## 2018-08-25 VITALS
HEART RATE: 71 BPM | SYSTOLIC BLOOD PRESSURE: 116 MMHG | TEMPERATURE: 98 F | DIASTOLIC BLOOD PRESSURE: 67 MMHG | RESPIRATION RATE: 18 BRPM

## 2018-08-25 DIAGNOSIS — C18.8 OVERLAPPING MALIGNANT NEOPLASM OF COLON: Primary | ICD-10-CM

## 2018-08-25 PROCEDURE — 96523 IRRIG DRUG DELIVERY DEVICE: CPT

## 2018-08-25 PROCEDURE — 63600175 PHARM REV CODE 636 W HCPCS: Performed by: INTERNAL MEDICINE

## 2018-08-25 PROCEDURE — 25000003 PHARM REV CODE 250: Performed by: INTERNAL MEDICINE

## 2018-08-25 PROCEDURE — A4216 STERILE WATER/SALINE, 10 ML: HCPCS | Performed by: INTERNAL MEDICINE

## 2018-08-25 RX ORDER — HEPARIN 100 UNIT/ML
500 SYRINGE INTRAVENOUS
Status: COMPLETED | OUTPATIENT
Start: 2018-08-25 | End: 2018-08-25

## 2018-08-25 RX ORDER — SODIUM CHLORIDE 0.9 % (FLUSH) 0.9 %
10 SYRINGE (ML) INJECTION
Status: DISCONTINUED | OUTPATIENT
Start: 2018-08-25 | End: 2018-08-25 | Stop reason: HOSPADM

## 2018-08-25 RX ADMIN — Medication 10 ML: at 11:08

## 2018-08-25 RX ADMIN — HEPARIN 500 UNITS: 100 SYRINGE at 11:08

## 2018-08-25 NOTE — PLAN OF CARE
Problem: Patient Care Overview  Goal: Plan of Care Review  Outcome: Ongoing (interventions implemented as appropriate)  Pt here for pump d/c, pump complete, port flushed per policy and deaccessed without issue, no distress noted upon d/c to home

## 2018-08-27 ENCOUNTER — INFUSION (OUTPATIENT)
Dept: INFUSION THERAPY | Facility: HOSPITAL | Age: 53
End: 2018-08-27
Attending: INTERNAL MEDICINE
Payer: MEDICAID

## 2018-08-27 ENCOUNTER — OFFICE VISIT (OUTPATIENT)
Dept: HEMATOLOGY/ONCOLOGY | Facility: CLINIC | Age: 53
End: 2018-08-27
Payer: MEDICAID

## 2018-08-27 VITALS
SYSTOLIC BLOOD PRESSURE: 109 MMHG | BODY MASS INDEX: 23.52 KG/M2 | HEIGHT: 71 IN | WEIGHT: 168 LBS | DIASTOLIC BLOOD PRESSURE: 68 MMHG | RESPIRATION RATE: 18 BRPM | HEART RATE: 74 BPM | SYSTOLIC BLOOD PRESSURE: 123 MMHG | DIASTOLIC BLOOD PRESSURE: 81 MMHG | OXYGEN SATURATION: 100 % | TEMPERATURE: 98 F | RESPIRATION RATE: 20 BRPM | HEART RATE: 68 BPM | TEMPERATURE: 99 F

## 2018-08-27 DIAGNOSIS — C18.8 OVERLAPPING MALIGNANT NEOPLASM OF COLON: Primary | ICD-10-CM

## 2018-08-27 DIAGNOSIS — R45.89 ANXIETY ABOUT HEALTH: ICD-10-CM

## 2018-08-27 DIAGNOSIS — C18.8 OVERLAPPING MALIGNANT NEOPLASM OF COLON: ICD-10-CM

## 2018-08-27 DIAGNOSIS — R63.4 WEIGHT LOSS, NON-INTENTIONAL: Primary | ICD-10-CM

## 2018-08-27 DIAGNOSIS — D64.81 ANEMIA DUE TO ANTINEOPLASTIC CHEMOTHERAPY: ICD-10-CM

## 2018-08-27 DIAGNOSIS — T45.1X5A ANEMIA DUE TO ANTINEOPLASTIC CHEMOTHERAPY: ICD-10-CM

## 2018-08-27 DIAGNOSIS — F17.200 SMOKING ADDICTION: ICD-10-CM

## 2018-08-27 PROCEDURE — 99214 OFFICE O/P EST MOD 30 MIN: CPT | Mod: S$PBB,,, | Performed by: INTERNAL MEDICINE

## 2018-08-27 PROCEDURE — 96416 CHEMO PROLONG INFUSE W/PUMP: CPT

## 2018-08-27 PROCEDURE — 63600175 PHARM REV CODE 636 W HCPCS: Performed by: INTERNAL MEDICINE

## 2018-08-27 PROCEDURE — 25000003 PHARM REV CODE 250: Performed by: INTERNAL MEDICINE

## 2018-08-27 PROCEDURE — 99213 OFFICE O/P EST LOW 20 MIN: CPT | Mod: PBBFAC | Performed by: INTERNAL MEDICINE

## 2018-08-27 PROCEDURE — 99999 PR PBB SHADOW E&M-EST. PATIENT-LVL III: CPT | Mod: PBBFAC,,, | Performed by: INTERNAL MEDICINE

## 2018-08-27 RX ORDER — PROMETHAZINE HYDROCHLORIDE 25 MG/1
25 TABLET ORAL EVERY 6 HOURS PRN
Qty: 30 TABLET | Refills: 2 | Status: SHIPPED | OUTPATIENT
Start: 2018-08-27 | End: 2018-09-20 | Stop reason: SDUPTHER

## 2018-08-27 RX ORDER — LORAZEPAM 0.5 MG/1
0.5 TABLET ORAL EVERY 6 HOURS PRN
Qty: 60 TABLET | Refills: 0 | Status: SHIPPED | OUTPATIENT
Start: 2018-08-27 | End: 2018-09-20 | Stop reason: SDUPTHER

## 2018-08-27 RX ADMIN — FLUOROURACIL 2150 MG: 50 INJECTION, SOLUTION INTRAVENOUS at 01:08

## 2018-08-27 NOTE — PROGRESS NOTES
Cc: Colon cancer, here for follow up     HPI: is a 53yoWM who presented to the ED on March 10th, 2018 with acute onset of stool and gas in his urine over the previous several days. Previously, he had progressively worsening constipation, for which he was on stool softeners. He also had given history of  bloody stools and  lower abdominal pain. He had  15 lbs over the past few months. He denies fatigue, fevers, CP, or SOB.   He was diagnosed with HCV about 15 years ago but never sought treatment for this. He was previously hospitalized and seen by inpatient psychiatry for substance abuse, past history of suicide attempt by cutting. He also has a history of an MI with placement of heart stents, on ASA.He underwent  partial cystectomy with complex cystorrhaphy and partial prostatectomy, left neoureterocystotomy with ureteral re-implantation, cystoscopy with bilateral ureteral catheter placement, bilateral ureteral stent placement, left pelvic lymph node dissection,sigmoid colectomy and ileostomy on 3/13/18.         Interval history: He is here for a follow up visit. He is doing well. He still has no good appetite.He started adjuvant 5FU/LVon 5/7/18. He only received 3 weekly treatments. He missed chemotherapy as he lost his friend and was depressed. He feels better now. He is not drinking regularly.He is in C2 of single agent 5 FU. He takes Lorazepam almost on a daily basis for anxiety.       Review of Systems   Constitutional: Positive for malaise/fatigue. Negative for chills, fever and weight loss.   HENT: Negative for congestion, ear discharge and nosebleeds.    Eyes: Negative for blurred vision, double vision and photophobia.   Respiratory: Negative for cough, hemoptysis and sputum production.    Cardiovascular: Negative for chest pain, palpitations, orthopnea and claudication.   Gastrointestinal: Positive for abdominal pain, diarrhea and nausea. Negative for blood in stool, constipation, heartburn and  vomiting.   Genitourinary: Negative for dysuria, frequency and urgency.   Musculoskeletal: Negative for myalgias and neck pain.   Skin: Negative for itching and rash.   Neurological: Negative for dizziness, tingling, tremors, sensory change and weakness.   Endo/Heme/Allergies: Does not bruise/bleed easily.   Psychiatric/Behavioral: Negative for depression and suicidal ideas. The patient is nervous/anxious.          Current Outpatient Medications   Medication Sig    LORazepam (ATIVAN) 0.5 MG tablet Take 1 tablet (0.5 mg total) by mouth every 6 (six) hours as needed for Anxiety.    promethazine (PHENERGAN) 25 MG tablet Take 1 tablet (25 mg total) by mouth every 6 (six) hours as needed for Nausea.     No current facility-administered medications for this visit.          Vitals:    08/27/18 1105   BP: 109/68   Pulse: 74   Resp: 18   Temp: 98.3 °F (36.8 °C)       Physical Exam   Constitutional: He is oriented to person, place, and time. He appears well-developed. No distress.   HENT:   Head: Normocephalic and atraumatic.   Mouth/Throat: No oropharyngeal exudate.   Eyes: Pupils are equal, round, and reactive to light. No scleral icterus.   Neck: Normal range of motion.   Cardiovascular: Normal rate, regular rhythm and normal heart sounds.   No murmur heard.  Pulmonary/Chest: Effort normal and breath sounds normal. No respiratory distress. He has no wheezes. He has no rales.   Abdominal: Soft. He exhibits no distension. There is tenderness. There is no rebound.   He has a colostomy bag    Lymphadenopathy:     He has no cervical adenopathy.   Neurological: He is alert and oriented to person, place, and time. No cranial nerve deficit.   Skin: Skin is warm. No erythema.   Psychiatric: He has a normal mood and affect.     Component      Latest Ref Rng & Units 8/27/2018   WBC      3.90 - 12.70 K/uL 5.50   RBC      4.60 - 6.20 M/uL 3.91 (L)   Hemoglobin      14.0 - 18.0 g/dL 12.5 (L)   Hematocrit      40.0 - 54.0 % 37.2 (L)    MCV      82 - 98 fL 95   MCH      27.0 - 31.0 pg 32.0 (H)   MCHC      32.0 - 36.0 g/dL 33.6   RDW      11.5 - 14.5 % 17.1 (H)   Platelets      150 - 350 K/uL 257   MPV      9.2 - 12.9 fL 10.1   Immature Granulocytes      0.0 - 0.5 % 0.2   Gran # (ANC)      1.8 - 7.7 K/uL 2.6   Immature Grans (Abs)      0.00 - 0.04 K/uL 0.01   Lymph #      1.0 - 4.8 K/uL 2.5   Mono #      0.3 - 1.0 K/uL 0.3   Eos #      0.0 - 0.5 K/uL 0.2   Baso #      0.00 - 0.20 K/uL 0.03   nRBC      0 /100 WBC 0   Gran%      38.0 - 73.0 % 47.0   Lymph%      18.0 - 48.0 % 44.5   Mono%      4.0 - 15.0 % 5.1   Eosinophil%      0.0 - 8.0 % 2.7   Basophil%      0.0 - 1.9 % 0.5   Differential Method       Automated   Sodium      136 - 145 mmol/L 142   Potassium      3.5 - 5.1 mmol/L 3.5   Chloride      95 - 110 mmol/L 107   CO2      23 - 29 mmol/L 26   Glucose      70 - 110 mg/dL 120 (H)   BUN, Bld      6 - 20 mg/dL 6   Creatinine      0.5 - 1.4 mg/dL 0.9   Calcium      8.7 - 10.5 mg/dL 9.9   Total Protein      6.0 - 8.4 g/dL 6.7   Albumin      3.5 - 5.2 g/dL 3.5   Total Bilirubin      0.1 - 1.0 mg/dL 0.3   Alkaline Phosphatase      55 - 135 U/L 49 (L)   AST      10 - 40 U/L 26   ALT      10 - 44 U/L 29   Anion Gap      8 - 16 mmol/L 9   eGFR if African American      >60 mL/min/1.73 m:2 >60.0   eGFR if non African American      >60 mL/min/1.73 m:2 >60.0     3/9/18 CT abdomen/pelvis with cont        CT chest abdomen and pelvis with contrast    Clinical history: Weight loss    Comparison: None    Technique:  Axial images of the chest, abdomen, and pelvis were obtained at 5 mm intervals following administration of 75 cc Omni 350 IV contrast as well as oral and rectal contrast.  Coronal and sagittal and delayed images were reviewed.    Findings:  The structures at the base of the neck are grossly unremarkable.  No significant mediastinal lymphadenopathy.  The heart is not enlarged.  There is atherosclerotic calcification in the distribution of the  coronary arteries.  No significant pericardial effusion.    The airways are patent.    There is a vague focus of groundglass attenuation within the right upper lobe, measuring up to 1.6 cm.  No significant volume of pleural fluid.  No pneumothorax.  No large focal consolidation.    The thoracic aorta tapers normally with atherosclerotic calcification.    The liver is hypoattenuating and enlarged, may reflect steatosis, correlation with LFTs recommended.  There is a subcentimeter hypoattenuating lesion within the left hepatic lobe, too small for characterization.  There are 2 faint foci of irregular peripheral enhancement involving the medial aspect and anterior aspect of the left hepatic lobe.  These foci normalize with hepatic parenchymal enhancement on delayed image, and may reflect perfusional anomaly versus flash filling hemangiomas.  Punctate hypoattenuating focus within the right hepatic lobe is too small for characterization.  The spleen, pancreas, gallbladder and adrenal glands are grossly unremarkable.  There is no biliary dilation.  There is thickening at the gastric fundus, nonspecific, correlation with direct visualization as warranted.  No high grade obstruction.  The portal vein, splenic vein, SMV, celiac axis and SMA all are grossly patent.  Scattered shotty periaortic and paracaval lymph nodes are noted.    The kidneys enhance symmetrically and excrete contrast appropriately without hydronephrosis or nephrolithiasis.  The bilateral ureters are grossly unremarkable along their course to the urinary bladder without calculi seen.  There is air within the urinary bladder, possibly from catheterization.  Please see below for complete description.  The prostate is prominent.    Rectal contrast has been administered.  The rectum and distal sigmoid colon is unremarkable without wall thickening.  There is circumferential wall thickening involving the proximal sigmoid colon, without significant contrast  passage through the region of thickening.  Wall thickening in the region measures up to 1.1 cm.  There is focal masslike thickening of the sigmoid colon, that abuts the urinary bladder.  There is urinary bladder wall thickening and distortion of the urinary bladder by this mass.  There is a focus of air and stool insinuating between the region of focal colonic thickening and the urinary bladder, finding is concerning for bladder wall invasion by colonic malignancy, with fistulous formation to the urinary bladder.  This may account for air within the urinary bladder.  Abscess in the region felt less likely.  There is surrounding inflammation, and several adjacent nonenlarged although numerous lymph nodes.  Diverticula are noted throughout the remainder of the colon, with moderate to large amount stool throughout the colon suggesting superimposed constipation.  Oral contrast is noted to the level of the transverse colon.  The terminal ileum is grossly unremarkable.  No pericecal inflammation.  The small bowel is grossly unremarkable.  Scattered shotty periaortic and paracaval lymph nodes are noted.  There is a small amount reactive fluid in the left hemipelvis.    There is a mixed s sclerotic focus within the posterior aspect of the right iliac bone, the appearance is not specific for metastatic disease, however metastatic focus is not excluded in the setting of probable colonic malignancy.  Several additional sclerotic foci are noted throughout the spine, likely degenerative in nature.  Schmorl's node involves the superior endplate of L1.  There is Oddi wall cachexia.  There are bilateral fat containing inguinal hernias.  No significant axillary lymphadenopathy.   Impression        1.  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  Air within the urinary bladder is suspected to be on the bases of the same although  correlation for recent catheterization and clinical symptomatology.  There is indistinctness about the colon region, with several although nonenlarged lymph nodes, and disorganized fluid.  Please see above for full description.    2.  Vague groundglass focus of attenuation within the right upper lobe of the right lung, nonspecific, could reflect nonspecific inflammation or infection, metastatic disease however cannot be excluded in this setting.  Followup is advised.    3.  Constipation.    4.  Several additional findings described above.      3/12/18 pathology        SPECIMEN  1) Rectal polyps x2, 8 mm polyp and 5 mm polyp, hot snare polypectomies.  FINAL PATHOLOGIC DIAGNOSIS  Rectum, 8 mm and 5 mm polyps ×2, biopsy:  - Hyperplastic polyp, multiple fragments.     3/12/18 FINAL PATHOLOGIC DIAGNOSIS     Supplemental Diagnosis     MICROSATELLITE INSTABILITY, TUMOR:  RESULT SUMMARY:  -AMBERLY/MSI-L  RESULT:  MSI: AMBERLY/MSI-L (instability observed in 0 of 5 informative markers).  INTERPRETATION:  An AMBERLY/MSI-L phenotype suggests the presence of normal DNA mismatch repair function within the tumor .     1. Sigmoid colon) posterior bladder wall, mass, en bloc resection of sigmoid colon with attached posterior bladder wall:     Adenocarcinoma, measuring 4.7 cm in greatest dimension.  Tumor extends through the muscularis propria into the pericolorectal tissue with surrounding abcess formation with adherent bladder. There is no viable tumor seen invading bladder in multiple examined sections.  Proximal and distal colonic surgical margins are negative for malignancy.  Mesenteric surgical margin is negative for malignancy.  17 benign lymph nodes, negative for metastatic carcinoma (0/17).  See synoptic report in comment section for further details.     2. Right lateral bladder wall margin, biopsy:     Negative for malignancy.     3. Inferior bladder wall margin, biopsy:     Negative for malignancy.     4. Left lateral bladder wall  "margin, biopsy:     Negative for malignancy.     5. Superior bladder wall, biopsy:     Negative for malignancy.     6. Second right lateral bladder wall margin, biopsy:     Negative for malignancy.     7. Second left lateral bladder wall margin, biopsy:  Negative for malignancy.     8. Left internal iliac lymph nodes, regional resection:     3 benign lymph nodes, negative for metastatic carcinoma (see 0/3).     9. Proximal sigmoid colon, segmental resection:     Colon with congested vasculature and no evidence of malignancy.  Resection margins are viable and negative for dysplasia or malignancy.  8 benign lymph nodes, negative for metastatic carcinoma (0/8).     10. Proximal donut, biopsy:     Colonic mucosa with no significant histopathologic abnormality.  No evidence of malignancy.     11. Distal donut, biopsy:     Colonic mucosa with no significant histopathologic abnormality.  No evidence of malignancy     Comment: Synoptic report  Procedure: Sigmoid colon with attached bladder wall; en bloc resection  Tumor site: Sigmoid colon  Tumor size:  Greatest dimension: 4.7 cm  Macroscopic tumor perforation: Grossly lesion involves the full extent of the bowel wall and erodes into the bladder  Histologic type: Adenocarcinoma  Histologic grade: G2 moderately differentiated.:  Microscopic tumor extension: Tumor invades through the muscularis propria into pericolic rectal tissue .  Margins:  Proximal margin: Uninvolved by invasive carcinoma  Distal margin: Uninvolved by invasive carcinoma  Mesenteric margin: Uninvolved  Circumferential (radial) margin: Tumor extends into pericolic rectal adipose tissue and has gross tumor perforation with surrounding abscess formation     Distance of invasive carcinoma from closest margin: 9 cm from surgical margin "A"  Treatment effect: No known presurgical treatment  Lymphovascular invasion: Not identified     Perineural invasion: Not identified  Tumor deposits: Not identified  Regional " lymph nodes:  Number of lymph nodes involved: 0  Number of lymph nodes examined: 28  Pathologic staging:  Primary tumor:  pT3: Tumor invades through the muscularis propria into pericolic tissues .  Regional lymph nodes:  pN0: No regional lymph node metastasis  Number examined: 28  Number involved: 0  Distant metastasis:  pMX: Cannot be assessed.                  4/27/18 CT     COMPARISON:  CT chest abdomen pelvis 03/09/2018, abdominal radiograph 03/14/2018    FINDINGS:  Thoracic soft tissues: No significant abnormality.    Aorta: Normal in caliber, course, and contour.  There are three branching vessels at the arch. Significant calcific atherosclerosis involving the coronary arteries in a multivessel distribution, the origins of the great vessels,  and the aortic arch.    Heart: Normal in size with trace pericardial effusion likely of minimal clinical significance.    Myra/Mediastinum: No significant lymphadenopathy    Lungs: Well expanded bilaterally without consolidation, mass, or pleural effusion.  Stable focus of nonspecific ground-glass attenuation within the right upper lobe measuring approximately 1.6 cm (axial series 2, image 23); recommend continued follow-up with expected oncology surveillance.    Liver: Hepatomegaly, similar to prior.  Stable subcentimeter hypodensities within the left and right hepatic lobes, too small to fully characterize but possibly representing cysts.    Gallbladder: Mild wall thickening likely secondary to decompression.  No internal calcified gallstones or pericholecystic fluid.    Bile Ducts: No evidence of dilated ducts.    Pancreas: No mass or peripancreatic fat stranding.    Spleen: Unremarkable.    Adrenals: Unremarkable.    Kidneys: Normal in size and location without focal abnormality.  Normal physiologic ability to concentrate contrast appropriately.    Bladder/ureters/prostate: Postsurgical changes status post partial cystectomy with bladder repair, partial prostatectomy,  and left ureteral reimplantation.  Bladder is not distended on today's examination and is thus poorly evaluated.  Bilateral double-J stents extending from the collecting systems to the bladder.  Of note, right double-J ureteral stent begins at the level of the ureteropelvic junction while the left ureteral stent begins within the renal pelvis.  No hydronephrosis or nephrolithiasis. No ureteral dilatation.    GI Tract/Mesentery: Postsurgical changes status post sigmoid colectomy and diverting loop ileostomy.  No evidence of recurrent disease within the surgical bed.    Peritoneal Space: No ascites. No free air.    Retroperitoneum:  No significant adenopathy.    Abdominal wall:  Diverting loop ileostomy exiting the right upper abdominal quadrant.  Healing midline incision.  No significant abnormalities.    Vasculature: Significant calcific atherosclerosis involving the infrarenal abdominal aorta with extension into the proximal iliac arteries.  No aneurysm    Bones: Moderate degenerative changes of the visualized osseous structures without acute fracture or destructive osseous process.  Grade 1 retrolisthesis of L5 on S1.  Stable wedge compression fracture of L1.   Impression        In this patient with history of colon cancer, there have been extensive interval postsurgical changes involving the bladder and bowel as detailed above.  No evidence of recurrent or metastatic disease.  Of note, bladder is decompressed on today's examination and poorly evaluated.    Stable focus of nonspecific ground-glass attenuation within the right upper lobe; recommend continue follow-up with expected surveillance.    Stable hepatic hypodensities too small to fully characterize but likely representing hepatic cyst.    Additional, stable findings as above.    There are no measurable lesions per RECIST criteria.             ASSESSMENT:     1. Sigmoid colon adenocarcinoma, [id number]  2. Lung lesion on CT  3. Weight loss, unintentional  4.  Chronic hepatitis C, without coma  5. Tachycardia  6. Hypertension, essential  7. Coronary artery disease  8. H/o depression  9. Anxiety  10. Insomnia  11. Anemia  12. Chronic smoker        Plan:     1,2: He has AJCC stage II disease. There were no clear metastatic lesions on CT done in March 2018. However, there was a groundglass focus of attenuation within the right upper lobe of the right lung. It is unclear if this is atelactasis/pnemonia/metastatic lesion. He cannot have PET CT due to insurance. Repeat CT done on 4/27/18 again showed a focus of nonspecific ground-glass attenuation within the right upper lobe that was previously noted. No other lesions to suspect metastases. He is MSI stable/low. No clear high risk features other than macroscopic perforation. Margins were clear. He has no fecal matter in urine/air in his urine at this time. No clear, established role for adjuvant FOLFOX. . I discussed role for adjuvant 5FU, given weekly for 6 weeks each cycle and off for 2 weeks, for a total of 6 months.  He started 5FU on 5/7/18. However, he received only 3 infusions ( once weekly x 7 days). He was very agitated and inebriated when he presented for subsequent treatments and had to be held. He is now sober.  He is now in C2. He will complete the 6 teratment sin C2 and proceed to C3.         4. He has untreated chronic hepatitis C genotype 1a.. He has been referred to liver clinic. HCV reactivation/ fulminant hepatitis after chemotherapy has been rarely reported. He also has positive hepatitis B antibody.      5,6: He will need treatment with betablocker if persistent. /68 mm Hg today     7. No symptoms at this time. He is on Aspirin     8,9,10. He has been referred to psychologist. He requested Ativan for chronic anxiety and was prescribed on 7/30/18     11. Possibly due to chemotherapy     12. Assistance with smoking cessation was offered, including:  []  Medications  [x]  Counseling  []  Printed  Information on Smoking Cessation  []  Referral to a Smoking Cessation Program    Patient was counseled regarding smoking for 3-10 minutes.                Distress Screening Results: Psychosocial Distress screening score of Distress Score: 0 noted and reviewed. No intervention indicated.

## 2018-08-27 NOTE — PLAN OF CARE
Problem: Patient Care Overview  Goal: Plan of Care Review  Outcome: Ongoing (interventions implemented as appropriate)  Pt connected to 5fu CADD pump with no complications. VSS. Pt instructed to call MD with any problems and RTC on Saturday AM for pump dc. NAD. Pt discharged home with mother at side.

## 2018-08-27 NOTE — Clinical Note
Chemo todayCbc, cmp, chemo next wk (to start 9/4)Cbc, cmp, chemo weekly from 9/24 x 6 weeksF/u on 9/24

## 2018-08-30 DIAGNOSIS — C18.8 OVERLAPPING MALIGNANT NEOPLASM OF COLON: Primary | ICD-10-CM

## 2018-09-01 ENCOUNTER — INFUSION (OUTPATIENT)
Dept: INFUSION THERAPY | Facility: HOSPITAL | Age: 53
End: 2018-09-01
Attending: INTERNAL MEDICINE
Payer: MEDICAID

## 2018-09-01 VITALS
RESPIRATION RATE: 18 BRPM | TEMPERATURE: 98 F | SYSTOLIC BLOOD PRESSURE: 124 MMHG | DIASTOLIC BLOOD PRESSURE: 88 MMHG | HEART RATE: 95 BPM

## 2018-09-01 DIAGNOSIS — C18.8 OVERLAPPING MALIGNANT NEOPLASM OF COLON: Primary | ICD-10-CM

## 2018-09-01 PROCEDURE — 96523 IRRIG DRUG DELIVERY DEVICE: CPT

## 2018-09-01 PROCEDURE — 25000003 PHARM REV CODE 250: Performed by: INTERNAL MEDICINE

## 2018-09-01 PROCEDURE — A4216 STERILE WATER/SALINE, 10 ML: HCPCS | Performed by: INTERNAL MEDICINE

## 2018-09-01 RX ORDER — SODIUM CHLORIDE 0.9 % (FLUSH) 0.9 %
10 SYRINGE (ML) INJECTION
Status: COMPLETED | OUTPATIENT
Start: 2018-09-01 | End: 2018-09-01

## 2018-09-01 RX ORDER — SODIUM CHLORIDE 0.9 % (FLUSH) 0.9 %
10 SYRINGE (ML) INJECTION
Status: CANCELLED | OUTPATIENT
Start: 2018-09-01

## 2018-09-01 RX ORDER — HEPARIN 100 UNIT/ML
500 SYRINGE INTRAVENOUS
Status: DISCONTINUED | OUTPATIENT
Start: 2018-09-01 | End: 2018-09-01 | Stop reason: HOSPADM

## 2018-09-01 RX ORDER — HEPARIN 100 UNIT/ML
500 SYRINGE INTRAVENOUS
Status: CANCELLED | OUTPATIENT
Start: 2018-09-01

## 2018-09-01 RX ADMIN — Medication 10 ML: at 01:09

## 2018-09-04 ENCOUNTER — INFUSION (OUTPATIENT)
Dept: INFUSION THERAPY | Facility: HOSPITAL | Age: 53
End: 2018-09-04
Attending: INTERNAL MEDICINE
Payer: MEDICAID

## 2018-09-04 VITALS
HEIGHT: 71 IN | BODY MASS INDEX: 23.52 KG/M2 | TEMPERATURE: 98 F | SYSTOLIC BLOOD PRESSURE: 144 MMHG | DIASTOLIC BLOOD PRESSURE: 102 MMHG | WEIGHT: 168 LBS | RESPIRATION RATE: 18 BRPM | HEART RATE: 97 BPM

## 2018-09-04 DIAGNOSIS — C18.8 OVERLAPPING MALIGNANT NEOPLASM OF COLON: Primary | ICD-10-CM

## 2018-09-04 LAB
ALBUMIN SERPL BCP-MCNC: 3.7 G/DL
ALP SERPL-CCNC: 60 U/L
ALT SERPL W/O P-5'-P-CCNC: 25 U/L
ANION GAP SERPL CALC-SCNC: 10 MMOL/L
AST SERPL-CCNC: 33 U/L
BASOPHILS # BLD AUTO: 0.02 K/UL
BASOPHILS NFR BLD: 0.2 %
BILIRUB SERPL-MCNC: 1.1 MG/DL
BUN SERPL-MCNC: 12 MG/DL
CALCIUM SERPL-MCNC: 9.9 MG/DL
CHLORIDE SERPL-SCNC: 101 MMOL/L
CO2 SERPL-SCNC: 26 MMOL/L
CREAT SERPL-MCNC: 0.8 MG/DL
DIFFERENTIAL METHOD: ABNORMAL
EOSINOPHIL # BLD AUTO: 0.1 K/UL
EOSINOPHIL NFR BLD: 1.1 %
ERYTHROCYTE [DISTWIDTH] IN BLOOD BY AUTOMATED COUNT: 17.1 %
EST. GFR  (AFRICAN AMERICAN): >60 ML/MIN/1.73 M^2
EST. GFR  (NON AFRICAN AMERICAN): >60 ML/MIN/1.73 M^2
GLUCOSE SERPL-MCNC: 213 MG/DL
HCT VFR BLD AUTO: 39.3 %
HGB BLD-MCNC: 13.3 G/DL
IMM GRANULOCYTES # BLD AUTO: 0.04 K/UL
IMM GRANULOCYTES NFR BLD AUTO: 0.5 %
LYMPHOCYTES # BLD AUTO: 2.4 K/UL
LYMPHOCYTES NFR BLD: 26.8 %
MCH RBC QN AUTO: 31.7 PG
MCHC RBC AUTO-ENTMCNC: 33.8 G/DL
MCV RBC AUTO: 94 FL
MONOCYTES # BLD AUTO: 0.5 K/UL
MONOCYTES NFR BLD: 5.7 %
NEUTROPHILS # BLD AUTO: 5.8 K/UL
NEUTROPHILS NFR BLD: 65.7 %
NRBC BLD-RTO: 0 /100 WBC
PLATELET # BLD AUTO: 220 K/UL
PMV BLD AUTO: 10.4 FL
POTASSIUM SERPL-SCNC: 3.5 MMOL/L
PROT SERPL-MCNC: 6.9 G/DL
RBC # BLD AUTO: 4.2 M/UL
SODIUM SERPL-SCNC: 137 MMOL/L
WBC # BLD AUTO: 8.79 K/UL

## 2018-09-04 PROCEDURE — 25000003 PHARM REV CODE 250: Performed by: INTERNAL MEDICINE

## 2018-09-04 PROCEDURE — 96416 CHEMO PROLONG INFUSE W/PUMP: CPT

## 2018-09-04 PROCEDURE — 63600175 PHARM REV CODE 636 W HCPCS: Performed by: INTERNAL MEDICINE

## 2018-09-04 PROCEDURE — 85025 COMPLETE CBC W/AUTO DIFF WBC: CPT

## 2018-09-04 PROCEDURE — 80053 COMPREHEN METABOLIC PANEL: CPT

## 2018-09-04 PROCEDURE — A4216 STERILE WATER/SALINE, 10 ML: HCPCS | Performed by: INTERNAL MEDICINE

## 2018-09-04 RX ORDER — SODIUM CHLORIDE 0.9 % (FLUSH) 0.9 %
10 SYRINGE (ML) INJECTION
Status: CANCELLED | OUTPATIENT
Start: 2018-09-06

## 2018-09-04 RX ORDER — HEPARIN 100 UNIT/ML
500 SYRINGE INTRAVENOUS
Status: COMPLETED | OUTPATIENT
Start: 2018-09-04 | End: 2018-09-04

## 2018-09-04 RX ORDER — HEPARIN 100 UNIT/ML
500 SYRINGE INTRAVENOUS
Status: CANCELLED | OUTPATIENT
Start: 2018-09-06

## 2018-09-04 RX ORDER — SODIUM CHLORIDE 0.9 % (FLUSH) 0.9 %
10 SYRINGE (ML) INJECTION
Status: CANCELLED | OUTPATIENT
Start: 2018-09-04

## 2018-09-04 RX ORDER — HEPARIN 100 UNIT/ML
500 SYRINGE INTRAVENOUS
Status: CANCELLED | OUTPATIENT
Start: 2018-09-04

## 2018-09-04 RX ORDER — SODIUM CHLORIDE 0.9 % (FLUSH) 0.9 %
10 SYRINGE (ML) INJECTION
Status: COMPLETED | OUTPATIENT
Start: 2018-09-04 | End: 2018-09-04

## 2018-09-04 RX ADMIN — Medication 10 ML: at 02:09

## 2018-09-04 RX ADMIN — FLUOROURACIL 2150 MG: 50 INJECTION, SOLUTION INTRAVENOUS at 03:09

## 2018-09-04 RX ADMIN — HEPARIN 500 UNITS: 100 SYRINGE at 02:09

## 2018-09-04 NOTE — NURSING
Patient here for blood draw from right chest port-accesses easily with good blood return-blood to lab-line flushed and left accessed for chemo today.

## 2018-09-11 ENCOUNTER — INFUSION (OUTPATIENT)
Dept: INFUSION THERAPY | Facility: HOSPITAL | Age: 53
End: 2018-09-11
Attending: INTERNAL MEDICINE
Payer: MEDICAID

## 2018-09-11 VITALS
BODY MASS INDEX: 23.38 KG/M2 | SYSTOLIC BLOOD PRESSURE: 146 MMHG | HEIGHT: 71 IN | RESPIRATION RATE: 17 BRPM | TEMPERATURE: 98 F | DIASTOLIC BLOOD PRESSURE: 90 MMHG | WEIGHT: 167 LBS | HEART RATE: 98 BPM

## 2018-09-11 DIAGNOSIS — C18.8 OVERLAPPING MALIGNANT NEOPLASM OF COLON: Primary | ICD-10-CM

## 2018-09-11 PROCEDURE — A4216 STERILE WATER/SALINE, 10 ML: HCPCS | Performed by: INTERNAL MEDICINE

## 2018-09-11 PROCEDURE — 63600175 PHARM REV CODE 636 W HCPCS: Performed by: INTERNAL MEDICINE

## 2018-09-11 PROCEDURE — 25000003 PHARM REV CODE 250: Performed by: INTERNAL MEDICINE

## 2018-09-11 PROCEDURE — 96523 IRRIG DRUG DELIVERY DEVICE: CPT

## 2018-09-11 RX ORDER — HEPARIN 100 UNIT/ML
500 SYRINGE INTRAVENOUS
Status: COMPLETED | OUTPATIENT
Start: 2018-09-11 | End: 2018-09-11

## 2018-09-11 RX ORDER — HEPARIN 100 UNIT/ML
500 SYRINGE INTRAVENOUS
Status: CANCELLED | OUTPATIENT
Start: 2018-09-11

## 2018-09-11 RX ORDER — SODIUM CHLORIDE 0.9 % (FLUSH) 0.9 %
10 SYRINGE (ML) INJECTION
Status: COMPLETED | OUTPATIENT
Start: 2018-09-11 | End: 2018-09-11

## 2018-09-11 RX ORDER — SODIUM CHLORIDE 0.9 % (FLUSH) 0.9 %
10 SYRINGE (ML) INJECTION
Status: CANCELLED | OUTPATIENT
Start: 2018-09-11

## 2018-09-11 RX ADMIN — SODIUM CHLORIDE, PRESERVATIVE FREE 10 ML: 5 INJECTION INTRAVENOUS at 11:09

## 2018-09-11 RX ADMIN — HEPARIN 500 UNITS: 100 SYRINGE at 11:09

## 2018-09-11 NOTE — PLAN OF CARE
Problem: Patient Care Overview  Goal: Plan of Care Review  Outcome: Ongoing (interventions implemented as appropriate)  Pt pump complete, right chest wall port flushed easily and deaccessed without issue, pt to rtc 9/24/18, no distress noted upon d/c to home

## 2018-09-17 ENCOUNTER — OFFICE VISIT (OUTPATIENT)
Dept: HEPATOLOGY | Facility: CLINIC | Age: 53
End: 2018-09-17
Payer: MEDICAID

## 2018-09-17 ENCOUNTER — TELEPHONE (OUTPATIENT)
Dept: PHARMACY | Facility: CLINIC | Age: 53
End: 2018-09-17

## 2018-09-17 ENCOUNTER — PROCEDURE VISIT (OUTPATIENT)
Dept: HEPATOLOGY | Facility: CLINIC | Age: 53
End: 2018-09-17
Attending: PHYSICIAN ASSISTANT
Payer: MEDICAID

## 2018-09-17 VITALS
HEART RATE: 75 BPM | WEIGHT: 170 LBS | HEIGHT: 71 IN | DIASTOLIC BLOOD PRESSURE: 84 MMHG | SYSTOLIC BLOOD PRESSURE: 127 MMHG | BODY MASS INDEX: 23.8 KG/M2 | OXYGEN SATURATION: 100 % | TEMPERATURE: 97 F | RESPIRATION RATE: 18 BRPM

## 2018-09-17 DIAGNOSIS — Z79.899 IMMUNOSUPPRESSED DUE TO CHEMOTHERAPY: ICD-10-CM

## 2018-09-17 DIAGNOSIS — B18.2 CHRONIC HEPATITIS C WITHOUT HEPATIC COMA: ICD-10-CM

## 2018-09-17 DIAGNOSIS — Z23 VACCINE FOR VIRAL HEPATITIS: ICD-10-CM

## 2018-09-17 DIAGNOSIS — C18.7 MALIGNANT NEOPLASM OF SIGMOID COLON: ICD-10-CM

## 2018-09-17 DIAGNOSIS — B18.2 CHRONIC HEPATITIS C WITHOUT HEPATIC COMA: Primary | ICD-10-CM

## 2018-09-17 DIAGNOSIS — T45.1X5A IMMUNOSUPPRESSED DUE TO CHEMOTHERAPY: ICD-10-CM

## 2018-09-17 DIAGNOSIS — R74.8 ABNORMAL TRANSAMINASES: ICD-10-CM

## 2018-09-17 DIAGNOSIS — D84.821 IMMUNOSUPPRESSED DUE TO CHEMOTHERAPY: ICD-10-CM

## 2018-09-17 PROCEDURE — 99999 PR PBB SHADOW E&M-EST. PATIENT-LVL IV: CPT | Mod: PBBFAC,,, | Performed by: PHYSICIAN ASSISTANT

## 2018-09-17 PROCEDURE — 91200 LIVER ELASTOGRAPHY: CPT | Mod: 26,S$PBB,, | Performed by: PHYSICIAN ASSISTANT

## 2018-09-17 PROCEDURE — 99214 OFFICE O/P EST MOD 30 MIN: CPT | Mod: PBBFAC,25 | Performed by: PHYSICIAN ASSISTANT

## 2018-09-17 PROCEDURE — 91200 LIVER ELASTOGRAPHY: CPT | Mod: PBBFAC | Performed by: PHYSICIAN ASSISTANT

## 2018-09-17 PROCEDURE — 99214 OFFICE O/P EST MOD 30 MIN: CPT | Mod: S$PBB,,, | Performed by: PHYSICIAN ASSISTANT

## 2018-09-17 RX ORDER — LAMIVUDINE 150 MG/1
150 TABLET, FILM COATED ORAL DAILY
Qty: 30 TABLET | Refills: 3 | Status: SHIPPED | OUTPATIENT
Start: 2018-09-17 | End: 2018-10-22

## 2018-09-17 NOTE — PROGRESS NOTES
HEPATOLOGY CLINIC VISIT NOTE - HCV clinic    CHIEF COMPLAINT: Hepatitis C   (here w/ mother)    HISTORY: This is a 53 y.o. White male with stage II sigmoid colon cancer (s/p sigmoid colectomy and ileostomy and partial cystectomy 3/2018, now on chemo w/ 5FU) here for f/u regarding his HCV infection and prior HBV exposure.     Has had labs & liver staging since initial visit:  -- HCV RNA trending down, raising question of whether pt may clear virus on his own, even though this would be uncommon  -- Lacking HAV immunity  -- FibroScan reveals moderate scarring, but no evidence of cirrhosis  -- Transaminases have normalized but TBili has bumped slightly to 1.1    Pt states he has remained off alcohol    Feels okay  (+) fatigue  Denies jaundice, dark urine, hematemesis, melena, slowed mentation, abdominal distention.     HCV history:  Originally diagnosed 2001 while in long-term  Risks for HCV:  Tattoo placement - first one age 25    IVDA and nasal 18-20 yrs old    In long-term [id number]  - Treatment naive  - Genotype 1a  - NS5A resistance not known  - HCV RNA 77,442 IU/mL (4/2018) --> 6,334 IU/mL (8/2018)    Liver staging:  FibroScan today 9/17/18 - kPa 7.9, (F2 Fibrosis) ;  (No steatosis)  Recent labs and imaging earlier this year reveal no evidence of advanced fibrosis or portal HTN                    Past Medical History:   Diagnosis Date    Anxiety about health 6/20/2018    Colon cancer     Coronary artery disease     Depression     Hepatitis C 3/9/2018    History of psychiatric care     History of psychiatric hospitalization     Hypertension     MI (myocardial infarction)     6 months ago    Neuropathy     Psychiatric problem     Psychosis     Self-harming behavior     Suicide attempt        Past Surgical History:   Procedure Laterality Date    ABDOMINAL SURGERY      History of perforated ulcer, unknown surgery    APPENDECTOMY      COLECTOMY-SIGMOID POSSIBLE BLADDER RESECTION N/A 3/13/2018     Performed by [facility] 2ND FLR    CYSTECTOMY-PARTIAL w/ bladder resection  3/13/2018    Performed by Meng Morrow [facility] at [facility] OR 2ND FLR    CYSTOSCOPY WITH STENT PLACEMENT Bilateral 3/13/2018    Performed by [facility] at [facility] OR 2ND FLR    ILEOSTOMY N/A 3/13/2018    Performed by [facility] at [facility] OR 2ND FLR    SIGMOIDOSCOPY-FLEXIBLE N/A 3/12/2018    Performed by Mikal Nino MD at [facility] ENDO (2ND FLR)       FAMILY HISTORY: Negative for liver disease    SOCIAL HISTORY:   Social History     Tobacco Use   Smoking Status Current Every Day Smoker    Packs/day: 2.00    Years: 30.00    Pack years: 60.00   Smokeless Tobacco Never Used     Alcohol - daily alcohol for many years. Today states he is off alcohol  Drugs - IVDA and nasal 18-20 yrs old      ROS:   No fever, chills, weight loss, (+) fatigue  No chest pain, palpitations, dyspnea, cough  No abdominal pain, nausea, vomiting  No headaches  No lower extremity edema  No depression or anxiety      PHYSICAL EXAM:  Friendly White male, in no acute distress; alert and oriented to person, place and time  VITALS: reviewed  HEENT: Sclerae anicteric.   NECK: Supple  LUNGS: Normal respiratory effort.  ABDOMEN: Flat, soft, nondistended     SKIN: Warm and dry. No jaundice, No obvious rashes. (+) tattoos  EXTREMITIES: No lower extremity edema  NEURO/PSYCH: Normal gate. Memory intact. Thought and speech pattern appropriate. Behavior normal. No depression or anxiety noted.      RECENT LABS:  Lab Results   Component Value Date    WBC 8.79 09/04/2018    HGB 13.3 (L) 09/04/2018     09/04/2018     Lab Results   Component Value Date    INR 0.9 08/16/2018     Lab Results   Component Value Date    AST 33 09/04/2018    ALT 25 09/04/2018    BILITOT 1.1 (H) 09/04/2018    ALBUMIN 3.7 09/04/2018    ALKPHOS 60 09/04/2018    CREATININE 0.8 09/04/2018    BUN 12 09/04/2018     09/04/2018    K 3.5 09/04/2018    AFP 1.8 04/13/2018          RECENT IMAGING:  Results for orders placed during the hospital encounter of 04/27/18   CT Abdomen Pelvis With Contrast    Narrative EXAMINATION:  CT ABDOMEN PELVIS WITH CONTRAST    CLINICAL HISTORY:  colon cancer staging; Malignant neoplasm of overlapping sites of colon    TECHNIQUE:  Axial images of the chest, abdomen, and pelvis were acquired after the use of 75 cc [id number] IV contrast. 30 cc Omnipaque 350 oral contrast also administered.  Coronal and sagittal reconstructions were also obtained    COMPARISON:  CT chest abdomen pelvis 03/09/2018, abdominal radiograph 03/14/2018    FINDINGS:  Thoracic soft tissues: No significant abnormality.    Aorta: Normal in caliber, course, and contour.  There are three branching vessels at the arch. Significant calcific atherosclerosis involving the coronary arteries in a multivessel distribution, the origins of the great vessels,  and the aortic arch.    Heart: Normal in size with trace pericardial effusion likely of minimal clinical significance.    Myra/Mediastinum: No significant lymphadenopathy    Lungs: Well expanded bilaterally without consolidation, mass, or pleural effusion.  Stable focus of nonspecific ground-glass attenuation within the right upper lobe measuring approximately 1.6 cm (axial series 2, image 23); recommend continued follow-up with expected oncology surveillance.    Liver: Hepatomegaly, similar to prior.  Stable subcentimeter hypodensities within the left and right hepatic lobes, too small to fully characterize but possibly representing cysts.    Gallbladder: Mild wall thickening likely secondary to decompression.  No internal calcified gallstones or pericholecystic fluid.    Bile Ducts: No evidence of dilated ducts.    Pancreas: No mass or peripancreatic fat stranding.    Spleen: Unremarkable.    Adrenals: Unremarkable.    Kidneys: Normal in size and location without focal abnormality.  Normal physiologic ability to concentrate contrast  appropriately.    Bladder/ureters/prostate: Postsurgical changes status post partial cystectomy with bladder repair, partial prostatectomy, and left ureteral reimplantation.  Bladder is not distended on today's examination and is thus poorly evaluated.  Bilateral double-J stents extending from the collecting systems to the bladder.  Of note, right double-J ureteral stent begins at the level of the ureteropelvic junction while the left ureteral stent begins within the renal pelvis.  No hydronephrosis or nephrolithiasis. No ureteral dilatation.    GI Tract/Mesentery: Postsurgical changes status post sigmoid colectomy and diverting loop ileostomy.  No evidence of recurrent disease within the surgical bed.    Peritoneal Space: No ascites. No free air.    Retroperitoneum:  No significant adenopathy.    Abdominal wall:  Diverting loop ileostomy exiting the right upper abdominal quadrant.  Healing midline incision.  No significant abnormalities.    Vasculature: Significant calcific atherosclerosis involving the infrarenal abdominal aorta with extension into the proximal iliac arteries.  No aneurysm    Bones: Moderate degenerative changes of the visualized osseous structures without acute fracture or destructive osseous process.  Grade 1 retrolisthesis of L5 on S1.  Stable wedge compression fracture of L1.      Impression In this patient with history of colon cancer, there have been extensive interval postsurgical changes involving the bladder and bowel as detailed above.  No evidence of recurrent or metastatic disease.  Of note, bladder is decompressed on today's examination and poorly evaluated.    Stable focus of nonspecific ground-glass attenuation within the right upper lobe; recommend continue follow-up with expected surveillance.    Stable hepatic hypodensities too small to fully characterize but likely representing hepatic cyst.    Additional, stable findings as above.    There are no measurable lesions per RECIST  criteria.    This report was flagged in Epic as abnormal.    Electronically signed by resident: Zackery Rob  Date:    04/27/2018  Time:    13:57    Electronically signed by: Maurice Mckay MD  Date:    04/27/2018  Time:    15:24         ASSESSMENT  53 y.o. White male with:  1. CHRONIC HEPATITIS C, GENOTYPE 1a - treatment naive  -- FibroScan today - F2  -- Lacking Immunity to HAV   -- current labs reveal downward trend in viral load - possible clearance on his own??    2. PRIOR HBV EXPOSURE  -- Pos HBcAb, neg HBsAg -- risk of reactivation w/ chemo  -- Per AASLD guidelines, needs vaccine due to chemo  -- Per AASLD guidelines, pt could be monitored and prophylaxis could be given on demand if reactivation occurs OR prophylaxis could be initiated now. Case reviewed w/ Dr Interiano. Will move forward w/ prophylaxis now    3. MILD INTERMITTENT TRANSAMINASE ELEVATION  -- currently normal    4.  SIGMOID COLON CANCER  -- s/p surgery 3/2018, now on 5FU     5. HISTORY OF ALCOHOL USE  -- reports he has quit drinking since last visit       PLAN:  1. Twinrix vaccine series to [facility]  2. Labs today  · HCV RNA - if still trending down will continue to monitor; if stable or trending up pt will return to discuss antiviral therapy  · HBV DNA, HBsAg, HBsAb  · LFT  3. Begin Lamivudine 150mg daily. Will continue x 1 month after chemo ends.  · Pt aware he should not miss / skip doses and should not d/c this no his own. He will need lab assessment before this is discontinued  4. Encouraged pt to remain OFF alcohol    Further recommendations to follow      Cc: Teresa Marin MD

## 2018-09-17 NOTE — PROCEDURES
Procedures     Fibroscan Procedure     Name: Chris Aguirre   Date of Procedure : 2018   :: Jennifer B Scheuermann, PA  Diagnosis: HCV    Probe: M    Fibroscan readin.9 KPa    Fibrosis:F2     CAP readin dB/m    Steatosis: :<S1

## 2018-09-18 ENCOUNTER — TELEPHONE (OUTPATIENT)
Dept: PHARMACY | Facility: CLINIC | Age: 53
End: 2018-09-18

## 2018-09-18 NOTE — TELEPHONE ENCOUNTER
Initial Medication Consult: Lamivudine     Confirmed 2 patient identifiers - name and . Patient received lamivudine counseling over the phone on . Patient will start lamivudine on . Lamivudine will be picked up at [facility] on . $3.00 co-pay (pay at register)    Lamivudine 150 mg - Take 1 tablet by mouth once daily.  Counseling was reviewed:   1. Patient MUST take lamivudine at the SAME time every day with or without food. Take with food if stomach irritation occurs.   2. Side effects include, but not limited to: headache, dizziness, upset stomach/vomiting, diarrhea, fatigue, insomnia.   Headache: Patient may treat with OTC remedies. If Tylenol is used, dose should  not exceed 2000mg per day.   3. Medication list reviewed. No DDIs or allergies noted. Patient MUST contact myself or provider prior to starting any new OTC, herbal, or prescription drugs to avoid potential DDIs.      This drug does not stop the spread of diseases like HIV or hepatitis that are passed through blood or having sex. Do not have any kind of sex without using a latex or polyurethane condom. Do not share needles or other things like toothbrushes or razors. Talk with your doctor.    Discussed the importance of staying well hydrated while on therapy. Compliance stressed - patient to take missed doses as soon as remembered, but NOT to take 2 doses in one day. Stressed the importance of keeping lab appointments.  Patient will report questions or concerns to myself or practitioner. Patient verbalizes understanding. Patient plans to start lamivudine on  . I will f/u with patient in 1 weeks from start, and [facility] will contact patient in 3 weeks to coordinate next refill.    Cheri Quevedo, PharmD, [facility]  Clinical Pharmacist   [facility]  Phone: [phone]

## 2018-09-19 ENCOUNTER — TELEPHONE (OUTPATIENT)
Dept: HEPATOLOGY | Facility: CLINIC | Age: 53
End: 2018-09-19

## 2018-09-19 DIAGNOSIS — B18.2 CHRONIC HEPATITIS C WITHOUT HEPATIC COMA: Primary | ICD-10-CM

## 2018-09-19 NOTE — TELEPHONE ENCOUNTER
Labs reviewed:   9/17/2018 09:10   Hep B S Ab Positive (A)   Hep B Viral DNA IU/ML <10   Hepatitis B Surface Ag Negative   Log HBV IU/mL <1.00        9/17/2018 09:10   HCV Log 3.39 (H)   HCV RNA Quant PCR 2,445 (H)   HCV, Qualitative DETECTED (A)     9/17 - LFT normal      Pls call pt:  1. HBV labs were all stable. No evidence of active HBV infection now.   - Proceed w/ vaccine series, 2nd shot due 10/15  - Proceed w/ lamivudine 150mg daily as soon as he gets it    2. HCV level is dropping. We'll keep monitoring it.    Schedule:  - HCV RNA, LFT in one month    thanks

## 2018-09-20 ENCOUNTER — OFFICE VISIT (OUTPATIENT)
Dept: HEMATOLOGY/ONCOLOGY | Facility: CLINIC | Age: 53
End: 2018-09-20
Payer: MEDICAID

## 2018-09-20 ENCOUNTER — INFUSION (OUTPATIENT)
Dept: INFUSION THERAPY | Facility: HOSPITAL | Age: 53
End: 2018-09-20
Attending: INTERNAL MEDICINE
Payer: MEDICAID

## 2018-09-20 VITALS
SYSTOLIC BLOOD PRESSURE: 160 MMHG | WEIGHT: 165.38 LBS | BODY MASS INDEX: 23.15 KG/M2 | TEMPERATURE: 99 F | HEART RATE: 101 BPM | HEIGHT: 71 IN | OXYGEN SATURATION: 100 % | RESPIRATION RATE: 18 BRPM | DIASTOLIC BLOOD PRESSURE: 98 MMHG

## 2018-09-20 DIAGNOSIS — B18.2 CHRONIC HEPATITIS C WITHOUT HEPATIC COMA: Primary | ICD-10-CM

## 2018-09-20 DIAGNOSIS — R45.89 ANXIETY ABOUT HEALTH: ICD-10-CM

## 2018-09-20 DIAGNOSIS — C18.8 OVERLAPPING MALIGNANT NEOPLASM OF COLON: Primary | ICD-10-CM

## 2018-09-20 DIAGNOSIS — T45.1X5A ANEMIA DUE TO ANTINEOPLASTIC CHEMOTHERAPY: ICD-10-CM

## 2018-09-20 DIAGNOSIS — C18.8 OVERLAPPING MALIGNANT NEOPLASM OF COLON: ICD-10-CM

## 2018-09-20 DIAGNOSIS — D64.81 ANEMIA DUE TO ANTINEOPLASTIC CHEMOTHERAPY: ICD-10-CM

## 2018-09-20 LAB
ALBUMIN SERPL BCP-MCNC: 4.2 G/DL
ALP SERPL-CCNC: 60 U/L
ALT SERPL W/O P-5'-P-CCNC: 28 U/L
ANION GAP SERPL CALC-SCNC: 10 MMOL/L
AST SERPL-CCNC: 32 U/L
BASOPHILS # BLD AUTO: 0.05 K/UL
BASOPHILS NFR BLD: 0.7 %
BILIRUB SERPL-MCNC: 0.7 MG/DL
BUN SERPL-MCNC: 10 MG/DL
CALCIUM SERPL-MCNC: 9.2 MG/DL
CHLORIDE SERPL-SCNC: 102 MMOL/L
CO2 SERPL-SCNC: 25 MMOL/L
CREAT SERPL-MCNC: 0.8 MG/DL
DIFFERENTIAL METHOD: ABNORMAL
EOSINOPHIL # BLD AUTO: 0 K/UL
EOSINOPHIL NFR BLD: 0.4 %
ERYTHROCYTE [DISTWIDTH] IN BLOOD BY AUTOMATED COUNT: 17.2 %
EST. GFR  (AFRICAN AMERICAN): >60 ML/MIN/1.73 M^2
EST. GFR  (NON AFRICAN AMERICAN): >60 ML/MIN/1.73 M^2
GLUCOSE SERPL-MCNC: 99 MG/DL
HCT VFR BLD AUTO: 38.5 %
HGB BLD-MCNC: 13.1 G/DL
IMM GRANULOCYTES # BLD AUTO: 0.02 K/UL
IMM GRANULOCYTES NFR BLD AUTO: 0.3 %
LYMPHOCYTES # BLD AUTO: 2.7 K/UL
LYMPHOCYTES NFR BLD: 37 %
MCH RBC QN AUTO: 31.9 PG
MCHC RBC AUTO-ENTMCNC: 34 G/DL
MCV RBC AUTO: 94 FL
MONOCYTES # BLD AUTO: 0.6 K/UL
MONOCYTES NFR BLD: 8.9 %
NEUTROPHILS # BLD AUTO: 3.8 K/UL
NEUTROPHILS NFR BLD: 52.7 %
NRBC BLD-RTO: 0 /100 WBC
PLATELET # BLD AUTO: 235 K/UL
PMV BLD AUTO: 9.8 FL
POTASSIUM SERPL-SCNC: 3.8 MMOL/L
PROT SERPL-MCNC: 7.7 G/DL
RBC # BLD AUTO: 4.11 M/UL
SODIUM SERPL-SCNC: 137 MMOL/L
WBC # BLD AUTO: 7.22 K/UL

## 2018-09-20 PROCEDURE — A4216 STERILE WATER/SALINE, 10 ML: HCPCS | Performed by: INTERNAL MEDICINE

## 2018-09-20 PROCEDURE — 80053 COMPREHEN METABOLIC PANEL: CPT

## 2018-09-20 PROCEDURE — 85025 COMPLETE CBC W/AUTO DIFF WBC: CPT

## 2018-09-20 PROCEDURE — 99213 OFFICE O/P EST LOW 20 MIN: CPT | Mod: PBBFAC,25 | Performed by: INTERNAL MEDICINE

## 2018-09-20 PROCEDURE — 36591 DRAW BLOOD OFF VENOUS DEVICE: CPT

## 2018-09-20 PROCEDURE — 25000003 PHARM REV CODE 250: Performed by: INTERNAL MEDICINE

## 2018-09-20 PROCEDURE — 99999 PR PBB SHADOW E&M-EST. PATIENT-LVL III: CPT | Mod: PBBFAC,,, | Performed by: INTERNAL MEDICINE

## 2018-09-20 PROCEDURE — 99214 OFFICE O/P EST MOD 30 MIN: CPT | Mod: S$PBB,,, | Performed by: INTERNAL MEDICINE

## 2018-09-20 PROCEDURE — 63600175 PHARM REV CODE 636 W HCPCS: Performed by: INTERNAL MEDICINE

## 2018-09-20 RX ORDER — SODIUM CHLORIDE 0.9 % (FLUSH) 0.9 %
10 SYRINGE (ML) INJECTION
Status: CANCELLED | OUTPATIENT
Start: 2018-11-01

## 2018-09-20 RX ORDER — SODIUM CHLORIDE 0.9 % (FLUSH) 0.9 %
10 SYRINGE (ML) INJECTION
Status: CANCELLED | OUTPATIENT
Start: 2018-10-25

## 2018-09-20 RX ORDER — HEPARIN 100 UNIT/ML
500 SYRINGE INTRAVENOUS
Status: CANCELLED | OUTPATIENT
Start: 2018-09-20

## 2018-09-20 RX ORDER — HEPARIN 100 UNIT/ML
500 SYRINGE INTRAVENOUS
Status: CANCELLED | OUTPATIENT
Start: 2018-10-25

## 2018-09-20 RX ORDER — HEPARIN 100 UNIT/ML
500 SYRINGE INTRAVENOUS
Status: CANCELLED | OUTPATIENT
Start: 2018-11-01

## 2018-09-20 RX ORDER — HEPARIN 100 UNIT/ML
500 SYRINGE INTRAVENOUS
Status: CANCELLED | OUTPATIENT
Start: 2018-09-27

## 2018-09-20 RX ORDER — SODIUM CHLORIDE 0.9 % (FLUSH) 0.9 %
10 SYRINGE (ML) INJECTION
Status: CANCELLED | OUTPATIENT
Start: 2018-10-11

## 2018-09-20 RX ORDER — SODIUM CHLORIDE 0.9 % (FLUSH) 0.9 %
10 SYRINGE (ML) INJECTION
Status: CANCELLED | OUTPATIENT
Start: 2018-09-20

## 2018-09-20 RX ORDER — HEPARIN 100 UNIT/ML
500 SYRINGE INTRAVENOUS
Status: CANCELLED | OUTPATIENT
Start: 2018-10-18

## 2018-09-20 RX ORDER — SODIUM CHLORIDE 0.9 % (FLUSH) 0.9 %
10 SYRINGE (ML) INJECTION
Status: CANCELLED | OUTPATIENT
Start: 2018-10-18

## 2018-09-20 RX ORDER — SODIUM CHLORIDE 0.9 % (FLUSH) 0.9 %
10 SYRINGE (ML) INJECTION
Status: COMPLETED | OUTPATIENT
Start: 2018-09-20 | End: 2018-09-20

## 2018-09-20 RX ORDER — HEPARIN 100 UNIT/ML
500 SYRINGE INTRAVENOUS
Status: COMPLETED | OUTPATIENT
Start: 2018-09-20 | End: 2018-09-20

## 2018-09-20 RX ORDER — HEPARIN 100 UNIT/ML
500 SYRINGE INTRAVENOUS
Status: CANCELLED | OUTPATIENT
Start: 2018-10-11

## 2018-09-20 RX ORDER — HEPARIN 100 UNIT/ML
500 SYRINGE INTRAVENOUS
Status: CANCELLED | OUTPATIENT
Start: 2018-10-04

## 2018-09-20 RX ORDER — SODIUM CHLORIDE 0.9 % (FLUSH) 0.9 %
10 SYRINGE (ML) INJECTION
Status: CANCELLED | OUTPATIENT
Start: 2018-09-27

## 2018-09-20 RX ORDER — PROMETHAZINE HYDROCHLORIDE 25 MG/1
25 TABLET ORAL EVERY 6 HOURS PRN
Qty: 30 TABLET | Refills: 2 | Status: SHIPPED | OUTPATIENT
Start: 2018-09-20 | End: 2018-10-25 | Stop reason: SDUPTHER

## 2018-09-20 RX ORDER — SODIUM CHLORIDE 0.9 % (FLUSH) 0.9 %
10 SYRINGE (ML) INJECTION
Status: CANCELLED | OUTPATIENT
Start: 2018-10-04

## 2018-09-20 RX ORDER — LORAZEPAM 0.5 MG/1
0.5 TABLET ORAL EVERY 6 HOURS PRN
Qty: 60 TABLET | Refills: 0 | Status: SHIPPED | OUTPATIENT
Start: 2018-09-20 | End: 2018-10-25 | Stop reason: SDUPTHER

## 2018-09-20 RX ADMIN — Medication 10 ML: at 09:09

## 2018-09-20 RX ADMIN — HEPARIN 500 UNITS: 100 SYRINGE at 09:09

## 2018-09-20 NOTE — PROGRESS NOTES
Cc: Colon cancer, here for follow up     HPI: is a 53yoWM who presented to the ED on March 10th, 2018 with acute onset of stool and gas in his urine over the previous several days. Previously, he had progressively worsening constipation, for which he was on stool softeners. He also had given history of  bloody stools and  lower abdominal pain. He had  15 lbs over the past few months. He denies fatigue, fevers, CP, or SOB.   He was diagnosed with HCV about 15 years ago but never sought treatment for this. He was previously hospitalized and seen by inpatient psychiatry for substance abuse, past history of suicide attempt by cutting. He also has a history of an MI with placement of heart stents, on ASA.He underwent  partial cystectomy with complex cystorrhaphy and partial prostatectomy, left neoureterocystotomy with ureteral re-implantation, cystoscopy with bilateral ureteral catheter placement, bilateral ureteral stent placement, left pelvic lymph node dissection,sigmoid colectomy and ileostomy on 3/13/18.         Interval history: He is here for a follow up visit. He is doing well. He still has no good appetite.He started adjuvant 5FU/LVon 5/7/18. He only received 3 weekly treatments. He missed chemotherapy as he lost his friend and was depressed. He feels better now. He is not drinking regularly.He has completed 2 cycles of single agent 5 FU. He takes Lorazepam almost on a daily basis for anxiety.   He had a followup at the liver clinic for his Hep B and C recently.      Review of Systems   Constitutional: Positive for malaise/fatigue. Negative for chills, fever and weight loss.   HENT: Negative for ear discharge, ear pain and nosebleeds.    Eyes: Negative for blurred vision, double vision and photophobia.   Respiratory: Negative for cough, hemoptysis and sputum production.    Cardiovascular: Negative for chest pain, palpitations and orthopnea.   Gastrointestinal: Negative for abdominal pain, heartburn,  nausea and vomiting.   Genitourinary: Negative for dysuria, frequency and urgency.   Musculoskeletal: Negative for myalgias and neck pain.   Skin: Negative for rash.   Neurological: Negative for dizziness, tingling, tremors, weakness and headaches.   Endo/Heme/Allergies: Does not bruise/bleed easily.   Psychiatric/Behavioral: Negative for depression.       Current Outpatient Medications   Medication Sig    lamiVUDine (EPIVIR) 150 MG Tab Take 1 tablet (150 mg total) by mouth once daily.    LORazepam (ATIVAN) 0.5 MG tablet Take 1 tablet (0.5 mg total) by mouth every 6 (six) hours as needed for Anxiety.    promethazine (PHENERGAN) 25 MG tablet Take 1 tablet (25 mg total) by mouth every 6 (six) hours as needed for Nausea.     No current facility-administered medications for this visit.          Vitals:    09/20/18 1045   BP: (!) 160/98   Pulse: 101   Resp: 18   Temp: 98.8 °F (37.1 °C)         Physical Exam   Constitutional: He appears well-developed. No distress.   HENT:   Head: Normocephalic and atraumatic.   Mouth/Throat: No oropharyngeal exudate.   Eyes: Pupils are equal, round, and reactive to light. No scleral icterus.   Neck: Normal range of motion.   Cardiovascular: Normal rate and regular rhythm.   No murmur heard.  Pulmonary/Chest: Effort normal and breath sounds normal. No respiratory distress. He has no wheezes.   Abdominal: Soft. Bowel sounds are normal. He exhibits no distension. There is no tenderness. There is no rebound.   He has colostomy bag in right lower quadrant   Musculoskeletal: He exhibits no edema.   Lymphadenopathy:     He has no cervical adenopathy.   Neurological: He is alert. No cranial nerve deficit.   Skin: Skin is warm.   Psychiatric: He has a normal mood and affect.         3/9/18 CT abdomen/pelvis with cont        CT chest abdomen and pelvis with contrast    Clinical history: Weight loss    Comparison: None    Technique:  Axial images of the chest, abdomen, and pelvis were obtained  at 5 mm intervals following administration of 75 cc Omni 350 IV contrast as well as oral and rectal contrast.  Coronal and sagittal and delayed images were reviewed.    Findings:  The structures at the base of the neck are grossly unremarkable.  No significant mediastinal lymphadenopathy.  The heart is not enlarged.  There is atherosclerotic calcification in the distribution of the coronary arteries.  No significant pericardial effusion.    The airways are patent.    There is a vague focus of groundglass attenuation within the right upper lobe, measuring up to 1.6 cm.  No significant volume of pleural fluid.  No pneumothorax.  No large focal consolidation.    The thoracic aorta tapers normally with atherosclerotic calcification.    The liver is hypoattenuating and enlarged, may reflect steatosis, correlation with LFTs recommended.  There is a subcentimeter hypoattenuating lesion within the left hepatic lobe, too small for characterization.  There are 2 faint foci of irregular peripheral enhancement involving the medial aspect and anterior aspect of the left hepatic lobe.  These foci normalize with hepatic parenchymal enhancement on delayed image, and may reflect perfusional anomaly versus flash filling hemangiomas.  Punctate hypoattenuating focus within the right hepatic lobe is too small for characterization.  The spleen, pancreas, gallbladder and adrenal glands are grossly unremarkable.  There is no biliary dilation.  There is thickening at the gastric fundus, nonspecific, correlation with direct visualization as warranted.  No high grade obstruction.  The portal vein, splenic vein, SMV, celiac axis and SMA all are grossly patent.  Scattered shotty periaortic and paracaval lymph nodes are noted.    The kidneys enhance symmetrically and excrete contrast appropriately without hydronephrosis or nephrolithiasis.  The bilateral ureters are grossly unremarkable along their course to the urinary bladder without calculi  seen.  There is air within the urinary bladder, possibly from catheterization.  Please see below for complete description.  The prostate is prominent.    Rectal contrast has been administered.  The rectum and distal sigmoid colon is unremarkable without wall thickening.  There is circumferential wall thickening involving the proximal sigmoid colon, without significant contrast passage through the region of thickening.  Wall thickening in the region measures up to 1.1 cm.  There is focal masslike thickening of the sigmoid colon, that abuts the urinary bladder.  There is urinary bladder wall thickening and distortion of the urinary bladder by this mass.  There is a focus of air and stool insinuating between the region of focal colonic thickening and the urinary bladder, finding is concerning for bladder wall invasion by colonic malignancy, with fistulous formation to the urinary bladder.  This may account for air within the urinary bladder.  Abscess in the region felt less likely.  There is surrounding inflammation, and several adjacent nonenlarged although numerous lymph nodes.  Diverticula are noted throughout the remainder of the colon, with moderate to large amount stool throughout the colon suggesting superimposed constipation.  Oral contrast is noted to the level of the transverse colon.  The terminal ileum is grossly unremarkable.  No pericecal inflammation.  The small bowel is grossly unremarkable.  Scattered shotty periaortic and paracaval lymph nodes are noted.  There is a small amount reactive fluid in the left hemipelvis.    There is a mixed s sclerotic focus within the posterior aspect of the right iliac bone, the appearance is not specific for metastatic disease, however metastatic focus is not excluded in the setting of probable colonic malignancy.  Several additional sclerotic foci are noted throughout the spine, likely degenerative in nature.  Schmorl's node involves the superior endplate of L1.   There is Oddi wall cachexia.  There are bilateral fat containing inguinal hernias.  No significant axillary lymphadenopathy.   Impression        1.  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  Air within the urinary bladder is suspected to be on the bases of the same although correlation for recent catheterization and clinical symptomatology.  There is indistinctness about the colon region, with several although nonenlarged lymph nodes, and disorganized fluid.  Please see above for full description.    2.  Vague groundglass focus of attenuation within the right upper lobe of the right lung, nonspecific, could reflect nonspecific inflammation or infection, metastatic disease however cannot be excluded in this setting.  Followup is advised.    3.  Constipation.    4.  Several additional findings described above.      3/12/18 pathology        SPECIMEN  1) Rectal polyps x2, 8 mm polyp and 5 mm polyp, hot snare polypectomies.  FINAL PATHOLOGIC DIAGNOSIS  Rectum, 8 mm and 5 mm polyps ×2, biopsy:  - Hyperplastic polyp, multiple fragments.     3/12/18 FINAL PATHOLOGIC DIAGNOSIS     Supplemental Diagnosis     MICROSATELLITE INSTABILITY, TUMOR:  RESULT SUMMARY:  -AMBERLY/MSI-L  RESULT:  MSI: AMBERLY/MSI-L (instability observed in 0 of 5 informative markers).  INTERPRETATION:  An AMBERLY/MSI-L phenotype suggests the presence of normal DNA mismatch repair function within the tumor .     1. Sigmoid colon) posterior bladder wall, mass, en bloc resection of sigmoid colon with attached posterior bladder wall:     Adenocarcinoma, measuring 4.7 cm in greatest dimension.  Tumor extends through the muscularis propria into the pericolorectal tissue with surrounding abcess formation with adherent bladder. There is no viable tumor seen invading bladder in multiple examined sections.  Proximal and distal colonic surgical margins are negative for  malignancy.  Mesenteric surgical margin is negative for malignancy.  17 benign lymph nodes, negative for metastatic carcinoma (0/17).  See synoptic report in comment section for further details.     2. Right lateral bladder wall margin, biopsy:     Negative for malignancy.     3. Inferior bladder wall margin, biopsy:     Negative for malignancy.     4. Left lateral bladder wall margin, biopsy:     Negative for malignancy.     5. Superior bladder wall, biopsy:     Negative for malignancy.     6. Second right lateral bladder wall margin, biopsy:     Negative for malignancy.     7. Second left lateral bladder wall margin, biopsy:  Negative for malignancy.     8. Left internal iliac lymph nodes, regional resection:     3 benign lymph nodes, negative for metastatic carcinoma (see 0/3).     9. Proximal sigmoid colon, segmental resection:     Colon with congested vasculature and no evidence of malignancy.  Resection margins are viable and negative for dysplasia or malignancy.  8 benign lymph nodes, negative for metastatic carcinoma (0/8).     10. Proximal donut, biopsy:     Colonic mucosa with no significant histopathologic abnormality.  No evidence of malignancy.     11. Distal donut, biopsy:     Colonic mucosa with no significant histopathologic abnormality.  No evidence of malignancy     Comment: Synoptic report  Procedure: Sigmoid colon with attached bladder wall; en bloc resection  Tumor site: Sigmoid colon  Tumor size:  Greatest dimension: 4.7 cm  Macroscopic tumor perforation: Grossly lesion involves the full extent of the bowel wall and erodes into the bladder  Histologic type: Adenocarcinoma  Histologic grade: G2 moderately differentiated.:  Microscopic tumor extension: Tumor invades through the muscularis propria into pericolic rectal tissue .  Margins:  Proximal margin: Uninvolved by invasive carcinoma  Distal margin: Uninvolved by invasive carcinoma  Mesenteric margin: Uninvolved  Circumferential (radial)  "margin: Tumor extends into pericolic rectal adipose tissue and has gross tumor perforation with surrounding abscess formation     Distance of invasive carcinoma from closest margin: 9 cm from surgical margin "A"  Treatment effect: No known presurgical treatment  Lymphovascular invasion: Not identified     Perineural invasion: Not identified  Tumor deposits: Not identified  Regional lymph nodes:  Number of lymph nodes involved: 0  Number of lymph nodes examined: 28  Pathologic staging:  Primary tumor:  pT3: Tumor invades through the muscularis propria into pericolic tissues .  Regional lymph nodes:  pN0: No regional lymph node metastasis  Number examined: 28  Number involved: 0  Distant metastasis:  pMX: Cannot be assessed.                  4/27/18 CT     COMPARISON:  CT chest abdomen pelvis 03/09/2018, abdominal radiograph 03/14/2018    FINDINGS:  Thoracic soft tissues: No significant abnormality.    Aorta: Normal in caliber, course, and contour.  There are three branching vessels at the arch. Significant calcific atherosclerosis involving the coronary arteries in a multivessel distribution, the origins of the great vessels,  and the aortic arch.    Heart: Normal in size with trace pericardial effusion likely of minimal clinical significance.    Myra/Mediastinum: No significant lymphadenopathy    Lungs: Well expanded bilaterally without consolidation, mass, or pleural effusion.  Stable focus of nonspecific ground-glass attenuation within the right upper lobe measuring approximately 1.6 cm (axial series 2, image 23); recommend continued follow-up with expected oncology surveillance.    Liver: Hepatomegaly, similar to prior.  Stable subcentimeter hypodensities within the left and right hepatic lobes, too small to fully characterize but possibly representing cysts.    Gallbladder: Mild wall thickening likely secondary to decompression.  No internal calcified gallstones or pericholecystic fluid.    Bile Ducts: No " evidence of dilated ducts.    Pancreas: No mass or peripancreatic fat stranding.    Spleen: Unremarkable.    Adrenals: Unremarkable.    Kidneys: Normal in size and location without focal abnormality.  Normal physiologic ability to concentrate contrast appropriately.    Bladder/ureters/prostate: Postsurgical changes status post partial cystectomy with bladder repair, partial prostatectomy, and left ureteral reimplantation.  Bladder is not distended on today's examination and is thus poorly evaluated.  Bilateral double-J stents extending from the collecting systems to the bladder.  Of note, right double-J ureteral stent begins at the level of the ureteropelvic junction while the left ureteral stent begins within the renal pelvis.  No hydronephrosis or nephrolithiasis. No ureteral dilatation.    GI Tract/Mesentery: Postsurgical changes status post sigmoid colectomy and diverting loop ileostomy.  No evidence of recurrent disease within the surgical bed.    Peritoneal Space: No ascites. No free air.    Retroperitoneum:  No significant adenopathy.    Abdominal wall:  Diverting loop ileostomy exiting the right upper abdominal quadrant.  Healing midline incision.  No significant abnormalities.    Vasculature: Significant calcific atherosclerosis involving the infrarenal abdominal aorta with extension into the proximal iliac arteries.  No aneurysm    Bones: Moderate degenerative changes of the visualized osseous structures without acute fracture or destructive osseous process.  Grade 1 retrolisthesis of L5 on S1.  Stable wedge compression fracture of L1.   Impression        In this patient with history of colon cancer, there have been extensive interval postsurgical changes involving the bladder and bowel as detailed above.  No evidence of recurrent or metastatic disease.  Of note, bladder is decompressed on today's examination and poorly evaluated.    Stable focus of nonspecific ground-glass attenuation within the right  upper lobe; recommend continue follow-up with expected surveillance.    Stable hepatic hypodensities too small to fully characterize but likely representing hepatic cyst.    Additional, stable findings as above.    There are no measurable lesions per RECIST criteria.               Component      Latest Ref Rng & Units 9/20/2018   WBC      3.90 - 12.70 K/uL 7.22   RBC      4.60 - 6.20 M/uL 4.11 (L)   Hemoglobin      14.0 - 18.0 g/dL 13.1 (L)   Hematocrit      40.0 - 54.0 % 38.5 (L)   MCV      82 - 98 fL 94   MCH      27.0 - 31.0 pg 31.9 (H)   MCHC      32.0 - 36.0 g/dL 34.0   RDW      11.5 - 14.5 % 17.2 (H)   Platelets      150 - 350 K/uL 235   MPV      9.2 - 12.9 fL 9.8   Immature Granulocytes      0.0 - 0.5 % 0.3   Gran # (ANC)      1.8 - 7.7 K/uL 3.8   Immature Grans (Abs)      0.00 - 0.04 K/uL 0.02   Lymph #      1.0 - 4.8 K/uL 2.7   Mono #      0.3 - 1.0 K/uL 0.6   Eos #      0.0 - 0.5 K/uL 0.0   Baso #      0.00 - 0.20 K/uL 0.05   nRBC      0 /100 WBC 0   Gran%      38.0 - 73.0 % 52.7   Lymph%      18.0 - 48.0 % 37.0   Mono%      4.0 - 15.0 % 8.9   Eosinophil%      0.0 - 8.0 % 0.4   Basophil%      0.0 - 1.9 % 0.7   Differential Method       Automated   Sodium      136 - 145 mmol/L 137   Potassium      3.5 - 5.1 mmol/L 3.8   Chloride      95 - 110 mmol/L 102   CO2      23 - 29 mmol/L 25   Glucose      70 - 110 mg/dL 99   BUN, Bld      6 - 20 mg/dL 10   Creatinine      0.5 - 1.4 mg/dL 0.8   Calcium      8.7 - 10.5 mg/dL 9.2   Total Protein      6.0 - 8.4 g/dL 7.7   Albumin      3.5 - 5.2 g/dL 4.2   Total Bilirubin      0.1 - 1.0 mg/dL 0.7   Alkaline Phosphatase      55 - 135 U/L 60   AST      10 - 40 U/L 32   ALT      10 - 44 U/L 28   Anion Gap      8 - 16 mmol/L 10   eGFR if African American      >60 mL/min/1.73 m:2 >60.0   eGFR if non African American      >60 mL/min/1.73 m:2 >60.0     Component      Latest Ref Rng & Units 9/17/2018   HCV Log      <1.08 Log (10) IU/mL 3.39 (H)   HCV, Qualitative      Not  detected IU/mL DETECTED (A)   HCV RNA Quant PCR      <12 IU/mL 2,445 (H)       ASSESSMENT:     1. Sigmoid colon adenocarcinoma, [id number]  2. Lung lesion on CT  3. Weight loss, unintentional  4. Chronic hepatitis C, without coma  5. Tachycardia  6. Hypertension, essential  7. Coronary artery disease  8. H/o depression  9. Anxiety  10. Insomnia  11. Anemia  12. Chronic smoker        Plan:     1,2: He has AJCC stage II disease. There were no clear metastatic lesions on CT done in March 2018. However, there was a groundglass focus of attenuation within the right upper lobe of the right lung. It is unclear if this is atelactasis/pnemonia/metastatic lesion. He cannot have PET CT due to insurance. Repeat CT done on 4/27/18 again showed a focus of nonspecific ground-glass attenuation within the right upper lobe that was previously noted. No other lesions to suspect metastases. He is MSI stable/low. No clear high risk features other than macroscopic perforation. Margins were clear. He has no fecal matter in urine/air in his urine at this time. No clear, established role for adjuvant FOLFOX. . I discussed role for adjuvant 5FU, given weekly for 6 weeks each cycle and off for 2 weeks, for a total of 6 months.  He started 5FU on 5/7/18. However, he received only 3 infusions ( once weekly x 7 days). He was very agitated and inebriated when he presented for subsequent treatments and had to be held. He is now sober.  He is starting C3 .         4. He has untreated chronic hepatitis C genotype 1a.. He was referred to liver clinic. HCV reactivation/ fulminant hepatitis after chemotherapy has been rarely reported. Viral load needs  monitoring. He also has positive hepatitis B antibody. He is on lamivudine     5,6: He will need treatment with betablocker if persistent. /98 mm Hg today     7. No symptoms at this time. He is on Aspirin     8,9,10. He has been referred to psychologist. He requested Ativan for chronic anxiety and  was prescribed on 7/30/18                  Distress Screening Results: Psychosocial Distress screening score of Distress Score: 2 noted and reviewed. No intervention indicated.

## 2018-09-20 NOTE — TELEPHONE ENCOUNTER
I spoke with patient and msg from PS Scheuermann relayed and mailed to him.  Lab draw scheduled 10/15; appt notice mailed.

## 2018-09-21 ENCOUNTER — TELEPHONE (OUTPATIENT)
Dept: HEPATOLOGY | Facility: CLINIC | Age: 53
End: 2018-09-21

## 2018-09-21 DIAGNOSIS — R76.8 HEPATITIS B CORE ANTIBODY POSITIVE: Primary | ICD-10-CM

## 2018-09-21 NOTE — TELEPHONE ENCOUNTER
Started lamivudine for HBV prophylaxis 9/22/18    pls add HBsAg, HBV DNA to other labs (HCV RNA, LFT) I ordered scheduled 10/15    (pls investigate - pt appears to have regular lab appt for me and appt for labs for oncology from port on same day. Can they all be done together??)    thanks

## 2018-09-24 ENCOUNTER — INFUSION (OUTPATIENT)
Dept: INFUSION THERAPY | Facility: HOSPITAL | Age: 53
End: 2018-09-24
Attending: INTERNAL MEDICINE
Payer: MEDICAID

## 2018-09-24 VITALS
BODY MASS INDEX: 23.1 KG/M2 | HEIGHT: 71 IN | RESPIRATION RATE: 18 BRPM | DIASTOLIC BLOOD PRESSURE: 90 MMHG | WEIGHT: 165 LBS | HEART RATE: 81 BPM | SYSTOLIC BLOOD PRESSURE: 135 MMHG | TEMPERATURE: 98 F

## 2018-09-24 DIAGNOSIS — C18.8 OVERLAPPING MALIGNANT NEOPLASM OF COLON: Primary | ICD-10-CM

## 2018-09-24 LAB
ALBUMIN SERPL BCP-MCNC: 3.9 G/DL
ALP SERPL-CCNC: 52 U/L
ALT SERPL W/O P-5'-P-CCNC: 22 U/L
ANION GAP SERPL CALC-SCNC: 5 MMOL/L
AST SERPL-CCNC: 25 U/L
BASOPHILS # BLD AUTO: 0.02 K/UL
BASOPHILS NFR BLD: 0.4 %
BILIRUB SERPL-MCNC: 0.3 MG/DL
BUN SERPL-MCNC: 7 MG/DL
CALCIUM SERPL-MCNC: 9.5 MG/DL
CHLORIDE SERPL-SCNC: 108 MMOL/L
CO2 SERPL-SCNC: 24 MMOL/L
CREAT SERPL-MCNC: 0.8 MG/DL
DIFFERENTIAL METHOD: ABNORMAL
EOSINOPHIL # BLD AUTO: 0 K/UL
EOSINOPHIL NFR BLD: 0.7 %
ERYTHROCYTE [DISTWIDTH] IN BLOOD BY AUTOMATED COUNT: 16.8 %
EST. GFR  (AFRICAN AMERICAN): >60 ML/MIN/1.73 M^2
EST. GFR  (NON AFRICAN AMERICAN): >60 ML/MIN/1.73 M^2
GLUCOSE SERPL-MCNC: 88 MG/DL
HCT VFR BLD AUTO: 38.3 %
HGB BLD-MCNC: 12.9 G/DL
IMM GRANULOCYTES # BLD AUTO: 0.01 K/UL
IMM GRANULOCYTES NFR BLD AUTO: 0.2 %
LYMPHOCYTES # BLD AUTO: 2.1 K/UL
LYMPHOCYTES NFR BLD: 39.5 %
MCH RBC QN AUTO: 32 PG
MCHC RBC AUTO-ENTMCNC: 33.7 G/DL
MCV RBC AUTO: 95 FL
MONOCYTES # BLD AUTO: 0.4 K/UL
MONOCYTES NFR BLD: 6.7 %
NEUTROPHILS # BLD AUTO: 2.8 K/UL
NEUTROPHILS NFR BLD: 52.5 %
NRBC BLD-RTO: 0 /100 WBC
PLATELET # BLD AUTO: 237 K/UL
PMV BLD AUTO: 10.2 FL
POTASSIUM SERPL-SCNC: 4 MMOL/L
PROT SERPL-MCNC: 7.4 G/DL
RBC # BLD AUTO: 4.03 M/UL
SODIUM SERPL-SCNC: 137 MMOL/L
WBC # BLD AUTO: 5.39 K/UL

## 2018-09-24 PROCEDURE — 63600175 PHARM REV CODE 636 W HCPCS: Performed by: INTERNAL MEDICINE

## 2018-09-24 PROCEDURE — 96416 CHEMO PROLONG INFUSE W/PUMP: CPT

## 2018-09-24 PROCEDURE — A4216 STERILE WATER/SALINE, 10 ML: HCPCS | Performed by: INTERNAL MEDICINE

## 2018-09-24 PROCEDURE — 80053 COMPREHEN METABOLIC PANEL: CPT

## 2018-09-24 PROCEDURE — 25000003 PHARM REV CODE 250: Performed by: INTERNAL MEDICINE

## 2018-09-24 PROCEDURE — 85025 COMPLETE CBC W/AUTO DIFF WBC: CPT

## 2018-09-24 RX ORDER — SODIUM CHLORIDE 0.9 % (FLUSH) 0.9 %
10 SYRINGE (ML) INJECTION
Status: COMPLETED | OUTPATIENT
Start: 2018-09-24 | End: 2018-09-24

## 2018-09-24 RX ORDER — SODIUM CHLORIDE 0.9 % (FLUSH) 0.9 %
10 SYRINGE (ML) INJECTION
Status: CANCELLED | OUTPATIENT
Start: 2018-09-24

## 2018-09-24 RX ORDER — HEPARIN 100 UNIT/ML
500 SYRINGE INTRAVENOUS
Status: CANCELLED | OUTPATIENT
Start: 2018-09-24

## 2018-09-24 RX ORDER — HEPARIN 100 UNIT/ML
500 SYRINGE INTRAVENOUS
Status: COMPLETED | OUTPATIENT
Start: 2018-09-24 | End: 2018-09-24

## 2018-09-24 RX ADMIN — Medication 10 ML: at 03:09

## 2018-09-24 RX ADMIN — HEPARIN 500 UNITS: 100 SYRINGE at 03:09

## 2018-09-24 RX ADMIN — FLUOROURACIL 2150 MG: 50 INJECTION, SOLUTION INTRAVENOUS at 04:09

## 2018-09-24 NOTE — PLAN OF CARE
Problem: Chemotherapy Effects (Adult)  Goal: Signs and Symptoms of Listed Potential Problems Will be Absent, Minimized or Managed (Chemotherapy Effects)  Signs and symptoms of listed potential problems will be absent, minimized or managed by discharge/transition of care (reference Chemotherapy Effects (Adult) CPG).   Outcome: Ongoing (interventions implemented as appropriate)  Pt here for 5 FU pump placement, labs, hx, meds, allergies reviewed,

## 2018-09-24 NOTE — PLAN OF CARE
Problem: Patient Care Overview  Goal: Plan of Care Review  Outcome: Ongoing (interventions implemented as appropriate)  cadd pump infusing to right chest wall port without issue at 2.1 ml/hr , pt to rtc Saturday around 1200 for pump d/c, no distress noted upon d/c to home with mother

## 2018-09-25 ENCOUNTER — TELEPHONE (OUTPATIENT)
Dept: SURGERY | Facility: CLINIC | Age: 53
End: 2018-09-25

## 2018-09-25 NOTE — TELEPHONE ENCOUNTER
Returned phone call. Went straight to voicemail, left message.   ----- Message from Kortney Saba sent at 9/24/2018 10:18 AM CDT -----  Contact: self [phone]  Pt called stating he is out of colonostomy bags and he is out of tape as well. He is holding his bags up today w/ duck tape. Pt states he will be coming to [facility] at 2:15pm. He was hoping he could get some bags from the hospital until Frida puts the order in. He states the duck tape is irritating his skin.     Contact: self [phone]

## 2018-09-29 ENCOUNTER — INFUSION (OUTPATIENT)
Dept: INFUSION THERAPY | Facility: HOSPITAL | Age: 53
End: 2018-09-29
Attending: INTERNAL MEDICINE
Payer: MEDICAID

## 2018-09-29 DIAGNOSIS — C18.8 OVERLAPPING MALIGNANT NEOPLASM OF COLON: Primary | ICD-10-CM

## 2018-09-29 PROCEDURE — A4216 STERILE WATER/SALINE, 10 ML: HCPCS | Performed by: INTERNAL MEDICINE

## 2018-09-29 PROCEDURE — 96523 IRRIG DRUG DELIVERY DEVICE: CPT

## 2018-09-29 PROCEDURE — 63600175 PHARM REV CODE 636 W HCPCS: Performed by: INTERNAL MEDICINE

## 2018-09-29 PROCEDURE — 25000003 PHARM REV CODE 250: Performed by: INTERNAL MEDICINE

## 2018-09-29 RX ORDER — SODIUM CHLORIDE 0.9 % (FLUSH) 0.9 %
10 SYRINGE (ML) INJECTION
Status: DISCONTINUED | OUTPATIENT
Start: 2018-09-29 | End: 2018-09-29 | Stop reason: HOSPADM

## 2018-09-29 RX ORDER — HEPARIN 100 UNIT/ML
500 SYRINGE INTRAVENOUS
Status: DISCONTINUED | OUTPATIENT
Start: 2018-09-29 | End: 2018-09-29 | Stop reason: HOSPADM

## 2018-09-29 RX ADMIN — SODIUM CHLORIDE, PRESERVATIVE FREE 10 ML: 5 INJECTION INTRAVENOUS at 12:09

## 2018-09-29 RX ADMIN — HEPARIN 500 UNITS: 100 SYRINGE at 12:09

## 2018-09-29 NOTE — NURSING
Pt here for pump d/c, PAC flush, accompanied by mother, no complaints or concerns at present, tolerating treatment well; ambulatory infusion completed; pt instructed to remain well hydrated, to contact MD for any needs or concerns; declined printed AVS, verbalized understanding of all discussed and when to report next

## 2018-10-01 ENCOUNTER — INFUSION (OUTPATIENT)
Dept: INFUSION THERAPY | Facility: HOSPITAL | Age: 53
End: 2018-10-01
Attending: INTERNAL MEDICINE
Payer: MEDICAID

## 2018-10-01 VITALS
DIASTOLIC BLOOD PRESSURE: 81 MMHG | SYSTOLIC BLOOD PRESSURE: 128 MMHG | TEMPERATURE: 99 F | HEART RATE: 67 BPM | RESPIRATION RATE: 18 BRPM

## 2018-10-01 DIAGNOSIS — C18.8 OVERLAPPING MALIGNANT NEOPLASM OF COLON: Primary | ICD-10-CM

## 2018-10-01 LAB
ALBUMIN SERPL BCP-MCNC: 3.8 G/DL
ALP SERPL-CCNC: 50 U/L
ALT SERPL W/O P-5'-P-CCNC: 16 U/L
ANION GAP SERPL CALC-SCNC: 8 MMOL/L
AST SERPL-CCNC: 17 U/L
BASOPHILS # BLD AUTO: 0.04 K/UL
BASOPHILS NFR BLD: 0.6 %
BILIRUB SERPL-MCNC: 0.2 MG/DL
BUN SERPL-MCNC: 10 MG/DL
CALCIUM SERPL-MCNC: 9.6 MG/DL
CHLORIDE SERPL-SCNC: 108 MMOL/L
CO2 SERPL-SCNC: 24 MMOL/L
CREAT SERPL-MCNC: 0.8 MG/DL
DIFFERENTIAL METHOD: ABNORMAL
EOSINOPHIL # BLD AUTO: 0.1 K/UL
EOSINOPHIL NFR BLD: 2.2 %
ERYTHROCYTE [DISTWIDTH] IN BLOOD BY AUTOMATED COUNT: 16.9 %
EST. GFR  (AFRICAN AMERICAN): >60 ML/MIN/1.73 M^2
EST. GFR  (NON AFRICAN AMERICAN): >60 ML/MIN/1.73 M^2
GLUCOSE SERPL-MCNC: 108 MG/DL
HCT VFR BLD AUTO: 39.8 %
HGB BLD-MCNC: 13.3 G/DL
IMM GRANULOCYTES # BLD AUTO: 0.02 K/UL
IMM GRANULOCYTES NFR BLD AUTO: 0.3 %
LYMPHOCYTES # BLD AUTO: 3.1 K/UL
LYMPHOCYTES NFR BLD: 49.1 %
MCH RBC QN AUTO: 31.7 PG
MCHC RBC AUTO-ENTMCNC: 33.4 G/DL
MCV RBC AUTO: 95 FL
MONOCYTES # BLD AUTO: 0.4 K/UL
MONOCYTES NFR BLD: 7 %
NEUTROPHILS # BLD AUTO: 2.6 K/UL
NEUTROPHILS NFR BLD: 40.8 %
NRBC BLD-RTO: 0 /100 WBC
PLATELET # BLD AUTO: 242 K/UL
PMV BLD AUTO: 10.4 FL
POTASSIUM SERPL-SCNC: 3.9 MMOL/L
PROT SERPL-MCNC: 7.3 G/DL
RBC # BLD AUTO: 4.2 M/UL
SODIUM SERPL-SCNC: 140 MMOL/L
WBC # BLD AUTO: 6.27 K/UL

## 2018-10-01 PROCEDURE — 25000003 PHARM REV CODE 250: Performed by: INTERNAL MEDICINE

## 2018-10-01 PROCEDURE — 63600175 PHARM REV CODE 636 W HCPCS: Performed by: INTERNAL MEDICINE

## 2018-10-01 PROCEDURE — 85025 COMPLETE CBC W/AUTO DIFF WBC: CPT

## 2018-10-01 PROCEDURE — A4216 STERILE WATER/SALINE, 10 ML: HCPCS | Performed by: INTERNAL MEDICINE

## 2018-10-01 PROCEDURE — 96416 CHEMO PROLONG INFUSE W/PUMP: CPT

## 2018-10-01 PROCEDURE — 80053 COMPREHEN METABOLIC PANEL: CPT

## 2018-10-01 RX ORDER — SODIUM CHLORIDE 0.9 % (FLUSH) 0.9 %
10 SYRINGE (ML) INJECTION
Status: CANCELLED | OUTPATIENT
Start: 2018-10-01

## 2018-10-01 RX ORDER — HEPARIN 100 UNIT/ML
500 SYRINGE INTRAVENOUS
Status: CANCELLED | OUTPATIENT
Start: 2018-10-01

## 2018-10-01 RX ORDER — SODIUM CHLORIDE 0.9 % (FLUSH) 0.9 %
10 SYRINGE (ML) INJECTION
Status: COMPLETED | OUTPATIENT
Start: 2018-10-01 | End: 2018-10-01

## 2018-10-01 RX ORDER — HEPARIN 100 UNIT/ML
500 SYRINGE INTRAVENOUS
Status: COMPLETED | OUTPATIENT
Start: 2018-10-01 | End: 2018-10-01

## 2018-10-01 RX ADMIN — HEPARIN 500 UNITS: 100 SYRINGE at 01:10

## 2018-10-01 RX ADMIN — Medication 10 ML: at 01:10

## 2018-10-01 RX ADMIN — FLUOROURACIL 2150 MG: 50 INJECTION, SOLUTION INTRAVENOUS at 03:10

## 2018-10-01 NOTE — PLAN OF CARE
Problem: Patient Care Overview  Goal: Plan of Care Review  Outcome: Ongoing (interventions implemented as appropriate)  Pt arrived for 5FU pump. CADD pump connected to run over 120 hours at 2 ml/hr. PAC + blood return; connections secure. RTC Sat AM for pump d/c. VSS; NAD. Discharging unassisted.

## 2018-10-06 ENCOUNTER — INFUSION (OUTPATIENT)
Dept: INFUSION THERAPY | Facility: HOSPITAL | Age: 53
End: 2018-10-06
Attending: INTERNAL MEDICINE
Payer: MEDICAID

## 2018-10-06 VITALS
DIASTOLIC BLOOD PRESSURE: 84 MMHG | TEMPERATURE: 98 F | HEART RATE: 94 BPM | SYSTOLIC BLOOD PRESSURE: 148 MMHG | RESPIRATION RATE: 16 BRPM

## 2018-10-06 DIAGNOSIS — C18.8 OVERLAPPING MALIGNANT NEOPLASM OF COLON: Primary | ICD-10-CM

## 2018-10-06 PROCEDURE — 96523 IRRIG DRUG DELIVERY DEVICE: CPT

## 2018-10-06 PROCEDURE — 63600175 PHARM REV CODE 636 W HCPCS: Performed by: INTERNAL MEDICINE

## 2018-10-06 PROCEDURE — A4216 STERILE WATER/SALINE, 10 ML: HCPCS | Performed by: INTERNAL MEDICINE

## 2018-10-06 PROCEDURE — 25000003 PHARM REV CODE 250: Performed by: INTERNAL MEDICINE

## 2018-10-06 RX ORDER — SODIUM CHLORIDE 0.9 % (FLUSH) 0.9 %
10 SYRINGE (ML) INJECTION
Status: DISCONTINUED | OUTPATIENT
Start: 2018-10-06 | End: 2018-10-06 | Stop reason: HOSPADM

## 2018-10-06 RX ORDER — HEPARIN 100 UNIT/ML
500 SYRINGE INTRAVENOUS
Status: DISCONTINUED | OUTPATIENT
Start: 2018-10-06 | End: 2018-10-06 | Stop reason: HOSPADM

## 2018-10-06 RX ADMIN — HEPARIN 500 UNITS: 100 SYRINGE at 10:10

## 2018-10-06 RX ADMIN — Medication 10 ML: at 10:10

## 2018-10-06 NOTE — NURSING
Patient here for pump d/c.  Assessment complete and labs reviewed.  VSS.  Flushed PAC with NS and heparin; de-accessed PAC.  No questions or concerns.  Patient ambulated off unit unassisted.

## 2018-10-08 ENCOUNTER — INFUSION (OUTPATIENT)
Dept: INFUSION THERAPY | Facility: HOSPITAL | Age: 53
End: 2018-10-08
Attending: INTERNAL MEDICINE
Payer: MEDICAID

## 2018-10-08 VITALS — HEIGHT: 71 IN | BODY MASS INDEX: 23.15 KG/M2 | WEIGHT: 165.38 LBS

## 2018-10-08 DIAGNOSIS — C18.8 OVERLAPPING MALIGNANT NEOPLASM OF COLON: Primary | ICD-10-CM

## 2018-10-08 LAB
ALBUMIN SERPL BCP-MCNC: 3.9 G/DL
ALP SERPL-CCNC: 63 U/L
ALT SERPL W/O P-5'-P-CCNC: 45 U/L
ANION GAP SERPL CALC-SCNC: 6 MMOL/L
AST SERPL-CCNC: 44 U/L
BASOPHILS # BLD AUTO: 0.02 K/UL
BASOPHILS NFR BLD: 0.3 %
BILIRUB SERPL-MCNC: 0.5 MG/DL
BUN SERPL-MCNC: 11 MG/DL
CALCIUM SERPL-MCNC: 9.5 MG/DL
CHLORIDE SERPL-SCNC: 107 MMOL/L
CO2 SERPL-SCNC: 24 MMOL/L
CREAT SERPL-MCNC: 0.8 MG/DL
DIFFERENTIAL METHOD: ABNORMAL
EOSINOPHIL # BLD AUTO: 0.1 K/UL
EOSINOPHIL NFR BLD: 2.4 %
ERYTHROCYTE [DISTWIDTH] IN BLOOD BY AUTOMATED COUNT: 16.7 %
EST. GFR  (AFRICAN AMERICAN): >60 ML/MIN/1.73 M^2
EST. GFR  (NON AFRICAN AMERICAN): >60 ML/MIN/1.73 M^2
GLUCOSE SERPL-MCNC: 79 MG/DL
HCT VFR BLD AUTO: 40.1 %
HGB BLD-MCNC: 13.4 G/DL
IMM GRANULOCYTES # BLD AUTO: 0.02 K/UL
IMM GRANULOCYTES NFR BLD AUTO: 0.3 %
LYMPHOCYTES # BLD AUTO: 2.6 K/UL
LYMPHOCYTES NFR BLD: 44.8 %
MCH RBC QN AUTO: 31.2 PG
MCHC RBC AUTO-ENTMCNC: 33.4 G/DL
MCV RBC AUTO: 94 FL
MONOCYTES # BLD AUTO: 0.5 K/UL
MONOCYTES NFR BLD: 9 %
NEUTROPHILS # BLD AUTO: 2.5 K/UL
NEUTROPHILS NFR BLD: 43.2 %
NRBC BLD-RTO: 0 /100 WBC
PLATELET # BLD AUTO: 222 K/UL
PMV BLD AUTO: 10.3 FL
POTASSIUM SERPL-SCNC: 4.2 MMOL/L
PROT SERPL-MCNC: 7.3 G/DL
RBC # BLD AUTO: 4.29 M/UL
SODIUM SERPL-SCNC: 137 MMOL/L
WBC # BLD AUTO: 5.81 K/UL

## 2018-10-08 PROCEDURE — 96416 CHEMO PROLONG INFUSE W/PUMP: CPT

## 2018-10-08 PROCEDURE — 63600175 PHARM REV CODE 636 W HCPCS: Performed by: INTERNAL MEDICINE

## 2018-10-08 PROCEDURE — 80053 COMPREHEN METABOLIC PANEL: CPT

## 2018-10-08 PROCEDURE — A4216 STERILE WATER/SALINE, 10 ML: HCPCS | Performed by: INTERNAL MEDICINE

## 2018-10-08 PROCEDURE — 25000003 PHARM REV CODE 250: Performed by: INTERNAL MEDICINE

## 2018-10-08 PROCEDURE — 85025 COMPLETE CBC W/AUTO DIFF WBC: CPT

## 2018-10-08 RX ORDER — SODIUM CHLORIDE 0.9 % (FLUSH) 0.9 %
10 SYRINGE (ML) INJECTION
Status: COMPLETED | OUTPATIENT
Start: 2018-10-08 | End: 2018-10-08

## 2018-10-08 RX ORDER — HEPARIN 100 UNIT/ML
500 SYRINGE INTRAVENOUS
Status: COMPLETED | OUTPATIENT
Start: 2018-10-08 | End: 2018-10-08

## 2018-10-08 RX ORDER — HEPARIN 100 UNIT/ML
500 SYRINGE INTRAVENOUS
Status: CANCELLED | OUTPATIENT
Start: 2018-10-08

## 2018-10-08 RX ORDER — SODIUM CHLORIDE 0.9 % (FLUSH) 0.9 %
10 SYRINGE (ML) INJECTION
Status: CANCELLED | OUTPATIENT
Start: 2018-10-08

## 2018-10-08 RX ADMIN — Medication 10 ML: at 02:10

## 2018-10-08 RX ADMIN — FLUOROURACIL 2150 MG: 50 INJECTION, SOLUTION INTRAVENOUS at 03:10

## 2018-10-08 RX ADMIN — HEPARIN 500 UNITS: 100 SYRINGE at 02:10

## 2018-10-08 NOTE — NURSING
1425- Patient arrived ambulatory to the unit for labs to be drawn from port.  Patient has no complaints, shows no signs of distress at this time.  Port accessed and samples collected, heparin locked and needle left in place for infusion this afternoon.

## 2018-10-08 NOTE — PLAN OF CARE
Problem: Patient Care Overview  Goal: Plan of Care Review  Outcome: Ongoing (interventions implemented as appropriate)  Pt connected to 5fu pump. Vitals stable, NAD.  Will return sat for pump dc.

## 2018-10-13 ENCOUNTER — INFUSION (OUTPATIENT)
Dept: INFUSION THERAPY | Facility: HOSPITAL | Age: 53
End: 2018-10-13
Attending: INTERNAL MEDICINE
Payer: MEDICAID

## 2018-10-13 DIAGNOSIS — C18.8 OVERLAPPING MALIGNANT NEOPLASM OF COLON: Primary | ICD-10-CM

## 2018-10-13 PROCEDURE — 96523 IRRIG DRUG DELIVERY DEVICE: CPT

## 2018-10-13 PROCEDURE — 63600175 PHARM REV CODE 636 W HCPCS: Performed by: INTERNAL MEDICINE

## 2018-10-13 PROCEDURE — A4216 STERILE WATER/SALINE, 10 ML: HCPCS | Performed by: INTERNAL MEDICINE

## 2018-10-13 PROCEDURE — 25000003 PHARM REV CODE 250: Performed by: INTERNAL MEDICINE

## 2018-10-13 RX ORDER — SODIUM CHLORIDE 0.9 % (FLUSH) 0.9 %
10 SYRINGE (ML) INJECTION
Status: DISCONTINUED | OUTPATIENT
Start: 2018-10-13 | End: 2018-10-13 | Stop reason: HOSPADM

## 2018-10-13 RX ORDER — HEPARIN 100 UNIT/ML
500 SYRINGE INTRAVENOUS
Status: DISCONTINUED | OUTPATIENT
Start: 2018-10-13 | End: 2018-10-13 | Stop reason: HOSPADM

## 2018-10-13 RX ADMIN — Medication 10 ML: at 10:10

## 2018-10-13 RX ADMIN — HEPARIN 500 UNITS: 100 SYRINGE at 10:10

## 2018-10-15 ENCOUNTER — INFUSION (OUTPATIENT)
Dept: INFUSION THERAPY | Facility: HOSPITAL | Age: 53
End: 2018-10-15
Attending: INTERNAL MEDICINE
Payer: MEDICAID

## 2018-10-15 VITALS
HEIGHT: 71 IN | BODY MASS INDEX: 23.46 KG/M2 | RESPIRATION RATE: 18 BRPM | DIASTOLIC BLOOD PRESSURE: 99 MMHG | WEIGHT: 167.56 LBS | HEART RATE: 105 BPM | SYSTOLIC BLOOD PRESSURE: 150 MMHG | TEMPERATURE: 99 F

## 2018-10-15 DIAGNOSIS — B18.2 CHRONIC HEPATITIS C WITHOUT HEPATIC COMA: Primary | ICD-10-CM

## 2018-10-15 DIAGNOSIS — C18.8 OVERLAPPING MALIGNANT NEOPLASM OF COLON: Primary | ICD-10-CM

## 2018-10-15 LAB
ALBUMIN SERPL BCP-MCNC: 4.1 G/DL
ALP SERPL-CCNC: 71 U/L
ALT SERPL W/O P-5'-P-CCNC: 63 U/L
ANION GAP SERPL CALC-SCNC: 12 MMOL/L
AST SERPL-CCNC: 54 U/L
BASOPHILS # BLD AUTO: 0.02 K/UL
BASOPHILS NFR BLD: 0.3 %
BILIRUB SERPL-MCNC: 0.5 MG/DL
BUN SERPL-MCNC: 18 MG/DL
CALCIUM SERPL-MCNC: 9.5 MG/DL
CHLORIDE SERPL-SCNC: 107 MMOL/L
CO2 SERPL-SCNC: 21 MMOL/L
CREAT SERPL-MCNC: 0.9 MG/DL
DIFFERENTIAL METHOD: ABNORMAL
EOSINOPHIL # BLD AUTO: 0 K/UL
EOSINOPHIL NFR BLD: 0.1 %
ERYTHROCYTE [DISTWIDTH] IN BLOOD BY AUTOMATED COUNT: 17.1 %
EST. GFR  (AFRICAN AMERICAN): >60 ML/MIN/1.73 M^2
EST. GFR  (NON AFRICAN AMERICAN): >60 ML/MIN/1.73 M^2
GLUCOSE SERPL-MCNC: 140 MG/DL
HCT VFR BLD AUTO: 37.7 %
HGB BLD-MCNC: 12.4 G/DL
IMM GRANULOCYTES # BLD AUTO: 0.02 K/UL
IMM GRANULOCYTES NFR BLD AUTO: 0.3 %
LYMPHOCYTES # BLD AUTO: 2.7 K/UL
LYMPHOCYTES NFR BLD: 38.7 %
MCH RBC QN AUTO: 31 PG
MCHC RBC AUTO-ENTMCNC: 32.9 G/DL
MCV RBC AUTO: 94 FL
MONOCYTES # BLD AUTO: 0.4 K/UL
MONOCYTES NFR BLD: 6 %
NEUTROPHILS # BLD AUTO: 3.8 K/UL
NEUTROPHILS NFR BLD: 54.6 %
NRBC BLD-RTO: 0 /100 WBC
PLATELET # BLD AUTO: 228 K/UL
PMV BLD AUTO: 9.9 FL
POTASSIUM SERPL-SCNC: 3.3 MMOL/L
PROT SERPL-MCNC: 7.7 G/DL
RBC # BLD AUTO: 4 M/UL
SODIUM SERPL-SCNC: 140 MMOL/L
WBC # BLD AUTO: 6.88 K/UL

## 2018-10-15 PROCEDURE — 96416 CHEMO PROLONG INFUSE W/PUMP: CPT

## 2018-10-15 PROCEDURE — 85025 COMPLETE CBC W/AUTO DIFF WBC: CPT

## 2018-10-15 PROCEDURE — 63600175 PHARM REV CODE 636 W HCPCS: Performed by: INTERNAL MEDICINE

## 2018-10-15 PROCEDURE — 80053 COMPREHEN METABOLIC PANEL: CPT

## 2018-10-15 PROCEDURE — 25000003 PHARM REV CODE 250: Performed by: INTERNAL MEDICINE

## 2018-10-15 RX ORDER — SODIUM CHLORIDE 0.9 % (FLUSH) 0.9 %
10 SYRINGE (ML) INJECTION
Status: CANCELLED | OUTPATIENT
Start: 2018-10-15

## 2018-10-15 RX ORDER — SODIUM CHLORIDE 0.9 % (FLUSH) 0.9 %
10 SYRINGE (ML) INJECTION
Status: DISCONTINUED | OUTPATIENT
Start: 2018-10-15 | End: 2018-10-15 | Stop reason: HOSPADM

## 2018-10-15 RX ORDER — HEPARIN 100 UNIT/ML
500 SYRINGE INTRAVENOUS
Status: DISCONTINUED | OUTPATIENT
Start: 2018-10-15 | End: 2018-10-15 | Stop reason: HOSPADM

## 2018-10-15 RX ORDER — HEPARIN 100 UNIT/ML
500 SYRINGE INTRAVENOUS
Status: CANCELLED | OUTPATIENT
Start: 2018-10-15

## 2018-10-15 RX ADMIN — FLUOROURACIL 2150 MG: 50 INJECTION, SOLUTION INTRAVENOUS at 03:10

## 2018-10-15 NOTE — PLAN OF CARE
Problem: Patient Care Overview  Goal: Plan of Care Review  Outcome: Ongoing (interventions implemented as appropriate)  Pt tolerated C16 5Fu without adverse effects. VSS. Verbalized understanding of RTC date. DC ambulating independently.

## 2018-10-16 ENCOUNTER — TELEPHONE (OUTPATIENT)
Dept: PHARMACY | Facility: CLINIC | Age: 53
End: 2018-10-16

## 2018-10-18 ENCOUNTER — TELEPHONE (OUTPATIENT)
Dept: HEPATOLOGY | Facility: CLINIC | Age: 53
End: 2018-10-18

## 2018-10-18 DIAGNOSIS — B18.2 CHRONIC HEPATITIS C WITHOUT HEPATIC COMA: Primary | ICD-10-CM

## 2018-10-18 NOTE — TELEPHONE ENCOUNTER
Labs reviewed:  10/15/18  HBV DNA neg  HBsAg neg  HCV NA 425k (up from 2,445)  LFT stable - AST 54, ALT 63    Started lamivudine for HBV prophylaxis 9/22/18  (on chemo for colon CA)    pls call pt :  1. HBV labs negative  - Continue lamivudine 150mg daily   2. Liver labs are stable  3. HCV level has increased some so we'll need to treat it in the future.    Pls schedule:  - LFT, HBV DNA, HBsAg in 4 weeks  - f/u visit w/ me in 2 months  (blood draw may be from port, need to check w/ pt)    thanks

## 2018-10-20 ENCOUNTER — INFUSION (OUTPATIENT)
Dept: INFUSION THERAPY | Facility: HOSPITAL | Age: 53
End: 2018-10-20
Attending: INTERNAL MEDICINE
Payer: MEDICAID

## 2018-10-20 VITALS
SYSTOLIC BLOOD PRESSURE: 124 MMHG | HEART RATE: 96 BPM | TEMPERATURE: 98 F | RESPIRATION RATE: 18 BRPM | DIASTOLIC BLOOD PRESSURE: 86 MMHG

## 2018-10-20 DIAGNOSIS — C18.8 OVERLAPPING MALIGNANT NEOPLASM OF COLON: Primary | ICD-10-CM

## 2018-10-20 PROCEDURE — 96523 IRRIG DRUG DELIVERY DEVICE: CPT

## 2018-10-20 PROCEDURE — A4216 STERILE WATER/SALINE, 10 ML: HCPCS | Performed by: INTERNAL MEDICINE

## 2018-10-20 PROCEDURE — 63600175 PHARM REV CODE 636 W HCPCS: Performed by: INTERNAL MEDICINE

## 2018-10-20 PROCEDURE — 25000003 PHARM REV CODE 250: Performed by: INTERNAL MEDICINE

## 2018-10-20 RX ORDER — SODIUM CHLORIDE 0.9 % (FLUSH) 0.9 %
10 SYRINGE (ML) INJECTION
Status: DISCONTINUED | OUTPATIENT
Start: 2018-10-20 | End: 2018-10-20 | Stop reason: HOSPADM

## 2018-10-20 RX ORDER — HEPARIN 100 UNIT/ML
500 SYRINGE INTRAVENOUS
Status: DISCONTINUED | OUTPATIENT
Start: 2018-10-20 | End: 2018-10-20 | Stop reason: HOSPADM

## 2018-10-20 RX ADMIN — HEPARIN 500 UNITS: 100 SYRINGE at 10:10

## 2018-10-20 RX ADMIN — Medication 10 ML: at 10:10

## 2018-10-20 NOTE — NURSING
Pt. Here for ambulatory pump d/c. Infusion complete. Tolerated well. VSS. No questions at this time. Port-hep-locked and de-accessed.

## 2018-10-22 ENCOUNTER — INFUSION (OUTPATIENT)
Dept: INFUSION THERAPY | Facility: HOSPITAL | Age: 53
End: 2018-10-22
Attending: INTERNAL MEDICINE
Payer: MEDICAID

## 2018-10-22 ENCOUNTER — TELEPHONE (OUTPATIENT)
Dept: HEPATOLOGY | Facility: CLINIC | Age: 53
End: 2018-10-22

## 2018-10-22 VITALS
HEIGHT: 71 IN | WEIGHT: 167 LBS | BODY MASS INDEX: 23.38 KG/M2 | RESPIRATION RATE: 18 BRPM | HEART RATE: 94 BPM | SYSTOLIC BLOOD PRESSURE: 140 MMHG | DIASTOLIC BLOOD PRESSURE: 90 MMHG | TEMPERATURE: 98 F

## 2018-10-22 DIAGNOSIS — C18.8 OVERLAPPING MALIGNANT NEOPLASM OF COLON: Primary | ICD-10-CM

## 2018-10-22 LAB
ALBUMIN SERPL BCP-MCNC: 4 G/DL
ALP SERPL-CCNC: 92 U/L
ALT SERPL W/O P-5'-P-CCNC: 71 U/L
ANION GAP SERPL CALC-SCNC: 8 MMOL/L
AST SERPL-CCNC: 71 U/L
BASOPHILS # BLD AUTO: 0.03 K/UL
BASOPHILS NFR BLD: 0.4 %
BILIRUB SERPL-MCNC: 0.6 MG/DL
BUN SERPL-MCNC: 12 MG/DL
CALCIUM SERPL-MCNC: 9.6 MG/DL
CHLORIDE SERPL-SCNC: 103 MMOL/L
CO2 SERPL-SCNC: 25 MMOL/L
CREAT SERPL-MCNC: 0.9 MG/DL
DIFFERENTIAL METHOD: ABNORMAL
EOSINOPHIL # BLD AUTO: 0.2 K/UL
EOSINOPHIL NFR BLD: 2.7 %
ERYTHROCYTE [DISTWIDTH] IN BLOOD BY AUTOMATED COUNT: 18.3 %
EST. GFR  (AFRICAN AMERICAN): >60 ML/MIN/1.73 M^2
EST. GFR  (NON AFRICAN AMERICAN): >60 ML/MIN/1.73 M^2
GLUCOSE SERPL-MCNC: 111 MG/DL
HCT VFR BLD AUTO: 38.5 %
HGB BLD-MCNC: 13 G/DL
IMM GRANULOCYTES # BLD AUTO: 0.03 K/UL
IMM GRANULOCYTES NFR BLD AUTO: 0.4 %
LYMPHOCYTES # BLD AUTO: 2.3 K/UL
LYMPHOCYTES NFR BLD: 32.8 %
MCH RBC QN AUTO: 31.2 PG
MCHC RBC AUTO-ENTMCNC: 33.8 G/DL
MCV RBC AUTO: 92 FL
MONOCYTES # BLD AUTO: 0.5 K/UL
MONOCYTES NFR BLD: 6.9 %
NEUTROPHILS # BLD AUTO: 4 K/UL
NEUTROPHILS NFR BLD: 56.8 %
NRBC BLD-RTO: 0 /100 WBC
PLATELET # BLD AUTO: 199 K/UL
PMV BLD AUTO: 10.6 FL
POTASSIUM SERPL-SCNC: 3.5 MMOL/L
PROT SERPL-MCNC: 7.8 G/DL
RBC # BLD AUTO: 4.17 M/UL
SODIUM SERPL-SCNC: 136 MMOL/L
WBC # BLD AUTO: 7.08 K/UL

## 2018-10-22 PROCEDURE — A4216 STERILE WATER/SALINE, 10 ML: HCPCS | Performed by: INTERNAL MEDICINE

## 2018-10-22 PROCEDURE — 85025 COMPLETE CBC W/AUTO DIFF WBC: CPT

## 2018-10-22 PROCEDURE — 80053 COMPREHEN METABOLIC PANEL: CPT

## 2018-10-22 PROCEDURE — 25000003 PHARM REV CODE 250: Performed by: INTERNAL MEDICINE

## 2018-10-22 PROCEDURE — 63600175 PHARM REV CODE 636 W HCPCS: Performed by: INTERNAL MEDICINE

## 2018-10-22 PROCEDURE — 96416 CHEMO PROLONG INFUSE W/PUMP: CPT

## 2018-10-22 RX ORDER — SODIUM CHLORIDE 0.9 % (FLUSH) 0.9 %
10 SYRINGE (ML) INJECTION
Status: CANCELLED | OUTPATIENT
Start: 2018-10-22

## 2018-10-22 RX ORDER — HEPARIN 100 UNIT/ML
500 SYRINGE INTRAVENOUS
Status: COMPLETED | OUTPATIENT
Start: 2018-10-22 | End: 2018-10-22

## 2018-10-22 RX ORDER — LAMIVUDINE 100 MG/1
100 TABLET, FILM COATED ORAL DAILY
Qty: 30 TABLET | Refills: 3 | Status: SHIPPED | OUTPATIENT
Start: 2018-10-22 | End: 2019-04-25

## 2018-10-22 RX ORDER — SODIUM CHLORIDE 0.9 % (FLUSH) 0.9 %
10 SYRINGE (ML) INJECTION
Status: COMPLETED | OUTPATIENT
Start: 2018-10-22 | End: 2018-10-22

## 2018-10-22 RX ORDER — HEPARIN 100 UNIT/ML
500 SYRINGE INTRAVENOUS
Status: CANCELLED | OUTPATIENT
Start: 2018-10-22

## 2018-10-22 RX ADMIN — FLUOROURACIL 2150 MG: 50 INJECTION, SOLUTION INTRAVENOUS at 03:10

## 2018-10-22 RX ADMIN — Medication 10 ML: at 02:10

## 2018-10-22 RX ADMIN — HEPARIN 500 UNITS: 100 SYRINGE at 02:10

## 2018-10-22 NOTE — PLAN OF CARE
Problem: Patient Care Overview  Goal: Plan of Care Review  Outcome: Ongoing (interventions implemented as appropriate)  cadd pump infusing at 2 ml/hr to right chest wall port without issue, pt to rtc Saturday for 200 for pump d/c, pt aware, no distress noted upon d/c to home

## 2018-10-22 NOTE — PLAN OF CARE
Problem: Chemotherapy Effects (Adult)  Goal: Signs and Symptoms of Listed Potential Problems Will be Absent, Minimized or Managed (Chemotherapy Effects)  Signs and symptoms of listed potential problems will be absent, minimized or managed by discharge/transition of care (reference Chemotherapy Effects (Adult) CPG).   Outcome: Ongoing (interventions implemented as appropriate)  Pt here for 5 FU pump placeemnt, labs, hx, meds, allergies reviewed, pt with no complaints at this time, continue to monitor

## 2018-10-22 NOTE — TELEPHONE ENCOUNTER
Pt presented to office for unscheduled encounter  C/o diarrhea and headaches that he thinks is due to lamivudine.    Discussed that it is unlikely symptoms are from lamivudine  Could certainly be from his chemo  Advised him to talk to oncologist about symptoms    Alerted him that [facility] has been trying to reach him about his lamivudine refill. He will call them.    Will change lamivudine from 150mg daily to 100mg daily

## 2018-10-23 ENCOUNTER — TELEPHONE (OUTPATIENT)
Dept: PHARMACY | Facility: CLINIC | Age: 53
End: 2018-10-23

## 2018-10-23 NOTE — TELEPHONE ENCOUNTER
Documentation Only:    Prior Authorization for Lamivudine 100mg has been approved for 1 year    Approval dates: 10.23.2018 until 10.23.2019    Case ID#: [id number]    Patient co-pay: $3    Patient Assistance IS NOT required.    Forwarding to clinical pharmacist for consult and shipment.    OSWALDO 11:43am

## 2018-10-24 ENCOUNTER — TELEPHONE (OUTPATIENT)
Dept: PHARMACY | Facility: CLINIC | Age: 53
End: 2018-10-24

## 2018-10-24 NOTE — TELEPHONE ENCOUNTER
Lamivudine refill and f/u attempted. Will discuss dose reduction to Lamivudine 100 mg daily.  LVM for patient with his mother, Francesca. Will f/u.

## 2018-10-25 ENCOUNTER — INFUSION (OUTPATIENT)
Dept: INFUSION THERAPY | Facility: HOSPITAL | Age: 53
End: 2018-10-25
Attending: INTERNAL MEDICINE
Payer: MEDICAID

## 2018-10-25 ENCOUNTER — OFFICE VISIT (OUTPATIENT)
Dept: HEMATOLOGY/ONCOLOGY | Facility: CLINIC | Age: 53
End: 2018-10-25
Payer: MEDICAID

## 2018-10-25 VITALS
DIASTOLIC BLOOD PRESSURE: 94 MMHG | OXYGEN SATURATION: 97 % | SYSTOLIC BLOOD PRESSURE: 136 MMHG | HEART RATE: 123 BPM | BODY MASS INDEX: 23.08 KG/M2 | WEIGHT: 164.88 LBS | HEIGHT: 71 IN | RESPIRATION RATE: 18 BRPM | TEMPERATURE: 98 F

## 2018-10-25 DIAGNOSIS — R45.89 ANXIETY ABOUT HEALTH: ICD-10-CM

## 2018-10-25 DIAGNOSIS — C18.8 OVERLAPPING MALIGNANT NEOPLASM OF COLON: ICD-10-CM

## 2018-10-25 DIAGNOSIS — R63.4 WEIGHT LOSS, NON-INTENTIONAL: ICD-10-CM

## 2018-10-25 DIAGNOSIS — D64.81 ANEMIA DUE TO ANTINEOPLASTIC CHEMOTHERAPY: ICD-10-CM

## 2018-10-25 DIAGNOSIS — B18.2 CHRONIC HEPATITIS C WITHOUT HEPATIC COMA: Primary | ICD-10-CM

## 2018-10-25 DIAGNOSIS — T45.1X5A ANEMIA DUE TO ANTINEOPLASTIC CHEMOTHERAPY: ICD-10-CM

## 2018-10-25 DIAGNOSIS — I10 ESSENTIAL HYPERTENSION: ICD-10-CM

## 2018-10-25 DIAGNOSIS — C18.8 OVERLAPPING MALIGNANT NEOPLASM OF COLON: Primary | ICD-10-CM

## 2018-10-25 LAB
ALBUMIN SERPL BCP-MCNC: 3.8 G/DL
ALP SERPL-CCNC: 84 U/L
ALT SERPL W/O P-5'-P-CCNC: 60 U/L
ANION GAP SERPL CALC-SCNC: 13 MMOL/L
AST SERPL-CCNC: 55 U/L
BASOPHILS # BLD AUTO: 0.03 K/UL
BASOPHILS NFR BLD: 0.6 %
BILIRUB SERPL-MCNC: 0.4 MG/DL
BUN SERPL-MCNC: 16 MG/DL
CALCIUM SERPL-MCNC: 10.1 MG/DL
CEA SERPL-MCNC: 2.7 NG/ML
CHLORIDE SERPL-SCNC: 106 MMOL/L
CO2 SERPL-SCNC: 20 MMOL/L
CREAT SERPL-MCNC: 1 MG/DL
DIFFERENTIAL METHOD: ABNORMAL
EOSINOPHIL # BLD AUTO: 0.1 K/UL
EOSINOPHIL NFR BLD: 2.4 %
ERYTHROCYTE [DISTWIDTH] IN BLOOD BY AUTOMATED COUNT: 18.7 %
EST. GFR  (AFRICAN AMERICAN): >60 ML/MIN/1.73 M^2
EST. GFR  (NON AFRICAN AMERICAN): >60 ML/MIN/1.73 M^2
GLUCOSE SERPL-MCNC: 110 MG/DL
HCT VFR BLD AUTO: 38.2 %
HGB BLD-MCNC: 13.1 G/DL
IMM GRANULOCYTES # BLD AUTO: 0.01 K/UL
IMM GRANULOCYTES NFR BLD AUTO: 0.2 %
LYMPHOCYTES # BLD AUTO: 2.3 K/UL
LYMPHOCYTES NFR BLD: 45.7 %
MCH RBC QN AUTO: 32.3 PG
MCHC RBC AUTO-ENTMCNC: 34.3 G/DL
MCV RBC AUTO: 94 FL
MONOCYTES # BLD AUTO: 0.4 K/UL
MONOCYTES NFR BLD: 7.3 %
NEUTROPHILS # BLD AUTO: 2.2 K/UL
NEUTROPHILS NFR BLD: 43.8 %
NRBC BLD-RTO: 0 /100 WBC
PLATELET # BLD AUTO: 265 K/UL
PMV BLD AUTO: 10.1 FL
POTASSIUM SERPL-SCNC: 3.8 MMOL/L
PROT SERPL-MCNC: 7.8 G/DL
RBC # BLD AUTO: 4.06 M/UL
SODIUM SERPL-SCNC: 139 MMOL/L
WBC # BLD AUTO: 4.92 K/UL

## 2018-10-25 PROCEDURE — 99213 OFFICE O/P EST LOW 20 MIN: CPT | Mod: PBBFAC | Performed by: INTERNAL MEDICINE

## 2018-10-25 PROCEDURE — 80053 COMPREHEN METABOLIC PANEL: CPT

## 2018-10-25 PROCEDURE — 99999 PR PBB SHADOW E&M-EST. PATIENT-LVL III: CPT | Mod: PBBFAC,,, | Performed by: INTERNAL MEDICINE

## 2018-10-25 PROCEDURE — 85025 COMPLETE CBC W/AUTO DIFF WBC: CPT

## 2018-10-25 PROCEDURE — 36415 COLL VENOUS BLD VENIPUNCTURE: CPT

## 2018-10-25 PROCEDURE — 99214 OFFICE O/P EST MOD 30 MIN: CPT | Mod: S$PBB,,, | Performed by: INTERNAL MEDICINE

## 2018-10-25 PROCEDURE — 82378 CARCINOEMBRYONIC ANTIGEN: CPT

## 2018-10-25 RX ORDER — HEPARIN 100 UNIT/ML
500 SYRINGE INTRAVENOUS
Status: DISCONTINUED | OUTPATIENT
Start: 2018-10-25 | End: 2018-10-25 | Stop reason: HOSPADM

## 2018-10-25 RX ORDER — HEPARIN 100 UNIT/ML
500 SYRINGE INTRAVENOUS
Status: CANCELLED | OUTPATIENT
Start: 2018-10-25

## 2018-10-25 RX ORDER — SODIUM CHLORIDE 0.9 % (FLUSH) 0.9 %
10 SYRINGE (ML) INJECTION
Status: DISCONTINUED | OUTPATIENT
Start: 2018-10-25 | End: 2018-10-25 | Stop reason: HOSPADM

## 2018-10-25 RX ORDER — PROMETHAZINE HYDROCHLORIDE 25 MG/1
25 TABLET ORAL EVERY 6 HOURS PRN
Qty: 30 TABLET | Refills: 2 | Status: SHIPPED | OUTPATIENT
Start: 2018-10-25 | End: 2019-02-11

## 2018-10-25 RX ORDER — LORAZEPAM 0.5 MG/1
0.5 TABLET ORAL EVERY 6 HOURS PRN
Qty: 60 TABLET | Refills: 0 | Status: SHIPPED | OUTPATIENT
Start: 2018-10-25 | End: 2018-12-10

## 2018-10-25 RX ORDER — SODIUM CHLORIDE 0.9 % (FLUSH) 0.9 %
10 SYRINGE (ML) INJECTION
Status: CANCELLED | OUTPATIENT
Start: 2018-10-25

## 2018-10-25 NOTE — NURSING
Pt arrived for labs from port, but chemo pump is infusioning into port.  Labs drawn from Left hand and sent.  Pt now going to MD appt

## 2018-10-25 NOTE — PROGRESS NOTES
CC: Colon cancer, here for follow up     HPI: is a 53yoWM who presented to the ED on March 10th, 2018 with acute onset of stool and gas in his urine over the previous several days. Previously, he had progressively worsening constipation, for which he was on stool softeners. He also had given history of  bloody stools and  lower abdominal pain. He had  15 lbs over the past few months. He denies fatigue, fevers, CP, or SOB.   He was diagnosed with HCV about 15 years ago but never sought treatment for this. He was previously hospitalized and seen by inpatient psychiatry for substance abuse, past history of suicide attempt by cutting. He also has a history of an MI with placement of heart stents, on ASA.He underwent  partial cystectomy with complex cystorrhaphy and partial prostatectomy, left neoureterocystotomy with ureteral re-implantation, cystoscopy with bilateral ureteral catheter placement, bilateral ureteral stent placement, left pelvic lymph node dissection,sigmoid colectomy and ileostomy on 3/13/18.         Interval history: He is here for a follow up visit. He is doing well. He still has no good appetite.He started adjuvant 5FU/LVon 5/7/18. He only received 3 weekly treatments. He missed chemotherapy as he lost his friend and was depressed. He feels better now. He is not drinking regularly.He is in his 3rd cycle single agent 5 FU. He takes Lorazepam almost on a daily basis for anxiety.   He had a followup at the liver clinic for his Hep B and C recently    Review of Systems   Constitutional: Positive for malaise/fatigue. Negative for chills, fever and weight loss.   HENT: Negative for congestion, nosebleeds and sinus pain.    Eyes: Negative for blurred vision, double vision and photophobia.   Respiratory: Negative for cough, hemoptysis and sputum production.    Cardiovascular: Negative for chest pain, palpitations and orthopnea.   Gastrointestinal: Positive for diarrhea. Negative for abdominal  pain, constipation, heartburn, nausea and vomiting.   Genitourinary: Negative for dysuria, frequency, hematuria and urgency.   Musculoskeletal: Negative for back pain, myalgias and neck pain.   Skin: Negative for rash.   Neurological: Negative for dizziness, tingling, tremors, sensory change, weakness and headaches.   Endo/Heme/Allergies: Does not bruise/bleed easily.   Psychiatric/Behavioral: Negative for depression and suicidal ideas. The patient is nervous/anxious.        Current Outpatient Medications   Medication Sig    flu vac [id number] 36mos up,PF, 60 mcg (15 mcg x 4)/0.5 mL Syrg inject 0.5ml into the muscle    lamiVUDine (EPIVIR) 100 MG Tab Take 1 tablet (100 mg total) by mouth once daily.    LORazepam (ATIVAN) 0.5 MG tablet Take 1 tablet (0.5 mg total) by mouth every 6 (six) hours as needed for Anxiety.    promethazine (PHENERGAN) 25 MG tablet Take 1 tablet (25 mg total) by mouth every 6 (six) hours as needed for Nausea.     No current facility-administered medications for this visit.          Vitals:    10/25/18 1047   BP: (!) 136/94   Pulse: (!) 123   Resp: 18   Temp: 98.3 °F (36.8 °C)       Physical Exam   Constitutional: He appears well-developed. No distress.   HENT:   Head: Normocephalic and atraumatic.   Mouth/Throat: No oropharyngeal exudate.   Eyes: Pupils are equal, round, and reactive to light. No scleral icterus.   Neck: Normal range of motion.   Cardiovascular: Regular rhythm.   No murmur heard.  He is tachycardic   Pulmonary/Chest: Effort normal and breath sounds normal. No respiratory distress. He has no wheezes.   Abdominal: Soft. Bowel sounds are normal. He exhibits no distension. There is no tenderness. There is no rebound.   He has colostomy bag in RLQ, with fecal matter   Musculoskeletal: He exhibits no edema.   Lymphadenopathy:     He has no cervical adenopathy.   Neurological: He is alert. No cranial nerve deficit.   Skin: Skin is warm.   Psychiatric: He has a normal mood and  affect.     3/9/18 CT abdomen/pelvis with cont        CT chest abdomen and pelvis with contrast    Clinical history: Weight loss    Comparison: None    Technique:  Axial images of the chest, abdomen, and pelvis were obtained at 5 mm intervals following administration of 75 cc Omni 350 IV contrast as well as oral and rectal contrast.  Coronal and sagittal and delayed images were reviewed.    Findings:  The structures at the base of the neck are grossly unremarkable.  No significant mediastinal lymphadenopathy.  The heart is not enlarged.  There is atherosclerotic calcification in the distribution of the coronary arteries.  No significant pericardial effusion.    The airways are patent.    There is a vague focus of groundglass attenuation within the right upper lobe, measuring up to 1.6 cm.  No significant volume of pleural fluid.  No pneumothorax.  No large focal consolidation.    The thoracic aorta tapers normally with atherosclerotic calcification.    The liver is hypoattenuating and enlarged, may reflect steatosis, correlation with LFTs recommended.  There is a subcentimeter hypoattenuating lesion within the left hepatic lobe, too small for characterization.  There are 2 faint foci of irregular peripheral enhancement involving the medial aspect and anterior aspect of the left hepatic lobe.  These foci normalize with hepatic parenchymal enhancement on delayed image, and may reflect perfusional anomaly versus flash filling hemangiomas.  Punctate hypoattenuating focus within the right hepatic lobe is too small for characterization.  The spleen, pancreas, gallbladder and adrenal glands are grossly unremarkable.  There is no biliary dilation.  There is thickening at the gastric fundus, nonspecific, correlation with direct visualization as warranted.  No high grade obstruction.  The portal vein, splenic vein, SMV, celiac axis and SMA all are grossly patent.  Scattered shotty periaortic and paracaval lymph nodes are  noted.    The kidneys enhance symmetrically and excrete contrast appropriately without hydronephrosis or nephrolithiasis.  The bilateral ureters are grossly unremarkable along their course to the urinary bladder without calculi seen.  There is air within the urinary bladder, possibly from catheterization.  Please see below for complete description.  The prostate is prominent.    Rectal contrast has been administered.  The rectum and distal sigmoid colon is unremarkable without wall thickening.  There is circumferential wall thickening involving the proximal sigmoid colon, without significant contrast passage through the region of thickening.  Wall thickening in the region measures up to 1.1 cm.  There is focal masslike thickening of the sigmoid colon, that abuts the urinary bladder.  There is urinary bladder wall thickening and distortion of the urinary bladder by this mass.  There is a focus of air and stool insinuating between the region of focal colonic thickening and the urinary bladder, finding is concerning for bladder wall invasion by colonic malignancy, with fistulous formation to the urinary bladder.  This may account for air within the urinary bladder.  Abscess in the region felt less likely.  There is surrounding inflammation, and several adjacent nonenlarged although numerous lymph nodes.  Diverticula are noted throughout the remainder of the colon, with moderate to large amount stool throughout the colon suggesting superimposed constipation.  Oral contrast is noted to the level of the transverse colon.  The terminal ileum is grossly unremarkable.  No pericecal inflammation.  The small bowel is grossly unremarkable.  Scattered shotty periaortic and paracaval lymph nodes are noted.  There is a small amount reactive fluid in the left hemipelvis.    There is a mixed s sclerotic focus within the posterior aspect of the right iliac bone, the appearance is not specific for metastatic disease, however  metastatic focus is not excluded in the setting of probable colonic malignancy.  Several additional sclerotic foci are noted throughout the spine, likely degenerative in nature.  Schmorl's node involves the superior endplate of L1.  There is Oddi wall cachexia.  There are bilateral fat containing inguinal hernias.  No significant axillary lymphadenopathy.   Impression        1.  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  Air within the urinary bladder is suspected to be on the bases of the same although correlation for recent catheterization and clinical symptomatology.  There is indistinctness about the colon region, with several although nonenlarged lymph nodes, and disorganized fluid.  Please see above for full description.    2.  Vague groundglass focus of attenuation within the right upper lobe of the right lung, nonspecific, could reflect nonspecific inflammation or infection, metastatic disease however cannot be excluded in this setting.  Followup is advised.    3.  Constipation.    4.  Several additional findings described above.      3/12/18 pathology        SPECIMEN  1) Rectal polyps x2, 8 mm polyp and 5 mm polyp, hot snare polypectomies.  FINAL PATHOLOGIC DIAGNOSIS  Rectum, 8 mm and 5 mm polyps ×2, biopsy:  - Hyperplastic polyp, multiple fragments.     3/12/18 FINAL PATHOLOGIC DIAGNOSIS     Supplemental Diagnosis     MICROSATELLITE INSTABILITY, TUMOR:  RESULT SUMMARY:  -AMBERLY/MSI-L  RESULT:  MSI: AMBERLY/MSI-L (instability observed in 0 of 5 informative markers).  INTERPRETATION:  An AMBERLY/MSI-L phenotype suggests the presence of normal DNA mismatch repair function within the tumor .     1. Sigmoid colon) posterior bladder wall, mass, en bloc resection of sigmoid colon with attached posterior bladder wall:     Adenocarcinoma, measuring 4.7 cm in greatest dimension.  Tumor extends through the muscularis propria into  the pericolorectal tissue with surrounding abcess formation with adherent bladder. There is no viable tumor seen invading bladder in multiple examined sections.  Proximal and distal colonic surgical margins are negative for malignancy.  Mesenteric surgical margin is negative for malignancy.  17 benign lymph nodes, negative for metastatic carcinoma (0/17).  See synoptic report in comment section for further details.     2. Right lateral bladder wall margin, biopsy:     Negative for malignancy.     3. Inferior bladder wall margin, biopsy:     Negative for malignancy.     4. Left lateral bladder wall margin, biopsy:     Negative for malignancy.     5. Superior bladder wall, biopsy:     Negative for malignancy.     6. Second right lateral bladder wall margin, biopsy:     Negative for malignancy.     7. Second left lateral bladder wall margin, biopsy:  Negative for malignancy.     8. Left internal iliac lymph nodes, regional resection:     3 benign lymph nodes, negative for metastatic carcinoma (see 0/3).     9. Proximal sigmoid colon, segmental resection:     Colon with congested vasculature and no evidence of malignancy.  Resection margins are viable and negative for dysplasia or malignancy.  8 benign lymph nodes, negative for metastatic carcinoma (0/8).     10. Proximal donut, biopsy:     Colonic mucosa with no significant histopathologic abnormality.  No evidence of malignancy.     11. Distal donut, biopsy:     Colonic mucosa with no significant histopathologic abnormality.  No evidence of malignancy     Comment: Synoptic report  Procedure: Sigmoid colon with attached bladder wall; en bloc resection  Tumor site: Sigmoid colon  Tumor size:  Greatest dimension: 4.7 cm  Macroscopic tumor perforation: Grossly lesion involves the full extent of the bowel wall and erodes into the bladder  Histologic type: Adenocarcinoma  Histologic grade: G2 moderately differentiated.:  Microscopic tumor extension: Tumor invades through  "the muscularis propria into pericolic rectal tissue .  Margins:  Proximal margin: Uninvolved by invasive carcinoma  Distal margin: Uninvolved by invasive carcinoma  Mesenteric margin: Uninvolved  Circumferential (radial) margin: Tumor extends into pericolic rectal adipose tissue and has gross tumor perforation with surrounding abscess formation     Distance of invasive carcinoma from closest margin: 9 cm from surgical margin "A"  Treatment effect: No known presurgical treatment  Lymphovascular invasion: Not identified     Perineural invasion: Not identified  Tumor deposits: Not identified  Regional lymph nodes:  Number of lymph nodes involved: 0  Number of lymph nodes examined: 28  Pathologic staging:  Primary tumor:  pT3: Tumor invades through the muscularis propria into pericolic tissues .  Regional lymph nodes:  pN0: No regional lymph node metastasis  Number examined: 28  Number involved: 0  Distant metastasis:  pMX: Cannot be assessed.                  4/27/18 CT     COMPARISON:  CT chest abdomen pelvis 03/09/2018, abdominal radiograph 03/14/2018    FINDINGS:  Thoracic soft tissues: No significant abnormality.    Aorta: Normal in caliber, course, and contour.  There are three branching vessels at the arch. Significant calcific atherosclerosis involving the coronary arteries in a multivessel distribution, the origins of the great vessels,  and the aortic arch.    Heart: Normal in size with trace pericardial effusion likely of minimal clinical significance.    Myra/Mediastinum: No significant lymphadenopathy    Lungs: Well expanded bilaterally without consolidation, mass, or pleural effusion.  Stable focus of nonspecific ground-glass attenuation within the right upper lobe measuring approximately 1.6 cm (axial series 2, image 23); recommend continued follow-up with expected oncology surveillance.    Liver: Hepatomegaly, similar to prior.  Stable subcentimeter hypodensities within the left and right hepatic lobes, " too small to fully characterize but possibly representing cysts.    Gallbladder: Mild wall thickening likely secondary to decompression.  No internal calcified gallstones or pericholecystic fluid.    Bile Ducts: No evidence of dilated ducts.    Pancreas: No mass or peripancreatic fat stranding.    Spleen: Unremarkable.    Adrenals: Unremarkable.    Kidneys: Normal in size and location without focal abnormality.  Normal physiologic ability to concentrate contrast appropriately.    Bladder/ureters/prostate: Postsurgical changes status post partial cystectomy with bladder repair, partial prostatectomy, and left ureteral reimplantation.  Bladder is not distended on today's examination and is thus poorly evaluated.  Bilateral double-J stents extending from the collecting systems to the bladder.  Of note, right double-J ureteral stent begins at the level of the ureteropelvic junction while the left ureteral stent begins within the renal pelvis.  No hydronephrosis or nephrolithiasis. No ureteral dilatation.    GI Tract/Mesentery: Postsurgical changes status post sigmoid colectomy and diverting loop ileostomy.  No evidence of recurrent disease within the surgical bed.    Peritoneal Space: No ascites. No free air.    Retroperitoneum:  No significant adenopathy.    Abdominal wall:  Diverting loop ileostomy exiting the right upper abdominal quadrant.  Healing midline incision.  No significant abnormalities.    Vasculature: Significant calcific atherosclerosis involving the infrarenal abdominal aorta with extension into the proximal iliac arteries.  No aneurysm    Bones: Moderate degenerative changes of the visualized osseous structures without acute fracture or destructive osseous process.  Grade 1 retrolisthesis of L5 on S1.  Stable wedge compression fracture of L1.   Impression        In this patient with history of colon cancer, there have been extensive interval postsurgical changes involving the bladder and bowel as  detailed above.  No evidence of recurrent or metastatic disease.  Of note, bladder is decompressed on today's examination and poorly evaluated.    Stable focus of nonspecific ground-glass attenuation within the right upper lobe; recommend continue follow-up with expected surveillance.    Stable hepatic hypodensities too small to fully characterize but likely representing hepatic cyst.    Additional, stable findings as above.    There are no measurable lesions per RECIST criteria.            Component      Latest Ref Rng & Units 10/25/2018   WBC      3.90 - 12.70 K/uL 4.92   RBC      4.60 - 6.20 M/uL 4.06 (L)   Hemoglobin      14.0 - 18.0 g/dL 13.1 (L)   Hematocrit      40.0 - 54.0 % 38.2 (L)   MCV      82 - 98 fL 94   MCH      27.0 - 31.0 pg 32.3 (H)   MCHC      32.0 - 36.0 g/dL 34.3   RDW      11.5 - 14.5 % 18.7 (H)   Platelets      150 - 350 K/uL 265   MPV      9.2 - 12.9 fL 10.1   Immature Granulocytes      0.0 - 0.5 % 0.2   Gran # (ANC)      1.8 - 7.7 K/uL 2.2   Immature Grans (Abs)      0.00 - 0.04 K/uL 0.01   Lymph #      1.0 - 4.8 K/uL 2.3   Mono #      0.3 - 1.0 K/uL 0.4   Eos #      0.0 - 0.5 K/uL 0.1   Baso #      0.00 - 0.20 K/uL 0.03   nRBC      0 /100 WBC 0   Gran%      38.0 - 73.0 % 43.8   Lymph%      18.0 - 48.0 % 45.7   Mono%      4.0 - 15.0 % 7.3   Eosinophil%      0.0 - 8.0 % 2.4   Basophil%      0.0 - 1.9 % 0.6   Differential Method       Automated   Sodium      136 - 145 mmol/L 139   Potassium      3.5 - 5.1 mmol/L 3.8   Chloride      95 - 110 mmol/L 106   CO2      23 - 29 mmol/L 20 (L)   Glucose      70 - 110 mg/dL 110   BUN, Bld      6 - 20 mg/dL 16   Creatinine      0.5 - 1.4 mg/dL 1.0   Calcium      8.7 - 10.5 mg/dL 10.1   Total Protein      6.0 - 8.4 g/dL 7.8   Albumin      3.5 - 5.2 g/dL 3.8   Total Bilirubin      0.1 - 1.0 mg/dL 0.4   Alkaline Phosphatase      55 - 135 U/L 84   AST      10 - 40 U/L 55 (H)   ALT      10 - 44 U/L 60 (H)   Anion Gap      8 - 16 mmol/L 13   eGFR if African  American      >60 mL/min/1.73 m:2 >60.0   eGFR if non African American      >60 mL/min/1.73 m:2 >60.0       ASSESSMENT:     1. Sigmoid colon adenocarcinoma, [id number]  2. Lung lesion on CT  3. Weight loss, unintentional  4. Chronic hepatitis C, without coma  5. Tachycardia  6. Hypertension, essential  7. Coronary artery disease  8. H/o depression  9. Anxiety  10. Insomnia  11. Anemia  12. Chronic smoker  13. Transaminitis        Plan:     1,2: He has AJCC stage II disease. There were no clear metastatic lesions on CT done in March 2018. However, there was a groundglass focus of attenuation within the right upper lobe of the right lung. It is unclear if this is atelactasis/pnemonia/metastatic lesion. He cannot have PET CT due to insurance. Repeat CT done on 4/27/18 again showed a focus of nonspecific ground-glass attenuation within the right upper lobe that was previously noted. No other lesions to suspect metastases. He is MSI stable/low. No clear high risk features other than macroscopic perforation. Margins were clear. He has no fecal matter in urine/air in his urine at this time. No clear, established role for adjuvant FOLFOX. . I discussed role for adjuvant 5FU, given weekly for 6 weeks each cycle and off for 2 weeks, for a total of 6 months.  He started 5FU on 5/7/18. However, he received only 3 infusions ( once weekly x 7 days). He was very agitated and inebriated when he presented for subsequent treatments and had to be held. He is now sober.  He is now in C3.  He will have repeat imaging in 4-5 weeks.           4. He has untreated chronic hepatitis C genotype 1a.. He was referred to liver clinic. HCV reactivation/ fulminant hepatitis after chemotherapy has been rarely reported. Viral load needs  monitoring. He also has positive hepatitis B antibody. He is on lamivudine     5,6: He will need treatment with betablocker if persistent. /94 mm Hg today     7. No symptoms at this time. He is on  Aspirin     8,9,10. He has been referred to psychologist. He requested Ativan for chronic anxiety and was prescribed on 7/30/18    13. Grade 1. Possibly secondary to chemotherapy              Distress Screening Results: Psychosocial Distress screening score of Distress Score: 2 noted and reviewed. No intervention indicated.

## 2018-10-25 NOTE — TELEPHONE ENCOUNTER
Lamivudine refill confirmed (dose reduction to 100 mg daily from 150 mg daily). Mr. Aguirre plans to pick-up on 10/25. $3.00 copay- 004 (pay at register). Confirmed 2 patient identifiers - name and .     Pt reported having severe diarrhea most likely 2/2 chemotherapy; however, will attempt lamivudine dose reduction. No missed doses, no new medications, no new allergies or health conditions reported at this time.. All questions answered and addressed to patients satisfaction. Pt verbalized understanding.

## 2018-10-27 ENCOUNTER — INFUSION (OUTPATIENT)
Dept: INFUSION THERAPY | Facility: HOSPITAL | Age: 53
End: 2018-10-27
Attending: INTERNAL MEDICINE
Payer: MEDICAID

## 2018-10-27 VITALS
SYSTOLIC BLOOD PRESSURE: 130 MMHG | RESPIRATION RATE: 18 BRPM | HEART RATE: 107 BPM | DIASTOLIC BLOOD PRESSURE: 93 MMHG | TEMPERATURE: 99 F

## 2018-10-27 DIAGNOSIS — C18.8 OVERLAPPING MALIGNANT NEOPLASM OF COLON: Primary | ICD-10-CM

## 2018-10-27 PROCEDURE — 63600175 PHARM REV CODE 636 W HCPCS: Performed by: INTERNAL MEDICINE

## 2018-10-27 PROCEDURE — 96523 IRRIG DRUG DELIVERY DEVICE: CPT

## 2018-10-27 PROCEDURE — A4216 STERILE WATER/SALINE, 10 ML: HCPCS | Performed by: INTERNAL MEDICINE

## 2018-10-27 PROCEDURE — 25000003 PHARM REV CODE 250: Performed by: INTERNAL MEDICINE

## 2018-10-27 RX ORDER — SODIUM CHLORIDE 0.9 % (FLUSH) 0.9 %
10 SYRINGE (ML) INJECTION
Status: DISCONTINUED | OUTPATIENT
Start: 2018-10-27 | End: 2018-10-27 | Stop reason: HOSPADM

## 2018-10-27 RX ORDER — HEPARIN 100 UNIT/ML
500 SYRINGE INTRAVENOUS
Status: DISCONTINUED | OUTPATIENT
Start: 2018-10-27 | End: 2018-10-27 | Stop reason: HOSPADM

## 2018-10-27 RX ADMIN — HEPARIN 500 UNITS: 100 SYRINGE at 01:10

## 2018-10-27 RX ADMIN — Medication 10 ML: at 01:10

## 2018-10-29 ENCOUNTER — INFUSION (OUTPATIENT)
Dept: INFUSION THERAPY | Facility: HOSPITAL | Age: 53
End: 2018-10-29
Attending: INTERNAL MEDICINE
Payer: MEDICAID

## 2018-10-29 VITALS
WEIGHT: 164 LBS | DIASTOLIC BLOOD PRESSURE: 90 MMHG | BODY MASS INDEX: 22.96 KG/M2 | RESPIRATION RATE: 18 BRPM | SYSTOLIC BLOOD PRESSURE: 140 MMHG | HEIGHT: 71 IN | HEART RATE: 110 BPM | TEMPERATURE: 99 F

## 2018-10-29 DIAGNOSIS — C18.8 OVERLAPPING MALIGNANT NEOPLASM OF COLON: Primary | ICD-10-CM

## 2018-10-29 LAB
ALBUMIN SERPL BCP-MCNC: 3.8 G/DL
ALP SERPL-CCNC: 66 U/L
ALT SERPL W/O P-5'-P-CCNC: 53 U/L
ANION GAP SERPL CALC-SCNC: 9 MMOL/L
AST SERPL-CCNC: 43 U/L
BASOPHILS # BLD AUTO: 0.02 K/UL
BASOPHILS NFR BLD: 0.3 %
BILIRUB SERPL-MCNC: 0.4 MG/DL
BUN SERPL-MCNC: 12 MG/DL
CALCIUM SERPL-MCNC: 9.8 MG/DL
CHLORIDE SERPL-SCNC: 105 MMOL/L
CO2 SERPL-SCNC: 24 MMOL/L
CREAT SERPL-MCNC: 0.9 MG/DL
DIFFERENTIAL METHOD: ABNORMAL
EOSINOPHIL # BLD AUTO: 0.1 K/UL
EOSINOPHIL NFR BLD: 1.2 %
ERYTHROCYTE [DISTWIDTH] IN BLOOD BY AUTOMATED COUNT: 18.9 %
EST. GFR  (AFRICAN AMERICAN): >60 ML/MIN/1.73 M^2
EST. GFR  (NON AFRICAN AMERICAN): >60 ML/MIN/1.73 M^2
GLUCOSE SERPL-MCNC: 111 MG/DL
HCT VFR BLD AUTO: 37.8 %
HGB BLD-MCNC: 12.7 G/DL
IMM GRANULOCYTES # BLD AUTO: 0.01 K/UL
IMM GRANULOCYTES NFR BLD AUTO: 0.2 %
LYMPHOCYTES # BLD AUTO: 2.3 K/UL
LYMPHOCYTES NFR BLD: 39.5 %
MCH RBC QN AUTO: 32.5 PG
MCHC RBC AUTO-ENTMCNC: 33.6 G/DL
MCV RBC AUTO: 97 FL
MONOCYTES # BLD AUTO: 0.5 K/UL
MONOCYTES NFR BLD: 8.1 %
NEUTROPHILS # BLD AUTO: 2.9 K/UL
NEUTROPHILS NFR BLD: 50.7 %
NRBC BLD-RTO: 0 /100 WBC
PLATELET # BLD AUTO: 262 K/UL
PMV BLD AUTO: 10.1 FL
POTASSIUM SERPL-SCNC: 3.8 MMOL/L
PROT SERPL-MCNC: 7.3 G/DL
RBC # BLD AUTO: 3.91 M/UL
SODIUM SERPL-SCNC: 138 MMOL/L
WBC # BLD AUTO: 5.8 K/UL

## 2018-10-29 PROCEDURE — 63600175 PHARM REV CODE 636 W HCPCS: Performed by: INTERNAL MEDICINE

## 2018-10-29 PROCEDURE — 96416 CHEMO PROLONG INFUSE W/PUMP: CPT

## 2018-10-29 PROCEDURE — 80053 COMPREHEN METABOLIC PANEL: CPT

## 2018-10-29 PROCEDURE — 25000003 PHARM REV CODE 250: Performed by: INTERNAL MEDICINE

## 2018-10-29 PROCEDURE — A4216 STERILE WATER/SALINE, 10 ML: HCPCS | Performed by: INTERNAL MEDICINE

## 2018-10-29 PROCEDURE — 85025 COMPLETE CBC W/AUTO DIFF WBC: CPT

## 2018-10-29 RX ORDER — SODIUM CHLORIDE 0.9 % (FLUSH) 0.9 %
10 SYRINGE (ML) INJECTION
Status: CANCELLED | OUTPATIENT
Start: 2018-10-29

## 2018-10-29 RX ORDER — SODIUM CHLORIDE 0.9 % (FLUSH) 0.9 %
10 SYRINGE (ML) INJECTION
Status: COMPLETED | OUTPATIENT
Start: 2018-10-29 | End: 2018-10-29

## 2018-10-29 RX ORDER — HEPARIN 100 UNIT/ML
500 SYRINGE INTRAVENOUS
Status: COMPLETED | OUTPATIENT
Start: 2018-10-29 | End: 2018-10-29

## 2018-10-29 RX ORDER — HEPARIN 100 UNIT/ML
500 SYRINGE INTRAVENOUS
Status: CANCELLED | OUTPATIENT
Start: 2018-10-29

## 2018-10-29 RX ADMIN — Medication 10 ML: at 01:10

## 2018-10-29 RX ADMIN — HEPARIN 500 UNITS: 100 SYRINGE at 01:10

## 2018-10-29 RX ADMIN — FLUOROURACIL 2150 MG: 50 INJECTION, SOLUTION INTRAVENOUS at 03:10

## 2018-10-29 NOTE — PLAN OF CARE
Problem: Patient Care Overview  Goal: Plan of Care Review  Outcome: Ongoing (interventions implemented as appropriate)  Pt here for 5 FU pump placement, labs, hx, meds, allergies reviewed, pt with no complaints at this time, cadd pump infusing to right chest wall port at 2 ml/hr for 120 hours, pt will rtc Saturday 1200 for pump d/c, no distres snoted upon d/c to home

## 2018-11-03 ENCOUNTER — INFUSION (OUTPATIENT)
Dept: INFUSION THERAPY | Facility: HOSPITAL | Age: 53
End: 2018-11-03
Attending: INTERNAL MEDICINE
Payer: MEDICAID

## 2018-11-03 VITALS
HEART RATE: 101 BPM | DIASTOLIC BLOOD PRESSURE: 85 MMHG | RESPIRATION RATE: 18 BRPM | TEMPERATURE: 98 F | SYSTOLIC BLOOD PRESSURE: 141 MMHG

## 2018-11-03 DIAGNOSIS — C18.8 OVERLAPPING MALIGNANT NEOPLASM OF COLON: Primary | ICD-10-CM

## 2018-11-03 PROCEDURE — 25000003 PHARM REV CODE 250: Performed by: INTERNAL MEDICINE

## 2018-11-03 PROCEDURE — 96523 IRRIG DRUG DELIVERY DEVICE: CPT

## 2018-11-03 PROCEDURE — 63600175 PHARM REV CODE 636 W HCPCS: Performed by: INTERNAL MEDICINE

## 2018-11-03 PROCEDURE — A4216 STERILE WATER/SALINE, 10 ML: HCPCS | Performed by: INTERNAL MEDICINE

## 2018-11-03 RX ORDER — SODIUM CHLORIDE 0.9 % (FLUSH) 0.9 %
10 SYRINGE (ML) INJECTION
Status: DISCONTINUED | OUTPATIENT
Start: 2018-11-03 | End: 2018-11-03 | Stop reason: HOSPADM

## 2018-11-03 RX ORDER — HEPARIN 100 UNIT/ML
500 SYRINGE INTRAVENOUS
Status: DISCONTINUED | OUTPATIENT
Start: 2018-11-03 | End: 2018-11-03 | Stop reason: HOSPADM

## 2018-11-03 RX ADMIN — Medication 10 ML: at 10:11

## 2018-11-03 RX ADMIN — HEPARIN 500 UNITS: 100 SYRINGE at 10:11

## 2018-11-03 NOTE — NURSING
Pt arrives to clinic for CADD pump d/c, medication cartridge and tubing appear empty pump reading 9.8 ml remaining pt refusing to stay states he has a  service to attend and wants pump removed now. PAC flushed d/c'd site covered, leaves clinic accompanied by family member vss, no c/o verbalized tolerated home infusion well. Instructed to contact MD office concerning future appointments understanding verbalized.

## 2018-11-12 ENCOUNTER — TELEPHONE (OUTPATIENT)
Dept: PHARMACY | Facility: CLINIC | Age: 53
End: 2018-11-12

## 2018-11-15 ENCOUNTER — HOSPITAL ENCOUNTER (EMERGENCY)
Facility: HOSPITAL | Age: 53
Discharge: HOME | End: 2018-11-15
Attending: EMERGENCY MEDICINE
Payer: MEDICAID

## 2018-11-15 VITALS
DIASTOLIC BLOOD PRESSURE: 88 MMHG | TEMPERATURE: 97 F | BODY MASS INDEX: 22.12 KG/M2 | OXYGEN SATURATION: 96 % | SYSTOLIC BLOOD PRESSURE: 120 MMHG | RESPIRATION RATE: 17 BRPM | HEART RATE: 83 BPM | HEIGHT: 71 IN | WEIGHT: 158 LBS

## 2018-11-15 DIAGNOSIS — C18.7 ADENOCARCINOMA OF SIGMOID COLON: ICD-10-CM

## 2018-11-15 DIAGNOSIS — R10.9 ABDOMINAL PAIN, UNSPECIFIED ABDOMINAL LOCATION: Primary | ICD-10-CM

## 2018-11-15 DIAGNOSIS — Z93.2 ILEOSTOMY STATUS: ICD-10-CM

## 2018-11-15 DIAGNOSIS — B18.2 CHRONIC HEPATITIS C WITHOUT HEPATIC COMA: ICD-10-CM

## 2018-11-15 LAB
ALBUMIN SERPL BCP-MCNC: 4.2 G/DL
ALLENS TEST: ABNORMAL
ALP SERPL-CCNC: 84 U/L
ALT SERPL W/O P-5'-P-CCNC: 173 U/L
ANION GAP SERPL CALC-SCNC: 16 MMOL/L
AST SERPL-CCNC: 149 U/L
BASOPHILS # BLD AUTO: 0.03 K/UL
BASOPHILS NFR BLD: 0.4 %
BILIRUB SERPL-MCNC: 0.5 MG/DL
BILIRUB UR QL STRIP: NEGATIVE
BUN SERPL-MCNC: 11 MG/DL
CALCIUM SERPL-MCNC: 9.4 MG/DL
CHLORIDE SERPL-SCNC: 104 MMOL/L
CLARITY UR REFRACT.AUTO: ABNORMAL
CO2 SERPL-SCNC: 20 MMOL/L
COLOR UR AUTO: YELLOW
CREAT SERPL-MCNC: 0.8 MG/DL
DIFFERENTIAL METHOD: ABNORMAL
EOSINOPHIL # BLD AUTO: 0.1 K/UL
EOSINOPHIL NFR BLD: 1 %
ERYTHROCYTE [DISTWIDTH] IN BLOOD BY AUTOMATED COUNT: 19.2 %
EST. GFR  (AFRICAN AMERICAN): >60 ML/MIN/1.73 M^2
EST. GFR  (NON AFRICAN AMERICAN): >60 ML/MIN/1.73 M^2
ETHANOL SERPL-MCNC: 293 MG/DL
GLUCOSE SERPL-MCNC: 98 MG/DL
GLUCOSE UR QL STRIP: NEGATIVE
HCO3 UR-SCNC: 23.1 MMOL/L (ref 24–28)
HCT VFR BLD AUTO: 39.3 %
HGB BLD-MCNC: 13.5 G/DL
HGB UR QL STRIP: NEGATIVE
IMM GRANULOCYTES # BLD AUTO: 0.02 K/UL
IMM GRANULOCYTES NFR BLD AUTO: 0.3 %
KETONES UR QL STRIP: NEGATIVE
LACTATE SERPL-SCNC: 3.2 MMOL/L
LACTATE SERPL-SCNC: 4.1 MMOL/L
LACTATE SERPL-SCNC: 4.2 MMOL/L
LDH SERPL L TO P-CCNC: 5.6 MMOL/L (ref 0.5–2.2)
LEUKOCYTE ESTERASE UR QL STRIP: NEGATIVE
LYMPHOCYTES # BLD AUTO: 4 K/UL
LYMPHOCYTES NFR BLD: 54.7 %
MCH RBC QN AUTO: 32.5 PG
MCHC RBC AUTO-ENTMCNC: 34.4 G/DL
MCV RBC AUTO: 95 FL
MONOCYTES # BLD AUTO: 0.8 K/UL
MONOCYTES NFR BLD: 10.8 %
NEUTROPHILS # BLD AUTO: 2.4 K/UL
NEUTROPHILS NFR BLD: 32.8 %
NITRITE UR QL STRIP: NEGATIVE
NRBC BLD-RTO: 0 /100 WBC
PCO2 BLDA: 44.7 MMHG (ref 35–45)
PH SMN: 7.32 [PH] (ref 7.35–7.45)
PH UR STRIP: 5 [PH] (ref 5–8)
PLATELET # BLD AUTO: 255 K/UL
PMV BLD AUTO: 10.2 FL
PO2 BLDA: 41 MMHG (ref 40–60)
POC BE: -3 MMOL/L
POC SATURATED O2: 72 % (ref 95–100)
POC TCO2: 24 MMOL/L (ref 24–29)
POTASSIUM SERPL-SCNC: 3.6 MMOL/L
PROT SERPL-MCNC: 8 G/DL
PROT UR QL STRIP: NEGATIVE
RBC # BLD AUTO: 4.16 M/UL
SAMPLE: ABNORMAL
SITE: ABNORMAL
SODIUM SERPL-SCNC: 140 MMOL/L
SP GR UR STRIP: 1.02 (ref 1–1.03)
URN SPEC COLLECT METH UR: ABNORMAL
WBC # BLD AUTO: 7.3 K/UL

## 2018-11-15 PROCEDURE — 96375 TX/PRO/DX INJ NEW DRUG ADDON: CPT

## 2018-11-15 PROCEDURE — 25000003 PHARM REV CODE 250: Performed by: PHYSICIAN ASSISTANT

## 2018-11-15 PROCEDURE — 96361 HYDRATE IV INFUSION ADD-ON: CPT

## 2018-11-15 PROCEDURE — 80320 DRUG SCREEN QUANTALCOHOLS: CPT

## 2018-11-15 PROCEDURE — 63600175 PHARM REV CODE 636 W HCPCS: Performed by: PHYSICIAN ASSISTANT

## 2018-11-15 PROCEDURE — 82803 BLOOD GASES ANY COMBINATION: CPT

## 2018-11-15 PROCEDURE — 99285 EMERGENCY DEPT VISIT HI MDM: CPT | Mod: 25

## 2018-11-15 PROCEDURE — S4991 NICOTINE PATCH NONLEGEND: HCPCS | Performed by: PHYSICIAN ASSISTANT

## 2018-11-15 PROCEDURE — 81003 URINALYSIS AUTO W/O SCOPE: CPT

## 2018-11-15 PROCEDURE — 99285 EMERGENCY DEPT VISIT HI MDM: CPT | Mod: ,,, | Performed by: EMERGENCY MEDICINE

## 2018-11-15 PROCEDURE — 83605 ASSAY OF LACTIC ACID: CPT

## 2018-11-15 PROCEDURE — 96374 THER/PROPH/DIAG INJ IV PUSH: CPT

## 2018-11-15 PROCEDURE — 83605 ASSAY OF LACTIC ACID: CPT | Mod: 91

## 2018-11-15 PROCEDURE — 25500020 PHARM REV CODE 255: Performed by: EMERGENCY MEDICINE

## 2018-11-15 PROCEDURE — 80053 COMPREHEN METABOLIC PANEL: CPT

## 2018-11-15 PROCEDURE — 85025 COMPLETE CBC W/AUTO DIFF WBC: CPT

## 2018-11-15 RX ORDER — ACETAMINOPHEN 500 MG
1000 TABLET ORAL
Status: COMPLETED | OUTPATIENT
Start: 2018-11-15 | End: 2018-11-15

## 2018-11-15 RX ORDER — MORPHINE SULFATE 4 MG/ML
4 INJECTION, SOLUTION INTRAMUSCULAR; INTRAVENOUS
Status: COMPLETED | OUTPATIENT
Start: 2018-11-15 | End: 2018-11-15

## 2018-11-15 RX ORDER — POLYETHYLENE GLYCOL 3350 17 G/17G
17 POWDER, FOR SOLUTION ORAL DAILY
Status: DISCONTINUED | OUTPATIENT
Start: 2018-11-15 | End: 2018-11-15 | Stop reason: HOSPADM

## 2018-11-15 RX ORDER — IBUPROFEN 200 MG
1 TABLET ORAL
Status: DISCONTINUED | OUTPATIENT
Start: 2018-11-15 | End: 2018-11-15 | Stop reason: HOSPADM

## 2018-11-15 RX ORDER — KETOROLAC TROMETHAMINE 30 MG/ML
10 INJECTION, SOLUTION INTRAMUSCULAR; INTRAVENOUS
Status: COMPLETED | OUTPATIENT
Start: 2018-11-15 | End: 2018-11-15

## 2018-11-15 RX ADMIN — POLYETHYLENE GLYCOL 3350 17 G: 17 POWDER, FOR SOLUTION ORAL at 12:11

## 2018-11-15 RX ADMIN — SODIUM CHLORIDE 1000 ML: 0.9 INJECTION, SOLUTION INTRAVENOUS at 02:11

## 2018-11-15 RX ADMIN — ACETAMINOPHEN 1000 MG: 500 TABLET, FILM COATED ORAL at 04:11

## 2018-11-15 RX ADMIN — NICOTINE 1 PATCH: 21 PATCH, EXTENDED RELEASE TRANSDERMAL at 01:11

## 2018-11-15 RX ADMIN — SODIUM CHLORIDE 1000 ML: 0.9 INJECTION, SOLUTION INTRAVENOUS at 09:11

## 2018-11-15 RX ADMIN — MORPHINE SULFATE 4 MG: 4 INJECTION, SOLUTION INTRAMUSCULAR; INTRAVENOUS at 10:11

## 2018-11-15 RX ADMIN — MORPHINE SULFATE 4 MG: 4 INJECTION, SOLUTION INTRAMUSCULAR; INTRAVENOUS at 12:11

## 2018-11-15 RX ADMIN — MORPHINE SULFATE 4 MG: 4 INJECTION, SOLUTION INTRAMUSCULAR; INTRAVENOUS at 09:11

## 2018-11-15 RX ADMIN — SODIUM CHLORIDE 1000 ML: 0.9 INJECTION, SOLUTION INTRAVENOUS at 08:11

## 2018-11-15 RX ADMIN — KETOROLAC TROMETHAMINE 10 MG: 30 INJECTION, SOLUTION INTRAMUSCULAR at 08:11

## 2018-11-15 RX ADMIN — IOHEXOL 75 ML: 350 INJECTION, SOLUTION INTRAVENOUS at 09:11

## 2018-11-15 NOTE — ED NOTES
Pt reports RLQ pain 5/10, MD notified and states CT result shown stools trap in colon and will put in for stool softener.

## 2018-11-15 NOTE — ED NOTES
Hourly rounding complete. Patient resting in stretcher and is in NAD at this time. Pt is awake alert and oriented x4, VSS, respirations even and unlabored. Pt reports pain subsides 8/10 at this time. Pt updated on POC. Bed low and locked with side rails up x2, call bell in pt reach. Pt voices no needs at this time.

## 2018-11-15 NOTE — ED TRIAGE NOTES
Pt with Hx of colon and prostate cancer and had surgery in march for a colostomy arrived to ED with CC of pain 10/10 and blood to stoma site x 1 day to RLQ. Pt reports having chills denied sweats and fever. Pt also has Hx of Hep A and C and currently on hepatitis medication.     Patient identifiers verified and correct for Chris Aguirre Jr..    LOC: The patient is awake, alert and oriented x 4. Pt is speaking appropriately, no slurred speech.  APPEARANCE: Patient resting comfortably and in no acute distress. Pt is clean and well groomed. No JVD visible. Pt reports pain level of 0.  SKIN: Skin is warm dry and intact, and color is consistent with ethnicity. No tenting observed and capillary refill <3 seconds. No clubbing noted to nail beds. No breakdown or brusing visible and mucus membranes moist and acyanotic.  MUSCULOSKELETAL: Full range of motion present in all extremities. Hand  equal and leg strength strong +2 bilaterally.  RESPIRATORY: Airway is open and patent. Respirations-unlabored, regular rate, equal bilaterally on inspiration and expiration. No accessory muscle use noted. Lungs clear to auscultation in all fields bilaterally anterior and posterior.   CARDIAC: Patient has regular heart rate and rhythm.  No peripheral edema noted, and patient has no c/o chest pain.  ABDOMEN: Soft and tender to RLQ with palpation with no distention noted. Pt reports swollen to site start yesterday. Normoactive bowel sounds X4 quadrants. Pt has no complaints of abnormal bowel movements. Pt reports normal appetite.   NEUROLOGIC: Eyes open spontaneously and facial expression symmetrical. Pt behavior appropriate to situation, and pt follows commands.  Pt reports sensation present in all extremities when touched with a finger. PERRLA  : No complaints of frequency, burning, urgency or blood in the urine.

## 2018-11-15 NOTE — ED PROVIDER NOTES
Encounter Date: 11/15/2018       History     Chief Complaint   Patient presents with    Abdominal Pain     Pt states his ileostomy site is swollen. Pt having pain, chills & fever.      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  Patient states that he walked 3 hr from [place] to here.  He states that 3 hr ago, he started to have 12/10 pain to his ileostomy site and noted a swollen stoma.  However, his fecal output has been normal.  He states he did note blood this morning around the stoma site.  He endorses feeling chills, but denies any fever or diaphoresis.  He has had some pain after urination.          Review of patient's allergies indicates:  No Known Allergies  Past Medical History:   Diagnosis Date    Anxiety about health 6/20/2018    Colon cancer     Coronary artery disease     Depression     Hepatitis C 3/9/2018    History of psychiatric care     History of psychiatric hospitalization     Hypertension     MI (myocardial infarction)     6 months ago    Neuropathy     Psychiatric problem     Psychosis     Self-harming behavior     Suicide attempt      Past Surgical History:   Procedure Laterality Date    ABDOMINAL SURGERY      History of perforated ulcer, unknown surgery    APPENDECTOMY      COLECTOMY-SIGMOID POSSIBLE BLADDER RESECTION N/A 3/13/2018    [facility] 2ND FLR    CYSTECTOMY-PARTIAL w/ bladder resection  3/13/2018    [facility]row [facility] at [facility] OR 2ND [facility]LR    CYSTOSCOPY WITH STENT PLACEMENT Bilateral 3/13/2018    [facility] at [facility] OR 2ND FLR    ILEOSTOMY N/A 3/13/2018    [facility] at [facility] OR 2ND [facility]LR    SIGMOIDOSCOPY-FLEXIBLE N/A 3/12/2018    Performed by [facility] at [facility] ENDO (2ND FLR)     Family History    Problem Relation Age of Onset    No Known Problems Mother     No Known Problems Father     No Known Problems Sister     No Known Problems Brother     No Known Problems Maternal Aunt     No Known Problems Paternal Aunt     No Known Problems Maternal Uncle     No Known Problems Paternal Uncle     No Known Problems Maternal Grandfather     No Known Problems Maternal Grandmother     No Known Problems Paternal Grandfather     No Known Problems Paternal Grandmother     ADD / ADHD Cousin     Alcohol abuse Neg Hx     Anxiety disorder Neg Hx     Bipolar disorder Neg Hx     Dementia Neg Hx     Depression Neg Hx     Drug abuse Neg Hx     OCD Neg Hx     Paranoid behavior Neg Hx     Physical abuse Neg Hx     Schizophrenia Neg Hx     Seizures Neg Hx     Self injury Neg Hx     Sexual abuse Neg Hx     Suicide Neg Hx      Social History     Tobacco Use    Smoking status: Current Every Day Smoker     Packs/day: 2.00     Years: 30.00     Pack years: 60.00    Smokeless tobacco: Never Used   Substance Use Topics    Alcohol use: Yes    Drug use: Yes     Types: Oxycodone     Review of Systems   Constitutional: Positive for chills. Negative for diaphoresis and fever.   HENT: Negative for ear pain, postnasal drip and sore throat.    Eyes: Negative for pain.   Respiratory: Negative for cough and shortness of breath.    Cardiovascular: Negative for chest pain.   Gastrointestinal: Positive for abdominal pain. Negative for diarrhea, nausea and vomiting.        +blood from stoma   Genitourinary: Positive for dysuria. Negative for hematuria.   Musculoskeletal: Negative for back pain and myalgias.   Skin: Negative for color change and rash.   Neurological: Negative for weakness and headaches.   Hematological: Does not bruise/bleed easily.       Physical Exam     Initial Vitals   BP Pulse Resp Temp SpO2   11/15/18 0845 11/15/18 0806 11/15/18 0806 11/15/18 0806 11/15/18 0806   (!) 172/115 (!) 113 18 97.6 °F (36.4 °C)  99 %      MAP       --                Physical Exam    Vitals reviewed.  Constitutional: He appears well-developed and well-nourished.   HENT:   Head: Normocephalic and atraumatic.   Nose: Nose normal.   Eyes: Conjunctivae and EOM are normal.   Neck: Normal range of motion.   Cardiovascular: Normal rate, regular rhythm and normal heart sounds. Exam reveals no friction rub.    No murmur heard.  Pulmonary/Chest: Breath sounds normal. No respiratory distress. He has no wheezes. He has no rales.   Abdominal: Soft. Bowel sounds are normal. He exhibits no distension and no mass. There is tenderness in the right lower quadrant. There is rigidity and guarding. There is no rebound.       Musculoskeletal: Normal range of motion.   Neurological: He is alert and oriented to person, place, and time. He has normal strength. No sensory deficit.   Skin: Skin is warm and dry. No erythema.   Psychiatric: He has a normal mood and affect. Thought content normal.         ED Course   Procedures  Labs Reviewed   CBC W/ AUTO DIFFERENTIAL - Abnormal; Notable for the following components:       Result Value    RBC 4.16 (*)     Hemoglobin 13.5 (*)     Hematocrit 39.3 (*)     MCH 32.5 (*)     RDW 19.2 (*)     Gran% 32.8 (*)     Lymph% 54.7 (*)     All other components within normal limits   COMPREHENSIVE METABOLIC PANEL - Abnormal; Notable for the following components:    CO2 20 (*)      (*)      (*)     All other components within normal limits   URINALYSIS, REFLEX TO URINE CULTURE - Abnormal; Notable for the following components:    Appearance, UA Hazy (*)     All other components within normal limits    Narrative:     Preferred Collection Type->Urine, Clean Catch   LACTIC ACID, PLASMA - Abnormal; Notable for the following components:    Lactate (Lactic Acid) 4.1 (*)     All other components within normal limits   LACTIC ACID, PLASMA - Abnormal; Notable for the following components:    Lactate (Lactic Acid) 4.2 (*)     All  other components within normal limits   ALCOHOL,MEDICAL (ETHANOL) - Abnormal; Notable for the following components:    Alcohol, Medical, Serum 293 (*)     All other components within normal limits   LACTIC ACID, PLASMA - Abnormal; Notable for the following components:    Lactate (Lactic Acid) 3.2 (*)     All other components within normal limits   ISTAT PROCEDURE - Abnormal; Notable for the following components:    POC PH 7.322 (*)     POC HCO3 23.1 (*)     POC SATURATED O2 72 (*)     POC Lactate 5.60 (*)     All other components within normal limits          Imaging Results          CT Abdomen Pelvis With Contrast (Final result)  Result time 11/15/18 10:28:43    Final result by Meng Pang Jr., MD (11/15/18 10:28:43)                 Impression:      Postsurgical changes of sigmoidectomy and right lower quadrant ileostomy.  No evidence of local recurrence.  There is retained fecal material in the right colon without evidence of obstruction or inflammation.    Interval removal of bilateral ureteral stents with development of mild bilateral hydronephrosis, left slightly greater than right.    Stable appearance of 2 arterial enhancing left hepatic lobe foci and 2 hepatic hypodensities since prior examination of 03/09/2018.  Appearance is nonspecific, but in light of patient's history continued surveillance is recommended. No new hepatic lesions.    Electronically signed by resident: Usama Hernandez  Date:    11/15/2018  Time:    09:57    Electronically signed by: Meng Pang MD  Date:    11/15/2018  Time:    10:28             Narrative:    EXAMINATION:  CT ABDOMEN PELVIS WITH CONTRAST    CLINICAL HISTORY:  rlq pain around ostomy, hx of sigmoid colon CA;    TECHNIQUE:  Low dose axial images were obtained from the lung bases to the pubic symphysis following the oral administration of 75 cc of Omnipaque 350.  Sagittal and coronal reformats were provided.    COMPARISON:  CT abdomen pelvis 04/27/2018.   The    FINDINGS:  Lung bases: Symmetrically expanded.  No consolidation, pneumothorax, or pleural effusion.    Liver: Normal in size and attenuation.  Stable 1.1 cm arterial enhancing focus at the anterior margin of the left hepatic lobe and stable enhancing focus at the posterior margin of hepatic segment III.  Subcentimeter hepatic hypodensities are similar in appearance and too small to characterize.  No new hepatic lesions.    Gallbladder: Normal in appearance without evidence for cholecystitis.    Bile Ducts: No intra or extrahepatic biliary ductal dilation.    Pancreas: No pancreatic mass lesion or peripancreatic inflammatory change.    Spleen: Unremarkable.    Adrenals: Unremarkable.    Kidneys/ Ureters: Normal in size and location.  Normal concentration and excretion of contrast. No focal renal abnormality or nephrolithiasis.  Interval removal of bilateral double-J ureteral stents with development of mild bilateral hydroureteronephrosis, left greater than right.    Bladder: Status post partial cystectomy with bladder repair, and left ureteral reimplantation.  No bladder wall thickening.    Reproductive organs: The patient is status post partial prostatectomy.    GI Tract/Mesentery: Stomach is normal appearance.  Postsurgical changes of sigmoidectomy and right lower quadrant diverting loop ileostomy.  Visualized loops of small and large bowel are normal in caliber without evidence for obstruction or inflammation.    Peritoneal Space: No abdominopelvic ascites or intraperitoneal free air.    Retroperitoneum: No significant adenopathy.    Abdominal wall: Small umbilical hernia containing a loop of unobstructed distal ileum, just proximal to the ileostomy.    Vasculature: Abdominal aorta is normal in caliber, contour, and course with prominent calcific atherosclerosis of the aortoiliac arteries.    Bones: No acute fracture or aggressive lytic or blastic osseous process.  Stable degenerative changes of the spine  with compression deformity of L1 and severe degenerative disc disease most pronounced at L5/S1.                                 Medical Decision Making:   History:   Old Medical Records: I decided to obtain old medical records.  Clinical Tests:   Lab Tests: Ordered and Reviewed  Radiological Study: Reviewed and Ordered       APC / Resident Notes:   DX includes but is not limited to abdominal muscle strain, constipation, gastritis, gastroenteritis, and less likely obstruction and mesenteric ischemia.  Will initiate workup, give fluids and pain medicine, and continue to monitor.    UA with no signs of infection.  No ketones.    CBC with no leukocytosis.  H/H at 13/39.  CMP with no electrolyte abnormalities. Creatinine stable at 0.8.    No hyperbilirubinemia.  Transaminitis with AST and ALT at 149 and 173.  He has a history of chronic hepatitis-C.  CT abd pelvis with contrast shows postsurgical changes of sigmoidectomy and right lower quadrant ileostomy.  No evidence of local recurrence.  There is retained fecal material in the right colon without evidence of obstruction or inflammation. Interval removal of bilateral ureteral stents with development of mild bilateral hydronephrosis, left slightly greater than right.    10:46 AM  Patient reassessed.  He reports improvement in his pain, but would like something more.  Will give 2nd dose of morphine. Patient also ate sandwich and tolerated it without difficulty.    1:54 PM  CRS evaluated patient and does not think that he needs any emergent intervention at this time. Unlikely ischemic bowel. Consult appreciated. See their note.     2:16 PM   Ethanol level of 293. He initially endorse rare ETOH use, but then stated that he had a liter of vodka 3 days ago. Will give 3rd liter and repeat lactic acid.     4:48 PM  Repeat lactic acid shows improvement to 3.2 after fluids. I suspect this is due to his chronic liver disease and ETOH use. Upon reassessment, exam improved; abdomen  soft, nondistended. Patient remains in no apparent distress and vitals are stable. There are no emergent or life-threatening conditions at this time. Low likelihood of mesenteric ischemia or ischemic bowel. He was given MiraLax earlier in the ED for possible constipation. He was walked in the ED with nurse without any issues; he is clinically sober.  He has an appt with CRS next week but should call and f/u sooner if possible. Return to ED precautions given.  All questions were answered.  Patient is comfortable with plan and stable for discharge. I have reviewed patient's chart and discussed this case with my supervising MD.      Zoraida Almaguer PA-C  Emergent Department  [facility]             Attending Attestation:     Physician Attestation Statement for NP/PA:   I have conducted a face to face encounter with this patient in addition to the NP/PA, due to Medical Complexity    Other NP/PA Attestation Additions:      Medical Decision Making:   Mildly tachycardic on arrival with normotension and afebrile. Has focal TTP around ostomy site.; no guarding or rebound. Collection bag with creamy, brown stool without evidence of blood. Labs significant only for acutely (on chronic) elevated LFTs and lactate 4. Repeat lactate after fluids still 4. CT abd pelvis unremarkable for acute pathology. CRS surgery consulted as they performed the procedure. They do not think this intraabdominal pathology, specifically not mesenteric ischemia. I tend to agree. Vitals improved after pain meds and fluids. WBC normal. After sitting for 4 hours, EtOH level obtained; significantly elevated to 293. Suspect this plus cirrhosis is cause of failure to clear lactate after fluids. However, planned to give additional fluid bolus and repeat lactate.    Patient signed out to Dr. Reyes at shift change to f/u response to fluids, repeat lactate, and monitor to sobriety. Suspect patient will be discharged home.                    Clinical  Impression:   The primary encounter diagnosis was Abdominal pain, unspecified abdominal location. Diagnoses of Ileostomy status, Chronic hepatitis C without hepatic coma, and Adenocarcinoma of sigmoid colon were also pertinent to this visit.      Disposition:   Disposition: Discharged  Condition: Stable                        Zoraida Almaguer PA-C  11/15/18 1700       Bairon Prasad MD  11/16/18 1411

## 2018-11-15 NOTE — ED NOTES
Pt reports pain 10/10 unrelieved by morphine 4mg IV  given @ 1045 AM. MD notified, states will put in orders and continue to monitor.

## 2018-11-15 NOTE — ED NOTES
Hourly rounding complete. Patient resting in stretcher and is in NAD at this time. Pt is asleep and easily aroused, oriented x4, VSS, respirations even and unlabored. Pt denies pain at this time. Pt updated on POC. Bed low and locked with side rails up x2, call bell in pt reach. Pt voices no needs at this time.

## 2018-11-15 NOTE — CONSULTS
"[facility]  Attending: Mica  Resident: Dawood   CC: "Abdominal pain"    HPI: Chris Aguirre Jr. is a 53 y.o. man with T3N0 sigmoid adenocarcinoma s/p sigmoid colectomy en bloc with bladder wall, part of prostate, and part of left ureter, with DLI and complex reconstruction in 3/2018 by urology who presented to the ED for abdominal pain.    The patient has an elevated lactate to 4, and an elevated ethanol level.  Based on the lactate, ED is requesting Colon and Rectal Surgery evaluation for ischemic bowel.  CT scan is negative for any intrabdominal pathology.    By the time of my interview, the patient is sound asleep and aroused with a little difficulty.  He denies any abdominal pain, and cannot remember why he presented to the ED in the first place.  He says he "May have had on and off abdominal pain yesterday."    He has no problems eating.  He has no nausea or vomiting.  He is having normal ileostomy output.  He had a sandwich in the ED prior to my interview.    Past Medical History:   Diagnosis Date    Anxiety about health 6/20/2018    Colon cancer     Coronary artery disease     Depression     Hepatitis C 3/9/2018    History of psychiatric care     History of psychiatric hospitalization     Hypertension     MI (myocardial infarction)     6 months ago    Neuropathy     Psychiatric problem     Psychosis     Self-harming behavior     Suicide attempt        Past Surgical History:   Procedure Laterality Date    ABDOMINAL SURGERY      History of perforated ulcer, unknown surgery    APPENDECTOMY      COLECTOMY-SIGMOID POSSIBLE BLADDER RESECTION N/A 3/13/2018    [facility] 2ND FLR    CYSTECTOMY-PARTIAL w/ bladder resection  3/13/2018    [facility]formed by [facility]row [facility] at [facility] OR 2ND FLR    CYSTOSCOPY WITH STENT PLACEMENT Bilateral 3/13/2018    [facility] at [facility] OR 2ND FLR    ILEOSTOMY N/A 3/13/2018    [facility] by [facility] at " [facility] OR 2ND FLR    SIGMOIDOSCOPY-FLEXIBLE N/A 3/12/2018    Performed by [facility] at [facility] ENDO (2ND FLR)       Family History   Problem Relation Age of Onset    No Known Problems Mother     No Known Problems Father     No Known Problems Sister     No Known Problems Brother     No Known Problems Maternal Aunt     No Known Problems Paternal Aunt     No Known Problems Maternal Uncle     No Known Problems Paternal Uncle     No Known Problems Maternal Grandfather     No Known Problems Maternal Grandmother     No Known Problems Paternal Grandfather     No Known Problems Paternal Grandmother     ADD / ADHD Cousin     Alcohol abuse Neg Hx     Anxiety disorder Neg Hx     Bipolar disorder Neg Hx     Dementia Neg Hx     Depression Neg Hx     Drug abuse Neg Hx     OCD Neg Hx     Paranoid behavior Neg Hx     Physical abuse Neg Hx     Schizophrenia Neg Hx     Seizures Neg Hx     Self injury Neg Hx     Sexual abuse Neg Hx     Suicide Neg Hx        Social History     Socioeconomic History    Marital status: Single     Spouse name: Not on file    Number of children: Not on file    Years of education: Not on file    Highest education level: Not on file   Social Needs    Financial resource strain: Not on file    Food insecurity - worry: Not on file    Food insecurity - inability: Not on file    Transportation needs - medical: Not on file    Transportation needs - non-medical: Not on file   Occupational History    Not on file   Tobacco Use    Smoking status: Current Every Day Smoker     Packs/day: 2.00     Years: 30.00     Pack years: 60.00    Smokeless tobacco: Never Used   Substance and Sexual Activity    Alcohol use: Yes    Drug use: Yes     Types: Oxycodone    Sexual activity: Yes     Partners: Female     Birth control/protection: None   Other Topics Concern    Patient feels they ought to cut down on drinking/drug use No    Patient annoyed by others criticizing their  drinking/drug use No    Patient has felt bad or guilty about drinking/drug use No    Patient has had a drink/used drugs as an eye opener in the AM No   Social History Narrative    Not on file       No current facility-administered medications on file prior to encounter.      Current Outpatient Medications on File Prior to Encounter   Medication Sig Dispense Refill    flu vac [id number] 36mos up,PF, 60 mcg (15 mcg x 4)/0.5 mL Syrg inject 0.5ml into the muscle 0.5 mL 0    lamiVUDine (EPIVIR) 100 MG Tab Take 1 tablet (100 mg total) by mouth once daily. 30 tablet 3    LORazepam (ATIVAN) 0.5 MG tablet Take 1 tablet (0.5 mg total) by mouth every 6 (six) hours as needed for Anxiety. 60 tablet 0    promethazine (PHENERGAN) 25 MG tablet Take 1 tablet (25 mg total) by mouth every 6 (six) hours as needed for Nausea. 30 tablet 2       Review of patient's allergies indicates:  No Known Allergies    ROS: Constitutional: no fever or chills, pain controlled   Respiratory: no cough or shortness of breath   Cardiovascular: no chest pain or palpitations   Genitourinary: no hematuria or dysuria   Hematologic/Lymphatic: no easy bruising or lymphadenopathy   Musculoskeletal: no arthralgias or myalgias   Neurological: no seizures or tremors     Phys:  Vitals:    11/15/18 1200 11/15/18 1214 11/15/18 1216 11/15/18 1255   BP: (!) 148/88  (!) 162/84 (!) 155/78   Pulse: 95 98 98 90   Resp: 18 18 18   Temp:   97.6 °F (36.4 °C)    TempSrc:   Oral    SpO2: 98% 98% 100% 100%   Weight:       Height:         Phys:   Gen: NAD   HEENT: NCAT, trachea midline  CV: RRR, no m/r/g   Pulm: Unlabored  Abd: Soft, completely nttp. No rebound, guarding.  Ileostomy with succus in bag.  Extremities: no cyanosis or edema, or clubbing  Skin: Skin color, texture, turgor normal. No rashes or lesions     A/P 53 year old man with abdominal pain    CT shows no intrabdominal pathology  He has no abdominal pain by the time of my examination  No surgical issues  identified    Montrell Seay MD  General Surgery, PGY-5  [phone]

## 2018-11-15 NOTE — DISCHARGE INSTRUCTIONS
Call and follow up closely with your colon and rectal surgeon.  Take all medication as prescribed.  Follow-up with all of your other appointments.  Return to the emergency department for new or worsening symptoms.    Future Appointments   Date Time Provider Department Center   11/26/2018 10:00 AM Mikal Nino MD [facility] [address]   12/10/2018  3:00 PM Jennifer B. Scheuermann, PA [facility] [address]       Our goal in the emergency department is to always give you outstanding care and exceptional service. You may receive a survey by mail or e-mail in the next week regarding your experience in our ED. We would greatly appreciate your completing and returning the survey. Your feedback provides us with a way to recognize our staff who give very good care and it helps us learn how to improve when your experience was below our aspiration of excellence.

## 2018-11-15 NOTE — ED NOTES
Pt reports RLQ pain 10/10 unrelieved with pain medicine. MD notified and states will put in orders.

## 2018-11-15 NOTE — ED NOTES
Hourly rounding complete. Patient resting in stretcher and is in NAD at this time. Pt is awake alert and oriented x4, respirations even and unlabored. Pt reports pain subsides 2/10 at this time. Pt updated on POC. Bed low and locked with side rails up x2, call bell in pt reach. Pt voices no needs at this time.

## 2018-11-15 NOTE — PROGRESS NOTES
Patient turned over to me by Dr. Prasad.  Reviewed vitals/imaging/labs.  Evaluated at bedside and completed abdominal exam after 3rd lactic.  Doubt mesenteric ischemia or acute abdominal surgical emergency.  Patient is texting on phone and in no pain.  Will discharge home with General Surgery follow up.  Patient will return to ED for worsening symptoms, inability to eat/drink, fever greater than 100.4, or any other concerns.  Did bedside teaching with return precautions.  All questions answered.  The patient acknowledges understanding.  Gave verbal discharge instructions.

## 2018-11-26 ENCOUNTER — OFFICE VISIT (OUTPATIENT)
Dept: SURGERY | Facility: CLINIC | Age: 53
End: 2018-11-26
Payer: MEDICAID

## 2018-11-26 VITALS
WEIGHT: 170 LBS | HEART RATE: 115 BPM | SYSTOLIC BLOOD PRESSURE: 150 MMHG | BODY MASS INDEX: 23.71 KG/M2 | DIASTOLIC BLOOD PRESSURE: 108 MMHG

## 2018-11-26 DIAGNOSIS — C18.7 CANCER OF SIGMOID COLON: Primary | ICD-10-CM

## 2018-11-26 DIAGNOSIS — Z93.2 ILEOSTOMY IN PLACE: ICD-10-CM

## 2018-11-26 PROCEDURE — 99999 PR PBB SHADOW E&M-EST. PATIENT-LVL III: CPT | Mod: PBBFAC,,, | Performed by: COLON & RECTAL SURGERY

## 2018-11-26 PROCEDURE — 99213 OFFICE O/P EST LOW 20 MIN: CPT | Mod: PBBFAC | Performed by: COLON & RECTAL SURGERY

## 2018-11-26 PROCEDURE — 99213 OFFICE O/P EST LOW 20 MIN: CPT | Mod: S$PBB,,, | Performed by: COLON & RECTAL SURGERY

## 2018-11-26 NOTE — LETTER
December 2, 2018      Teresa Marin MD  [address]  [facility] [address]           [facility]  [address]  [facility] [address]  Phone: [phone]          Patient: Chris Aguirre Jr.   MR Number: [id number]   YOB: 1965   Date of Visit: 11/26/2018       Dear Dr. Marin:    Thank you for referring Chris Aguirre to me for evaluation. Attached you will find relevant portions of my assessment and plan of care.    If you have questions, please do not hesitate to call me. I look forward to following Chris Aguirre along with you.    Sincerely,    Mikal Nino MD    Enclosure  CC:  MD Ash Shields NP    If you would like to receive this communication electronically, please contact [email or web address] or [phone] to request more information on [email or web address] Link access.    For providers and/or their staff who would like to refer a patient to [facility], please contact us through our one-stop-shop provider referral line, [facility], at [phone].    If you feel you have received this communication in error or would no longer like to receive these types of communications, please e-mail [email or web address]

## 2018-11-26 NOTE — PROGRESS NOTES
CRS Office Visit     SUBJECTIVE:      Chief Complaint: Follow up for colon cancer s/p resection     PROCEDURE:  3/13/2018  1. Sigmoid colectomy with en bloc partial cystectomy.  2. Diverting loop ileostomy.  3. Splenic flexure mobilization.  4. Omental pedicle flap.     Procedure(s) Performed: (urology)  1. Partial cystectomy with complex cystorrhaphy and partial prostatectomy- 22 MODIFIER  2. Left neoureterocystotomy with ureteral re-implantation  3. Cystoscopy with bilateral ureteral catheter placement  4. Bilateral ureteral stent placement   5. Left pelvic lymph node dissection     Pathologic staging:  Primary tumor:  pT3: Tumor invades through the muscularis propria into pericolic tissues .  Regional lymph nodes:  pN0: No regional lymph node metastasis  Number examined: 28  Number involved: 0  Distant metastasis:  pMX: Cannot be assessed.  MSI - low      History of Present Illness:  Patient is a 53 y.o. male with T3N0Mx sigmoid colon cancer which presented as fecaluria s/p above procedures over 2 months ago who presents for follow up.      Completed adjuvant chemo with 5FU -  6 months total.      He reports his appetite is good. He is having good output from his ileostomy. States he is able to keep his weight stable. Notes no problems with urination. Continues to have adequate pain control. Denies fevers/chills, or abdominal pain. States he feels ready to have his ileostomy reversed.     Review of patient's allergies indicates:  No Known Allergies          Past Medical History:   Diagnosis Date    Coronary artery disease      Depression      Hepatitis C 3/9/2018    History of psychiatric care      History of psychiatric hospitalization      Hypertension      MI (myocardial infarction)       6 months ago    Neuropathy      Psychiatric problem      Psychosis      Self-harming behavior      Suicide attempt              Past Surgical History:   Procedure Laterality Date    ABDOMINAL SURGERY          History of perforated ulcer, unknown surgery    APPENDECTOMY                Family History   Problem Relation Age of Onset    No Known Problems Mother      No Known Problems Father      No Known Problems Sister      No Known Problems Brother      No Known Problems Maternal Aunt      No Known Problems Paternal Aunt      No Known Problems Maternal Uncle      No Known Problems Paternal Uncle      No Known Problems Maternal Grandfather      No Known Problems Maternal Grandmother      No Known Problems Paternal Grandfather      No Known Problems Paternal Grandmother      ADD / ADHD Cousin      Alcohol abuse Neg Hx      Anxiety disorder Neg Hx      Bipolar disorder Neg Hx      Dementia Neg Hx      Depression Neg Hx      Drug abuse Neg Hx      OCD Neg Hx      Paranoid behavior Neg Hx      Physical abuse Neg Hx      Schizophrenia Neg Hx      Seizures Neg Hx      Self injury Neg Hx      Sexual abuse Neg Hx      Suicide Neg Hx               Social History   Substance Use Topics    Smoking status: Current Every Day Smoker       Packs/day: 2.00       Years: 30.00    Smokeless tobacco: Never Used    Alcohol use Yes          Review of Systems:  Constitutional: no fever or chills  Eyes: no visual changes  ENT: no nasal congestion or sore throat  Respiratory: no cough or shortness of breath  Cardiovascular: no chest pain or palpitations  Gastrointestinal: no nausea or vomiting, tolerating diet  Genitourinary: no hematuria or dysuria  Integument/Breast: no rash or pruritis  Hematologic/Lymphatic: no easy bruising or lymphadenopathy  Musculoskeletal: no arthralgias or myalgias  Neurological: no seizures or tremors     OBJECTIVE:      Vital Signs (Most Recent)  Vitals:    11/26/18 1016   BP: (!) 150/108   BP Location: Right arm   Patient Position: Sitting   BP Method: Large (Automatic)   Pulse: (!) 115   Weight: 77.1 kg (169 lb 15.6 oz)       Physical Exam:  General: White male in NAD sitting in chair in  clinic  Neuro: aaox4   Respiratory: resps even unlabored  Cardiac: RRR  Abdomen: soft, nontender, nondistended. Incision c/d/i, well healed. Ileostomy pink and healthy with succus in bag.   Extremities: Warm dry and intact  Anorectal: deferred     ASSESSMENT/PLAN:      Chris was seen today for post-op follow-up.     Diagnoses and all orders for this visit:     Ileostomy in place     Overlapping malignant neoplasm of colon     -Will order gastrograffin constrast enema; please obtain  -F/u with patient after study is completed; please schedule patient to be seen in clinic either week before Christmas or week after New Years for follow up and operative planning      Dilcia Amos MD  General Surgery, PGYI [facility]      I have interviewed and examined the patient, reviewed the notes and assessments, and/or personally supervised the procedure(s) performed by Dr. Amos, and I concur with her/his documentation of Chris Aguirre .  See below addendum for my evaluation and additional findings.    Overall doing well, finished chemotherapy 3 weeks ago.    CT C/A/P 11/15/18:  -Postsurgical changes of sigmoidectomy and right lower quadrant ileostomy.  No evidence of local recurrence.  There is retained fecal material in the right colon without evidence of obstruction or inflammation.  -Interval removal of bilateral ureteral stents with development of mild bilateral hydronephrosis, left slightly greater than right.  -Stable appearance of 2 arterial enhancing left hepatic lobe foci and 2 hepatic hypodensities since prior examination of 03/09/2018.  Appearance is nonspecific, but in light of patient's history continued surveillance is recommended. No new hepatic lesions.    CEA on 10/25/2018 was 2.7    Exam as noted above.    He will be scheduled for a contrast enema to evaluate his anastomosis.  See him back in late December and if there is no extravasation from his anastomosis on contrast  enema, we will proceed with loop ileostomy closure in early January.    Of note he will need a colonoscopy sometime after he has recovered from ileostomy closure, as he had an obstructing cancer at the time of presentation and we were not able to advance the scope beyond the obstructing tumor.      Mikal Nino MD, FACS, FASCRS  Staff Surgeon  Department of Colon & Rectal Surgery

## 2018-11-29 ENCOUNTER — TELEPHONE (OUTPATIENT)
Dept: PHARMACY | Facility: CLINIC | Age: 53
End: 2018-11-29

## 2018-11-30 NOTE — TELEPHONE ENCOUNTER
Lamivudine fill confirmed with Mr. Aguirre. Lamivudine refill will be picked on 12/3. $3.00 copay- 004. Confirmed 2 patient identifiers - name and .     Patient has 6 doses of lamivudine remaining and takes it around breakfast daily.  Pt reports they are not having any side effects so far. No missed doses, no new medications, no new allergies or health conditions reported at this time.. All questions answered and addressed to patients satisfaction. Pt verbalized understanding.

## 2018-12-03 ENCOUNTER — TELEPHONE (OUTPATIENT)
Dept: HEPATOLOGY | Facility: CLINIC | Age: 53
End: 2018-12-03

## 2018-12-03 NOTE — TELEPHONE ENCOUNTER
----- Message from Adilene Rock sent at 12/3/2018 10:14 AM CST -----  Contact: patient  Needs Advice    Reason for call: pt called to schedule labs.        Communication Preference: [phone]    Additional Information: n/a

## 2018-12-04 ENCOUNTER — LAB VISIT (OUTPATIENT)
Dept: LAB | Facility: HOSPITAL | Age: 53
End: 2018-12-04
Payer: MEDICAID

## 2018-12-04 DIAGNOSIS — B18.2 CHRONIC HEPATITIS C WITHOUT HEPATIC COMA: ICD-10-CM

## 2018-12-04 LAB
ALBUMIN SERPL BCP-MCNC: 4 G/DL
ALP SERPL-CCNC: 76 U/L
ALT SERPL W/O P-5'-P-CCNC: 72 U/L
AST SERPL-CCNC: 50 U/L
BILIRUB DIRECT SERPL-MCNC: 0.2 MG/DL
BILIRUB SERPL-MCNC: 0.6 MG/DL
HBV SURFACE AG SERPL QL IA: NEGATIVE
PROT SERPL-MCNC: 8 G/DL

## 2018-12-04 PROCEDURE — 87517 HEPATITIS B DNA QUANT: CPT

## 2018-12-04 PROCEDURE — 87340 HEPATITIS B SURFACE AG IA: CPT

## 2018-12-04 PROCEDURE — 36415 COLL VENOUS BLD VENIPUNCTURE: CPT

## 2018-12-04 PROCEDURE — 80076 HEPATIC FUNCTION PANEL: CPT

## 2018-12-07 LAB
HBV DNA SERPL NAA+PROBE-ACNC: <10 IU/ML
HBV DNA SERPL NAA+PROBE-LOG IU: <1 LOG (10) IU/ML
HBV DNA SERPL QL NAA+PROBE: NOT DETECTED

## 2018-12-10 ENCOUNTER — OFFICE VISIT (OUTPATIENT)
Dept: HEPATOLOGY | Facility: CLINIC | Age: 53
End: 2018-12-10
Payer: MEDICAID

## 2018-12-10 VITALS
HEART RATE: 95 BPM | SYSTOLIC BLOOD PRESSURE: 108 MMHG | RESPIRATION RATE: 18 BRPM | WEIGHT: 167.56 LBS | DIASTOLIC BLOOD PRESSURE: 75 MMHG | HEIGHT: 71 IN | BODY MASS INDEX: 23.46 KG/M2 | TEMPERATURE: 98 F | OXYGEN SATURATION: 99 %

## 2018-12-10 DIAGNOSIS — R76.8 HEPATITIS B CORE ANTIBODY POSITIVE: ICD-10-CM

## 2018-12-10 DIAGNOSIS — B18.2 CHRONIC HEPATITIS C WITHOUT HEPATIC COMA: Primary | ICD-10-CM

## 2018-12-10 PROCEDURE — 99999 PR PBB SHADOW E&M-EST. PATIENT-LVL III: CPT | Mod: PBBFAC,,, | Performed by: PHYSICIAN ASSISTANT

## 2018-12-10 PROCEDURE — 99213 OFFICE O/P EST LOW 20 MIN: CPT | Mod: PBBFAC | Performed by: PHYSICIAN ASSISTANT

## 2018-12-10 PROCEDURE — 99213 OFFICE O/P EST LOW 20 MIN: CPT | Mod: S$PBB,,, | Performed by: PHYSICIAN ASSISTANT

## 2018-12-10 NOTE — PROGRESS NOTES
HEPATOLOGY CLINIC VISIT NOTE - HCV clinic    CHIEF COMPLAINT: Hepatitis C     HISTORY: This is a 53 y.o. White male with stage II sigmoid colon cancer (s/p sigmoid colectomy and ileostomy and partial cystectomy 3/2018, completed chemo w/ 5FU ~ 4 weeks ago) here for f/u regarding his HCV infection and prior HBV exposure (on lamivudine prophylaxis).     Reports he's doing well  Completed chemo 4-5 weeks ago.  Has appt w/ colorectal surgery early Jan and will find out then when he will be able to have closure of ileostomy.  Looking forward to this, wants to be able to return to work.    Denies jaundice, dark urine, hematemesis, melena, slowed mentation, abdominal distention.       HCV history:  Originally [place] 2001 [place]  Risks for HCV:  Tattoo placement - first one age 25    IVDA and nasal 18-20 yrs old    In snf [id number]  - Treatment naive  - Genotype 1a  - NS5A resistance not known  Previously HCV RNA was trending down suggesting pt might clear on his own but now this does not appear likely w/ 10/2018 HCV RNA > 400,000 IU/mL    Prior HBV exposure:  (+) BcAb, (+) BsAb  (-) BsAg  Began prophylactic lamivudine 9/22/18 due to chemo for colon CA   - doing well.    - HBV DNA & HBsAg not detected.   - no side effects   Planning to continue x 6 months after chemo d/c per AASLD guidelines    Liver staging:  FibroScan 9/17/18 - kPa 7.9, (F2 Fibrosis) ;  (No steatosis)  Labs and imaging earlier this year reveal no evidence of advanced fibrosis or portal HTN                    Past Medical History:   Diagnosis Date    Anxiety about health 6/20/2018    Colon cancer     Coronary artery disease     Depression     Hepatitis C 3/9/2018    History of psychiatric care     History of psychiatric hospitalization     Hypertension     MI (myocardial infarction)     6 months ago    Neuropathy     Psychiatric problem     Psychosis     Self-harming behavior     Suicide attempt        Past Surgical  History:   Procedure Laterality Date    ABDOMINAL SURGERY      History of perforated ulcer, unknown surgery    APPENDECTOMY      COLECTOMY-SIGMOID POSSIBLE BLADDER RESECTION N/A 3/13/2018    Performed [facility] at [facility] OR 2ND FLR    CYSTECTOMY-PARTIAL w/ bladder resection  3/13/2018    [facility]eng [facility]row [facility] at [facility] OR 2ND FLR    CYSTOSCOPY WITH STENT PLACEMENT Bilateral 3/13/2018    [facility] at [facility] OR 2ND FLR    ILEOSTOMY N/A 3/13/2018    Per[facility] at [facility] OR 2ND FLR    SIGMOIDOSCOPY-FLEXIBLE N/A 3/12/2018    Performed by [facility] at [facility] ENDO (2ND FLR)       FAMILY HISTORY: Negative for liver disease    SOCIAL HISTORY:   Social History     Tobacco Use   Smoking Status Current Every Day Smoker    Packs/day: 2.00    Years: 30.00    Pack years: 60.00   Smokeless Tobacco Never Used     Alcohol - daily alcohol for many years. Has remained off alcohol  Drugs - IVDA and nasal 18-20 yrs old      ROS:   No fever, chills, weight loss, (+) fatigue  No chest pain, palpitations, dyspnea, cough  No abdominal pain, nausea, vomiting  No headaches  No lower extremity edema  No depression or anxiety      PHYSICAL EXAM:  Friendly White male, in no acute distress; alert and oriented to person, place and time  VITALS: reviewed  HEENT: Sclerae anicteric.   NECK: Supple  LUNGS: Normal respiratory effort.  ABDOMEN: Flat, soft, nondistended     SKIN: Warm and dry. No jaundice, No obvious rashes. (+) tattoos  EXTREMITIES: No lower extremity edema  NEURO/PSYCH: Normal gate. Memory intact. Thought and speech pattern appropriate. Behavior normal. No depression or anxiety noted.      RECENT LABS:  Lab Results   Component Value Date    WBC 7.30 11/15/2018    HGB 13.5 (L) 11/15/2018     11/15/2018     Lab Results   Component Value Date    INR 0.9 08/16/2018     Lab Results   Component Value Date    AST 50 (H) 12/04/2018    ALT 72 (H) 12/04/2018     BILITOT 0.6 12/04/2018    ALBUMIN 4.0 12/04/2018    ALKPHOS 76 12/04/2018    CREATININE 0.8 11/15/2018    BUN 11 11/15/2018     11/15/2018    K 3.6 11/15/2018    AFP 1.8 04/13/2018         RECENT IMAGING:  Results for orders placed during the hospital encounter of 11/15/18   CT Abdomen Pelvis With Contrast    Narrative EXAMINATION:  CT ABDOMEN PELVIS WITH CONTRAST    CLINICAL HISTORY:  rlq pain around ostomy, hx of sigmoid colon CA;    TECHNIQUE:  Low dose axial images were obtained from the lung bases to the pubic symphysis following the oral administration of 75 cc of Omnipaque 350.  Sagittal and coronal reformats were provided.    COMPARISON:  CT abdomen pelvis 04/27/2018.  The    FINDINGS:  Lung bases: Symmetrically expanded.  No consolidation, pneumothorax, or pleural effusion.    Liver: Normal in size and attenuation.  Stable 1.1 cm arterial enhancing focus at the anterior margin of the left hepatic lobe and stable enhancing focus at the posterior margin of hepatic segment III.  Subcentimeter hepatic hypodensities are similar in appearance and too small to characterize.  No new hepatic lesions.    Gallbladder: Normal in appearance without evidence for cholecystitis.    Bile Ducts: No intra or extrahepatic biliary ductal dilation.    Pancreas: No pancreatic mass lesion or peripancreatic inflammatory change.    Spleen: Unremarkable.    Adrenals: Unremarkable.    Kidneys/ Ureters: Normal in size and location.  Normal concentration and excretion of contrast. No focal renal abnormality or nephrolithiasis.  Interval removal of bilateral double-J ureteral stents with development of mild bilateral hydroureteronephrosis, left greater than right.    Bladder: Status post partial cystectomy with bladder repair, and left ureteral reimplantation.  No bladder wall thickening.    Reproductive organs: The patient is status post partial prostatectomy.    GI Tract/Mesentery: Stomach is normal appearance.  Postsurgical  changes of sigmoidectomy and right lower quadrant diverting loop ileostomy.  Visualized loops of small and large bowel are normal in caliber without evidence for obstruction or inflammation.    Peritoneal Space: No abdominopelvic ascites or intraperitoneal free air.    Retroperitoneum: No significant adenopathy.    Abdominal wall: Small umbilical hernia containing a loop of unobstructed distal ileum, just proximal to the ileostomy.    Vasculature: Abdominal aorta is normal in caliber, contour, and course with prominent calcific atherosclerosis of the aortoiliac arteries.    Bones: No acute fracture or aggressive lytic or blastic osseous process.  Stable degenerative changes of the spine with compression deformity of L1 and severe degenerative disc disease most pronounced at L5/S1.      Impression Postsurgical changes of sigmoidectomy and right lower quadrant ileostomy.  No evidence of local recurrence.  There is retained fecal material in the right colon without evidence of obstruction or inflammation.    Interval removal of bilateral ureteral stents with development of mild bilateral hydronephrosis, left slightly greater than right.    Stable appearance of 2 arterial enhancing left hepatic lobe foci and 2 hepatic hypodensities since prior examination of 03/09/2018.  Appearance is nonspecific, but in light of patient's history continued surveillance is recommended. No new hepatic lesions.    Electronically signed by resident: Usama Hernandez  Date:    11/15/2018  Time:    09:57    Electronically signed by: Meng Pang MD  Date:    11/15/2018  Time:    10:28         ASSESSMENT  53 y.o. White male with:  1. CHRONIC HEPATITIS C, GENOTYPE 1a - treatment naive  -- FibroScan 9/2018- F2  -- Lacking Immunity to HAV - vaccine series underway  -- elevated transaminases    2. PRIOR HBV EXPOSURE  -- Pos HBcAb, neg HBsAg -- risk of reactivation w/ chemo so on lamivudine since 9/22/18  -- Per AASLD guidelines, needs vaccine due to  chemo - s/p 1st and 2nd doses  -- recent HBV DNA, HBsAg neg    3.  SIGMOID COLON CANCER  -- s/p surgery 3/2018, completed 5FU ~ 4 weeks ago         PLAN:  1. Twinrix vaccine series -dose #3 due 3/2019  2. Continue Lamivudine 100mg daily. Will continue through 5/2018  3. Proceed w/ surgical eval for ileostomy closure. Will hold off on HCV therapy until afterwards. Pt to notify me of when surgery is scheduled so we can schedule f/u in HCV clinic  4. LFT, HBV DNA, HBsAg due 3/2019

## 2018-12-11 ENCOUNTER — TELEPHONE (OUTPATIENT)
Dept: HEPATOLOGY | Facility: CLINIC | Age: 53
End: 2018-12-11

## 2018-12-11 NOTE — TELEPHONE ENCOUNTER
Attempt made to reach patient.  Msg from PA Scheuermann left on his VM.  Labs scheduled; reminder notice mailed.

## 2018-12-11 NOTE — TELEPHONE ENCOUNTER
----- Message from Jennifer B. Scheuermann, PA sent at 12/10/2018  4:57 PM CST -----  LFT, HBV DNA, HBsAg due 3/2019  pls schedule

## 2018-12-24 ENCOUNTER — TELEPHONE (OUTPATIENT)
Dept: PHARMACY | Facility: CLINIC | Age: 53
End: 2018-12-24

## 2018-12-24 NOTE — TELEPHONE ENCOUNTER
Lamivudine fill confirmed with Mr. Aguirre. Lamivudine refill will be picked on . $3.00 copay- 004 (pay at register). Confirmed 2 patient identifiers - name and .      Patient has 8 doses of lamivudine remaining and takes it around breakfast daily.  Pt reports they are not having any side effects so far. No missed doses, no new medications, no new allergies or health conditions reported at this time.. All questions answered and addressed to patients satisfaction. Pt verbalized understanding.

## 2019-01-07 ENCOUNTER — OFFICE VISIT (OUTPATIENT)
Dept: SURGERY | Facility: CLINIC | Age: 54
End: 2019-01-07
Payer: MEDICAID

## 2019-01-07 ENCOUNTER — HOSPITAL ENCOUNTER (OUTPATIENT)
Dept: CARDIOLOGY | Facility: CLINIC | Age: 54
Discharge: HOME | End: 2019-01-07
Payer: MEDICAID

## 2019-01-07 ENCOUNTER — HOSPITAL ENCOUNTER (OUTPATIENT)
Dept: RADIOLOGY | Facility: HOSPITAL | Age: 54
Discharge: HOME | End: 2019-01-07
Attending: COLON & RECTAL SURGERY
Payer: MEDICAID

## 2019-01-07 VITALS
BODY MASS INDEX: 23.34 KG/M2 | SYSTOLIC BLOOD PRESSURE: 105 MMHG | WEIGHT: 166.69 LBS | HEART RATE: 97 BPM | DIASTOLIC BLOOD PRESSURE: 78 MMHG | HEIGHT: 71 IN

## 2019-01-07 DIAGNOSIS — Z93.2 ILEOSTOMY IN PLACE: ICD-10-CM

## 2019-01-07 DIAGNOSIS — C18.7 CANCER OF SIGMOID COLON: ICD-10-CM

## 2019-01-07 DIAGNOSIS — C18.7 CANCER OF SIGMOID COLON: Primary | ICD-10-CM

## 2019-01-07 PROCEDURE — 25000003 PHARM REV CODE 250: Performed by: COLON & RECTAL SURGERY

## 2019-01-07 PROCEDURE — 74270 FL GASTROGRAFIN ENEMA WATER SOLUBLE: ICD-10-PCS | Mod: 26,,, | Performed by: RADIOLOGY

## 2019-01-07 PROCEDURE — 99999 PR PBB SHADOW E&M-EST. PATIENT-LVL III: ICD-10-PCS | Mod: PBBFAC,,, | Performed by: COLON & RECTAL SURGERY

## 2019-01-07 PROCEDURE — 99213 PR OFFICE/OUTPT VISIT, EST, LEVL III, 20-29 MIN: ICD-10-PCS | Mod: S$PBB,,, | Performed by: COLON & RECTAL SURGERY

## 2019-01-07 PROCEDURE — 93010 ELECTROCARDIOGRAM REPORT: CPT | Mod: S$PBB,,, | Performed by: INTERNAL MEDICINE

## 2019-01-07 PROCEDURE — 74270 X-RAY XM COLON 1CNTRST STD: CPT | Mod: 26,,, | Performed by: RADIOLOGY

## 2019-01-07 PROCEDURE — 99999 PR PBB SHADOW E&M-EST. PATIENT-LVL III: CPT | Mod: PBBFAC,,, | Performed by: COLON & RECTAL SURGERY

## 2019-01-07 PROCEDURE — 99213 OFFICE O/P EST LOW 20 MIN: CPT | Mod: PBBFAC,25 | Performed by: COLON & RECTAL SURGERY

## 2019-01-07 PROCEDURE — 93005 ELECTROCARDIOGRAM TRACING: CPT | Mod: PBBFAC | Performed by: INTERNAL MEDICINE

## 2019-01-07 PROCEDURE — 99213 OFFICE O/P EST LOW 20 MIN: CPT | Mod: S$PBB,,, | Performed by: COLON & RECTAL SURGERY

## 2019-01-07 PROCEDURE — 74270 X-RAY XM COLON 1CNTRST STD: CPT | Mod: TC

## 2019-01-07 PROCEDURE — 25500020 PHARM REV CODE 255: Performed by: COLON & RECTAL SURGERY

## 2019-01-07 PROCEDURE — 93010 EKG 12-LEAD: ICD-10-PCS | Mod: S$PBB,,, | Performed by: INTERNAL MEDICINE

## 2019-01-07 RX ORDER — CHLORHEXIDINE/GLYCERIN/HE-CELL
JELLY (GRAM) TOPICAL ONCE
Status: COMPLETED | OUTPATIENT
Start: 2019-01-07 | End: 2019-01-07

## 2019-01-07 RX ADMIN — DIATRIZOATE MEGLUMINE AND DIATRIZOATE SODIUM 240 ML: 600; 100 SOLUTION ORAL; RECTAL at 10:01

## 2019-01-07 RX ADMIN — Medication: at 10:01

## 2019-01-07 NOTE — PROGRESS NOTES
CRS Office Visit H&P     SUBJECTIVE:      Chief Complaint: Follow up for colon cancer s/p resection     PROCEDURE:  3/13/2018  1. Sigmoid colectomy with en bloc partial cystectomy.  2. Diverting loop ileostomy.  3. Splenic flexure mobilization.  4. Omental pedicle flap.     Procedure(s) Performed: (urology)  1. Partial cystectomy with complex cystorrhaphy and partial prostatectomy- 22 MODIFIER  2. Left neoureterocystotomy with ureteral re-implantation  3. Cystoscopy with bilateral ureteral catheter placement  4. Bilateral ureteral stent placement   5. Left pelvic lymph node dissection     Pathologic staging:  T4N0     History of Present Illness:  Patient is a 53 y.o. male with T4N0Mx sigmoid colon cancer which presented as fecaluria s/p above procedures last year presents for follow up.      Completed adjuvant chemo with 5FU about 2 months ago -  6 months total.      He reports his appetite is good. He is having good output from his ileostomy. States he is able to keep his weight stable. Notes no problems with urination. Continues to have adequate pain control. Denies fevers/chills, or abdominal pain. States he feels ready to have his ileostomy reversed.     Review of patient's allergies indicates:  No Known Allergies          Past Medical History:   Diagnosis Date    Coronary artery disease      Depression      Hepatitis C 3/9/2018    History of psychiatric care      History of psychiatric hospitalization      Hypertension      MI (myocardial infarction)       6 months ago    Neuropathy      Psychiatric problem      Psychosis      Self-harming behavior      Suicide attempt              Past Surgical History:   Procedure Laterality Date    ABDOMINAL SURGERY         History of perforated ulcer, unknown surgery    APPENDECTOMY                Family History   Problem Relation Age of Onset    No Known Problems Mother      No Known Problems Father      No Known Problems Sister      No Known Problems  "Brother      No Known Problems Maternal Aunt      No Known Problems Paternal Aunt      No Known Problems Maternal Uncle      No Known Problems Paternal Uncle      No Known Problems Maternal Grandfather      No Known Problems Maternal Grandmother      No Known Problems Paternal Grandfather      No Known Problems Paternal Grandmother      ADD / ADHD Cousin      Alcohol abuse Neg Hx      Anxiety disorder Neg Hx      Bipolar disorder Neg Hx      Dementia Neg Hx      Depression Neg Hx      Drug abuse Neg Hx      OCD Neg Hx      Paranoid behavior Neg Hx      Physical abuse Neg Hx      Schizophrenia Neg Hx      Seizures Neg Hx      Self injury Neg Hx      Sexual abuse Neg Hx      Suicide Neg Hx               Social History   Substance Use Topics    Smoking status: Current Every Day Smoker       Packs/day: 2.00       Years: 30.00    Smokeless tobacco: Never Used    Alcohol use Yes          Review of Systems:  Constitutional: no fever or chills  Eyes: no visual changes  ENT: no nasal congestion or sore throat  Respiratory: no cough or shortness of breath  Cardiovascular: no chest pain or palpitations  Gastrointestinal: no nausea or vomiting, tolerating diet  Genitourinary: no hematuria or dysuria  Integument/Breast: no rash or pruritis  Hematologic/Lymphatic: no easy bruising or lymphadenopathy  Musculoskeletal: no arthralgias or myalgias  Neurological: no seizures or tremors     OBJECTIVE:      Vital Signs (Most Recent)  Vitals:    01/07/19 1303   BP: 105/78   BP Location: Left arm   Patient Position: Sitting   BP Method: Large (Automatic)   Pulse: 97   Weight: 75.6 kg (166 lb 10.7 oz)   Height: 5' 11" (1.803 m)       Physical Exam:  General: White male in NAD sitting in chair in clinic  Neuro: aaox4   Respiratory: resps even unlabored  Cardiac: RRR  Abdomen: soft, nontender, nondistended. Incision c/d/i, well healed. Ileostomy pink and healthy with succus in bag.   Extremities: Warm dry and " intact  Anorectal: deferred     IMAGING: Gastrografin enema 1/7/19  Impression       No evidence of stricture or leak in patient status post sigmoidectomy.  Please note examination was focused to evaluate the anastomotic site.         ASSESSMENT/PLAN:      Chris was seen today for post-op follow-up.     Diagnoses and all orders for this visit:     Ileostomy in place     Overlapping malignant neoplasm of colon     Pt's condition explained to him at length, and he is medically ready for ileostomy reversal.  Risk and rationale for this procedure have been explained to him at length, including, but not limited to bleeding, infection, anastomotic leak, and hernia.  All questions answered.  Informed consent has been obtained and pre-operative orders written.  We will plan to proceed with surgery on 1/11/19.  Pt may call back with any questions or concerns in the interim.       I have interviewed and examined the patient, reviewed the notes and assessments, and/or personally supervised the procedure(s) performed by Dr. Sampson, and I concur with her/his documentation of Chris Aguirre Jr..  See below addendum for my evaluation and additional findings.    Contrast enema today - no leak.    CT 11/2018 - no obvious recurrent/metastatic disease    Last CEA Oct 2018 - 2.7    Scheduled for DLI closure 1/11/19.    I have discussed the procedure at length with Chris Aguirre Jr..  We discussed the rationale, risks, benefits, and alternatives in depth.  We discussed the expected outcomes and potential complications including but not limited to bleeding, infection, anastomotic leak, recurrence, prolonged pain, need for further procedures, altered continence and incisional hernia.  He verbalized his understanding of the procedure and wishes to proceed.  Written consent was obtained.    Mikal Nino MD, FACS, FASCRS  Staff Surgeon  Department of Colon & Rectal Surgery

## 2019-01-07 NOTE — LETTER
January 7, 2019                 [facility]al [facility]  [address]  [facility] [address]  Phone: [phone]   January 7, 2019     Patient: Chris Aguirre Jr.   YOB: 1965   Date of Visit: 1/7/2019       To Whom it May Concern:    Chris Aguirre was seen in my clinic on 1/7/2019 and has been under the care of our department since 3/2018. Due to his acute medical issues he has been unable to do any strenuous labor or heavy lifting. He is schedule to undergo an additional procedure Friday 1/11/19. He cannot perform any manual labor during this period and until further notice.    If you have any questions or concerns, please don't hesitate to call.    Sincerely,       Mikal Nino MD

## 2019-01-11 ENCOUNTER — ANESTHESIA EVENT (OUTPATIENT)
Dept: SURGERY | Facility: HOSPITAL | Age: 54
DRG: 331 | End: 2019-01-11
Payer: MEDICAID

## 2019-01-11 ENCOUNTER — HOSPITAL ENCOUNTER (INPATIENT)
Facility: HOSPITAL | Age: 54
LOS: 9 days | Discharge: HOME | DRG: 331 | End: 2019-01-20
Attending: COLON & RECTAL SURGERY | Admitting: COLON & RECTAL SURGERY
Payer: MEDICAID

## 2019-01-11 ENCOUNTER — ANESTHESIA (OUTPATIENT)
Dept: SURGERY | Facility: HOSPITAL | Age: 54
DRG: 331 | End: 2019-01-11
Payer: MEDICAID

## 2019-01-11 DIAGNOSIS — Z43.2 ILEOSTOMY CARE: ICD-10-CM

## 2019-01-11 DIAGNOSIS — N32.1 COLOVESICAL FISTULA: Primary | ICD-10-CM

## 2019-01-11 LAB
ABO + RH BLD: NORMAL
BLD GP AB SCN CELLS X3 SERPL QL: NORMAL

## 2019-01-11 PROCEDURE — 63600175 PHARM REV CODE 636 W HCPCS: Performed by: COLON & RECTAL SURGERY

## 2019-01-11 PROCEDURE — 20600001 HC STEP DOWN PRIVATE ROOM

## 2019-01-11 PROCEDURE — 71000033 HC RECOVERY, INTIAL HOUR: Performed by: COLON & RECTAL SURGERY

## 2019-01-11 PROCEDURE — D9220A PRA ANESTHESIA: ICD-10-PCS | Mod: ANES,,, | Performed by: ANESTHESIOLOGY

## 2019-01-11 PROCEDURE — 63600175 PHARM REV CODE 636 W HCPCS: Performed by: NURSE PRACTITIONER

## 2019-01-11 PROCEDURE — D9220A PRA ANESTHESIA: Mod: CRNA,,, | Performed by: NURSE ANESTHETIST, CERTIFIED REGISTERED

## 2019-01-11 PROCEDURE — 63600175 PHARM REV CODE 636 W HCPCS: Performed by: ANESTHESIOLOGY

## 2019-01-11 PROCEDURE — 27201423 OPTIME MED/SURG SUP & DEVICES STERILE SUPPLY: Performed by: COLON & RECTAL SURGERY

## 2019-01-11 PROCEDURE — 25000003 PHARM REV CODE 250: Performed by: STUDENT IN AN ORGANIZED HEALTH CARE EDUCATION/TRAINING PROGRAM

## 2019-01-11 PROCEDURE — 71000039 HC RECOVERY, EACH ADD'L HOUR: Performed by: COLON & RECTAL SURGERY

## 2019-01-11 PROCEDURE — 94761 N-INVAS EAR/PLS OXIMETRY MLT: CPT

## 2019-01-11 PROCEDURE — 63600175 PHARM REV CODE 636 W HCPCS: Performed by: STUDENT IN AN ORGANIZED HEALTH CARE EDUCATION/TRAINING PROGRAM

## 2019-01-11 PROCEDURE — 37000008 HC ANESTHESIA 1ST 15 MINUTES: Performed by: COLON & RECTAL SURGERY

## 2019-01-11 PROCEDURE — 25000003 PHARM REV CODE 250: Performed by: COLON & RECTAL SURGERY

## 2019-01-11 PROCEDURE — C9290 INJ, BUPIVACAINE LIPOSOME: HCPCS | Performed by: COLON & RECTAL SURGERY

## 2019-01-11 PROCEDURE — 36000707: Performed by: COLON & RECTAL SURGERY

## 2019-01-11 PROCEDURE — 44625 REPAIR BOWEL OPENING: CPT | Mod: ,,, | Performed by: COLON & RECTAL SURGERY

## 2019-01-11 PROCEDURE — 25000003 PHARM REV CODE 250: Performed by: NURSE ANESTHETIST, CERTIFIED REGISTERED

## 2019-01-11 PROCEDURE — D9220A PRA ANESTHESIA: Mod: ANES,,, | Performed by: ANESTHESIOLOGY

## 2019-01-11 PROCEDURE — 25000003 PHARM REV CODE 250: Performed by: NURSE PRACTITIONER

## 2019-01-11 PROCEDURE — 63600175 PHARM REV CODE 636 W HCPCS: Performed by: NURSE ANESTHETIST, CERTIFIED REGISTERED

## 2019-01-11 PROCEDURE — 36000706: Performed by: COLON & RECTAL SURGERY

## 2019-01-11 PROCEDURE — 86850 RBC ANTIBODY SCREEN: CPT

## 2019-01-11 PROCEDURE — D9220A PRA ANESTHESIA: ICD-10-PCS | Mod: CRNA,,, | Performed by: NURSE ANESTHETIST, CERTIFIED REGISTERED

## 2019-01-11 PROCEDURE — 94799 UNLISTED PULMONARY SVC/PX: CPT

## 2019-01-11 PROCEDURE — 37000009 HC ANESTHESIA EA ADD 15 MINS: Performed by: COLON & RECTAL SURGERY

## 2019-01-11 PROCEDURE — 44625 PR CLOSE ENTEROSTOMY,RESEC+ANAST: ICD-10-PCS | Mod: ,,, | Performed by: COLON & RECTAL SURGERY

## 2019-01-11 PROCEDURE — S0030 INJECTION, METRONIDAZOLE: HCPCS | Performed by: NURSE PRACTITIONER

## 2019-01-11 RX ORDER — HYDROMORPHONE HYDROCHLORIDE 1 MG/ML
1 INJECTION, SOLUTION INTRAMUSCULAR; INTRAVENOUS; SUBCUTANEOUS EVERY 4 HOURS PRN
Status: DISCONTINUED | OUTPATIENT
Start: 2019-01-11 | End: 2019-01-12

## 2019-01-11 RX ORDER — MUPIROCIN 20 MG/G
1 OINTMENT TOPICAL 2 TIMES DAILY
Status: DISPENSED | OUTPATIENT
Start: 2019-01-11 | End: 2019-01-16

## 2019-01-11 RX ORDER — PROPOFOL 10 MG/ML
VIAL (ML) INTRAVENOUS
Status: DISCONTINUED | OUTPATIENT
Start: 2019-01-11 | End: 2019-01-11

## 2019-01-11 RX ORDER — LIDOCAINE HCL/PF 100 MG/5ML
SYRINGE (ML) INTRAVENOUS
Status: DISCONTINUED | OUTPATIENT
Start: 2019-01-11 | End: 2019-01-11

## 2019-01-11 RX ORDER — GLYCOPYRROLATE 0.2 MG/ML
INJECTION INTRAMUSCULAR; INTRAVENOUS
Status: DISCONTINUED | OUTPATIENT
Start: 2019-01-11 | End: 2019-01-11

## 2019-01-11 RX ORDER — SODIUM CHLORIDE 0.9 % (FLUSH) 0.9 %
3 SYRINGE (ML) INJECTION
Status: DISCONTINUED | OUTPATIENT
Start: 2019-01-11 | End: 2019-01-11 | Stop reason: HOSPADM

## 2019-01-11 RX ORDER — SODIUM CHLORIDE, SODIUM LACTATE, POTASSIUM CHLORIDE, CALCIUM CHLORIDE 600; 310; 30; 20 MG/100ML; MG/100ML; MG/100ML; MG/100ML
INJECTION, SOLUTION INTRAVENOUS CONTINUOUS
Status: DISCONTINUED | OUTPATIENT
Start: 2019-01-11 | End: 2019-01-12

## 2019-01-11 RX ORDER — BUPIVACAINE HYDROCHLORIDE 2.5 MG/ML
INJECTION, SOLUTION EPIDURAL; INFILTRATION; INTRACAUDAL
Status: DISCONTINUED | OUTPATIENT
Start: 2019-01-11 | End: 2019-01-11 | Stop reason: HOSPADM

## 2019-01-11 RX ORDER — ROCURONIUM BROMIDE 10 MG/ML
INJECTION, SOLUTION INTRAVENOUS
Status: DISCONTINUED | OUTPATIENT
Start: 2019-01-11 | End: 2019-01-11

## 2019-01-11 RX ORDER — MIDAZOLAM HYDROCHLORIDE 1 MG/ML
INJECTION INTRAMUSCULAR; INTRAVENOUS
Status: DISCONTINUED | OUTPATIENT
Start: 2019-01-11 | End: 2019-01-11

## 2019-01-11 RX ORDER — HEPARIN SODIUM 5000 [USP'U]/ML
5000 INJECTION, SOLUTION INTRAVENOUS; SUBCUTANEOUS
Status: COMPLETED | OUTPATIENT
Start: 2019-01-11 | End: 2019-01-11

## 2019-01-11 RX ORDER — NALOXONE HCL 0.4 MG/ML
0.02 VIAL (ML) INJECTION
Status: DISCONTINUED | OUTPATIENT
Start: 2019-01-11 | End: 2019-01-20 | Stop reason: HOSPADM

## 2019-01-11 RX ORDER — METRONIDAZOLE 500 MG/100ML
500 INJECTION, SOLUTION INTRAVENOUS
Status: COMPLETED | OUTPATIENT
Start: 2019-01-11 | End: 2019-01-11

## 2019-01-11 RX ORDER — ACETAMINOPHEN 10 MG/ML
1000 INJECTION, SOLUTION INTRAVENOUS
Status: COMPLETED | OUTPATIENT
Start: 2019-01-11 | End: 2019-01-11

## 2019-01-11 RX ORDER — ACETAMINOPHEN 10 MG/ML
1000 INJECTION, SOLUTION INTRAVENOUS EVERY 8 HOURS
Status: DISPENSED | OUTPATIENT
Start: 2019-01-11 | End: 2019-01-12

## 2019-01-11 RX ORDER — HYDROMORPHONE HYDROCHLORIDE 1 MG/ML
0.2 INJECTION, SOLUTION INTRAMUSCULAR; INTRAVENOUS; SUBCUTANEOUS EVERY 5 MIN PRN
Status: DISCONTINUED | OUTPATIENT
Start: 2019-01-11 | End: 2019-01-11 | Stop reason: HOSPADM

## 2019-01-11 RX ORDER — LAMIVUDINE 100 MG/1
100 TABLET, FILM COATED ORAL DAILY
Status: DISCONTINUED | OUTPATIENT
Start: 2019-01-12 | End: 2019-01-20 | Stop reason: HOSPADM

## 2019-01-11 RX ORDER — SODIUM CHLORIDE 9 MG/ML
INJECTION, SOLUTION INTRAVENOUS CONTINUOUS
Status: DISCONTINUED | OUTPATIENT
Start: 2019-01-11 | End: 2019-01-12

## 2019-01-11 RX ORDER — MUPIROCIN 20 MG/G
OINTMENT TOPICAL
Status: DISCONTINUED | OUTPATIENT
Start: 2019-01-11 | End: 2019-01-11 | Stop reason: HOSPADM

## 2019-01-11 RX ORDER — ONDANSETRON 2 MG/ML
4 INJECTION INTRAMUSCULAR; INTRAVENOUS ONCE AS NEEDED
Status: DISCONTINUED | OUTPATIENT
Start: 2019-01-11 | End: 2019-01-11 | Stop reason: HOSPADM

## 2019-01-11 RX ORDER — ENOXAPARIN SODIUM 100 MG/ML
40 INJECTION SUBCUTANEOUS DAILY
Status: DISCONTINUED | OUTPATIENT
Start: 2019-01-12 | End: 2019-01-20 | Stop reason: HOSPADM

## 2019-01-11 RX ORDER — NEOSTIGMINE METHYLSULFATE 1 MG/ML
INJECTION, SOLUTION INTRAVENOUS
Status: DISCONTINUED | OUTPATIENT
Start: 2019-01-11 | End: 2019-01-11

## 2019-01-11 RX ORDER — HYDROCODONE BITARTRATE AND ACETAMINOPHEN 5; 325 MG/1; MG/1
1 TABLET ORAL EVERY 4 HOURS PRN
Status: DISCONTINUED | OUTPATIENT
Start: 2019-01-11 | End: 2019-01-12

## 2019-01-11 RX ORDER — FENTANYL CITRATE 50 UG/ML
INJECTION, SOLUTION INTRAMUSCULAR; INTRAVENOUS
Status: DISCONTINUED | OUTPATIENT
Start: 2019-01-11 | End: 2019-01-11

## 2019-01-11 RX ORDER — ONDANSETRON 2 MG/ML
4 INJECTION INTRAMUSCULAR; INTRAVENOUS EVERY 6 HOURS PRN
Status: DISCONTINUED | OUTPATIENT
Start: 2019-01-11 | End: 2019-01-20 | Stop reason: HOSPADM

## 2019-01-11 RX ADMIN — GLYCOPYRROLATE 0.6 MG: 0.2 INJECTION, SOLUTION INTRAMUSCULAR; INTRAVENOUS at 03:01

## 2019-01-11 RX ADMIN — SODIUM CHLORIDE, SODIUM LACTATE, POTASSIUM CHLORIDE, AND CALCIUM CHLORIDE: .6; .31; .03; .02 INJECTION, SOLUTION INTRAVENOUS at 03:01

## 2019-01-11 RX ADMIN — HYDROMORPHONE HYDROCHLORIDE 0.2 MG: 1 INJECTION, SOLUTION INTRAMUSCULAR; INTRAVENOUS; SUBCUTANEOUS at 04:01

## 2019-01-11 RX ADMIN — METRONIDAZOLE 500 MG: 500 SOLUTION INTRAVENOUS at 02:01

## 2019-01-11 RX ADMIN — HYDROMORPHONE HYDROCHLORIDE 0.2 MG: 1 INJECTION, SOLUTION INTRAMUSCULAR; INTRAVENOUS; SUBCUTANEOUS at 05:01

## 2019-01-11 RX ADMIN — HYDROMORPHONE HYDROCHLORIDE 0.2 MG: 1 INJECTION, SOLUTION INTRAMUSCULAR; INTRAVENOUS; SUBCUTANEOUS at 03:01

## 2019-01-11 RX ADMIN — HYDROCODONE BITARTRATE AND ACETAMINOPHEN 1 TABLET: 5; 325 TABLET ORAL at 03:01

## 2019-01-11 RX ADMIN — ACETAMINOPHEN 1000 MG: 10 INJECTION, SOLUTION INTRAVENOUS at 08:01

## 2019-01-11 RX ADMIN — PROPOFOL 200 MG: 10 INJECTION, EMULSION INTRAVENOUS at 02:01

## 2019-01-11 RX ADMIN — HYDROMORPHONE HYDROCHLORIDE 1 MG: 1 INJECTION, SOLUTION INTRAMUSCULAR; INTRAVENOUS; SUBCUTANEOUS at 06:01

## 2019-01-11 RX ADMIN — ACETAMINOPHEN 1000 MG: 10 INJECTION, SOLUTION INTRAVENOUS at 09:01

## 2019-01-11 RX ADMIN — IBUPROFEN 400 MG: 800 INJECTION INTRAVENOUS at 10:01

## 2019-01-11 RX ADMIN — SODIUM CHLORIDE: 0.9 INJECTION, SOLUTION INTRAVENOUS at 09:01

## 2019-01-11 RX ADMIN — FENTANYL CITRATE 100 MCG: 50 INJECTION, SOLUTION INTRAMUSCULAR; INTRAVENOUS at 03:01

## 2019-01-11 RX ADMIN — FENTANYL CITRATE 100 MCG: 50 INJECTION, SOLUTION INTRAMUSCULAR; INTRAVENOUS at 02:01

## 2019-01-11 RX ADMIN — MIDAZOLAM HYDROCHLORIDE 2 MG: 1 INJECTION, SOLUTION INTRAMUSCULAR; INTRAVENOUS at 02:01

## 2019-01-11 RX ADMIN — CEFTRIAXONE 2 G: 2 INJECTION, SOLUTION INTRAVENOUS at 02:01

## 2019-01-11 RX ADMIN — HYDROCODONE BITARTRATE AND ACETAMINOPHEN 1 TABLET: 5; 325 TABLET ORAL at 11:01

## 2019-01-11 RX ADMIN — HEPARIN SODIUM 5000 UNITS: 5000 INJECTION, SOLUTION INTRAVENOUS; SUBCUTANEOUS at 09:01

## 2019-01-11 RX ADMIN — SODIUM CHLORIDE, SODIUM GLUCONATE, SODIUM ACETATE, POTASSIUM CHLORIDE, MAGNESIUM CHLORIDE, SODIUM PHOSPHATE, DIBASIC, AND POTASSIUM PHOSPHATE: .53; .5; .37; .037; .03; .012; .00082 INJECTION, SOLUTION INTRAVENOUS at 02:01

## 2019-01-11 RX ADMIN — NEOSTIGMINE METHYLSULFATE 4 MG: 1 INJECTION INTRAVENOUS at 03:01

## 2019-01-11 RX ADMIN — LIDOCAINE HYDROCHLORIDE 100 MG: 20 INJECTION, SOLUTION INTRAVENOUS at 02:01

## 2019-01-11 RX ADMIN — MUPIROCIN: 20 OINTMENT TOPICAL at 09:01

## 2019-01-11 RX ADMIN — ROCURONIUM BROMIDE 40 MG: 10 INJECTION, SOLUTION INTRAVENOUS at 02:01

## 2019-01-11 NOTE — ANESTHESIA PREPROCEDURE EVALUATION
01/11/2019    Pre-operative evaluation for Procedure(s) (LRB):  CLOSURE,ILEOSTOMY (N/A)    Chris Aguirre Jr. is a 53 y.o. male     Patient Active Problem List   Diagnosis    Substance induced mood disorder    Alcohol dependence    Pneumaturia    Lower abdominal pain    Blood in stool    Other constipation    Hepatitis C    Pre-operative cardiovascular examination    Colovesical fistula    Overlapping malignant neoplasm of colon    Weight loss, non-intentional    Anxiety about health    Smoking addiction    Anemia due to antineoplastic chemotherapy    Essential hypertension    Ileostomy care       Review of patient's allergies indicates:  No Known Allergies    No current facility-administered medications on file prior to encounter.      Current Outpatient Medications on File Prior to Encounter   Medication Sig Dispense Refill    lamiVUDine (EPIVIR) 100 MG Tab Take 1 tablet (100 mg total) by mouth once daily. 30 tablet 3    multivitamin with minerals tablet Take 1 tablet by mouth once daily.      promethazine (PHENERGAN) 25 MG tablet Take 1 tablet (25 mg total) by mouth every 6 (six) hours as needed for Nausea. 30 tablet 2       Past Surgical History:   Procedure Laterality Date    ABDOMINAL SURGERY      History of perforated ulcer, unknown surgery    APPENDECTOMY      COLECTOMY-SIGMOID POSSIBLE BLADDER RESECTION N/A 3/13/2018    Performed [facility] at [facility] 2ND [facility]    CYSTECTOMY-PARTIAL w/ bladder resection  3/13/2018    Performed by Meng Morrow MD at [facility] OR 2ND FLR    CYSTOSCOPY WITH STENT PLACEMENT Bilateral 3/13/2018    Performed by [facility]eng Morrow MD at [facility] OR 2ND [facility]LR    ILEOSTOMY N/A 3/13/2018    Performed by [facility] at [facility] OR 2ND [facility]LR    SIGMOIDOSCOPY-FLEXIBLE N/A 3/12/2018    Performed by Mikal Nino MD at [facility] ENDO (2ND FLR)       Social  History     Socioeconomic History    Marital status: Single     Spouse name: Not on file    Number of children: Not on file    Years of education: Not on file    Highest education level: Not on file   Social Needs    Financial resource strain: Not on file    Food insecurity - worry: Not on file    Food insecurity - inability: Not on file    Transportation needs - medical: Not on file    Transportation needs - non-medical: Not on file   Occupational History    Not on file   Tobacco Use    Smoking status: Current Every Day Smoker     Packs/day: 2.00     Years: 30.00     Pack years: 60.00    Smokeless tobacco: Never Used   Substance and Sexual Activity    Alcohol use: Yes     Comment: occasiona;    Drug use: Yes     Types: Oxycodone    Sexual activity: Yes     Partners: Female     Birth control/protection: None   Other Topics Concern    Patient feels they ought to cut down on drinking/drug use No    Patient annoyed by others criticizing their drinking/drug use No    Patient has felt bad or guilty about drinking/drug use No    Patient has had a drink/used drugs as an eye opener in the AM No   Social History Narrative    Not on file       Diagnostic Studies:      EKG:  Normal sinus rhythm  Normal ECG  When compared with ECG of 09-MAR-2018 17:48,  No significant change was found  Confirmed by DELMY KAPLAN MD ([id number]) on 1/7/2019 4:34:41 PM    2D Echo:  No results found for this or any previous visit.      Anesthesia Evaluation    I have reviewed the Patient Summary Reports.    I have reviewed the Nursing Notes.   I have reviewed the Medications.     Review of Systems  Anesthesia Hx:  History of prior surgery of interest to airway management or planning: Denies Family Hx of Anesthesia complications.   Denies Personal Hx of Anesthesia complications.   Social:  Alcohol Use  Illicit Drug Use: Types of drugs include IV drug use,   Hematology/Oncology:  Hematology Normal   Oncology Normal      EENT/Dental:EENT/Dental Normal   Cardiovascular:   Hypertension Past MI CAD      Pulmonary:  Pulmonary Normal    Hepatic/GI:   Liver Disease, Hepatitis, C Hx of colovesical fistula   Neurological:  Neurology Normal    Psych:   depression          Physical Exam  General:  Well nourished    Airway/Jaw/Neck:  Airway Findings: Mouth Opening: Normal General Airway Assessment: Adult  Mallampati: II  Improves to II with phonation.  Jaw/Neck Findings:  Limited Ability to Prognath  Neck ROM: Normal ROM     Eyes/Ears/Nose:  Eyes/Ears/Nose Findings:    Dental:  Dental Findings: In tact   Chest/Lungs:  Chest/Lungs Findings: Clear to auscultation, Normal Respiratory Rate     Heart/Vascular:  Heart Findings: Rate: Normal  Rhythm: Regular Rhythm  Sounds: Normal     Abdomen:  Abdomen Findings:  Normal     Musculoskeletal:  Musculoskeletal Findings:    Skin:  Skin Findings:     Mental Status:  Mental Status Findings:  Cooperative, Alert and Oriented         Anesthesia Plan  Type of Anesthesia, risks & benefits discussed:  Anesthesia Type:  general  Patient's Preference:   Intra-op Monitoring Plan: standard ASA monitors  Intra-op Monitoring Plan Comments:   Post Op Pain Control Plan: multimodal analgesia and per primary service following discharge from PACU  Post Op Pain Control Plan Comments:   Induction:   IV  Beta Blocker:  Patient is not currently on a Beta-Blocker (No further documentation required).       Informed Consent: Patient understands risks and agrees with Anesthesia plan.  Questions answered. Anesthesia consent signed with patient.  ASA Score: 2     Day of Surgery Review of History & Physical:    H&P update referred to the surgeon.         Ready For Surgery From Anesthesia Perspective.

## 2019-01-11 NOTE — TRANSFER OF CARE
"Anesthesia Transfer of Care Note    Patient: Chris Aguirre Jr.    Procedure(s) Performed: Procedure(s) (LRB):  CLOSURE,ILEOSTOMY (N/A)    Patient location: PACU    Anesthesia Type: general    Transport from OR: Transported from OR on room air with adequate spontaneous ventilation    Post pain: adequate analgesia    Post assessment: no apparent anesthetic complications and tolerated procedure well    Post vital signs: stable    Level of consciousness: awake and alert    Nausea/Vomiting: no nausea/vomiting    Complications: none    Transfer of care protocol was followed      Last vitals:   Visit Vitals  BP (!) 163/99   Pulse 66   Temp 36.8 °C (98.3 °F) (Oral)   Resp 18   Ht 5' 11" (1.803 m)   Wt 75.8 kg (167 lb)   SpO2 100%   BMI 23.29 kg/m²     "

## 2019-01-11 NOTE — INTERVAL H&P NOTE
The patient has been examined and the H&P has been reviewed:    I concur with the findings and no changes have occurred since H&P was written.    Anesthesia/Surgery risks, benefits and alternative options discussed and understood by patient/family.          Active Hospital Problems    Diagnosis  POA    Ileostomy care [Z43.2]  Not Applicable      Resolved Hospital Problems   No resolved problems to display.

## 2019-01-11 NOTE — BRIEF OP NOTE
[facility]  Brief Operative Note    SUMMARY     Surgery Date: 1/11/2019     Surgeon(s) and Role:     * Mikal Nino MD - Primary     * Osmin Tinoco MD - Fellow    Assisting Surgeon: None    Pre-Operative Care:  Pre-operative bowel prep Mechanical and PO antibiotics  IV antibiotics given within 1 hour of incision? Yes  Preoperative multimodal pain control? Orfirmev, Calador and TAP    Pre-op Diagnosis:  Cancer of sigmoid colon [C18.7]  Ileostomy in place [Z93.2]    Operative Care:  Post-op Diagnosis: Post-Op Diagnosis Codes:     * Cancer of sigmoid colon [C18.7]     * Ileostomy in place [Z93.2]    Procedure(s) (LRB):  CLOSURE,ILEOSTOMY (N/A)    Anesthesia: General  Total volume administered .   Total UOP: .  Anesthesia Start: .  Anesthesia Stop: .    Technical Procedures Used:  DLI closure  Use of closing instrument setup? N/A  Gown/Glove Change @ time of closing? N/A  Suction tip change at time of closing? N/A  Wound Protector used if open case? N/A    Description of the findings of the procedure:  Consistent with procedure performed  Wound Class (Contaminated    Complications: No     Estimated Blood Loss: * No values recorded between 1/11/2019  2:37 PM and 1/11/2019  3:40 PM * 20cc          Specimens:   Specimen (12h ago, onward)    None          Implants: * No implants in log *    Post-Operative Care:         Disposition: PACU - hemodynamically stable.           Condition: Good  PT Temp >36 upon leaving the OR? Yes

## 2019-01-12 LAB
ANION GAP SERPL CALC-SCNC: 8 MMOL/L
BASOPHILS # BLD AUTO: 0.03 K/UL
BASOPHILS NFR BLD: 0.5 %
BUN SERPL-MCNC: 7 MG/DL
CALCIUM SERPL-MCNC: 8.9 MG/DL
CHLORIDE SERPL-SCNC: 105 MMOL/L
CO2 SERPL-SCNC: 24 MMOL/L
CREAT SERPL-MCNC: 0.7 MG/DL
DIFFERENTIAL METHOD: ABNORMAL
EOSINOPHIL # BLD AUTO: 0.1 K/UL
EOSINOPHIL NFR BLD: 1.6 %
ERYTHROCYTE [DISTWIDTH] IN BLOOD BY AUTOMATED COUNT: 14.6 %
EST. GFR  (AFRICAN AMERICAN): >60 ML/MIN/1.73 M^2
EST. GFR  (NON AFRICAN AMERICAN): >60 ML/MIN/1.73 M^2
GLUCOSE SERPL-MCNC: 78 MG/DL
HCT VFR BLD AUTO: 37.6 %
HGB BLD-MCNC: 12 G/DL
IMM GRANULOCYTES # BLD AUTO: 0.01 K/UL
IMM GRANULOCYTES NFR BLD AUTO: 0.2 %
LYMPHOCYTES # BLD AUTO: 1.6 K/UL
LYMPHOCYTES NFR BLD: 24.5 %
MAGNESIUM SERPL-MCNC: 1.3 MG/DL
MCH RBC QN AUTO: 30.5 PG
MCHC RBC AUTO-ENTMCNC: 31.9 G/DL
MCV RBC AUTO: 95 FL
MONOCYTES # BLD AUTO: 0.5 K/UL
MONOCYTES NFR BLD: 7.5 %
NEUTROPHILS # BLD AUTO: 4.2 K/UL
NEUTROPHILS NFR BLD: 65.7 %
NRBC BLD-RTO: 0 /100 WBC
PHOSPHATE SERPL-MCNC: 2.9 MG/DL
PLATELET # BLD AUTO: 185 K/UL
PMV BLD AUTO: 10.5 FL
POTASSIUM SERPL-SCNC: 4.2 MMOL/L
RBC # BLD AUTO: 3.94 M/UL
SODIUM SERPL-SCNC: 137 MMOL/L
WBC # BLD AUTO: 6.41 K/UL

## 2019-01-12 PROCEDURE — 36415 COLL VENOUS BLD VENIPUNCTURE: CPT

## 2019-01-12 PROCEDURE — 63600175 PHARM REV CODE 636 W HCPCS: Performed by: STUDENT IN AN ORGANIZED HEALTH CARE EDUCATION/TRAINING PROGRAM

## 2019-01-12 PROCEDURE — 83735 ASSAY OF MAGNESIUM: CPT

## 2019-01-12 PROCEDURE — 85025 COMPLETE CBC W/AUTO DIFF WBC: CPT

## 2019-01-12 PROCEDURE — 25000003 PHARM REV CODE 250: Performed by: STUDENT IN AN ORGANIZED HEALTH CARE EDUCATION/TRAINING PROGRAM

## 2019-01-12 PROCEDURE — 20600001 HC STEP DOWN PRIVATE ROOM

## 2019-01-12 PROCEDURE — 84100 ASSAY OF PHOSPHORUS: CPT

## 2019-01-12 PROCEDURE — 80048 BASIC METABOLIC PNL TOTAL CA: CPT

## 2019-01-12 RX ORDER — OXYCODONE HYDROCHLORIDE 5 MG/1
5 TABLET ORAL EVERY 6 HOURS PRN
Status: DISCONTINUED | OUTPATIENT
Start: 2019-01-12 | End: 2019-01-20 | Stop reason: HOSPADM

## 2019-01-12 RX ORDER — HYDROCODONE BITARTRATE AND ACETAMINOPHEN 5; 325 MG/1; MG/1
1 TABLET ORAL EVERY 6 HOURS PRN
Status: DISCONTINUED | OUTPATIENT
Start: 2019-01-12 | End: 2019-01-12

## 2019-01-12 RX ORDER — HYDROCODONE BITARTRATE AND ACETAMINOPHEN 10; 325 MG/1; MG/1
1 TABLET ORAL EVERY 6 HOURS PRN
Status: DISCONTINUED | OUTPATIENT
Start: 2019-01-12 | End: 2019-01-12

## 2019-01-12 RX ORDER — ACETAMINOPHEN 325 MG/1
650 TABLET ORAL EVERY 6 HOURS PRN
Status: DISCONTINUED | OUTPATIENT
Start: 2019-01-12 | End: 2019-01-20 | Stop reason: HOSPADM

## 2019-01-12 RX ORDER — OXYCODONE HYDROCHLORIDE 10 MG/1
10 TABLET ORAL EVERY 6 HOURS PRN
Status: DISCONTINUED | OUTPATIENT
Start: 2019-01-12 | End: 2019-01-20 | Stop reason: HOSPADM

## 2019-01-12 RX ORDER — HYDROMORPHONE HYDROCHLORIDE 1 MG/ML
0.5 INJECTION, SOLUTION INTRAMUSCULAR; INTRAVENOUS; SUBCUTANEOUS EVERY 6 HOURS PRN
Status: DISCONTINUED | OUTPATIENT
Start: 2019-01-12 | End: 2019-01-20 | Stop reason: HOSPADM

## 2019-01-12 RX ADMIN — MAGNESIUM SULFATE HEPTAHYDRATE 3 G: 500 INJECTION, SOLUTION INTRAMUSCULAR; INTRAVENOUS at 10:01

## 2019-01-12 RX ADMIN — MUPIROCIN 1 G: 20 OINTMENT TOPICAL at 10:01

## 2019-01-12 RX ADMIN — OXYCODONE HYDROCHLORIDE 10 MG: 10 TABLET ORAL at 03:01

## 2019-01-12 RX ADMIN — HYDROMORPHONE HYDROCHLORIDE 1 MG: 1 INJECTION, SOLUTION INTRAMUSCULAR; INTRAVENOUS; SUBCUTANEOUS at 06:01

## 2019-01-12 RX ADMIN — ONDANSETRON 4 MG: 2 INJECTION INTRAMUSCULAR; INTRAVENOUS at 09:01

## 2019-01-12 RX ADMIN — HYDROMORPHONE HYDROCHLORIDE 0.5 MG: 1 INJECTION, SOLUTION INTRAMUSCULAR; INTRAVENOUS; SUBCUTANEOUS at 07:01

## 2019-01-12 RX ADMIN — HYDROCODONE BITARTRATE AND ACETAMINOPHEN 1 TABLET: 5; 325 TABLET ORAL at 08:01

## 2019-01-12 RX ADMIN — LAMIVUDINE 100 MG: 100 TABLET, FILM COATED ORAL at 09:01

## 2019-01-12 RX ADMIN — ONDANSETRON 4 MG: 2 INJECTION INTRAMUSCULAR; INTRAVENOUS at 06:01

## 2019-01-12 RX ADMIN — HYDROMORPHONE HYDROCHLORIDE 1 MG: 1 INJECTION, SOLUTION INTRAMUSCULAR; INTRAVENOUS; SUBCUTANEOUS at 01:01

## 2019-01-12 RX ADMIN — OXYCODONE HYDROCHLORIDE 10 MG: 10 TABLET ORAL at 10:01

## 2019-01-12 RX ADMIN — ENOXAPARIN SODIUM 40 MG: 100 INJECTION SUBCUTANEOUS at 09:01

## 2019-01-12 NOTE — NURSING TRANSFER
Nursing Transfer Note      1/11/2019     Transfer To: [id number]    Transfer via stretcher    Transfer with iv pole    Transported by pct    Medicines sent: [facility]    Chart send with patient: Yes    Notified: mother

## 2019-01-12 NOTE — PLAN OF CARE
Problem: Fall Injury Risk  Goal: Absence of Fall and Fall-Related Injury    Intervention: Promote Injury-Free Environment  VSS. Call light and personal items in reach. Mother @ bedside. Dressing changed by MD this morning. Pain 9/10. Pt threw up 1x today d/t to pain. No other issues. WCTM.

## 2019-01-12 NOTE — PROGRESS NOTES
Ileostomy reversal site leaking decent amount of SS drainage outside of marked area.     Scheduled AM IV tylenol held d/t exceeding dosage limit in 24h.     Paged colorectal surgery twice with no callback. Pt stable. Will continue to monitor patient.

## 2019-01-12 NOTE — SUBJECTIVE & OBJECTIVE
Subjective:     Interval History: No acute events, pain around the former ileostomy site with saturated dressing, no nausea     Post-Op Info:  Procedure(s) (LRB):  CLOSURE,ILEOSTOMY (N/A)   1 Day Post-Op      Medications:  Continuous Infusions:  Scheduled Meds:   acetaminophen  1,000 mg Intravenous Q8H    enoxaparin  40 mg Subcutaneous Daily    lamiVUDine  100 mg Oral Daily    mupirocin  1 g Nasal BID     PRN Meds:   HYDROcodone-acetaminophen    HYDROmorphone    naloxone    ondansetron        Objective:     Vital Signs (Most Recent):  Temp: 98.7 °F (37.1 °C) (01/11/19 2144)  Pulse: 84 (01/12/19 0800)  Resp: 12 (01/12/19 0800)  BP: (!) 139/96 (01/12/19 0800)  SpO2: (!) 93 % (01/12/19 0800) Vital Signs (24h Range):  Temp:  [98.2 °F (36.8 °C)-98.9 °F (37.2 °C)] 98.7 °F (37.1 °C)  Pulse:  [62-99] 84  Resp:  [11-21] 12  SpO2:  [92 %-100 %] 93 %  BP: (121-176)/() 139/96     Intake/Output - Last 3 Shifts       01/10 0700 - 01/11 0659 01/11 0700 - 01/12 0659 01/12 0700 - 01/13 0659    P.O.  950     I.V. (mL/kg)  1500 (19.8)     Total Intake(mL/kg)  2450 (32.4)     Urine (mL/kg/hr)  250     Stool  0     Total Output  250     Net  +2200            Stool Occurrence  0 x           Physical Exam   Constitutional: He is oriented to person, place, and time. He appears well-developed and well-nourished.   Eyes: EOM are normal.   Cardiovascular: Normal rate.   Pulmonary/Chest: Effort normal.   On room air   Abdominal: Soft. There is tenderness. There is no rebound.   Tenderness with minimal packing of the former ileostomy site, no active oozing    Neurological: He is alert and oriented to person, place, and time.   Skin: Skin is warm and dry.         Significant Labs:  BMP:   Recent Labs   Lab 01/12/19  0623   GLU 78      K 4.2      CO2 24   BUN 7   CREATININE 0.7   CALCIUM 8.9   MG 1.3*     CBC:   Recent Labs   Lab 01/12/19  0623   WBC 6.41   RBC 3.94*   HGB 12.0*   HCT 37.6*      MCV 95   MCH  30.5   [facility] 31.9*       Significant Diagnostics:  I have reviewed all pertinent imaging results/findings within the past 24 hours.

## 2019-01-12 NOTE — PROGRESS NOTES
[facility]  Colorectal Surgery  Progress Note    Patient Name: Chris Aguirre Jr.  MRN: [id number]  Admission Date: 1/11/2019  Hospital Length of Stay: 1 days  Attending Physician: Mikal Nino MD    Subjective:     Interval History: No acute events, pain around the former ileostomy site with saturated dressing, no nausea     Post-Op Info:  Procedure(s) (LRB):  CLOSURE,ILEOSTOMY (N/A)   1 Day Post-Op      Medications:  Continuous Infusions:  Scheduled Meds:   acetaminophen  1,000 mg Intravenous Q8H    enoxaparin  40 mg Subcutaneous Daily    lamiVUDine  100 mg Oral Daily    mupirocin  1 g Nasal BID     PRN Meds:   HYDROcodone-acetaminophen    HYDROmorphone    naloxone    ondansetron        Objective:     Vital Signs (Most Recent):  Temp: 98.7 °F (37.1 °C) (01/11/19 2144)  Pulse: 84 (01/12/19 0800)  Resp: 12 (01/12/19 0800)  BP: (!) 139/96 (01/12/19 0800)  SpO2: (!) 93 % (01/12/19 0800) Vital Signs (24h Range):  Temp:  [98.2 °F (36.8 °C)-98.9 °F (37.2 °C)] 98.7 °F (37.1 °C)  Pulse:  [62-99] 84  Resp:  [11-21] 12  SpO2:  [92 %-100 %] 93 %  BP: (121-176)/() 139/96     Intake/Output - Last 3 Shifts       01/10 0700 - 01/11 0659 01/11 0700 - 01/12 0659 01/12 0700 - 01/13 0659    P.O.  950     I.V. (mL/kg)  1500 (19.8)     Total Intake(mL/kg)  2450 (32.4)     Urine (mL/kg/hr)  250     Stool  0     Total Output  250     Net  +2200            Stool Occurrence  0 x           Physical Exam   Constitutional: He is oriented to person, place, and time. He appears well-developed and well-nourished.   Eyes: EOM are normal.   Cardiovascular: Normal rate.   Pulmonary/Chest: Effort normal.   On room air   Abdominal: Soft. There is tenderness. There is no rebound.   Tenderness with minimal packing of the former ileostomy site, no active oozing    Neurological: He is alert and oriented to person, place, and time.   Skin: Skin is warm and dry.         Significant Labs:  BMP:   Recent Labs   Lab  01/12/19  0623   GLU 78      K 4.2      CO2 24   BUN 7   CREATININE 0.7   CALCIUM 8.9   MG 1.3*     CBC:   Recent Labs   Lab 01/12/19  0623   WBC 6.41   RBC 3.94*   HGB 12.0*   HCT 37.6*      MCV 95   MCH 30.5   MCHC 31.9*       Significant Diagnostics:  I have reviewed all pertinent imaging results/findings within the past 24 hours.    Assessment/Plan:     Ileostomy care    Chris Aguirre Jr. is a 53 y.o. male s/p CLOSURE,ILEOSTOMY (N/A) on 1/11/2019 recovering in stable condition on the floor.     - prn pain and nausea control - PO w/ IVBT  - wean/maintain room air as tolerated  - HDS   - FLD + MIVF, HLIV once tolerating diet  - SSI  - Hct stable   - OOB, ICS, SCDs  Dispo: pending ROBF, PO pain control              Melva Medina MD  Colorectal Surgery  [facility]

## 2019-01-12 NOTE — PLAN OF CARE
Problem: Adult Inpatient Plan of Care  Goal: Plan of Care Review  Outcome: Ongoing (interventions implemented as appropriate)  Pt AAOx4, VSS, in bed with upper siderails raised x2, bed in lowest/locked position, call light/personal belongings within reach. Pt instructed to call for assistance. Pt verbalizes understanding. Pt afebrile at this time.  Proper hand hygiene performed before and after pt care.      R gauze dressing with tegaderm in place, SS drainage outline marked. Small amount noted.  LR continued at 100cc/hr.   Pain well controlled with scheduled IV Tylenol + PRN Norco q4h.   Compliant with clear liquid diet.  UOP measured via urinal, no bm so far this shift.    Will continue to monitor patient.

## 2019-01-12 NOTE — OP NOTE
DATE OF PROCEDURE:  01/11/2019.    PREOPERATIVE DIAGNOSIS:  Diverting loop ileostomy.    POSTOPERATIVE DIAGNOSIS:  Diverting loop ileostomy.    PROCEDURE:  Diverting loop ileostomy closure.    SURGEON:  Miakl Nino M.D.    ASSISTANT:  Dr. Osmin Tinoco.    ANESTHESIA:  General endotracheal.    INTRAVENOUS FLUIDS:  1.5 liters of crystalloid.    ESTIMATED BLOOD LOSS:  Less than 10 mL.    URINE OUTPUT:  200 mL.    DRAINS:  None.    SPECIMENS:  None.    COMPLICATIONS:  None.    OPERATIVE FINDINGS:  Successful loop ileostomy closure.    INDICATIONS:  The patient is a 53-year-old male who had previously undergone a   colectomy for a T4 sigmoid cancer invading the bladder.  At the time of that   surgery, he underwent a diverting loop ileostomy due to the obstructing nature   of the lesion.  He has subsequently completed adjuvant chemotherapy and a   contrast enema shows no extravasation from his anastomosis.  CT scan   demonstrates no evidence of recurrent or metastatic disease.  He presents today   for elective loop ileostomy closure.    DESCRIPTION OF PROCEDURE:  The patient was identified, brought to the Operating   Room, and placed on the table in a supine position after obtaining informed   consent.  Venous sequential stockings were placed.  He was given preoperative   intravenous antibiotics as well as subcutaneous heparin.  After induction of   general endotracheal anesthesia, a Samson catheter was placed using sterile   technique and the abdomen was prepped and draped in the usual sterile fashion.    Mucocutaneous junction of the right lower quadrant loop ileostomy was incised   and the bowel was mobilized from the abdominal wall down into the abdominal   cavity.  We then fashioned a side-to-side functional end-to-end   enteroenterostomy utilizing RYDER stapler to approximate the afferent and efferent   limbs of the stoma and additional firing of the RYDER stapler to close the common   enterotomy.  The common enterotomy  staple line was imbricated with 3-0 Vicryl   seromuscular sutures and a 3-0 Vicryl seromuscular suture was placed at the   crotch of the anastomosis to relieve tension.  Bowel was returned to the   abdominal wall.  The fascia was closed with interrupted #1 PDS figure-of-eight   sutures.  A combination of Exparel and Marcaine was infiltrated at the surgical   incision site.  The skin was plicated with a 4-0 Monocryl pursestring suture   leaving the central portion of the skin open, which was packed with sterile   gauze and dressed with a bioclusive dressing.    The patient tolerated the procedure well with no complications.  He was   extubated in the Operating Room and taken to Recovery in satisfactory condition.    The Samson catheter was removed at the end of the case.  I was present   throughout the entire procedure.  All needle, instrument and sponge counts were   correct at the end of the case.      WINDY  dd: 01/12/2019 08:08:05 (CST)  td: 01/12/2019 08:28:56 (CST)  Doc ID   #[id number]  Job ID #[id number]    CC:

## 2019-01-12 NOTE — ASSESSMENT & PLAN NOTE
Chris Aguirre Jr. is a 53 y.o. male s/p CLOSURE,ILEOSTOMY (N/A) on 1/11/2019 recovering in stable condition on the floor.     - prn pain and nausea control - PO w/ IVBT  - wean/maintain room air as tolerated  - HDS   - FLD + MIVF, HLIV once tolerating diet  - SSI  - Hct stable   - OOB, ICS, SCDs  Dispo: pending ROBF, PO pain control

## 2019-01-13 LAB
ANION GAP SERPL CALC-SCNC: 10 MMOL/L
BASOPHILS # BLD AUTO: 0.02 K/UL
BASOPHILS NFR BLD: 0.3 %
BUN SERPL-MCNC: 9 MG/DL
CALCIUM SERPL-MCNC: 9.5 MG/DL
CHLORIDE SERPL-SCNC: 99 MMOL/L
CO2 SERPL-SCNC: 26 MMOL/L
CREAT SERPL-MCNC: 0.8 MG/DL
DIFFERENTIAL METHOD: ABNORMAL
EOSINOPHIL # BLD AUTO: 0 K/UL
EOSINOPHIL NFR BLD: 0.4 %
ERYTHROCYTE [DISTWIDTH] IN BLOOD BY AUTOMATED COUNT: 14.4 %
EST. GFR  (AFRICAN AMERICAN): >60 ML/MIN/1.73 M^2
EST. GFR  (NON AFRICAN AMERICAN): >60 ML/MIN/1.73 M^2
GLUCOSE SERPL-MCNC: 91 MG/DL
HCT VFR BLD AUTO: 41.5 %
HGB BLD-MCNC: 13.7 G/DL
IMM GRANULOCYTES # BLD AUTO: 0.03 K/UL
IMM GRANULOCYTES NFR BLD AUTO: 0.4 %
LYMPHOCYTES # BLD AUTO: 2.3 K/UL
LYMPHOCYTES NFR BLD: 29.1 %
MAGNESIUM SERPL-MCNC: 1.7 MG/DL
MCH RBC QN AUTO: 31.4 PG
MCHC RBC AUTO-ENTMCNC: 33 G/DL
MCV RBC AUTO: 95 FL
MONOCYTES # BLD AUTO: 0.8 K/UL
MONOCYTES NFR BLD: 9.9 %
NEUTROPHILS # BLD AUTO: 4.7 K/UL
NEUTROPHILS NFR BLD: 59.9 %
NRBC BLD-RTO: 0 /100 WBC
PHOSPHATE SERPL-MCNC: 3.7 MG/DL
PLATELET # BLD AUTO: 220 K/UL
PMV BLD AUTO: 10.5 FL
POTASSIUM SERPL-SCNC: 4.3 MMOL/L
RBC # BLD AUTO: 4.36 M/UL
SODIUM SERPL-SCNC: 135 MMOL/L
WBC # BLD AUTO: 7.86 K/UL

## 2019-01-13 PROCEDURE — 84100 ASSAY OF PHOSPHORUS: CPT

## 2019-01-13 PROCEDURE — 85025 COMPLETE CBC W/AUTO DIFF WBC: CPT

## 2019-01-13 PROCEDURE — 20600001 HC STEP DOWN PRIVATE ROOM

## 2019-01-13 PROCEDURE — 63600175 PHARM REV CODE 636 W HCPCS: Performed by: STUDENT IN AN ORGANIZED HEALTH CARE EDUCATION/TRAINING PROGRAM

## 2019-01-13 PROCEDURE — 25000003 PHARM REV CODE 250: Performed by: STUDENT IN AN ORGANIZED HEALTH CARE EDUCATION/TRAINING PROGRAM

## 2019-01-13 PROCEDURE — 36415 COLL VENOUS BLD VENIPUNCTURE: CPT

## 2019-01-13 PROCEDURE — 83735 ASSAY OF MAGNESIUM: CPT

## 2019-01-13 PROCEDURE — 80048 BASIC METABOLIC PNL TOTAL CA: CPT

## 2019-01-13 RX ADMIN — ONDANSETRON 4 MG: 2 INJECTION INTRAMUSCULAR; INTRAVENOUS at 12:01

## 2019-01-13 RX ADMIN — MUPIROCIN 1 G: 20 OINTMENT TOPICAL at 08:01

## 2019-01-13 RX ADMIN — HYDROMORPHONE HYDROCHLORIDE 0.5 MG: 1 INJECTION, SOLUTION INTRAMUSCULAR; INTRAVENOUS; SUBCUTANEOUS at 02:01

## 2019-01-13 RX ADMIN — OXYCODONE HYDROCHLORIDE 10 MG: 10 TABLET ORAL at 12:01

## 2019-01-13 RX ADMIN — HYDROMORPHONE HYDROCHLORIDE 0.5 MG: 1 INJECTION, SOLUTION INTRAMUSCULAR; INTRAVENOUS; SUBCUTANEOUS at 08:01

## 2019-01-13 RX ADMIN — LAMIVUDINE 100 MG: 100 TABLET, FILM COATED ORAL at 08:01

## 2019-01-13 RX ADMIN — ENOXAPARIN SODIUM 40 MG: 100 INJECTION SUBCUTANEOUS at 08:01

## 2019-01-13 RX ADMIN — OXYCODONE HYDROCHLORIDE 10 MG: 10 TABLET ORAL at 05:01

## 2019-01-13 RX ADMIN — OXYCODONE HYDROCHLORIDE 10 MG: 10 TABLET ORAL at 06:01

## 2019-01-13 NOTE — ASSESSMENT & PLAN NOTE
Chris IDRIS Aguirre Jr. is a 53 y.o. male s/p CLOSURE,ILEOSTOMY (N/A) on 1/11/2019 recovering in stable condition on the floor.     - prn pain and nausea control - PO w/ IVBT   - maintain room air as tolerated  - HDS   - FLD + MIVF, HLIV once tolerating diet  - Hct stable, f/u am labs for prn lyte replacement   - OOB, ICS, SCDs  Dispo: pending ROBF, PO pain control

## 2019-01-13 NOTE — PLAN OF CARE
Problem: Adult Inpatient Plan of Care  Goal: Plan of Care Review  Outcome: Ongoing (interventions implemented as appropriate)  VSS. Call light and personal items in reach. 7/10 pain. Pain meds given. Pt not passing gas. Ambulates to the bathroom. No issues today WCTM.

## 2019-01-13 NOTE — PROGRESS NOTES
[facility]  Colorectal Surgery  Progress Note    Patient Name: Chris Aguirre Jr.  MRN: [id number]  Admission Date: 1/11/2019  Hospital Length of Stay: 2 days  Attending Physician: Mikal Nino MD    Subjective:     Interval History: Continued abdominal pain and distension, no flatus/BM as of yet, no emesis but some nausea alleviated with prn anti-emetics, otherwise continuing to tolerate diet and void spontaneously     Post-Op Info:  Procedure(s) (LRB):  CLOSURE,ILEOSTOMY (N/A)   2 Days Post-Op      Medications:  Continuous Infusions:  Scheduled Meds:   enoxaparin  40 mg Subcutaneous Daily    lamiVUDine  100 mg Oral Daily    mupirocin  1 g Nasal BID     PRN Meds:   acetaminophen    HYDROmorphone    naloxone    ondansetron    oxyCODONE    oxyCODONE        Objective:     Vital Signs (Most Recent):  Temp: 98.4 °F (36.9 °C) (01/12/19 2056)  Pulse: 92 (01/13/19 0400)  Resp: 16 (01/13/19 0400)  BP: (!) 135/98 (01/13/19 0400)  SpO2: (!) 94 % (01/13/19 0400) Vital Signs (24h Range):  Temp:  [98.4 °F (36.9 °C)-98.6 °F (37 °C)] 98.4 °F (36.9 °C)  Pulse:  [83-92] 92  Resp:  [12-17] 16  SpO2:  [93 %-94 %] 94 %  BP: (127-140)/() 135/98     Intake/Output - Last 3 Shifts       01/11 0700 - 01/12 0659 01/12 0700 - 01/13 0659 01/13 0700 - 01/14 0659    P.O. 950 450     I.V. (mL/kg) 1500 (19.8)      Total Intake(mL/kg) 2450 (32.4) 450 (5.7)     Urine (mL/kg/hr) 250 1450 (0.8)     Stool 0 0     Total Output 250 1450     Net +2200 -1000            Urine Occurrence  3 x     Stool Occurrence 0 x 0 x           Physical Exam   Constitutional: He is oriented to person, place, and time. He appears well-developed and well-nourished.   Eyes: EOM are normal.   Cardiovascular: Normal rate.   Pulmonary/Chest: Effort normal.   On room air   Abdominal: Soft. There is tenderness. There is no rebound.   Tenderness with minimal packing of the former ileostomy site, no active oozing    Neurological: He is alert and  oriented to person, place, and time.   Skin: Skin is warm and dry.         Significant Labs:  BMP:   Recent Labs   Lab 01/12/19  0623   GLU 78      K 4.2      CO2 24   BUN 7   CREATININE 0.7   CALCIUM 8.9   MG 1.3*     CBC:   Recent Labs   Lab 01/12/19  0623   WBC 6.41   RBC 3.94*   HGB 12.0*   HCT 37.6*      MCV 95   MCH 30.5   MCHC 31.9*       Significant Diagnostics:  I have reviewed all pertinent imaging results/findings within the past 24 hours.    Assessment/Plan:     Ileostomy care    Chris Aguirre Jr. is a 53 y.o. male s/p CLOSURE,ILEOSTOMY (N/A) on 1/11/2019 recovering in stable condition on the floor.     - prn pain and nausea control - PO w/ IVBT   - maintain room air as tolerated  - HDS   - FLD + MIVF, HLIV once tolerating diet  - Hct stable, f/u am labs for prn lyte replacement   - OOB, ICS, SCDs  Dispo: pending ROBF, PO pain control              Melva Medina MD  Colorectal Surgery  [facility]

## 2019-01-13 NOTE — ANESTHESIA POSTPROCEDURE EVALUATION
"Anesthesia Post Evaluation    Patient: Chris Aguirre JrLizzette    Procedure(s) Performed: Procedure(s) (LRB):  CLOSURE,ILEOSTOMY (N/A)    Final Anesthesia Type: general  Patient location during evaluation: floor  Patient participation: Yes- Able to Participate  Level of consciousness: awake and alert and oriented  Post-procedure vital signs: reviewed and stable  Pain management: adequate  Airway patency: patent  PONV status at discharge: No PONV  Anesthetic complications: no      Cardiovascular status: blood pressure returned to baseline and hemodynamically stable  Respiratory status: unassisted  Hydration status: euvolemic  Follow-up not needed.        Visit Vitals  /85   Pulse 99   Temp 37.2 °C (98.9 °F) (Oral)   Resp 14   Ht 5' 11" (1.803 m)   Wt 78.5 kg (173 lb 1 oz)   SpO2 (!) 93%   BMI 24.14 kg/m²       Pain/Marcella Score: Pain Rating Prior to Med Admin: 6 (1/13/2019 12:00 PM)        "

## 2019-01-13 NOTE — PLAN OF CARE
Problem: Adult Inpatient Plan of Care  Goal: Plan of Care Review  Outcome: Ongoing (interventions implemented as appropriate)  Pt AAOx4, VSS, in bed with upper siderails raised x2, bed in lowest/locked position, call light/personal belongings within reach. Pt instructed to call for assistance. Pt verbalizes understanding. Pt afebrile at this time.  Proper hand hygiene performed before and after pt care.       R gauze dressing in place, site packed and dressing changed by team (1/12). No drainage noted so far this shift.  Pain well controlled with PRN Oxy IR and Dilaudid q6h.   Compliant with full liquid diet.  Nausea well controlled with PRN Zofran.  Abdomen slightly firmer on assessment than previous shift.   UOP measured via urinal, no bm/gas so far this shift. Bowels remain hypoactive.    Will continue to monitor patient.

## 2019-01-13 NOTE — SUBJECTIVE & OBJECTIVE
Subjective:     Interval History: Continued abdominal pain and distension, no flatus/BM as of yet, no emesis but some nausea alleviated with prn anti-emetics, otherwise continuing to tolerate diet and void spontaneously     Post-Op Info:  Procedure(s) (LRB):  CLOSURE,ILEOSTOMY (N/A)   2 Days Post-Op      Medications:  Continuous Infusions:  Scheduled Meds:   enoxaparin  40 mg Subcutaneous Daily    lamiVUDine  100 mg Oral Daily    mupirocin  1 g Nasal BID     PRN Meds:   acetaminophen    HYDROmorphone    naloxone    ondansetron    oxyCODONE    oxyCODONE        Objective:     Vital Signs (Most Recent):  Temp: 98.4 °F (36.9 °C) (01/12/19 2056)  Pulse: 92 (01/13/19 0400)  Resp: 16 (01/13/19 0400)  BP: (!) 135/98 (01/13/19 0400)  SpO2: (!) 94 % (01/13/19 0400) Vital Signs (24h Range):  Temp:  [98.4 °F (36.9 °C)-98.6 °F (37 °C)] 98.4 °F (36.9 °C)  Pulse:  [83-92] 92  Resp:  [12-17] 16  SpO2:  [93 %-94 %] 94 %  BP: (127-140)/() 135/98     Intake/Output - Last 3 Shifts       01/11 0700 - 01/12 0659 01/12 0700 - 01/13 0659 01/13 0700 - 01/14 0659    P.O. 950 450     I.V. (mL/kg) 1500 (19.8)      Total Intake(mL/kg) 2450 (32.4) 450 (5.7)     Urine (mL/kg/hr) 250 1450 (0.8)     Stool 0 0     Total Output 250 1450     Net +2200 -1000            Urine Occurrence  3 x     Stool Occurrence 0 x 0 x           Physical Exam   Constitutional: He is oriented to person, place, and time. He appears well-developed and well-nourished.   Eyes: EOM are normal.   Cardiovascular: Normal rate.   Pulmonary/Chest: Effort normal.   On room air   Abdominal: Soft. There is tenderness. There is no rebound.   Tenderness with minimal packing of the former ileostomy site, no active oozing    Neurological: He is alert and oriented to person, place, and time.   Skin: Skin is warm and dry.         Significant Labs:  BMP:   Recent Labs   Lab 01/12/19  0623   GLU 78      K 4.2      CO2 24   BUN 7   CREATININE 0.7   CALCIUM 8.9    MG 1.3*     CBC:   Recent Labs   Lab 01/12/19  0623   WBC 6.41   RBC 3.94*   HGB 12.0*   HCT 37.6*      MCV 95   MCH 30.5   MCHC 31.9*       Significant Diagnostics:  I have reviewed all pertinent imaging results/findings within the past 24 hours.

## 2019-01-14 LAB
ANION GAP SERPL CALC-SCNC: 15 MMOL/L
BASOPHILS # BLD AUTO: 0.03 K/UL
BASOPHILS NFR BLD: 0.4 %
BUN SERPL-MCNC: 8 MG/DL
CALCIUM SERPL-MCNC: 8.7 MG/DL
CHLORIDE SERPL-SCNC: 97 MMOL/L
CO2 SERPL-SCNC: 21 MMOL/L
CREAT SERPL-MCNC: 0.8 MG/DL
DIFFERENTIAL METHOD: ABNORMAL
EOSINOPHIL # BLD AUTO: 0.4 K/UL
EOSINOPHIL NFR BLD: 4.6 %
ERYTHROCYTE [DISTWIDTH] IN BLOOD BY AUTOMATED COUNT: 14.5 %
EST. GFR  (AFRICAN AMERICAN): >60 ML/MIN/1.73 M^2
EST. GFR  (NON AFRICAN AMERICAN): >60 ML/MIN/1.73 M^2
GLUCOSE SERPL-MCNC: 79 MG/DL
HCT VFR BLD AUTO: 39.4 %
HGB BLD-MCNC: 13 G/DL
IMM GRANULOCYTES # BLD AUTO: 0.03 K/UL
IMM GRANULOCYTES NFR BLD AUTO: 0.4 %
LYMPHOCYTES # BLD AUTO: 2.3 K/UL
LYMPHOCYTES NFR BLD: 29.9 %
MAGNESIUM SERPL-MCNC: 1.2 MG/DL
MCH RBC QN AUTO: 31.2 PG
MCHC RBC AUTO-ENTMCNC: 33 G/DL
MCV RBC AUTO: 95 FL
MONOCYTES # BLD AUTO: 0.9 K/UL
MONOCYTES NFR BLD: 11.9 %
NEUTROPHILS # BLD AUTO: 4 K/UL
NEUTROPHILS NFR BLD: 52.8 %
NRBC BLD-RTO: 0 /100 WBC
PHOSPHATE SERPL-MCNC: 3 MG/DL
PLATELET # BLD AUTO: 217 K/UL
PMV BLD AUTO: 10.9 FL
POTASSIUM SERPL-SCNC: 3.7 MMOL/L
RBC # BLD AUTO: 4.17 M/UL
SODIUM SERPL-SCNC: 133 MMOL/L
WBC # BLD AUTO: 7.57 K/UL

## 2019-01-14 PROCEDURE — 36415 COLL VENOUS BLD VENIPUNCTURE: CPT

## 2019-01-14 PROCEDURE — 20600001 HC STEP DOWN PRIVATE ROOM

## 2019-01-14 PROCEDURE — 85025 COMPLETE CBC W/AUTO DIFF WBC: CPT

## 2019-01-14 PROCEDURE — 63600175 PHARM REV CODE 636 W HCPCS: Performed by: STUDENT IN AN ORGANIZED HEALTH CARE EDUCATION/TRAINING PROGRAM

## 2019-01-14 PROCEDURE — 25000003 PHARM REV CODE 250: Performed by: STUDENT IN AN ORGANIZED HEALTH CARE EDUCATION/TRAINING PROGRAM

## 2019-01-14 PROCEDURE — 84100 ASSAY OF PHOSPHORUS: CPT

## 2019-01-14 PROCEDURE — 80048 BASIC METABOLIC PNL TOTAL CA: CPT

## 2019-01-14 PROCEDURE — 83735 ASSAY OF MAGNESIUM: CPT

## 2019-01-14 RX ORDER — POTASSIUM CHLORIDE 750 MG/1
30 CAPSULE, EXTENDED RELEASE ORAL ONCE
Status: COMPLETED | OUTPATIENT
Start: 2019-01-14 | End: 2019-01-14

## 2019-01-14 RX ADMIN — MAGNESIUM SULFATE HEPTAHYDRATE 3 G: 500 INJECTION, SOLUTION INTRAMUSCULAR; INTRAVENOUS at 06:01

## 2019-01-14 RX ADMIN — HYDROMORPHONE HYDROCHLORIDE 0.5 MG: 1 INJECTION, SOLUTION INTRAMUSCULAR; INTRAVENOUS; SUBCUTANEOUS at 09:01

## 2019-01-14 RX ADMIN — ENOXAPARIN SODIUM 40 MG: 100 INJECTION SUBCUTANEOUS at 08:01

## 2019-01-14 RX ADMIN — OXYCODONE HYDROCHLORIDE 10 MG: 10 TABLET ORAL at 06:01

## 2019-01-14 RX ADMIN — POTASSIUM CHLORIDE 30 MEQ: 750 CAPSULE, EXTENDED RELEASE ORAL at 06:01

## 2019-01-14 RX ADMIN — HYDROMORPHONE HYDROCHLORIDE 0.5 MG: 1 INJECTION, SOLUTION INTRAMUSCULAR; INTRAVENOUS; SUBCUTANEOUS at 03:01

## 2019-01-14 RX ADMIN — OXYCODONE HYDROCHLORIDE 10 MG: 10 TABLET ORAL at 12:01

## 2019-01-14 RX ADMIN — LAMIVUDINE 100 MG: 100 TABLET, FILM COATED ORAL at 08:01

## 2019-01-14 NOTE — PLAN OF CARE
CM met with patient and his mother to obtain discharge planning assessment.  Planned discharge is home with family - Plan (A) and (B).    PCP:  Ash Hung NP      Payor:  Payor: MEDICAID / Plan: [facility] / Product Type: Managed Medicaid /      Pharamacy:    [facility]  [address]  [facility] [address]  Phone: [phone] Fax: [phone]       01/14/19 2757   Discharge Assessment   Assessment Type Discharge Planning Assessment   Confirmed/corrected address and phone number on facesheet? Yes   Assessment information obtained from? Patient   Communicated expected length of stay with patient/caregiver no   Prior to hospitilization cognitive status: Alert/Oriented   Prior to hospitalization functional status: Independent   Current cognitive status: Alert/Oriented   Current Functional Status: Independent   Lives With parent(s)   Able to Return to Prior Arrangements yes   Is patient able to care for self after discharge? Yes   Who are your caregiver(s) and their phone number(s)? Francesca - mother [phone]   Patient's perception of discharge disposition home or selfcare   Readmission Within the Last 30 Days no previous admission in last 30 days   Patient currently being followed by outpatient case management? No   Patient currently receives any other outside agency services? No   Equipment Currently Used at Home none   Do you have any problems affording any of your prescribed medications? No   Is the patient taking medications as prescribed? yes   Does the patient have transportation home? Yes   Transportation Anticipated family or friend will provide   Does the patient receive services at the [facility]? No   Discharge Plan A Home with family   Discharge Plan B Home with family   DME Needed Upon Discharge  none   Patient/Family in Agreement with Plan yes

## 2019-01-14 NOTE — PLAN OF CARE
Problem: Adult Inpatient Plan of Care  Goal: Plan of Care Review  Outcome: Ongoing (interventions implemented as appropriate)  Pt AAOx4, VSS, in bed with upper siderails raised x2, bed in lowest/locked position, call light/personal belongings within reach. Pt instructed to call for assistance. Pt verbalizes understanding. Pt afebrile at this time.  Proper hand hygiene performed before and after pt care.       R ileostomy reversal site packed and dressed with gauze, CDI.   Pain well controlled with PRN Oxy IR and Dilaudid q6h.   Compliant with full liquid diet.  Nausea well controlled with PRN Zofran.  Bowel sounds present. Pt encouraged to ambulate.   UOP measured via urinal, no bm/flatus so far this shift.      Will continue to monitor patient.

## 2019-01-14 NOTE — ASSESSMENT & PLAN NOTE
Chris Aguirre Jr. is a 53 y.o. male s/p CLOSURE,ILEOSTOMY (N/A) on 1/11/2019 recovering in stable condition on the floor.     - prn pain and nausea control - PO w/ IVBT   - maintain room air as tolerated  - HDS   - FLD  - Hct stable, f/u am labs for prn lyte replacement   - OOB, ICS, SCDs  Dispo: pending ROBF, PO pain control

## 2019-01-14 NOTE — PROGRESS NOTES
[facility]  Colorectal Surgery  Progress Note    Patient Name: Chris Aguirre Jr.  MRN: [id number]  Admission Date: 1/11/2019  Hospital Length of Stay: 3 days  Attending Physician: Mikal Nino MD    Subjective:     Interval History: No episodes of emesis, no nausea, minimal belching, continuing to tolerate fulls, increased abdominal gas pain overnight, no flatus/BM      Post-Op Info:  Procedure(s) (LRB):  CLOSURE,ILEOSTOMY (N/A)   3 Days Post-Op      Medications:  Continuous Infusions:  Scheduled Meds:   enoxaparin  40 mg Subcutaneous Daily    lamiVUDine  100 mg Oral Daily    mupirocin  1 g Nasal BID     PRN Meds:   acetaminophen    HYDROmorphone    naloxone    ondansetron    oxyCODONE    oxyCODONE        Objective:     Vital Signs (Most Recent):  Temp: 98.2 °F (36.8 °C) (01/13/19 2017)  Pulse: 94 (01/14/19 0430)  Resp: 14 (01/14/19 0430)  BP: 121/88 (01/14/19 0430)  SpO2: (!) 94 % (01/14/19 0430) Vital Signs (24h Range):  Temp:  [98.2 °F (36.8 °C)-98.9 °F (37.2 °C)] 98.2 °F (36.8 °C)  Pulse:  [] 94  Resp:  [14-20] 14  SpO2:  [93 %-95 %] 94 %  BP: (106-132)/(79-98) 121/88     Intake/Output - Last 3 Shifts       01/12 0700 - 01/13 0659 01/13 0700 - 01/14 0659    P.O. 450 650    Total Intake(mL/kg) 450 (5.7) 650 (8.2)    Urine (mL/kg/hr) 1450 (0.8)     Stool 0     Total Output 1450     Net -1000 +650          Urine Occurrence 3 x 3 x    Stool Occurrence 0 x 0 x          Physical Exam   Constitutional: He is oriented to person, place, and time. He appears well-developed and well-nourished.   Eyes: EOM are normal.   Cardiovascular: Normal rate.   Pulmonary/Chest: Effort normal.   On room air   Abdominal: There is no rebound.   Diffusely tympanic, no rebound, former ileostomy site c/d/i dressing   Neurological: He is alert and oriented to person, place, and time.   Skin: Skin is warm and dry.         Significant Labs:  BMP:   Recent Labs   Lab 01/13/19  0807   GLU 91   *   K 4.3    CL 99   CO2 26   BUN 9   CREATININE 0.8   CALCIUM 9.5   MG 1.7     CBC:   Recent Labs   Lab 01/13/19  0807   WBC 7.86   RBC 4.36*   HGB 13.7*   HCT 41.5      MCV 95   MCH 31.4*   MCHC 33.0       Significant Diagnostics:  I have reviewed all pertinent imaging results/findings within the past 24 hours.    Assessment/Plan:     Ileostomy care    Chris Aguirre Jr. is a 53 y.o. male s/p CLOSURE,ILEOSTOMY (N/A) on 1/11/2019 recovering in stable condition on the floor.     - prn pain and nausea control - PO w/ IVBT   - maintain room air as tolerated  - HDS   - FLD  - Hct stable, f/u am labs for prn lyte replacement   - OOB, ICS, SCDs  Dispo: pending ROBF, PO pain control              Melva Medina MD  Colorectal Surgery  [facility]

## 2019-01-14 NOTE — SUBJECTIVE & OBJECTIVE
Subjective:     Interval History: No episodes of emesis, no nausea, minimal belching, continuing to tolerate fulls, increased abdominal gas pain overnight, no flatus/BM      Post-Op Info:  Procedure(s) (LRB):  CLOSURE,ILEOSTOMY (N/A)   3 Days Post-Op      Medications:  Continuous Infusions:  Scheduled Meds:   enoxaparin  40 mg Subcutaneous Daily    lamiVUDine  100 mg Oral Daily    mupirocin  1 g Nasal BID     PRN Meds:   acetaminophen    HYDROmorphone    naloxone    ondansetron    oxyCODONE    oxyCODONE        Objective:     Vital Signs (Most Recent):  Temp: 98.2 °F (36.8 °C) (01/13/19 2017)  Pulse: 94 (01/14/19 0430)  Resp: 14 (01/14/19 0430)  BP: 121/88 (01/14/19 0430)  SpO2: (!) 94 % (01/14/19 0430) Vital Signs (24h Range):  Temp:  [98.2 °F (36.8 °C)-98.9 °F (37.2 °C)] 98.2 °F (36.8 °C)  Pulse:  [] 94  Resp:  [14-20] 14  SpO2:  [93 %-95 %] 94 %  BP: (106-132)/(79-98) 121/88     Intake/Output - Last 3 Shifts       01/12 0700 - 01/13 0659 01/13 0700 - 01/14 0659    P.O. 450 650    Total Intake(mL/kg) 450 (5.7) 650 (8.2)    Urine (mL/kg/hr) 1450 (0.8)     Stool 0     Total Output 1450     Net -1000 +650          Urine Occurrence 3 x 3 x    Stool Occurrence 0 x 0 x          Physical Exam   Constitutional: He is oriented to person, place, and time. He appears well-developed and well-nourished.   Eyes: EOM are normal.   Cardiovascular: Normal rate.   Pulmonary/Chest: Effort normal.   On room air   Abdominal: There is no rebound.   Diffusely tympanic, no rebound, former ileostomy site c/d/i dressing   Neurological: He is alert and oriented to person, place, and time.   Skin: Skin is warm and dry.         Significant Labs:  BMP:   Recent Labs   Lab 01/13/19  0807   GLU 91   *   K 4.3   CL 99   CO2 26   BUN 9   CREATININE 0.8   CALCIUM 9.5   MG 1.7     CBC:   Recent Labs   Lab 01/13/19  0807   WBC 7.86   RBC 4.36*   HGB 13.7*   HCT 41.5      MCV 95   MCH 31.4*   MCHC 33.0       Significant  Diagnostics:  I have reviewed all pertinent imaging results/findings within the past 24 hours.

## 2019-01-15 PROBLEM — Z98.890 STATUS POST REVERSAL OF ILEOSTOMY: Status: ACTIVE | Noted: 2019-01-15

## 2019-01-15 PROBLEM — Z43.2 ILEOSTOMY CARE: Status: RESOLVED | Noted: 2019-01-11 | Resolved: 2019-01-15

## 2019-01-15 LAB
ANION GAP SERPL CALC-SCNC: 9 MMOL/L
BASOPHILS # BLD AUTO: 0.02 K/UL
BASOPHILS NFR BLD: 0.3 %
BUN SERPL-MCNC: 5 MG/DL
CALCIUM SERPL-MCNC: 9.2 MG/DL
CHLORIDE SERPL-SCNC: 97 MMOL/L
CO2 SERPL-SCNC: 28 MMOL/L
CREAT SERPL-MCNC: 0.7 MG/DL
DIFFERENTIAL METHOD: ABNORMAL
EOSINOPHIL # BLD AUTO: 0.4 K/UL
EOSINOPHIL NFR BLD: 5.2 %
ERYTHROCYTE [DISTWIDTH] IN BLOOD BY AUTOMATED COUNT: 14.1 %
EST. GFR  (AFRICAN AMERICAN): >60 ML/MIN/1.73 M^2
EST. GFR  (NON AFRICAN AMERICAN): >60 ML/MIN/1.73 M^2
GLUCOSE SERPL-MCNC: 92 MG/DL
HCT VFR BLD AUTO: 37.7 %
HGB BLD-MCNC: 12.4 G/DL
IMM GRANULOCYTES # BLD AUTO: 0.02 K/UL
IMM GRANULOCYTES NFR BLD AUTO: 0.3 %
LYMPHOCYTES # BLD AUTO: 2 K/UL
LYMPHOCYTES NFR BLD: 28.2 %
MAGNESIUM SERPL-MCNC: 1.2 MG/DL
MCH RBC QN AUTO: 31.1 PG
MCHC RBC AUTO-ENTMCNC: 32.9 G/DL
MCV RBC AUTO: 95 FL
MONOCYTES # BLD AUTO: 0.9 K/UL
MONOCYTES NFR BLD: 12.6 %
NEUTROPHILS # BLD AUTO: 3.7 K/UL
NEUTROPHILS NFR BLD: 53.4 %
NRBC BLD-RTO: 0 /100 WBC
PHOSPHATE SERPL-MCNC: 3.1 MG/DL
PLATELET # BLD AUTO: 241 K/UL
PMV BLD AUTO: 10.7 FL
POTASSIUM SERPL-SCNC: 4.1 MMOL/L
RBC # BLD AUTO: 3.99 M/UL
SODIUM SERPL-SCNC: 134 MMOL/L
WBC # BLD AUTO: 6.92 K/UL

## 2019-01-15 PROCEDURE — 85025 COMPLETE CBC W/AUTO DIFF WBC: CPT

## 2019-01-15 PROCEDURE — 83735 ASSAY OF MAGNESIUM: CPT

## 2019-01-15 PROCEDURE — 84100 ASSAY OF PHOSPHORUS: CPT

## 2019-01-15 PROCEDURE — 20600001 HC STEP DOWN PRIVATE ROOM

## 2019-01-15 PROCEDURE — 25000003 PHARM REV CODE 250: Performed by: NURSE PRACTITIONER

## 2019-01-15 PROCEDURE — 25000003 PHARM REV CODE 250: Performed by: STUDENT IN AN ORGANIZED HEALTH CARE EDUCATION/TRAINING PROGRAM

## 2019-01-15 PROCEDURE — 36415 COLL VENOUS BLD VENIPUNCTURE: CPT

## 2019-01-15 PROCEDURE — 63600175 PHARM REV CODE 636 W HCPCS: Performed by: STUDENT IN AN ORGANIZED HEALTH CARE EDUCATION/TRAINING PROGRAM

## 2019-01-15 PROCEDURE — 80048 BASIC METABOLIC PNL TOTAL CA: CPT

## 2019-01-15 RX ORDER — SODIUM CHLORIDE 9 MG/ML
INJECTION, SOLUTION INTRAVENOUS CONTINUOUS
Status: DISCONTINUED | OUTPATIENT
Start: 2019-01-15 | End: 2019-01-20 | Stop reason: HOSPADM

## 2019-01-15 RX ADMIN — OXYCODONE HYDROCHLORIDE 10 MG: 10 TABLET ORAL at 06:01

## 2019-01-15 RX ADMIN — OXYCODONE HYDROCHLORIDE 10 MG: 10 TABLET ORAL at 12:01

## 2019-01-15 RX ADMIN — HYDROMORPHONE HYDROCHLORIDE 0.5 MG: 1 INJECTION, SOLUTION INTRAMUSCULAR; INTRAVENOUS; SUBCUTANEOUS at 04:01

## 2019-01-15 RX ADMIN — HYDROMORPHONE HYDROCHLORIDE 0.5 MG: 1 INJECTION, SOLUTION INTRAMUSCULAR; INTRAVENOUS; SUBCUTANEOUS at 10:01

## 2019-01-15 RX ADMIN — LAMIVUDINE 100 MG: 100 TABLET, FILM COATED ORAL at 08:01

## 2019-01-15 RX ADMIN — SODIUM CHLORIDE: 0.9 INJECTION, SOLUTION INTRAVENOUS at 08:01

## 2019-01-15 RX ADMIN — ENOXAPARIN SODIUM 40 MG: 100 INJECTION SUBCUTANEOUS at 08:01

## 2019-01-15 NOTE — PLAN OF CARE
Problem: Adult Inpatient Plan of Care  Goal: Plan of Care Review  -Pt free from fall or injury so far this shift. Instructed to call if assistance needed, verbalized understanding.   -Mg 1.2 IV replacement ordered, k 3.7, PO replacement ordered, awaiting orders to be verified.   -Still no BM, and no passing gas. Inquired with Dr. Medina about starting a bowel regimen, says to early to start oral meds, post ileostomy reversal . Pt encouraged to walk.   -Pain moderately controlled with PRN Oxycodone and Dilaudid. Pt requesting pain meds be given around the clock.   -Afebrile. Hand hygiene reinforced. Daily labs monitored.

## 2019-01-15 NOTE — PLAN OF CARE
Problem: Adult Inpatient Plan of Care  Goal: Plan of Care Review  Plan of care reviewed with patient who verbalized understanding.  AAO.  Ambulating independently.  IVF infusing.  Tolerating clears, no n/v.  No flatus / BM.  Pain managed with prn medication.  Call bell remains within reach.  Bed in lowest position.  Frequent rounds made for pt safety.  Patient remains free of any new or additional falls/injury at this time.  VSS on RA, will continue to monitor.

## 2019-01-15 NOTE — SUBJECTIVE & OBJECTIVE
Subjective:     Interval History: No acute events, no episodes of emesis, but continuing to await passage of flatus, reporting bubbling and belching, no emesis, continuing on a clear liquid diet this morning.      Post-Op Info:  Procedure(s) (LRB):  CLOSURE,ILEOSTOMY (N/A)   4 Days Post-Op      Medications:  Continuous Infusions:  Scheduled Meds:   enoxaparin  40 mg Subcutaneous Daily    lamiVUDine  100 mg Oral Daily    mupirocin  1 g Nasal BID     PRN Meds:   acetaminophen    HYDROmorphone    naloxone    ondansetron    oxyCODONE    oxyCODONE        Objective:     Vital Signs (Most Recent):  Temp: 98.5 °F (36.9 °C) (01/15/19 0417)  Pulse: 98 (01/15/19 0417)  Resp: 16 (01/15/19 0417)  BP: (!) 134/92 (01/15/19 0417)  SpO2: (!) 94 % (01/15/19 0417) Vital Signs (24h Range):  Temp:  [98.3 °F (36.8 °C)-99.1 °F (37.3 °C)] 98.5 °F (36.9 °C)  Pulse:  [] 98  Resp:  [14-17] 16  SpO2:  [94 %-97 %] 94 %  BP: (115-134)/(82-95) 134/92     Intake/Output - Last 3 Shifts       01/13 0700 - 01/14 0659 01/14 0700 - 01/15 0659    P.O. 650 1100    Total Intake(mL/kg) 650 (8.2) 1100 (13.9)    Net +650 +1100          Urine Occurrence 3 x 3 x    Stool Occurrence 0 x 0 x    Emesis Occurrence  0 x          Physical Exam   Constitutional: He is oriented to person, place, and time. He appears well-developed and well-nourished.   Eyes: EOM are normal.   Cardiovascular: Normal rate.   Pulmonary/Chest: Effort normal.   On room air with no overt accessory muscle use   Abdominal: There is no rebound.   Diffusely tympanic and distended, no rebound, former ileostomy site c/d/i dressing   Neurological: He is alert and oriented to person, place, and time.   Skin: Skin is warm and dry.         Significant Labs:  BMP:   Recent Labs   Lab 01/14/19  0615   GLU 79   *   K 3.7   CL 97   CO2 21*   BUN 8   CREATININE 0.8   CALCIUM 8.7   MG 1.2*     CBC:   Recent Labs   Lab 01/14/19  0615   WBC 7.57   RBC 4.17*   HGB 13.0*   HCT 39.4*       MCV 95   MCH 31.2*   MCHC 33.0       Significant Diagnostics:  I have reviewed all pertinent imaging results/findings within the past 24 hours.

## 2019-01-15 NOTE — PLAN OF CARE
Problem: Adult Inpatient Plan of Care  Goal: Plan of Care Review  Outcome: Ongoing (interventions implemented as appropriate)  POC reviewed with patient and mother who both verbalized understanding. AAOx4. VSS on room air. RLQ incision noted with gauze/tegaderm. Pain controlled with PRN medications per MAR. Tolerating clear liquids, denies any nausea. No BM or gas overnight. Up ad gloria to bathroom, voiding. BLE TEDS in place, refusing SCDS. Safety precautions maintained, call light within reach. Remains free from falls and injury. No acute events. No distress noted. Will continue to monitor. Mother at bedside.

## 2019-01-15 NOTE — NURSING TRANSFER
Nursing Transfer Note      1/15/2019     Transfer From: From transplant to 1045    Transfer via wheelchair    Transfer with patient terry    Transported by transport    Medicines sent: none    Chart send with patient: Yes    Notified: Mother at bedside    Patient reassessed at: 01/14/19 2320    Upon arrival to floor: patient oriented to room, call bell in reach and bed in lowest position, denies pain or nausea. Up ad gloria in room, VSS on room air. No needs at this time, will continue to monitor.

## 2019-01-15 NOTE — ASSESSMENT & PLAN NOTE
Chris Aguirre Jr. is a 53 y.o. male s/p CLOSURE,ILEOSTOMY (N/A) on 1/11/2019 recovering in stable condition on the floor.     - prn pain and nausea control - PO w/ IVBT   - maintain room air as tolerated  - HDS   - CLD  - Hct stable, f/u am labs for prn lyte replacement   - OOB, ICS, SCDs  Dispo: pending ROBF, PO pain control

## 2019-01-15 NOTE — PROGRESS NOTES
[facility]  Colorectal Surgery  Progress Note    Patient Name: Chris Aguirre Jr.  MRN: [id number]  Admission Date: 1/11/2019  Hospital Length of Stay: 4 days  Attending Physician: Mikal Nino MD    Subjective:     Interval History: No acute events, no episodes of emesis, but continuing to await passage of flatus, reporting bubbling and belching, no emesis, continuing on a clear liquid diet this morning.      Post-Op Info:  Procedure(s) (LRB):  CLOSURE,ILEOSTOMY (N/A)   4 Days Post-Op      Medications:  Continuous Infusions:  Scheduled Meds:   enoxaparin  40 mg Subcutaneous Daily    lamiVUDine  100 mg Oral Daily    mupirocin  1 g Nasal BID     PRN Meds:   acetaminophen    HYDROmorphone    naloxone    ondansetron    oxyCODONE    oxyCODONE        Objective:     Vital Signs (Most Recent):  Temp: 98.5 °F (36.9 °C) (01/15/19 0417)  Pulse: 98 (01/15/19 0417)  Resp: 16 (01/15/19 0417)  BP: (!) 134/92 (01/15/19 0417)  SpO2: (!) 94 % (01/15/19 0417) Vital Signs (24h Range):  Temp:  [98.3 °F (36.8 °C)-99.1 °F (37.3 °C)] 98.5 °F (36.9 °C)  Pulse:  [] 98  Resp:  [14-17] 16  SpO2:  [94 %-97 %] 94 %  BP: (115-134)/(82-95) 134/92     Intake/Output - Last 3 Shifts       01/13 0700 - 01/14 0659 01/14 0700 - 01/15 0659    P.O. 650 1100    Total Intake(mL/kg) 650 (8.2) 1100 (13.9)    Net +650 +1100          Urine Occurrence 3 x 3 x    Stool Occurrence 0 x 0 x    Emesis Occurrence  0 x          Physical Exam   Constitutional: He is oriented to person, place, and time. He appears well-developed and well-nourished.   Eyes: EOM are normal.   Cardiovascular: Normal rate.   Pulmonary/Chest: Effort normal.   On room air with no overt accessory muscle use   Abdominal: There is no rebound.   Diffusely tympanic and distended, no rebound, former ileostomy site c/d/i dressing   Neurological: He is alert and oriented to person, place, and time.   Skin: Skin is warm and dry.         Significant Labs:  BMP:    Recent Labs   Lab 01/14/19  0615   GLU 79   *   K 3.7   CL 97   CO2 21*   BUN 8   CREATININE 0.8   CALCIUM 8.7   MG 1.2*     CBC:   Recent Labs   Lab 01/14/19  0615   WBC 7.57   RBC 4.17*   HGB 13.0*   HCT 39.4*      MCV 95   MCH 31.2*   MCHC 33.0       Significant Diagnostics:  I have reviewed all pertinent imaging results/findings within the past 24 hours.    Assessment/Plan:     Status post reversal of ileostomy    Chris Aguirre  is a 53 y.o. male s/p CLOSURE,ILEOSTOMY (N/A) on 1/11/2019 recovering in stable condition on the floor.     - prn pain and nausea control - PO w/ IVBT   - maintain room air as tolerated  - HDS   - CLD  - Hct stable, f/u am labs for prn lyte replacement   - OOB, ICS, SCDs  Dispo: pending ROBF, PO pain control              Melva Medina MD  Colorectal Surgery  [facility]

## 2019-01-15 NOTE — PLAN OF CARE
SW following for d/c needs. Pt expected to d/c home with no needs.          Zaynab Macias, MSW, LCSW  [facility]  [id number]

## 2019-01-16 LAB
ANION GAP SERPL CALC-SCNC: 10 MMOL/L
BASOPHILS # BLD AUTO: 0.02 K/UL
BASOPHILS NFR BLD: 0.4 %
BUN SERPL-MCNC: 4 MG/DL
CALCIUM SERPL-MCNC: 8.9 MG/DL
CHLORIDE SERPL-SCNC: 99 MMOL/L
CO2 SERPL-SCNC: 27 MMOL/L
CREAT SERPL-MCNC: 0.7 MG/DL
DIFFERENTIAL METHOD: ABNORMAL
EOSINOPHIL # BLD AUTO: 0.3 K/UL
EOSINOPHIL NFR BLD: 5 %
ERYTHROCYTE [DISTWIDTH] IN BLOOD BY AUTOMATED COUNT: 14 %
EST. GFR  (AFRICAN AMERICAN): >60 ML/MIN/1.73 M^2
EST. GFR  (NON AFRICAN AMERICAN): >60 ML/MIN/1.73 M^2
GLUCOSE SERPL-MCNC: 93 MG/DL
HCT VFR BLD AUTO: 33.9 %
HGB BLD-MCNC: 11.4 G/DL
IMM GRANULOCYTES # BLD AUTO: 0.02 K/UL
IMM GRANULOCYTES NFR BLD AUTO: 0.4 %
LYMPHOCYTES # BLD AUTO: 1.8 K/UL
LYMPHOCYTES NFR BLD: 34.2 %
MAGNESIUM SERPL-MCNC: 1.3 MG/DL
MCH RBC QN AUTO: 31.8 PG
MCHC RBC AUTO-ENTMCNC: 33.6 G/DL
MCV RBC AUTO: 94 FL
MONOCYTES # BLD AUTO: 0.6 K/UL
MONOCYTES NFR BLD: 11.4 %
NEUTROPHILS # BLD AUTO: 2.5 K/UL
NEUTROPHILS NFR BLD: 48.6 %
NRBC BLD-RTO: 0 /100 WBC
PHOSPHATE SERPL-MCNC: 3.8 MG/DL
PLATELET # BLD AUTO: 245 K/UL
PMV BLD AUTO: 9.8 FL
POTASSIUM SERPL-SCNC: 3.9 MMOL/L
RBC # BLD AUTO: 3.59 M/UL
SODIUM SERPL-SCNC: 136 MMOL/L
WBC # BLD AUTO: 5.18 K/UL

## 2019-01-16 PROCEDURE — 84100 ASSAY OF PHOSPHORUS: CPT

## 2019-01-16 PROCEDURE — 63600175 PHARM REV CODE 636 W HCPCS: Performed by: NURSE PRACTITIONER

## 2019-01-16 PROCEDURE — 25000003 PHARM REV CODE 250: Performed by: STUDENT IN AN ORGANIZED HEALTH CARE EDUCATION/TRAINING PROGRAM

## 2019-01-16 PROCEDURE — 85025 COMPLETE CBC W/AUTO DIFF WBC: CPT

## 2019-01-16 PROCEDURE — 83735 ASSAY OF MAGNESIUM: CPT

## 2019-01-16 PROCEDURE — 25000003 PHARM REV CODE 250: Performed by: NURSE PRACTITIONER

## 2019-01-16 PROCEDURE — 36415 COLL VENOUS BLD VENIPUNCTURE: CPT

## 2019-01-16 PROCEDURE — 63600175 PHARM REV CODE 636 W HCPCS: Performed by: STUDENT IN AN ORGANIZED HEALTH CARE EDUCATION/TRAINING PROGRAM

## 2019-01-16 PROCEDURE — 20600001 HC STEP DOWN PRIVATE ROOM

## 2019-01-16 PROCEDURE — 80048 BASIC METABOLIC PNL TOTAL CA: CPT

## 2019-01-16 RX ORDER — BISACODYL 10 MG
10 SUPPOSITORY, RECTAL RECTAL ONCE
Status: COMPLETED | OUTPATIENT
Start: 2019-01-16 | End: 2019-01-16

## 2019-01-16 RX ADMIN — MAGNESIUM SULFATE HEPTAHYDRATE 1 G: 500 INJECTION, SOLUTION INTRAMUSCULAR; INTRAVENOUS at 11:01

## 2019-01-16 RX ADMIN — OXYCODONE HYDROCHLORIDE 10 MG: 10 TABLET ORAL at 01:01

## 2019-01-16 RX ADMIN — LAMIVUDINE 100 MG: 100 TABLET, FILM COATED ORAL at 08:01

## 2019-01-16 RX ADMIN — HYDROMORPHONE HYDROCHLORIDE 0.5 MG: 1 INJECTION, SOLUTION INTRAMUSCULAR; INTRAVENOUS; SUBCUTANEOUS at 11:01

## 2019-01-16 RX ADMIN — OXYCODONE HYDROCHLORIDE 10 MG: 10 TABLET ORAL at 08:01

## 2019-01-16 RX ADMIN — OXYCODONE HYDROCHLORIDE 10 MG: 10 TABLET ORAL at 07:01

## 2019-01-16 RX ADMIN — BISACODYL 10 MG: 10 SUPPOSITORY RECTAL at 05:01

## 2019-01-16 RX ADMIN — ENOXAPARIN SODIUM 40 MG: 100 INJECTION SUBCUTANEOUS at 08:01

## 2019-01-16 RX ADMIN — HYDROMORPHONE HYDROCHLORIDE 0.5 MG: 1 INJECTION, SOLUTION INTRAMUSCULAR; INTRAVENOUS; SUBCUTANEOUS at 05:01

## 2019-01-16 RX ADMIN — HYDROMORPHONE HYDROCHLORIDE 0.5 MG: 1 INJECTION, SOLUTION INTRAMUSCULAR; INTRAVENOUS; SUBCUTANEOUS at 04:01

## 2019-01-16 NOTE — PROGRESS NOTES
[facility]  Colorectal Surgery  Progress Note    Patient Name: Chris Agurire Jr.  MRN: [id number]  Admission Date: 1/11/2019  Hospital Length of Stay: 5 days  Attending Physician: Mikal Nino MD    Subjective:     Interval History: still awaiting bowel function. No n/v. Tolerating some clears.    Post-Op Info:  Procedure(s) (LRB):  CLOSURE,ILEOSTOMY (N/A)   5 Days Post-Op      Medications:  Continuous Infusions:   sodium chloride 0.9% 50 mL/hr at 01/15/19 0845     Scheduled Meds:   enoxaparin  40 mg Subcutaneous Daily    lamiVUDine  100 mg Oral Daily    mupirocin  1 g Nasal BID     PRN Meds:   acetaminophen    HYDROmorphone    naloxone    ondansetron    oxyCODONE    oxyCODONE        Objective:     Vital Signs (Most Recent):  Temp: 98.3 °F (36.8 °C) (01/16/19 1304)  Pulse: 81 (01/16/19 1304)  Resp: 18 (01/16/19 1304)  BP: (!) 151/95 (01/16/19 1304)  SpO2: (!) 93 % (01/16/19 1304) Vital Signs (24h Range):  Temp:  [98 °F (36.7 °C)-99.2 °F (37.3 °C)] 98.3 °F (36.8 °C)  Pulse:  [] 81  Resp:  [16-20] 18  SpO2:  [92 %-95 %] 93 %  BP: (130-156)/(91-97) 151/95     Intake/Output - Last 3 Shifts       01/14 0700 - 01/15 0659 01/15 0700 - 01/16 0659 01/16 0700 - 01/17 0659    P.O. 1100  340    I.V. (mL/kg)  162.5 (2.1) 1171.7 (14.8)    Total Intake(mL/kg) 1100 (13.9) 162.5 (2.1) 1511.7 (19.1)    Urine (mL/kg/hr) 150 (0.1) 700 (0.4)     Emesis/NG output 0      Stool 0      Total Output 150 700     Net +950 -537.5 +1511.7           Urine Occurrence 3 x 3 x 2 x    Stool Occurrence 0 x 0 x 0 x    Emesis Occurrence 0 x 0 x 0 x          Physical Exam   Constitutional: He is oriented to person, place, and time. He appears well-developed and well-nourished.   Eyes: EOM are normal.   Cardiovascular: Normal rate.   Pulmonary/Chest: Effort normal.   On room air with no overt accessory muscle use   Abdominal: There is no rebound.   Diffusely tympanic and distended, no rebound, former ileostomy site  c/d/i dressing   Neurological: He is alert and oriented to person, place, and time.   Skin: Skin is warm and dry.     Significant Labs:  BMP (Last 3 Results):   Recent Labs   Lab 01/14/19  0615 01/15/19  0600 01/16/19  0537   GLU 79 92 93   * 134* 136   K 3.7 4.1 3.9   CL 97 97 99   CO2 21* 28 27   BUN 8 5* 4*   CREATININE 0.8 0.7 0.7   CALCIUM 8.7 9.2 8.9   MG 1.2* 1.2* 1.3*     CBC (Last 3 Results):   Recent Labs   Lab 01/14/19  0615 01/15/19  0600 01/16/19  0537   WBC 7.57 6.92 5.18   RBC 4.17* 3.99* 3.59*   HGB 13.0* 12.4* 11.4*   HCT 39.4* 37.7* 33.9*    241 245   MCV 95 95 94   MCH 31.2* 31.1* 31.8*   MCHC 33.0 32.9 33.6       Significant Diagnostics:  I have reviewed all pertinent imaging results/findings within the past 24 hours.    Assessment/Plan:     * Status post reversal of ileostomy    Chris Aguirre Jr. is a 53 y.o. male s/p CLOSURE,ILEOSTOMY (N/A) on 1/11/2019 recovering in stable condition on the floor.     - prn pain and nausea control - PO w/ IVBT   - maintain room air as tolerated  - HDS   - CLD  - Check KUB, possible suppository today    Dispo: pending ROBF, PO pain control              Zack Sampson MD  Colorectal Surgery  [facility]

## 2019-01-16 NOTE — ASSESSMENT & PLAN NOTE
Chris Aguirre Jr. is a 53 y.o. male s/p CLOSURE,ILEOSTOMY (N/A) on 1/11/2019 recovering in stable condition on the floor.     - prn pain and nausea control - PO w/ IVBT   - maintain room air as tolerated  - HDS   - CLD  - Check KUB, possible suppository today    Dispo: pending ROBF, PO pain control

## 2019-01-16 NOTE — PLAN OF CARE
Problem: Adult Inpatient Plan of Care  Goal: Plan of Care Review  Outcome: Ongoing (interventions implemented as appropriate)  POC reviewed w/ pt, verbalized understanding. Pt AAOx4. VSS on RA. Pain managed with PRN pain meds. Pt voids per urinal, with adequate UOP overnight. Pt tolerating clear liquid diet with no c/o nausea. Pt slept between care. Frequent rounds made for pt safety. Call light in reach and bed in lowest position. WCTM

## 2019-01-16 NOTE — SUBJECTIVE & OBJECTIVE
Subjective:     Interval History: still awaiting bowel function. No n/v. Tolerating some clears.    Post-Op Info:  Procedure(s) (LRB):  CLOSURE,ILEOSTOMY (N/A)   5 Days Post-Op      Medications:  Continuous Infusions:   sodium chloride 0.9% 50 mL/hr at 01/15/19 0845     Scheduled Meds:   enoxaparin  40 mg Subcutaneous Daily    lamiVUDine  100 mg Oral Daily    mupirocin  1 g Nasal BID     PRN Meds:   acetaminophen    HYDROmorphone    naloxone    ondansetron    oxyCODONE    oxyCODONE        Objective:     Vital Signs (Most Recent):  Temp: 98.3 °F (36.8 °C) (01/16/19 1304)  Pulse: 81 (01/16/19 1304)  Resp: 18 (01/16/19 1304)  BP: (!) 151/95 (01/16/19 1304)  SpO2: (!) 93 % (01/16/19 1304) Vital Signs (24h Range):  Temp:  [98 °F (36.7 °C)-99.2 °F (37.3 °C)] 98.3 °F (36.8 °C)  Pulse:  [] 81  Resp:  [16-20] 18  SpO2:  [92 %-95 %] 93 %  BP: (130-156)/(91-97) 151/95     Intake/Output - Last 3 Shifts       01/14 0700 - 01/15 0659 01/15 0700 - 01/16 0659 01/16 0700 - 01/17 0659    P.O. 1100  340    I.V. (mL/kg)  162.5 (2.1) 1171.7 (14.8)    Total Intake(mL/kg) 1100 (13.9) 162.5 (2.1) 1511.7 (19.1)    Urine (mL/kg/hr) 150 (0.1) 700 (0.4)     Emesis/NG output 0      Stool 0      Total Output 150 700     Net +950 -537.5 +1511.7           Urine Occurrence 3 x 3 x 2 x    Stool Occurrence 0 x 0 x 0 x    Emesis Occurrence 0 x 0 x 0 x          Physical Exam   Constitutional: He is oriented to person, place, and time. He appears well-developed and well-nourished.   Eyes: EOM are normal.   Cardiovascular: Normal rate.   Pulmonary/Chest: Effort normal.   On room air with no overt accessory muscle use   Abdominal: There is no rebound.   Diffusely tympanic and distended, no rebound, former ileostomy site c/d/i dressing   Neurological: He is alert and oriented to person, place, and time.   Skin: Skin is warm and dry.     Significant Labs:  BMP (Last 3 Results):   Recent Labs   Lab 01/14/19  0615 01/15/19  0600  01/16/19  0537   GLU 79 92 93   * 134* 136   K 3.7 4.1 3.9   CL 97 97 99   CO2 21* 28 27   BUN 8 5* 4*   CREATININE 0.8 0.7 0.7   CALCIUM 8.7 9.2 8.9   MG 1.2* 1.2* 1.3*     CBC (Last 3 Results):   Recent Labs   Lab 01/14/19  0615 01/15/19  0600 01/16/19  0537   WBC 7.57 6.92 5.18   RBC 4.17* 3.99* 3.59*   HGB 13.0* 12.4* 11.4*   HCT 39.4* 37.7* 33.9*    241 245   MCV 95 95 94   MCH 31.2* 31.1* 31.8*   MCHC 33.0 32.9 33.6       Significant Diagnostics:  I have reviewed all pertinent imaging results/findings within the past 24 hours.

## 2019-01-17 LAB
ANION GAP SERPL CALC-SCNC: 11 MMOL/L
BASOPHILS # BLD AUTO: 0.02 K/UL
BASOPHILS NFR BLD: 0.4 %
BUN SERPL-MCNC: 3 MG/DL
CALCIUM SERPL-MCNC: 8.9 MG/DL
CHLORIDE SERPL-SCNC: 99 MMOL/L
CO2 SERPL-SCNC: 26 MMOL/L
CREAT SERPL-MCNC: 0.7 MG/DL
DIFFERENTIAL METHOD: ABNORMAL
EOSINOPHIL # BLD AUTO: 0.2 K/UL
EOSINOPHIL NFR BLD: 2.7 %
ERYTHROCYTE [DISTWIDTH] IN BLOOD BY AUTOMATED COUNT: 14 %
EST. GFR  (AFRICAN AMERICAN): >60 ML/MIN/1.73 M^2
EST. GFR  (NON AFRICAN AMERICAN): >60 ML/MIN/1.73 M^2
GLUCOSE SERPL-MCNC: 89 MG/DL
HCT VFR BLD AUTO: 33.4 %
HGB BLD-MCNC: 11.2 G/DL
IMM GRANULOCYTES # BLD AUTO: 0.01 K/UL
IMM GRANULOCYTES NFR BLD AUTO: 0.2 %
LYMPHOCYTES # BLD AUTO: 1.5 K/UL
LYMPHOCYTES NFR BLD: 27.5 %
MAGNESIUM SERPL-MCNC: 1.4 MG/DL
MCH RBC QN AUTO: 31.5 PG
MCHC RBC AUTO-ENTMCNC: 33.5 G/DL
MCV RBC AUTO: 94 FL
MONOCYTES # BLD AUTO: 0.6 K/UL
MONOCYTES NFR BLD: 10.3 %
NEUTROPHILS # BLD AUTO: 3.3 K/UL
NEUTROPHILS NFR BLD: 58.9 %
NRBC BLD-RTO: 0 /100 WBC
PHOSPHATE SERPL-MCNC: 4 MG/DL
PLATELET # BLD AUTO: 263 K/UL
PMV BLD AUTO: 9.9 FL
POTASSIUM SERPL-SCNC: 3.5 MMOL/L
RBC # BLD AUTO: 3.55 M/UL
SODIUM SERPL-SCNC: 136 MMOL/L
WBC # BLD AUTO: 5.56 K/UL

## 2019-01-17 PROCEDURE — 25000003 PHARM REV CODE 250: Performed by: NURSE PRACTITIONER

## 2019-01-17 PROCEDURE — 20600001 HC STEP DOWN PRIVATE ROOM

## 2019-01-17 PROCEDURE — 85025 COMPLETE CBC W/AUTO DIFF WBC: CPT

## 2019-01-17 PROCEDURE — 25000003 PHARM REV CODE 250: Performed by: STUDENT IN AN ORGANIZED HEALTH CARE EDUCATION/TRAINING PROGRAM

## 2019-01-17 PROCEDURE — 63600175 PHARM REV CODE 636 W HCPCS: Performed by: STUDENT IN AN ORGANIZED HEALTH CARE EDUCATION/TRAINING PROGRAM

## 2019-01-17 PROCEDURE — 84100 ASSAY OF PHOSPHORUS: CPT

## 2019-01-17 PROCEDURE — 83735 ASSAY OF MAGNESIUM: CPT

## 2019-01-17 PROCEDURE — 36415 COLL VENOUS BLD VENIPUNCTURE: CPT

## 2019-01-17 PROCEDURE — 80048 BASIC METABOLIC PNL TOTAL CA: CPT

## 2019-01-17 RX ADMIN — OXYCODONE HYDROCHLORIDE 10 MG: 10 TABLET ORAL at 04:01

## 2019-01-17 RX ADMIN — OXYCODONE HYDROCHLORIDE 10 MG: 10 TABLET ORAL at 10:01

## 2019-01-17 RX ADMIN — ENOXAPARIN SODIUM 40 MG: 100 INJECTION SUBCUTANEOUS at 08:01

## 2019-01-17 RX ADMIN — HYDROMORPHONE HYDROCHLORIDE 0.5 MG: 1 INJECTION, SOLUTION INTRAMUSCULAR; INTRAVENOUS; SUBCUTANEOUS at 07:01

## 2019-01-17 RX ADMIN — HYDROMORPHONE HYDROCHLORIDE 0.5 MG: 1 INJECTION, SOLUTION INTRAMUSCULAR; INTRAVENOUS; SUBCUTANEOUS at 06:01

## 2019-01-17 RX ADMIN — OXYCODONE HYDROCHLORIDE 10 MG: 10 TABLET ORAL at 08:01

## 2019-01-17 RX ADMIN — OXYCODONE HYDROCHLORIDE 10 MG: 10 TABLET ORAL at 02:01

## 2019-01-17 RX ADMIN — SODIUM CHLORIDE: 0.9 INJECTION, SOLUTION INTRAVENOUS at 02:01

## 2019-01-17 RX ADMIN — LAMIVUDINE 100 MG: 100 TABLET, FILM COATED ORAL at 08:01

## 2019-01-17 RX ADMIN — HYDROMORPHONE HYDROCHLORIDE 0.5 MG: 1 INJECTION, SOLUTION INTRAMUSCULAR; INTRAVENOUS; SUBCUTANEOUS at 01:01

## 2019-01-17 NOTE — PLAN OF CARE
01/17/19 1707   Discharge Reassessment   Assessment Type Discharge Planning Reassessment   Discharge Plan A Home;Home with family   Discharge Plan B Home;Home Health;Home with family   DME Needed Upon Discharge  none   Anticipated Discharge Disposition Home   Can the patient answer the patient profile reliably? Yes, cognitively intact   How does the patient rate their overall health at the present time? Good   Describe the patient's ability to walk at the present time. No restrictions   How often would a person be available to care for the patient? Often   Number of comorbid conditions (as recorded on the chart) Two

## 2019-01-17 NOTE — PLAN OF CARE
SW following for d/c needs. Post acute needs to be determined.          Zaynab Macias, MSW, [facility]  [facility]  [id number]

## 2019-01-17 NOTE — PLAN OF CARE
Problem: Adult Inpatient Plan of Care  Goal: Plan of Care Review  Outcome: Ongoing (interventions implemented as appropriate)  Plan of care reviewed with patient and mother. Patient and mother verbalized understanding. Patient is oriented x4. Patient RLQ surgical site dressing remained intact. Patient IVF remained continuous throughout shift. Patient tolerated clear liquid diet. Patient had 3 bowel movements. Patient voided in urinal. Patient received prn pain medication per mar. Patient denied nausea. Patient ambulated adlib. Patient remained free of any falls. VSS, Will continue to monitor.

## 2019-01-17 NOTE — PLAN OF CARE
Problem: Adult Inpatient Plan of Care  Goal: Plan of Care Review  Plan of care reviewed with pt/family verbalized understanding, vss, patient on clear liquid diet tolerating well with no c/o of nausea/vomting, patient ambulatory in hallways with family, continent bowel/bladder, pain controlled with prn pain medications, call-light within reach, will continue to monitor.

## 2019-01-17 NOTE — PROGRESS NOTES
[facility]  [facility] Note    Patient Name: Chris Aguirre Jr.  MRN: [id number]  Admission Date: 1/11/2019  Hospital Length of Stay: 6 days  Attending Physician: Mikal Nino MD    Subjective:     Interval History: dulcolax suppository given yesterday with 4bm + flatus. Says abdomen feels better. No n/v. Tolerating clears.    Post-Op Info:  Procedure(s) (LRB):  CLOSURE,ILEOSTOMY (N/A)   6 Days Post-Op      Medications:  Continuous Infusions:   sodium chloride 0.9% 50 mL/hr at 01/17/19 0253     Scheduled Meds:   enoxaparin  40 mg Subcutaneous Daily    lamiVUDine  100 mg Oral Daily     PRN Meds:   acetaminophen    HYDROmorphone    naloxone    ondansetron    oxyCODONE    oxyCODONE        Objective:     Vital Signs (Most Recent):  Temp: 98.6 °F (37 °C) (01/17/19 0415)  Pulse: 86 (01/17/19 0415)  Resp: 18 (01/17/19 0415)  BP: (!) 138/92 (01/17/19 0415)  SpO2: (!) 91 % (01/17/19 0415) Vital Signs (24h Range):  Temp:  [98.3 °F (36.8 °C)-98.9 °F (37.2 °C)] 98.6 °F (37 °C)  Pulse:  [81-96] 86  Resp:  [17-18] 18  SpO2:  [91 %-96 %] 91 %  BP: (137-173)/() 138/92     Intake/Output - Last 3 Shifts       01/15 0700 - 01/16 0659 01/16 0700 - 01/17 0659 01/17 0700 - 01/18 0659    P.O.  880     I.V. (mL/kg) 162.5 (2.1) 2050 (25.9)     Total Intake(mL/kg) 162.5 (2.1) 2930 (37)     Urine (mL/kg/hr) 700 (0.4) 1000 (0.5)     Emesis/NG output  0     Other  0     Stool  0     Total Output 700 1000     Net -537.5 +1930            Urine Occurrence 3 x 3 x     Stool Occurrence 0 x 4 x     Emesis Occurrence 0 x 0 x           Physical Exam   Constitutional: He is oriented to person, place, and time. He appears well-developed and well-nourished.   Eyes: EOM are normal.   Cardiovascular: Normal rate.   Pulmonary/Chest: Effort normal.   Abdominal: He exhibits distension (improved). There is tenderness. There is no rebound and no guarding.   Ileostomy site clean, dry   Neurological: He is  alert and oriented to person, place, and time.   Skin: Skin is warm and dry.     Significant Labs:  BMP (Last 3 Results):   Recent Labs   Lab 01/15/19  0600 01/16/19  0537 01/17/19  0413   GLU 92 93 89   * 136 136   K 4.1 3.9 3.5   CL 97 99 99   CO2 28 27 26   BUN 5* 4* 3*   CREATININE 0.7 0.7 0.7   CALCIUM 9.2 8.9 8.9   MG 1.2* 1.3* 1.4*     CBC (Last 3 Results):   Recent Labs   Lab 01/15/19  0600 01/16/19  0537 01/17/19  0413   WBC 6.92 5.18 5.56   RBC 3.99* 3.59* 3.55*   HGB 12.4* 11.4* 11.2*   HCT 37.7* 33.9* 33.4*    245 263   MCV 95 94 94   MCH 31.1* 31.8* 31.5*   MCHC 32.9 33.6 33.5       Significant Diagnostics:  I have reviewed all pertinent imaging results/findings within the past 24 hours.    Assessment/Plan:     * Status post reversal of ileostomy    Chris Aguirre Jr. is a 53 y.o. male s/p CLOSURE,ILEOSTOMY (N/A) on 1/11/2019 recovering in stable condition on the floor.     Clears for now  Check repeat KUB, if significantly improved can advance, otherwise will keep on clears for now  Pain control as needed  OOB/ambulate  Home meds  DVT ppx    Dispo: pending ROBF, PO pain control              Zack Sampson MD  Colorectal Surgery  [facility]

## 2019-01-17 NOTE — PLAN OF CARE
Problem: Adult Inpatient Plan of Care  Goal: Plan of Care Review  Plan of care reviewed with patient who verbalized understanding.  Ambulates independently.  Pt went to XRAY today, suppository given afterwards per order.  No flatus/BM.  Pain well managed with prn medication.  No nausea.  Call bell remains within reach.  Bed in lowest position.  Frequent rounds made for pt safety.  Patient remains free of any new or additional falls/injury at this time. VSS, will continue to monitor.

## 2019-01-17 NOTE — ASSESSMENT & PLAN NOTE
Chris Aguirre Jr. is a 53 y.o. male s/p CLOSURE,ILEOSTOMY (N/A) on 1/11/2019 recovering in stable condition on the floor.     Clears for now  Check repeat KUB, if significantly improved can advance, otherwise will keep on clears for now  Pain control as needed  OOB/ambulate  Home meds  DVT ppx    Dispo: pending ROBF, PO pain control

## 2019-01-17 NOTE — SUBJECTIVE & OBJECTIVE
Subjective:     Interval History: dulcolax suppository given yesterday with 4bm + flatus. Says abdomen feels better. No n/v. Tolerating clears.    Post-Op Info:  Procedure(s) (LRB):  CLOSURE,ILEOSTOMY (N/A)   6 Days Post-Op      Medications:  Continuous Infusions:   sodium chloride 0.9% 50 mL/hr at 01/17/19 0253     Scheduled Meds:   enoxaparin  40 mg Subcutaneous Daily    lamiVUDine  100 mg Oral Daily     PRN Meds:   acetaminophen    HYDROmorphone    naloxone    ondansetron    oxyCODONE    oxyCODONE        Objective:     Vital Signs (Most Recent):  Temp: 98.6 °F (37 °C) (01/17/19 0415)  Pulse: 86 (01/17/19 0415)  Resp: 18 (01/17/19 0415)  BP: (!) 138/92 (01/17/19 0415)  SpO2: (!) 91 % (01/17/19 0415) Vital Signs (24h Range):  Temp:  [98.3 °F (36.8 °C)-98.9 °F (37.2 °C)] 98.6 °F (37 °C)  Pulse:  [81-96] 86  Resp:  [17-18] 18  SpO2:  [91 %-96 %] 91 %  BP: (137-173)/() 138/92     Intake/Output - Last 3 Shifts       01/15 0700 - 01/16 0659 01/16 0700 - 01/17 0659 01/17 0700 - 01/18 0659    P.O.  880     I.V. (mL/kg) 162.5 (2.1) 2050 (25.9)     Total Intake(mL/kg) 162.5 (2.1) 2930 (37)     Urine (mL/kg/hr) 700 (0.4) 1000 (0.5)     Emesis/NG output  0     Other  0     Stool  0     Total Output 700 1000     Net -537.5 +1930            Urine Occurrence 3 x 3 x     Stool Occurrence 0 x 4 x     Emesis Occurrence 0 x 0 x           Physical Exam   Constitutional: He is oriented to person, place, and time. He appears well-developed and well-nourished.   Eyes: EOM are normal.   Cardiovascular: Normal rate.   Pulmonary/Chest: Effort normal.   Abdominal: He exhibits distension (improved). There is tenderness. There is no rebound and no guarding.   Ileostomy site clean, dry   Neurological: He is alert and oriented to person, place, and time.   Skin: Skin is warm and dry.     Significant Labs:  BMP (Last 3 Results):   Recent Labs   Lab 01/15/19  0600 01/16/19  0537 01/17/19  0413   GLU 92 93 89   * 136 136   K  4.1 3.9 3.5   CL 97 99 99   CO2 28 27 26   BUN 5* 4* 3*   CREATININE 0.7 0.7 0.7   CALCIUM 9.2 8.9 8.9   MG 1.2* 1.3* 1.4*     CBC (Last 3 Results):   Recent Labs   Lab 01/15/19  0600 01/16/19  0537 01/17/19  0413   WBC 6.92 5.18 5.56   RBC 3.99* 3.59* 3.55*   HGB 12.4* 11.4* 11.2*   HCT 37.7* 33.9* 33.4*    245 263   MCV 95 94 94   MCH 31.1* 31.8* 31.5*   MCHC 32.9 33.6 33.5       Significant Diagnostics:  I have reviewed all pertinent imaging results/findings within the past 24 hours.

## 2019-01-18 LAB
ANION GAP SERPL CALC-SCNC: 12 MMOL/L
BASOPHILS # BLD AUTO: 0.03 K/UL
BASOPHILS NFR BLD: 0.5 %
BUN SERPL-MCNC: 3 MG/DL
CALCIUM SERPL-MCNC: 9 MG/DL
CHLORIDE SERPL-SCNC: 99 MMOL/L
CO2 SERPL-SCNC: 26 MMOL/L
CREAT SERPL-MCNC: 0.7 MG/DL
DIFFERENTIAL METHOD: ABNORMAL
EOSINOPHIL # BLD AUTO: 0.2 K/UL
EOSINOPHIL NFR BLD: 3.5 %
ERYTHROCYTE [DISTWIDTH] IN BLOOD BY AUTOMATED COUNT: 14 %
EST. GFR  (AFRICAN AMERICAN): >60 ML/MIN/1.73 M^2
EST. GFR  (NON AFRICAN AMERICAN): >60 ML/MIN/1.73 M^2
GLUCOSE SERPL-MCNC: 72 MG/DL
HCT VFR BLD AUTO: 35.3 %
HGB BLD-MCNC: 11.7 G/DL
IMM GRANULOCYTES # BLD AUTO: 0.01 K/UL
IMM GRANULOCYTES NFR BLD AUTO: 0.2 %
LYMPHOCYTES # BLD AUTO: 2.1 K/UL
LYMPHOCYTES NFR BLD: 38.8 %
MAGNESIUM SERPL-MCNC: 1.3 MG/DL
MCH RBC QN AUTO: 31.3 PG
MCHC RBC AUTO-ENTMCNC: 33.1 G/DL
MCV RBC AUTO: 94 FL
MONOCYTES # BLD AUTO: 0.6 K/UL
MONOCYTES NFR BLD: 11.7 %
NEUTROPHILS # BLD AUTO: 2.5 K/UL
NEUTROPHILS NFR BLD: 45.3 %
NRBC BLD-RTO: 0 /100 WBC
PHOSPHATE SERPL-MCNC: 3.8 MG/DL
PLATELET # BLD AUTO: 297 K/UL
PMV BLD AUTO: 10.2 FL
POTASSIUM SERPL-SCNC: 4 MMOL/L
RBC # BLD AUTO: 3.74 M/UL
SODIUM SERPL-SCNC: 137 MMOL/L
WBC # BLD AUTO: 5.47 K/UL

## 2019-01-18 PROCEDURE — 63600175 PHARM REV CODE 636 W HCPCS: Performed by: STUDENT IN AN ORGANIZED HEALTH CARE EDUCATION/TRAINING PROGRAM

## 2019-01-18 PROCEDURE — 85025 COMPLETE CBC W/AUTO DIFF WBC: CPT

## 2019-01-18 PROCEDURE — 36415 COLL VENOUS BLD VENIPUNCTURE: CPT

## 2019-01-18 PROCEDURE — 25000003 PHARM REV CODE 250: Performed by: STUDENT IN AN ORGANIZED HEALTH CARE EDUCATION/TRAINING PROGRAM

## 2019-01-18 PROCEDURE — 20600001 HC STEP DOWN PRIVATE ROOM

## 2019-01-18 PROCEDURE — 84100 ASSAY OF PHOSPHORUS: CPT

## 2019-01-18 PROCEDURE — 83735 ASSAY OF MAGNESIUM: CPT

## 2019-01-18 PROCEDURE — 80048 BASIC METABOLIC PNL TOTAL CA: CPT

## 2019-01-18 RX ORDER — MAGNESIUM SULFATE HEPTAHYDRATE 40 MG/ML
2 INJECTION, SOLUTION INTRAVENOUS
Status: COMPLETED | OUTPATIENT
Start: 2019-01-18 | End: 2019-01-18

## 2019-01-18 RX ORDER — HYDRALAZINE HYDROCHLORIDE 20 MG/ML
10 INJECTION INTRAMUSCULAR; INTRAVENOUS EVERY 6 HOURS PRN
Status: DISCONTINUED | OUTPATIENT
Start: 2019-01-18 | End: 2019-01-20 | Stop reason: HOSPADM

## 2019-01-18 RX ADMIN — LAMIVUDINE 100 MG: 100 TABLET, FILM COATED ORAL at 08:01

## 2019-01-18 RX ADMIN — ENOXAPARIN SODIUM 40 MG: 100 INJECTION SUBCUTANEOUS at 08:01

## 2019-01-18 RX ADMIN — OXYCODONE HYDROCHLORIDE 10 MG: 10 TABLET ORAL at 08:01

## 2019-01-18 RX ADMIN — MAGNESIUM SULFATE IN WATER 2 G: 40 INJECTION, SOLUTION INTRAVENOUS at 12:01

## 2019-01-18 RX ADMIN — HYDROMORPHONE HYDROCHLORIDE 0.5 MG: 1 INJECTION, SOLUTION INTRAMUSCULAR; INTRAVENOUS; SUBCUTANEOUS at 07:01

## 2019-01-18 RX ADMIN — HYDROMORPHONE HYDROCHLORIDE 0.5 MG: 1 INJECTION, SOLUTION INTRAMUSCULAR; INTRAVENOUS; SUBCUTANEOUS at 01:01

## 2019-01-18 RX ADMIN — HYDROMORPHONE HYDROCHLORIDE 0.5 MG: 1 INJECTION, SOLUTION INTRAMUSCULAR; INTRAVENOUS; SUBCUTANEOUS at 02:01

## 2019-01-18 RX ADMIN — HYDROMORPHONE HYDROCHLORIDE 0.5 MG: 1 INJECTION, SOLUTION INTRAMUSCULAR; INTRAVENOUS; SUBCUTANEOUS at 08:01

## 2019-01-18 RX ADMIN — MAGNESIUM SULFATE IN WATER 2 G: 40 INJECTION, SOLUTION INTRAVENOUS at 10:01

## 2019-01-18 RX ADMIN — OXYCODONE HYDROCHLORIDE 10 MG: 10 TABLET ORAL at 11:01

## 2019-01-18 RX ADMIN — OXYCODONE HYDROCHLORIDE 10 MG: 10 TABLET ORAL at 05:01

## 2019-01-18 NOTE — PLAN OF CARE
Problem: Adult Inpatient Plan of Care  Goal: Plan of Care Review  Outcome: Ongoing (interventions implemented as appropriate)  Plan of care reviewed with patient. Patient verbalized understanding. Patient is oriented x4. Patient denies nausea. Patient tolerated clear liquid diet. Patient RLQ incision remained intact with foam dressing. Patient remained on IVF continuously. Patient received prn pain medications per MAR. Patient ambulated adlib. Patient remained free of any falls or acute events. VSS, Will continue to monitor.

## 2019-01-18 NOTE — SUBJECTIVE & OBJECTIVE
Subjective:     Interval History: distention slightly worse. No further BM since suppository given. No n/v.    Post-Op Info:  Procedure(s) (LRB):  CLOSURE,ILEOSTOMY (N/A)   7 Days Post-Op      Medications:  Continuous Infusions:   sodium chloride 0.9% 50 mL/hr at 01/17/19 0253     Scheduled Meds:   enoxaparin  40 mg Subcutaneous Daily    lamiVUDine  100 mg Oral Daily     PRN Meds:   acetaminophen    HYDROmorphone    ibuprofen    naloxone    ondansetron    oxyCODONE    oxyCODONE        Objective:     Vital Signs (Most Recent):  Temp: 98.4 °F (36.9 °C) (01/18/19 0329)  Pulse: 82 (01/18/19 0329)  Resp: 17 (01/18/19 0329)  BP: (!) 143/97 (01/18/19 0329)  SpO2: (!) 93 % (01/18/19 0329) Vital Signs (24h Range):  Temp:  [98 °F (36.7 °C)-98.5 °F (36.9 °C)] 98.4 °F (36.9 °C)  Pulse:  [72-91] 82  Resp:  [15-20] 17  SpO2:  [93 %-96 %] 93 %  BP: (143-159)/() 143/97     Intake/Output - Last 3 Shifts       01/16 0700 - 01/17 0659 01/17 0700 - 01/18 0659 01/18 0700 - 01/19 0659    P.O. 880 200     I.V. (mL/kg) 2050 (25.9) 430 (5.4)     Total Intake(mL/kg) 2930 (37) 630 (8)     Urine (mL/kg/hr) 1000 (0.5) 1300 (0.7)     Emesis/NG output 0 0     Other 0 0     Stool 0 0     Total Output 1000 1300     Net +1930 -670            Urine Occurrence 3 x      Stool Occurrence 4 x 0 x     Emesis Occurrence 0 x 0 x           Physical Exam   Constitutional: He is oriented to person, place, and time. He appears well-developed and well-nourished.   Eyes: EOM are normal.   Cardiovascular: Normal rate.   Pulmonary/Chest: Effort normal.   Abdominal: He exhibits distension. There is tenderness. There is no rebound and no guarding.   Ileostomy site clean, dry   Neurological: He is alert and oriented to person, place, and time.   Skin: Skin is warm and dry.     Significant Labs:  BMP (Last 3 Results):   Recent Labs   Lab 01/16/19  0537 01/17/19  0413 01/18/19  0541   GLU 93 89 72    136 137   K 3.9 3.5 4.0   CL 99 99 99   CO2 27  26 26   BUN 4* 3* 3*   CREATININE 0.7 0.7 0.7   CALCIUM 8.9 8.9 9.0   MG 1.3* 1.4* 1.3*     CBC (Last 3 Results):   Recent Labs   Lab 01/16/19  0537 01/17/19  0413 01/18/19  0541   WBC 5.18 5.56 5.47   RBC 3.59* 3.55* 3.74*   HGB 11.4* 11.2* 11.7*   HCT 33.9* 33.4* 35.3*    263 297   MCV 94 94 94   MCH 31.8* 31.5* 31.3*   MCHC 33.6 33.5 33.1       Significant Diagnostics:  I have reviewed all pertinent imaging results/findings within the past 24 hours.

## 2019-01-18 NOTE — ASSESSMENT & PLAN NOTE
Chris Aguirre Jr. is a 53 y.o. male s/p CLOSURE,ILEOSTOMY (N/A) on 1/11/2019 recovering in stable condition on the floor.     Clears for now  Trend xrays  Consider CT scan as 1 week without significant improvement  Pain control as needed  OOB/ambulate  Home meds  DVT ppx    Dispo: pending ROBF, PO pain control

## 2019-01-18 NOTE — PROGRESS NOTES
[facility]  Colorectal Surgery  Progress Note    Patient Name: Chris Aguirre Jr.  MRN: [id number]  Admission Date: 1/11/2019  Hospital Length of Stay: 7 days  Attending Physician: Mikal Nino MD    Subjective:     Interval History: distention slightly worse. No further BM since suppository given. No n/v.    Post-Op Info:  Procedure(s) (LRB):  CLOSURE,ILEOSTOMY (N/A)   7 Days Post-Op      Medications:  Continuous Infusions:   sodium chloride 0.9% 50 mL/hr at 01/17/19 0253     Scheduled Meds:   enoxaparin  40 mg Subcutaneous Daily    lamiVUDine  100 mg Oral Daily     PRN Meds:   acetaminophen    HYDROmorphone    ibuprofen    naloxone    ondansetron    oxyCODONE    oxyCODONE        Objective:     Vital Signs (Most Recent):  Temp: 98.4 °F (36.9 °C) (01/18/19 0329)  Pulse: 82 (01/18/19 0329)  Resp: 17 (01/18/19 0329)  BP: (!) 143/97 (01/18/19 0329)  SpO2: (!) 93 % (01/18/19 0329) Vital Signs (24h Range):  Temp:  [98 °F (36.7 °C)-98.5 °F (36.9 °C)] 98.4 °F (36.9 °C)  Pulse:  [72-91] 82  Resp:  [15-20] 17  SpO2:  [93 %-96 %] 93 %  BP: (143-159)/() 143/97     Intake/Output - Last 3 Shifts       01/16 0700 - 01/17 0659 01/17 0700 - 01/18 0659 01/18 0700 - 01/19 0659    P.O. 880 200     I.V. (mL/kg) 2050 (25.9) 430 (5.4)     Total Intake(mL/kg) 2930 (37) 630 (8)     Urine (mL/kg/hr) 1000 (0.5) 1300 (0.7)     Emesis/NG output 0 0     Other 0 0     Stool 0 0     Total Output 1000 1300     Net +1930 -670            Urine Occurrence 3 x      Stool Occurrence 4 x 0 x     Emesis Occurrence 0 x 0 x           Physical Exam   Constitutional: He is oriented to person, place, and time. He appears well-developed and well-nourished.   Eyes: EOM are normal.   Cardiovascular: Normal rate.   Pulmonary/Chest: Effort normal.   Abdominal: He exhibits distension. There is tenderness. There is no rebound and no guarding.   Ileostomy site clean, dry   Neurological: He is alert and oriented to person, place,  and time.   Skin: Skin is warm and dry.     Significant Labs:  BMP (Last 3 Results):   Recent Labs   Lab 01/16/19  0537 01/17/19  0413 01/18/19  0541   GLU 93 89 72    136 137   K 3.9 3.5 4.0   CL 99 99 99   CO2 27 26 26   BUN 4* 3* 3*   CREATININE 0.7 0.7 0.7   CALCIUM 8.9 8.9 9.0   MG 1.3* 1.4* 1.3*     CBC (Last 3 Results):   Recent Labs   Lab 01/16/19  0537 01/17/19  0413 01/18/19  0541   WBC 5.18 5.56 5.47   RBC 3.59* 3.55* 3.74*   HGB 11.4* 11.2* 11.7*   HCT 33.9* 33.4* 35.3*    263 297   MCV 94 94 94   MCH 31.8* 31.5* 31.3*   MCHC 33.6 33.5 33.1       Significant Diagnostics:  I have reviewed all pertinent imaging results/findings within the past 24 hours.    Assessment/Plan:     * Status post reversal of ileostomy    Chris Aguirre Jr. is a 53 y.o. male s/p CLOSURE,ILEOSTOMY (N/A) on 1/11/2019 recovering in stable condition on the floor.     Clears for now  Trend xrays  Consider CT scan as 1 week without significant improvement  Pain control as needed  OOB/ambulate  Home meds  DVT ppx    Dispo: pending ROBF, PO pain control              Zack Sampson MD  Colorectal Surgery  [facility]

## 2019-01-18 NOTE — PLAN OF CARE
Problem: Adult Inpatient Plan of Care  Goal: Plan of Care Review  Plan of care reviewed with pt/family verbalized understanding, vss, patient on clear liquid diet tolerating well with no c/o of nausea/vomiting, patient ambulatory in halls with family, pain controlled with prn pain medications, call-light within reach, will continue to monitor.

## 2019-01-19 LAB
ANION GAP SERPL CALC-SCNC: 15 MMOL/L
BASOPHILS # BLD AUTO: 0.02 K/UL
BASOPHILS NFR BLD: 0.4 %
BUN SERPL-MCNC: 3 MG/DL
CALCIUM SERPL-MCNC: 9.1 MG/DL
CHLORIDE SERPL-SCNC: 97 MMOL/L
CO2 SERPL-SCNC: 26 MMOL/L
CREAT SERPL-MCNC: 0.7 MG/DL
DIFFERENTIAL METHOD: ABNORMAL
EOSINOPHIL # BLD AUTO: 0.1 K/UL
EOSINOPHIL NFR BLD: 2.9 %
ERYTHROCYTE [DISTWIDTH] IN BLOOD BY AUTOMATED COUNT: 14 %
EST. GFR  (AFRICAN AMERICAN): >60 ML/MIN/1.73 M^2
EST. GFR  (NON AFRICAN AMERICAN): >60 ML/MIN/1.73 M^2
GLUCOSE SERPL-MCNC: 71 MG/DL
HCT VFR BLD AUTO: 34.1 %
HGB BLD-MCNC: 11.1 G/DL
IMM GRANULOCYTES # BLD AUTO: 0.01 K/UL
IMM GRANULOCYTES NFR BLD AUTO: 0.2 %
LYMPHOCYTES # BLD AUTO: 1.6 K/UL
LYMPHOCYTES NFR BLD: 33.5 %
MAGNESIUM SERPL-MCNC: 1.6 MG/DL
MCH RBC QN AUTO: 30.2 PG
MCHC RBC AUTO-ENTMCNC: 32.6 G/DL
MCV RBC AUTO: 93 FL
MONOCYTES # BLD AUTO: 0.6 K/UL
MONOCYTES NFR BLD: 12.5 %
NEUTROPHILS # BLD AUTO: 2.4 K/UL
NEUTROPHILS NFR BLD: 50.5 %
NRBC BLD-RTO: 0 /100 WBC
PHOSPHATE SERPL-MCNC: 3.7 MG/DL
PLATELET # BLD AUTO: 310 K/UL
PMV BLD AUTO: 10.9 FL
POTASSIUM SERPL-SCNC: 3.8 MMOL/L
RBC # BLD AUTO: 3.68 M/UL
SODIUM SERPL-SCNC: 138 MMOL/L
WBC # BLD AUTO: 4.81 K/UL

## 2019-01-19 PROCEDURE — 63600175 PHARM REV CODE 636 W HCPCS: Performed by: STUDENT IN AN ORGANIZED HEALTH CARE EDUCATION/TRAINING PROGRAM

## 2019-01-19 PROCEDURE — 84100 ASSAY OF PHOSPHORUS: CPT

## 2019-01-19 PROCEDURE — 25000003 PHARM REV CODE 250: Performed by: STUDENT IN AN ORGANIZED HEALTH CARE EDUCATION/TRAINING PROGRAM

## 2019-01-19 PROCEDURE — 85025 COMPLETE CBC W/AUTO DIFF WBC: CPT

## 2019-01-19 PROCEDURE — 80048 BASIC METABOLIC PNL TOTAL CA: CPT

## 2019-01-19 PROCEDURE — 36415 COLL VENOUS BLD VENIPUNCTURE: CPT

## 2019-01-19 PROCEDURE — 83735 ASSAY OF MAGNESIUM: CPT

## 2019-01-19 PROCEDURE — 20600001 HC STEP DOWN PRIVATE ROOM

## 2019-01-19 PROCEDURE — 25000003 PHARM REV CODE 250: Performed by: NURSE PRACTITIONER

## 2019-01-19 RX ADMIN — OXYCODONE HYDROCHLORIDE 10 MG: 10 TABLET ORAL at 08:01

## 2019-01-19 RX ADMIN — OXYCODONE HYDROCHLORIDE 10 MG: 10 TABLET ORAL at 07:01

## 2019-01-19 RX ADMIN — ENOXAPARIN SODIUM 40 MG: 100 INJECTION SUBCUTANEOUS at 09:01

## 2019-01-19 RX ADMIN — HYDROMORPHONE HYDROCHLORIDE 0.5 MG: 1 INJECTION, SOLUTION INTRAMUSCULAR; INTRAVENOUS; SUBCUTANEOUS at 04:01

## 2019-01-19 RX ADMIN — OXYCODONE HYDROCHLORIDE 10 MG: 10 TABLET ORAL at 01:01

## 2019-01-19 RX ADMIN — SODIUM CHLORIDE: 0.9 INJECTION, SOLUTION INTRAVENOUS at 04:01

## 2019-01-19 RX ADMIN — HYDROMORPHONE HYDROCHLORIDE 0.5 MG: 1 INJECTION, SOLUTION INTRAMUSCULAR; INTRAVENOUS; SUBCUTANEOUS at 10:01

## 2019-01-19 RX ADMIN — LAMIVUDINE 100 MG: 100 TABLET, FILM COATED ORAL at 09:01

## 2019-01-19 NOTE — ASSESSMENT & PLAN NOTE
Chris Aguirre Jr. is a 53 y.o. male s/p CLOSURE,ILEOSTOMY (N/A) on 1/11/2019 recovering in stable condition on the floor.     Now with some bowel function. Passing flatus. No BM. Less distended. Improving.     Advance to LRD.   Trend XR  No CT for now.   Pain control as needed  OOB/ambulate  Home meds  DVT ppx    Dispo: pending ROBF, PO pain control

## 2019-01-19 NOTE — SUBJECTIVE & OBJECTIVE
Subjective:     Interval History: No acute events overnight. Passing flatus. Less distended today. Feels better overall today.     Post-Op Info:  Procedure(s) (LRB):  CLOSURE,ILEOSTOMY (N/A)   8 Days Post-Op      Medications:  Continuous Infusions:   sodium chloride 0.9% 50 mL/hr at 01/19/19 0418     Scheduled Meds:   enoxaparin  40 mg Subcutaneous Daily    lamiVUDine  100 mg Oral Daily     PRN Meds:   acetaminophen    hydrALAZINE    HYDROmorphone    ibuprofen    naloxone    ondansetron    oxyCODONE    oxyCODONE        Objective:     Vital Signs (Most Recent):  Temp: 98.4 °F (36.9 °C) (01/19/19 0418)  Pulse: 86 (01/19/19 0418)  Resp: 14 (01/19/19 0418)  BP: (!) 148/98 (01/19/19 0418)  SpO2: (!) 94 % (01/19/19 0418) Vital Signs (24h Range):  Temp:  [97.5 °F (36.4 °C)-98.6 °F (37 °C)] 98.4 °F (36.9 °C)  Pulse:  [81-97] 86  Resp:  [14-18] 14  SpO2:  [94 %-95 %] 94 %  BP: (146-167)/(97-99) 148/98     Intake/Output - Last 3 Shifts       01/17 0700 - 01/18 0659 01/18 0700 - 01/19 0659 01/19 0700 - 01/20 0659    P.O. 200 360     I.V. (mL/kg) 430 (5.4) 1115 (14.1)     Total Intake(mL/kg) 630 (8) 1475 (18.6)     Urine (mL/kg/hr) 1300 (0.7) 2225 (1.2)     Emesis/NG output 0 0     Other 0      Stool 0 0     Total Output 1300 2225     Net -670 -750            Stool Occurrence 0 x 0 x     Emesis Occurrence 0 x 0 x           Physical Exam   Constitutional: He is oriented to person, place, and time. He appears well-developed and well-nourished.   Eyes: EOM are normal.   Cardiovascular: Normal rate.   Pulmonary/Chest: Effort normal.   Abdominal: He exhibits distension (improved from yesterday). There is tenderness. There is no rebound and no guarding.   Ileostomy site clean, dry   Neurological: He is alert and oriented to person, place, and time.   Skin: Skin is warm and dry.     Significant Labs:  CBC:   Recent Labs   Lab 01/19/19  0512   WBC 4.81   RBC 3.68*   HGB 11.1*   HCT 34.1*      MCV 93   MCH 30.2    MCHC 32.6     CMP:   Recent Labs   Lab 01/19/19  0512   GLU 71   CALCIUM 9.1      K 3.8   CO2 26   CL 97   BUN 3*   CREATININE 0.7       Significant Diagnostics:  I have reviewed all pertinent imaging results/findings within the past 24 hours.

## 2019-01-19 NOTE — PLAN OF CARE
Problem: Adult Inpatient Plan of Care  Goal: Plan of Care Review  Outcome: Ongoing (interventions implemented as appropriate)  POC reviewed with patient and mother who both verbalized understanding. AAOx4. VSS on room air. Dr. Stringer notified of patient's blood pressure increase, ordered PRN IV Hydralazine for SBP>170. RLQ incision CDI with foam mepilex. Pain controlled with PRN medications per MAR. Tolerating clear liquids, denies any nausea. No BM, passing small amount of flatus at beginning of shift. Up ad gloria to bathroom and in hallways. Urinating to urinal with adequate output. BLE TEDS in place, refusing SCDS. Safety precautions maintained, call light within reach. Remains free from falls and injury. No acute events. No distress noted. Will continue to monitor. Mother at bedside.

## 2019-01-19 NOTE — PROGRESS NOTES
[facility]  Colorectal Surgery  Progress Note    Patient Name: Chris Aguirre Jr.  MRN: [id number]  Admission Date: 1/11/2019  Hospital Length of Stay: 8 days  Attending Physician: Mkial Nino MD    Subjective:     Interval History: No acute events overnight. Passing flatus. Less distended today. Feels better overall today.     Post-Op Info:  Procedure(s) (LRB):  CLOSURE,ILEOSTOMY (N/A)   8 Days Post-Op      Medications:  Continuous Infusions:   sodium chloride 0.9% 50 mL/hr at 01/19/19 0418     Scheduled Meds:   enoxaparin  40 mg Subcutaneous Daily    lamiVUDine  100 mg Oral Daily     PRN Meds:   acetaminophen    hydrALAZINE    HYDROmorphone    ibuprofen    naloxone    ondansetron    oxyCODONE    oxyCODONE        Objective:     Vital Signs (Most Recent):  Temp: 98.4 °F (36.9 °C) (01/19/19 0418)  Pulse: 86 (01/19/19 0418)  Resp: 14 (01/19/19 0418)  BP: (!) 148/98 (01/19/19 0418)  SpO2: (!) 94 % (01/19/19 0418) Vital Signs (24h Range):  Temp:  [97.5 °F (36.4 °C)-98.6 °F (37 °C)] 98.4 °F (36.9 °C)  Pulse:  [81-97] 86  Resp:  [14-18] 14  SpO2:  [94 %-95 %] 94 %  BP: (146-167)/(97-99) 148/98     Intake/Output - Last 3 Shifts       01/17 0700 - 01/18 0659 01/18 0700 - 01/19 0659 01/19 0700 - 01/20 0659    P.O. 200 360     I.V. (mL/kg) 430 (5.4) 1115 (14.1)     Total Intake(mL/kg) 630 (8) 1475 (18.6)     Urine (mL/kg/hr) 1300 (0.7) 2225 (1.2)     Emesis/NG output 0 0     Other 0      Stool 0 0     Total Output 1300 2225     Net -670 -750            Stool Occurrence 0 x 0 x     Emesis Occurrence 0 x 0 x           Physical Exam   Constitutional: He is oriented to person, place, and time. He appears well-developed and well-nourished.   Eyes: EOM are normal.   Cardiovascular: Normal rate.   Pulmonary/Chest: Effort normal.   Abdominal: He exhibits distension (improved from yesterday). There is tenderness. There is no rebound and no guarding.   Ileostomy site clean, dry   Neurological: He is  alert and oriented to person, place, and time.   Skin: Skin is warm and dry.     Significant Labs:  CBC:   Recent Labs   Lab 01/19/19 0512   WBC 4.81   RBC 3.68*   HGB 11.1*   HCT 34.1*      MCV 93   MCH 30.2   MCHC 32.6     CMP:   Recent Labs   Lab 01/19/19 0512   GLU 71   CALCIUM 9.1      K 3.8   CO2 26   CL 97   BUN 3*   CREATININE 0.7       Significant Diagnostics:  I have reviewed all pertinent imaging results/findings within the past 24 hours.    Assessment/Plan:     * Status post reversal of ileostomy    Chris Aguirre . is a 53 y.o. male s/p CLOSURE,ILEOSTOMY (N/A) on 1/11/2019 recovering in stable condition on the floor.     Now with some bowel function. Passing flatus. No BM. Less distended. Improving.     Advance to LRD.   Trend XR  No CT for now.   Pain control as needed  OOB/ambulate  Home meds  DVT ppx    Dispo: pending ROBF, PO pain control              Zachary Sofia MD  Colorectal Surgery  [facility] [address]

## 2019-01-19 NOTE — PLAN OF CARE
Problem: Adult Inpatient Plan of Care  Goal: Plan of Care Review  Outcome: Ongoing (interventions implemented as appropriate)  To floor from IR after drain placed, ambulated from stretcher to bed. Requested pain medication.  called for request of food and explained to patient need to wait. No further request. IV re sited for infiltration of present one.

## 2019-01-19 NOTE — PLAN OF CARE
Problem: Adult Inpatient Plan of Care  Goal: Plan of Care Review  Outcome: Ongoing (interventions implemented as appropriate)  Patient ambulated in hallway with family today, Treated for pain as orders stated. Appetite good. Fluids continue and no other needs voiced.

## 2019-01-20 VITALS
BODY MASS INDEX: 24.41 KG/M2 | DIASTOLIC BLOOD PRESSURE: 84 MMHG | HEIGHT: 71 IN | TEMPERATURE: 99 F | HEART RATE: 79 BPM | OXYGEN SATURATION: 93 % | RESPIRATION RATE: 18 BRPM | SYSTOLIC BLOOD PRESSURE: 140 MMHG | WEIGHT: 174.38 LBS

## 2019-01-20 LAB
ANION GAP SERPL CALC-SCNC: 10 MMOL/L
BASOPHILS # BLD AUTO: 0.01 K/UL
BASOPHILS NFR BLD: 0.2 %
BUN SERPL-MCNC: 4 MG/DL
CALCIUM SERPL-MCNC: 8.7 MG/DL
CHLORIDE SERPL-SCNC: 99 MMOL/L
CO2 SERPL-SCNC: 28 MMOL/L
CREAT SERPL-MCNC: 0.7 MG/DL
DIFFERENTIAL METHOD: ABNORMAL
EOSINOPHIL # BLD AUTO: 0.2 K/UL
EOSINOPHIL NFR BLD: 3 %
ERYTHROCYTE [DISTWIDTH] IN BLOOD BY AUTOMATED COUNT: 14.1 %
EST. GFR  (AFRICAN AMERICAN): >60 ML/MIN/1.73 M^2
EST. GFR  (NON AFRICAN AMERICAN): >60 ML/MIN/1.73 M^2
GLUCOSE SERPL-MCNC: 89 MG/DL
HCT VFR BLD AUTO: 34.6 %
HGB BLD-MCNC: 11.3 G/DL
IMM GRANULOCYTES # BLD AUTO: 0.03 K/UL
IMM GRANULOCYTES NFR BLD AUTO: 0.6 %
LYMPHOCYTES # BLD AUTO: 1.5 K/UL
LYMPHOCYTES NFR BLD: 30 %
MAGNESIUM SERPL-MCNC: 1.4 MG/DL
MCH RBC QN AUTO: 30.4 PG
MCHC RBC AUTO-ENTMCNC: 32.7 G/DL
MCV RBC AUTO: 93 FL
MONOCYTES # BLD AUTO: 0.6 K/UL
MONOCYTES NFR BLD: 11.2 %
NEUTROPHILS # BLD AUTO: 2.8 K/UL
NEUTROPHILS NFR BLD: 55 %
NRBC BLD-RTO: 0 /100 WBC
PHOSPHATE SERPL-MCNC: 3.9 MG/DL
PLATELET # BLD AUTO: 316 K/UL
PMV BLD AUTO: 10.4 FL
POTASSIUM SERPL-SCNC: 3.5 MMOL/L
RBC # BLD AUTO: 3.72 M/UL
SODIUM SERPL-SCNC: 137 MMOL/L
WBC # BLD AUTO: 5.07 K/UL

## 2019-01-20 PROCEDURE — 83735 ASSAY OF MAGNESIUM: CPT

## 2019-01-20 PROCEDURE — 63600175 PHARM REV CODE 636 W HCPCS: Performed by: STUDENT IN AN ORGANIZED HEALTH CARE EDUCATION/TRAINING PROGRAM

## 2019-01-20 PROCEDURE — 80048 BASIC METABOLIC PNL TOTAL CA: CPT

## 2019-01-20 PROCEDURE — 25000003 PHARM REV CODE 250: Performed by: STUDENT IN AN ORGANIZED HEALTH CARE EDUCATION/TRAINING PROGRAM

## 2019-01-20 PROCEDURE — 84100 ASSAY OF PHOSPHORUS: CPT

## 2019-01-20 PROCEDURE — 36415 COLL VENOUS BLD VENIPUNCTURE: CPT

## 2019-01-20 PROCEDURE — 85025 COMPLETE CBC W/AUTO DIFF WBC: CPT

## 2019-01-20 RX ORDER — ONDANSETRON HYDROCHLORIDE 8 MG/1
8 TABLET, FILM COATED ORAL EVERY 8 HOURS PRN
Qty: 30 TABLET | Refills: 0 | Status: SHIPPED | OUTPATIENT
Start: 2019-01-20 | End: 2019-02-11

## 2019-01-20 RX ORDER — POLYETHYLENE GLYCOL 3350 17 G/17G
17 POWDER, FOR SOLUTION ORAL DAILY PRN
Qty: 238 G | Refills: 0 | Status: SHIPPED | OUTPATIENT
Start: 2019-01-20 | End: 2019-02-11

## 2019-01-20 RX ORDER — HYDROCODONE BITARTRATE AND ACETAMINOPHEN 5; 325 MG/1; MG/1
1 TABLET ORAL EVERY 4 HOURS PRN
Qty: 30 TABLET | Refills: 0 | Status: SHIPPED | OUTPATIENT
Start: 2019-01-20 | End: 2019-02-11

## 2019-01-20 RX ADMIN — HYDROMORPHONE HYDROCHLORIDE 0.5 MG: 1 INJECTION, SOLUTION INTRAMUSCULAR; INTRAVENOUS; SUBCUTANEOUS at 05:01

## 2019-01-20 RX ADMIN — OXYCODONE HYDROCHLORIDE 10 MG: 10 TABLET ORAL at 08:01

## 2019-01-20 RX ADMIN — OXYCODONE HYDROCHLORIDE 10 MG: 10 TABLET ORAL at 01:01

## 2019-01-20 RX ADMIN — LAMIVUDINE 100 MG: 100 TABLET, FILM COATED ORAL at 08:01

## 2019-01-20 RX ADMIN — ENOXAPARIN SODIUM 40 MG: 100 INJECTION SUBCUTANEOUS at 08:01

## 2019-01-20 NOTE — PHYSICIAN QUERY
PT Name: Chris Aguirre Jr.  MR #: [id number]     PHYSICIAN QUERY -  ELECTROLYTE CLARIFICATION      CDS/: Jessica Figueroa RN                Contact information:[email or web address]  This form is a permanent document in the medical record.     Query Date: January 20, 2019    By submitting this query, we are merely seeking further clarification of documentation to reflect the severity of illness of your patient. Please utilize your independent clinical judgment when addressing the question(s) below.    The Medical record reflects the following:     Indicators   Supporting Clinical Findings Location in Medical Record   x Lab Value(s) Magnesium 1.2-1.7 Lab 1/11-1/20   x Treatment                                 Medication Mag sulfate 1 gm IVPB once  Mag sulfate 2 gm  IVPB x3  Mag sulfate 3 gm IVPB x2 MAR 1/11-1/20   x Other Mg 1.2 IV replacement ordered, k 3.7, PO replacement ordered, awaiting orders to be verified RN POC 1/14     Provider, please specify the diagnosis or diagnoses that correspond(s) to the above indicators. Braulio all that apply:    [   x] Hypomagnesemia   [   ] Other electrolyte disturbance (please specify): _______   [   ]  Clinically Undetermined       Please document in your progress notes daily for the duration of treatment until resolved, and include in your discharge summary.

## 2019-01-20 NOTE — PLAN OF CARE
Problem: Adult Inpatient Plan of Care  Goal: Plan of Care Review  Outcome: Ongoing (interventions implemented as appropriate)  POC reviewed with pt and verbalizes understanding. AAOx4. VSS on RA. No c/o nausea. IVF infusing. Pain relieved with prn pain meds. Ambulated jain x2. Pt. Voids per toilet with adequate UOP. No BM. Pt. Up ad gloria. Bed low and locked. Call light within reach. No falls or acute events. WCTM.

## 2019-01-20 NOTE — DISCHARGE SUMMARY
[facility]  Colorectal Surgery  Discharge Summary      Patient Name: Chris Aguirre Jr.  MRN: [id number]  Admission Date: 1/11/2019  Hospital Length of Stay: 9 days  Discharge Date and Time: 1/20/19  Attending Physician: Mikal Nino MD   Discharging Provider: Hesham Arteaga MD  Primary Care Provider: Ash Hung NP     HPI: Patient is a 53 y.o. male with T4N0Mx sigmoid colon cancer which presented as fecaluria s/p above procedures last year presents for follow up.      Completed adjuvant chemo with 5FU about 2 months ago -  6 months total.      He reports his appetite is good. He is having good output from his ileostomy. States he is able to keep his weight stable. Notes no problems with urination. Continues to have adequate pain control. Denies fevers/chills, or abdominal pain. States he feels ready to have his ileostomy reversed.        Procedure(s) (LRB):  CLOSURE,ILEOSTOMY (N/A)     Hospital Course: Patient presented for above procedure. He has remained stable on the floor since surgery. He tolerated a progression in his diet. He is not passing BM but is passing flatus. He is currently in stable condition for discharge.         Significant Diagnostic Studies: Labs:   CMP   Recent Labs   Lab 01/19/19  0512 01/20/19  0421    137   K 3.8 3.5   CL 97 99   CO2 26 28   GLU 71 89   BUN 3* 4*   CREATININE 0.7 0.7   CALCIUM 9.1 8.7   ANIONGAP 15 10   ESTGFRAFRICA >60.0 >60.0   EGFRNONAA >60.0 >60.0    and CBC   Recent Labs   Lab 01/19/19  0512 01/20/19  0421   WBC 4.81 5.07   HGB 11.1* 11.3*   HCT 34.1* 34.6*    316       Pending Diagnostic Studies:     None        Final Active Diagnoses:    Diagnosis Date Noted POA    PRINCIPAL PROBLEM:  Status post reversal of ileostomy [Z98.890] 01/15/2019 Not Applicable    Alcohol dependence [F10.20] 09/03/2014 Yes     Chronic      Problems Resolved During this Admission:    Diagnosis Date Noted Date Resolved POA    Ileostomy care  [Z43.2] 01/11/2019 01/15/2019 Not Applicable      Discharged Condition: good    Disposition:    Discharge to home today    Follow Up:  Follow up in clinic in 3 weeks    Patient Instructions:   No discharge procedures on file.  Medications:  Reconciled Home Medications:      Medication List      START taking these medications    HYDROcodone-acetaminophen 5-325 mg per tablet  Commonly known as:  NORCO  Take 1 tablet by mouth every 4 (four) hours as needed for Pain.     ondansetron 8 MG tablet  Commonly known as:  ZOFRAN  Take 1 tablet (8 mg total) by mouth every 8 (eight) hours as needed for Nausea.     polyethylene glycol 17 gram Pwpk  Commonly known as:  GLYCOLAX  Take 17 g by mouth daily as needed.        ASK your doctor about these medications    lamiVUDine 100 MG Tab  Commonly known as:  EPIVIR  Take 1 tablet (100 mg total) by mouth once daily.     multivitamin with minerals tablet  Take 1 tablet by mouth once daily.     promethazine 25 MG tablet  Commonly known as:  PHENERGAN  Take 1 tablet (25 mg total) by mouth every 6 (six) hours as needed for Nausea.            Hesham Arteaga MD  Colorectal Surgery  [facility]

## 2019-01-29 ENCOUNTER — TELEPHONE (OUTPATIENT)
Dept: PHARMACY | Facility: CLINIC | Age: 54
End: 2019-01-29

## 2019-01-31 ENCOUNTER — TELEPHONE (OUTPATIENT)
Dept: HEMATOLOGY/ONCOLOGY | Facility: CLINIC | Age: 54
End: 2019-01-31

## 2019-01-31 DIAGNOSIS — C18.8 OVERLAPPING MALIGNANT NEOPLASM OF COLON: Primary | ICD-10-CM

## 2019-01-31 NOTE — TELEPHONE ENCOUNTER
Spoke with patient.  He is calling to request his chest PORT to be removed as soon as possible.  Nurse informed patient she would double check with dr chapman he is OK with this---and would contact him back to coordinate appointment. He thanked nurse.      Message routed to [email or web address]

## 2019-01-31 NOTE — TELEPHONE ENCOUNTER
----- Message from Petra Alberts sent at 1/31/2019  2:32 PM CST -----  Contact: Pt  Pt called to speak with nurse about Port be removed  and have some questions   Callback#[phone]  Thank You   SALONI Alberts

## 2019-02-04 ENCOUNTER — TELEPHONE (OUTPATIENT)
Dept: PHARMACY | Facility: CLINIC | Age: 54
End: 2019-02-04

## 2019-02-06 DIAGNOSIS — C18.8 OVERLAPPING MALIGNANT NEOPLASM OF COLON: Primary | ICD-10-CM

## 2019-02-08 ENCOUNTER — TELEPHONE (OUTPATIENT)
Dept: INTERVENTIONAL RADIOLOGY/VASCULAR | Facility: HOSPITAL | Age: 54
End: 2019-02-08

## 2019-02-08 DIAGNOSIS — C18.8 OVERLAPPING MALIGNANT NEOPLASM OF COLON: Primary | ICD-10-CM

## 2019-02-08 RX ORDER — FENTANYL CITRATE 50 UG/ML
50 INJECTION, SOLUTION INTRAMUSCULAR; INTRAVENOUS
Status: CANCELLED | OUTPATIENT
Start: 2019-02-08

## 2019-02-08 RX ORDER — MIDAZOLAM HYDROCHLORIDE 1 MG/ML
1 INJECTION INTRAMUSCULAR; INTRAVENOUS
Status: CANCELLED | OUTPATIENT
Start: 2019-02-08

## 2019-02-11 ENCOUNTER — HOSPITAL ENCOUNTER (OUTPATIENT)
Facility: HOSPITAL | Age: 54
Discharge: HOME | End: 2019-02-11
Attending: RADIOLOGY | Admitting: RADIOLOGY
Payer: MEDICAID

## 2019-02-11 ENCOUNTER — TELEPHONE (OUTPATIENT)
Dept: ENDOSCOPY | Facility: HOSPITAL | Age: 54
End: 2019-02-11

## 2019-02-11 ENCOUNTER — OFFICE VISIT (OUTPATIENT)
Dept: SURGERY | Facility: CLINIC | Age: 54
End: 2019-02-11
Payer: MEDICAID

## 2019-02-11 VITALS
DIASTOLIC BLOOD PRESSURE: 94 MMHG | BODY MASS INDEX: 23.08 KG/M2 | HEIGHT: 71 IN | HEART RATE: 90 BPM | SYSTOLIC BLOOD PRESSURE: 134 MMHG | WEIGHT: 164.88 LBS

## 2019-02-11 VITALS
OXYGEN SATURATION: 96 % | HEIGHT: 71 IN | RESPIRATION RATE: 14 BRPM | BODY MASS INDEX: 22.82 KG/M2 | HEART RATE: 82 BPM | TEMPERATURE: 98 F | SYSTOLIC BLOOD PRESSURE: 128 MMHG | WEIGHT: 163 LBS | DIASTOLIC BLOOD PRESSURE: 92 MMHG

## 2019-02-11 DIAGNOSIS — C18.8 OVERLAPPING MALIGNANT NEOPLASM OF COLON: ICD-10-CM

## 2019-02-11 DIAGNOSIS — Z85.038 HISTORY OF COLON CANCER: Primary | ICD-10-CM

## 2019-02-11 DIAGNOSIS — Z12.11 SPECIAL SCREENING FOR MALIGNANT NEOPLASMS, COLON: Primary | ICD-10-CM

## 2019-02-11 PROCEDURE — 99999 PR PBB SHADOW E&M-EST. PATIENT-LVL III: CPT | Mod: PBBFAC,,, | Performed by: COLON & RECTAL SURGERY

## 2019-02-11 PROCEDURE — 99024 PR POST-OP FOLLOW-UP VISIT: ICD-10-PCS | Mod: ,,, | Performed by: COLON & RECTAL SURGERY

## 2019-02-11 PROCEDURE — 99213 OFFICE O/P EST LOW 20 MIN: CPT | Mod: PBBFAC,25 | Performed by: COLON & RECTAL SURGERY

## 2019-02-11 PROCEDURE — 99024 POSTOP FOLLOW-UP VISIT: CPT | Mod: ,,, | Performed by: COLON & RECTAL SURGERY

## 2019-02-11 PROCEDURE — 63600175 PHARM REV CODE 636 W HCPCS: Performed by: FAMILY MEDICINE

## 2019-02-11 PROCEDURE — 63600175 PHARM REV CODE 636 W HCPCS: Performed by: STUDENT IN AN ORGANIZED HEALTH CARE EDUCATION/TRAINING PROGRAM

## 2019-02-11 PROCEDURE — 99999 PR PBB SHADOW E&M-EST. PATIENT-LVL III: ICD-10-PCS | Mod: PBBFAC,,, | Performed by: COLON & RECTAL SURGERY

## 2019-02-11 PROCEDURE — 25000003 PHARM REV CODE 250: Performed by: FAMILY MEDICINE

## 2019-02-11 RX ORDER — SODIUM CHLORIDE 9 MG/ML
500 INJECTION, SOLUTION INTRAVENOUS ONCE
Status: COMPLETED | OUTPATIENT
Start: 2019-02-11 | End: 2019-02-11

## 2019-02-11 RX ORDER — MIDAZOLAM HYDROCHLORIDE 1 MG/ML
INJECTION INTRAMUSCULAR; INTRAVENOUS CODE/TRAUMA/SEDATION MEDICATION
Status: COMPLETED | OUTPATIENT
Start: 2019-02-11 | End: 2019-02-11

## 2019-02-11 RX ORDER — POLYETHYLENE GLYCOL 3350, SODIUM SULFATE ANHYDROUS, SODIUM BICARBONATE, SODIUM CHLORIDE, POTASSIUM CHLORIDE 236; 22.74; 6.74; 5.86; 2.97 G/4L; G/4L; G/4L; G/4L; G/4L
4 POWDER, FOR SOLUTION ORAL ONCE
Qty: 4000 ML | Refills: 0 | Status: SHIPPED | OUTPATIENT
Start: 2019-02-11 | End: 2019-02-12

## 2019-02-11 RX ORDER — HYDROCODONE BITARTRATE AND ACETAMINOPHEN 5; 325 MG/1; MG/1
1 TABLET ORAL EVERY 4 HOURS PRN
Status: CANCELLED | OUTPATIENT
Start: 2019-02-11

## 2019-02-11 RX ORDER — FENTANYL CITRATE 50 UG/ML
INJECTION, SOLUTION INTRAMUSCULAR; INTRAVENOUS CODE/TRAUMA/SEDATION MEDICATION
Status: COMPLETED | OUTPATIENT
Start: 2019-02-11 | End: 2019-02-11

## 2019-02-11 RX ORDER — CEFAZOLIN SODIUM 1 G/3ML
1 INJECTION, POWDER, FOR SOLUTION INTRAMUSCULAR; INTRAVENOUS
Status: COMPLETED | OUTPATIENT
Start: 2019-02-11 | End: 2019-02-11

## 2019-02-11 RX ADMIN — MIDAZOLAM HYDROCHLORIDE 1 MG: 1 INJECTION, SOLUTION INTRAMUSCULAR; INTRAVENOUS at 02:02

## 2019-02-11 RX ADMIN — FENTANYL CITRATE 50 MCG: 50 INJECTION, SOLUTION INTRAMUSCULAR; INTRAVENOUS at 02:02

## 2019-02-11 RX ADMIN — CEFAZOLIN 1 G: 1 INJECTION, POWDER, FOR SOLUTION INTRAMUSCULAR; INTRAVENOUS at 01:02

## 2019-02-11 RX ADMIN — MIDAZOLAM HYDROCHLORIDE 1 MG: 1 INJECTION, SOLUTION INTRAMUSCULAR; INTRAVENOUS at 01:02

## 2019-02-11 RX ADMIN — SODIUM CHLORIDE 500 ML: 0.9 INJECTION, SOLUTION INTRAVENOUS at 01:02

## 2019-02-11 NOTE — PROCEDURES
"Radiology Post-Procedure Note    Pre Op Diagnosis: Port no longer needed    Post Op Diagnosis: Same    Procedure: PORT removal    Procedure performed by: Diana    Written Informed Consent Obtained: Yes    Specimen Removed: yes, port and catheter    Estimated Blood Loss: Minimal    Findings:   An incision was made over right chest port after administration of lidocaine. The entire port and catheter were removed. The pocket was flushed with normal saline. The incision was then sutured with vicryl and glued with dermabond. A dressing was then placed.       Thong Giles MD ([place])  Radiology PGY-5  [phone]        "

## 2019-02-11 NOTE — DISCHARGE INSTRUCTIONS
For scheduling: Call Perlita at [phone]    For questions or concerns call: SCOTT MON-FRI 8 AM- 5PM [phone]. Radiology resident on call [phone].    For immediate concerns that are not emergent, you may call our radiology clinic at: [phone]

## 2019-02-11 NOTE — PROGRESS NOTES
CRS Office Visit H&P     SUBJECTIVE:      Chief Complaint: Follow up for colon cancer s/p resection    Procedure 1/19  Ileostomy closure     PROCEDURE:  3/13/2018  1. Sigmoid colectomy with en bloc partial cystectomy.  2. Diverting loop ileostomy.  3. Splenic flexure mobilization.  4. Omental pedicle flap.     Procedure(s) Performed: (urology)  1. Partial cystectomy with complex cystorrhaphy and partial prostatectomy- 22 MODIFIER  2. Left neoureterocystotomy with ureteral re-implantation  3. Cystoscopy with bilateral ureteral catheter placement  4. Bilateral ureteral stent placement   5. Left pelvic lymph node dissection     Pathologic staging:  T4N0     History of Present Illness:  Patient is a 53 y.o. male with T4N0Mx sigmoid colon cancer which presented as fecaluria s/p above procedures last year presents for follow up.      Completed adjuvant chemo with 5FU about 2 months ago -  6 months total.      S/p ileostomy closure doing well.      Review of patient's allergies indicates:  No Known Allergies          Past Medical History:   Diagnosis Date    Coronary artery disease      Depression      Hepatitis C 3/9/2018    History of psychiatric care      History of psychiatric hospitalization      Hypertension      MI (myocardial infarction)       6 months ago    Neuropathy      Psychiatric problem      Psychosis      Self-harming behavior      Suicide attempt              Past Surgical History:   Procedure Laterality Date    ABDOMINAL SURGERY         History of perforated ulcer, unknown surgery    APPENDECTOMY                Family History   Problem Relation Age of Onset    No Known Problems Mother      No Known Problems Father      No Known Problems Sister      No Known Problems Brother      No Known Problems Maternal Aunt      No Known Problems Paternal Aunt      No Known Problems Maternal Uncle      No Known Problems Paternal Uncle      No Known Problems Maternal Grandfather      No Known  "Problems Maternal Grandmother      No Known Problems Paternal Grandfather      No Known Problems Paternal Grandmother      ADD / ADHD Cousin      Alcohol abuse Neg Hx      Anxiety disorder Neg Hx      Bipolar disorder Neg Hx      Dementia Neg Hx      Depression Neg Hx      Drug abuse Neg Hx      OCD Neg Hx      Paranoid behavior Neg Hx      Physical abuse Neg Hx      Schizophrenia Neg Hx      Seizures Neg Hx      Self injury Neg Hx      Sexual abuse Neg Hx      Suicide Neg Hx               Social History   Substance Use Topics    Smoking status: Current Every Day Smoker       Packs/day: 2.00       Years: 30.00    Smokeless tobacco: Never Used    Alcohol use Yes          Review of Systems:  Constitutional: no fever or chills  Eyes: no visual changes  ENT: no nasal congestion or sore throat  Respiratory: no cough or shortness of breath  Cardiovascular: no chest pain or palpitations  Gastrointestinal: no nausea or vomiting, tolerating diet  Genitourinary: no hematuria or dysuria  Integument/Breast: no rash or pruritis  Hematologic/Lymphatic: no easy bruising or lymphadenopathy  Musculoskeletal: no arthralgias or myalgias  Neurological: no seizures or tremors     OBJECTIVE:      Vital Signs (Most Recent)  Vitals:    02/11/19 1033   BP: (!) 134/94   BP Location: Left arm   Patient Position: Sitting   BP Method: Large (Automatic)   Pulse: 90   Weight: 74.8 kg (164 lb 14.5 oz)   Height: 5' 11" (1.803 m)       Physical Exam:  General: White male in NAD sitting in chair in clinic  Neuro: aaox4   Respiratory: resps even unlabored  Cardiac: RRR  Abdomen: soft, nontender, nondistended. Incision c/d/i, well healed.  Extremities: Warm dry and intact  Anorectal: deferred        ASSESSMENT/PLAN:      Chris was seen today for post-op follow-up.     Diagnoses and all orders for this visit:  T4N0M0 colon cancer s/p resection and adjuvant chemo    S/p Ileostomy closure doing well.    Surveillance colonoscopy due. "      I have interviewed and examined the patient, reviewed the notes and assessments, and/or personally supervised the procedure(s) performed by Dr. Wadsworth, and I concur with her/his documentation of Chris Aguirre Jr..  See below addendum for my evaluation and additional findings.    Doing well following DLI closure, prior sigmoid colectomy with en bloc partial cystectomy for obstructing sigmoid cancer with direct extension to bladder dome. (T4N0M0)  Completed adjuvant chemotherapy prior to DLI closure.      CT A/P 11/15/18:  -No evidence of local recurrence.  There is retained fecal material in the right colon without evidence of obstruction or inflammation.  -Interval removal of bilateral ureteral stents with development of mild bilateral hydronephrosis, left slightly greater than right.  -Stable appearance of 2 arterial enhancing left hepatic lobe foci and 2 hepatic hypodensities since prior examination of 03/09/2018.  Appearance is nonspecific, but in light of patient's history continued surveillance is recommended. No new hepatic lesions.    Last CEA on 10/25/18 - 2.7.    Exam as noted above.    He needs colonoscopy, as his initial presentation was with an obstructing sigmoid cancer - has not had colon proximal to anastomosis formally evaluated.  Will schedule for high risk screening colonoscopy.    Continue routine F/U & surveillance with Oncology.  RTO 6 months.    Mikal Nino MD, FACS, FASCRS  Staff Surgeon  Department of Colon & Rectal Surgery

## 2019-02-11 NOTE — LETTER
February 19, 2019      Teresa Marin MD  [address]  [facility] [address]           [facility]  [address]  [facility] [address]  Phone: [phone]          Patient: Chris Aguirre Jr.   MR Number: [id number]   YOB: 1965   Date of Visit: 2/11/2019       Dear Dr. Marin:    Thank you for referring Chris Aguirre to me for evaluation. Attached you will find relevant portions of my assessment and plan of care.    If you have questions, please do not hesitate to call me. I look forward to following Chris Aguirre along with you.    Sincerely,    Mikal Nino MD    Enclosure  CC:  MD Ash Shields NP    If you would like to receive this communication electronically, please contact [email or web address] or [phone] to request more information on Touch-[employer] Link access.    For providers and/or their staff who would like to refer a patient to [facility], please contact us through our one-stop-shop provider referral line, [facility], at [phone].    If you feel you have received this communication in error or would no longer like to receive these types of communications, please e-mail [email or web address]

## 2019-02-11 NOTE — PLAN OF CARE
Patient assigned to this writer by charge nurse. The patient was  escorted to [facility] room 9 by PCT.  This writer is completing a chart review and reviewing MD orders.

## 2019-02-11 NOTE — H&P
Radiology History & Physical      SUBJECTIVE:     Chief Complaint: colon cancer    History of Present Illness:  Chris Aguirre Jr. is a 53 y.o. male with h/o colon cancer s/p partial colectomy and right chest port placement (05/2018) who presents to IR to have his port removed after completion of chemotherapy.     Past Medical History:   Diagnosis Date    Anxiety about health 6/20/2018    Colon cancer     Coronary artery disease     Depression     Hepatitis C 3/9/2018    History of psychiatric care     History of psychiatric hospitalization     Hypertension     MI (myocardial infarction) 2007    Neuropathy     Psychiatric problem     Psychosis     Self-harming behavior     Suicide attempt      Past Surgical History:   Procedure Laterality Date    ABDOMINAL SURGERY      History of perforated ulcer, unknown surgery    APPENDECTOMY      CLOSURE,ILEOSTOMY N/A 1/11/2019    Performed by [facility] at [facility] 2ND FLR    COLECTOMY-SIGMOID POSSIBLE BLADDER RESECTION N/A 3/13/2018    Performed by [facility] at [facility] OR 2ND FLR    CYSTECTOMY-PARTIAL w/ bladder resection  3/13/2018    Performed by Meng Morrow MD at [facility] OR 2ND FLR    CYSTOSCOPY WITH STENT PLACEMENT Bilateral 3/13/2018    Performed by [facility]eng Morrow MD at [facility] OR 2ND FLR    ILEOSTOMY N/A 3/13/2018    Performed by [facility] at [facility] OR 2ND FLR    SIGMOIDOSCOPY-FLEXIBLE N/A 3/12/2018    Performed by Mikal Nino MD at [facility] ENDO (2ND FLR)       Home Meds:   Prior to Admission medications    Medication Sig Start Date End Date Taking? Authorizing Provider   lamiVUDine (EPIVIR) 100 MG Tab Take 1 tablet (100 mg total) by mouth once daily. 10/22/18 10/22/19 Yes Jennifer B. Scheuermann, PA   multivitamin with minerals tablet Take 1 tablet by mouth once daily.   Yes Historical Provider, MD   polyethylene glycol (GOLYTELY,NULYTELY) 236-22.74-6.74 -5.86 gram suspension Take 4,000 mLs (4 L total) by mouth once.  2/11/19 2/12/19  Mikal Nino MD   HYDROcodone-acetaminophen (NORCO) 5-325 mg per tablet Take 1 tablet by mouth every 4 (four) hours as needed for Pain. 1/20/19 2/11/19  Hesham Arteaga MD   ondansetron (ZOFRAN) 8 MG tablet Take 1 tablet (8 mg total) by mouth every 8 (eight) hours as needed for Nausea. 1/20/19 2/11/19  Hesham Arteaga MD   polyethylene glycol (GLYCOLAX) 17 gram/dose powder Mix 1 capful (17 g) with fluids and drink by mouth daily as needed. 1/20/19 2/11/19  Hesham Arteaga MD   promethazine (PHENERGAN) 25 MG tablet Take 1 tablet (25 mg total) by mouth every 6 (six) hours as needed for Nausea. 10/25/18 2/11/19  Teresa Marin MD     Anticoagulants/Antiplatelets: no anticoagulation    Allergies: Review of patient's allergies indicates:  No Known Allergies  Sedation History:  no adverse reactions    Review of Systems:   Hematological: no known coagulopathies  Respiratory: no shortness of breath  Cardiovascular: no chest pain  Gastrointestinal: no abdominal pain  Genito-Urinary: no dysuria  Musculoskeletal: negative  Neurological: no TIA or stroke symptoms         OBJECTIVE:     Vital Signs (Most Recent)  Temp: 98.6 °F (37 °C) (02/11/19 1246)  Pulse: 82 (02/11/19 1246)  Resp: 17 (02/11/19 1246)  BP: (!) 131/93 (02/11/19 1300)  SpO2: 99 % (02/11/19 1246)    Physical Exam:  ASA: 2  Mallampati: 2    General: no acute distress  Mental Status: alert and oriented to person, place and time  HEENT: normocephalic, atraumatic  Chest: unlabored breathing  Heart: regular heart rate  Abdomen: nondistended  Extremity: moves all extremities    Laboratory  Lab Results   Component Value Date    INR 1.0 02/11/2019       Lab Results   Component Value Date    WBC 5.07 01/20/2019    HGB 11.3 (L) 01/20/2019    HCT 34.6 (L) 01/20/2019    MCV 93 01/20/2019     01/20/2019      Lab Results   Component Value Date    GLU 89 01/20/2019     01/20/2019    K 3.5 01/20/2019    CL 99 01/20/2019    CO2 28  01/20/2019    BUN 4 (L) 01/20/2019    CREATININE 0.7 01/20/2019    CALCIUM 8.7 01/20/2019    MG 1.4 (L) 01/20/2019    ALT 89 (H) 01/07/2019    AST 60 (H) 01/07/2019    ALBUMIN 3.8 01/07/2019    BILITOT 0.6 01/07/2019    BILIDIR 0.2 12/04/2018       ASSESSMENT/PLAN:     Sedation Plan: moderate    Patient will undergo right chest port placement.      Peg Harp MD   Department of Radiology  PGY II Resident  Pager: [phone]

## 2019-02-11 NOTE — PLAN OF CARE
This writer spoke to Samm in pharmacy regarding reconstruction of 1 g Cefazolin. Reconstruction instructions given :4ml sterile water to infuse over 3 minutes.

## 2019-02-11 NOTE — PROGRESS NOTES
Recovery complete. Pt tolerated well. No c/o pain. Site clean, dry, intact, no bleeding, no hematoma. Pt and mother given discharge and site care instructions. Both verbalized understanding. Pt ambulated and dressed independently. Pt declined wheelchair. Pt discharged home in care of mother.

## 2019-02-26 ENCOUNTER — HOSPITAL ENCOUNTER (OUTPATIENT)
Facility: HOSPITAL | Age: 54
Discharge: HOME | End: 2019-02-26
Attending: COLON & RECTAL SURGERY | Admitting: COLON & RECTAL SURGERY
Payer: MEDICAID

## 2019-02-26 ENCOUNTER — ANESTHESIA EVENT (OUTPATIENT)
Dept: ENDOSCOPY | Facility: HOSPITAL | Age: 54
End: 2019-02-26
Payer: MEDICAID

## 2019-02-26 ENCOUNTER — ANESTHESIA (OUTPATIENT)
Dept: ENDOSCOPY | Facility: HOSPITAL | Age: 54
End: 2019-02-26
Payer: MEDICAID

## 2019-02-26 VITALS
DIASTOLIC BLOOD PRESSURE: 85 MMHG | SYSTOLIC BLOOD PRESSURE: 122 MMHG | RESPIRATION RATE: 18 BRPM | HEART RATE: 85 BPM | WEIGHT: 160 LBS | TEMPERATURE: 99 F | OXYGEN SATURATION: 97 % | BODY MASS INDEX: 22.4 KG/M2 | HEIGHT: 71 IN

## 2019-02-26 PROCEDURE — 25000003 PHARM REV CODE 250: Performed by: COLON & RECTAL SURGERY

## 2019-02-26 PROCEDURE — 45380 COLONOSCOPY AND BIOPSY: CPT | Performed by: COLON & RECTAL SURGERY

## 2019-02-26 PROCEDURE — 27201012 HC FORCEPS, HOT/COLD, DISP: Performed by: COLON & RECTAL SURGERY

## 2019-02-26 PROCEDURE — E9220 PRA ENDO ANESTHESIA: ICD-10-PCS | Mod: ,,, | Performed by: NURSE ANESTHETIST, CERTIFIED REGISTERED

## 2019-02-26 PROCEDURE — E9220 PRA ENDO ANESTHESIA: HCPCS | Mod: ,,, | Performed by: NURSE ANESTHETIST, CERTIFIED REGISTERED

## 2019-02-26 PROCEDURE — 63600175 PHARM REV CODE 636 W HCPCS: Performed by: NURSE ANESTHETIST, CERTIFIED REGISTERED

## 2019-02-26 PROCEDURE — 45380 COLONOSCOPY AND BIOPSY: CPT | Mod: ,,, | Performed by: COLON & RECTAL SURGERY

## 2019-02-26 PROCEDURE — 37000008 HC ANESTHESIA 1ST 15 MINUTES: Performed by: COLON & RECTAL SURGERY

## 2019-02-26 PROCEDURE — 88305 TISSUE SPECIMEN TO PATHOLOGY - SURGERY: ICD-10-PCS | Mod: 26,,, | Performed by: PATHOLOGY

## 2019-02-26 PROCEDURE — 88305 TISSUE EXAM BY PATHOLOGIST: CPT | Performed by: PATHOLOGY

## 2019-02-26 PROCEDURE — 37000009 HC ANESTHESIA EA ADD 15 MINS: Performed by: COLON & RECTAL SURGERY

## 2019-02-26 PROCEDURE — 45380 PR COLONOSCOPY,BIOPSY: ICD-10-PCS | Mod: ,,, | Performed by: COLON & RECTAL SURGERY

## 2019-02-26 RX ORDER — LIDOCAINE HCL/PF 100 MG/5ML
SYRINGE (ML) INTRAVENOUS
Status: DISCONTINUED | OUTPATIENT
Start: 2019-02-26 | End: 2019-02-26

## 2019-02-26 RX ORDER — PROPOFOL 10 MG/ML
INJECTION, EMULSION INTRAVENOUS
Status: DISCONTINUED | OUTPATIENT
Start: 2019-02-26 | End: 2019-02-26

## 2019-02-26 RX ORDER — SODIUM CHLORIDE 9 MG/ML
INJECTION, SOLUTION INTRAVENOUS CONTINUOUS
Status: DISCONTINUED | OUTPATIENT
Start: 2019-02-26 | End: 2019-02-26 | Stop reason: HOSPADM

## 2019-02-26 RX ORDER — PROPOFOL 10 MG/ML
INJECTION, EMULSION INTRAVENOUS CONTINUOUS PRN
Status: DISCONTINUED | OUTPATIENT
Start: 2019-02-26 | End: 2019-02-26

## 2019-02-26 RX ADMIN — PROPOFOL 200 MCG/KG/MIN: 10 INJECTION, EMULSION INTRAVENOUS at 09:02

## 2019-02-26 RX ADMIN — SODIUM CHLORIDE: 0.9 INJECTION, SOLUTION INTRAVENOUS at 09:02

## 2019-02-26 RX ADMIN — PROPOFOL 50 MG: 10 INJECTION, EMULSION INTRAVENOUS at 10:02

## 2019-02-26 RX ADMIN — PROPOFOL 30 MG: 10 INJECTION, EMULSION INTRAVENOUS at 09:02

## 2019-02-26 RX ADMIN — PROPOFOL 50 MG: 10 INJECTION, EMULSION INTRAVENOUS at 09:02

## 2019-02-26 RX ADMIN — LIDOCAINE HYDROCHLORIDE 100 MG: 20 INJECTION, SOLUTION INTRAVENOUS at 09:02

## 2019-02-26 RX ADMIN — PROPOFOL 100 MG: 10 INJECTION, EMULSION INTRAVENOUS at 09:02

## 2019-02-26 NOTE — DISCHARGE INSTRUCTIONS
Colonoscopy     A camera attached to a flexible tube with a viewing lens is used to take video pictures.     Colonoscopy is a test to view the inside of your lower digestive tract (colon and rectum). Sometimes it can show the last part of the small intestine (ileum). During the test, small pieces of tissue may be removed for testing. This is called a biopsy. Small growths, such as polyps, may also be removed.   Why is colonoscopy done?  The test is done to help look for colon cancer. And it can help find the source of abdominal pain, bleeding, and changes in bowel habits. It may be needed once a year, depending on factors such as your:  · Age  · Health history  · Family health history  · Symptoms  · Results from any prior colonoscopy  Risks and possible complications  These include:  · Bleeding               · A puncture or tear in the colon   · Risks of anesthesia  · A cancer lesion not being seen  Getting ready   To prepare for the test:  · Talk with your healthcare provider about the risks of the test (see below). Also ask your healthcare provider about alternatives to the test.  · Tell your healthcare provider about any medicines you take. Also tell him or her about any health conditions you may have.  · Make sure your rectum and colon are empty for the test. Follow the diet and bowel prep instructions exactly. If you dont, the test may need to be rescheduled.  · Plan for a friend or family member to drive you home after the test.     Colonoscopy provides an inside view of the entire colon.     You may discuss the results with your doctor right away or at a future visit.  During the test   The test is usually done in the hospital on an outpatient basis. This means you go home the same day. The procedure takes about 30 minutes. During that time:  · You are given relaxing (sedating) medicine through an IV line. You may be drowsy, or fully asleep.  · The healthcare provider will first give you a physical exam to  check for anal and rectal problems.  · Then the anus is lubricated and the scope inserted.  · If you are awake, you may have a feeling similar to needing to have a bowel movement. You may also feel pressure as air is pumped into the colon. Its OK to pass gas during the procedure.  · Biopsy, polyp removal, or other treatments may be done during the test.  After the test   You may have gas right after the test. It can help to try to pass it to help prevent later bloating. Your healthcare provider may discuss the results with you right away. Or you may need to schedule a follow-up visit to talk about the results. After the test, you can go back to your normal eating and other activities. You may be tired from the sedation and need to rest for a few hours.  Date Last Reviewed: 11/1/2016 © 2000-2017 [employer]. [address] [place] [address], [place] [address]. All rights reserved. This information is not intended as a substitute for professional medical care. Always follow your healthcare professional's instructions.

## 2019-02-26 NOTE — TRANSFER OF CARE
"Anesthesia Transfer of Care Note    Patient: Chris Aguirre Jr.    Procedure(s) Performed: Procedure(s) (LRB):  COLONOSCOPY (N/A)    Patient location: GI    Anesthesia Type: general    Transport from OR: Transported from OR on room air with adequate spontaneous ventilation    Post pain: adequate analgesia    Post assessment: no apparent anesthetic complications and tolerated procedure well    Post vital signs: stable    Level of consciousness: awake    Nausea/Vomiting: no nausea/vomiting    Complications: none    Transfer of care protocol was followed      Last vitals:   Visit Vitals  /89 (BP Location: Left arm, Patient Position: Lying)   Pulse 89   Temp 36.8 °C (98.2 °F) (Temporal)   Resp 16   Ht 5' 11" (1.803 m)   Wt 72.6 kg (160 lb)   SpO2 99%   BMI 22.32 kg/m²     "

## 2019-02-26 NOTE — ANESTHESIA PREPROCEDURE EVALUATION
02/26/2019      Chris Aguirre Jr. is a 53 y.o. male       Pre-op Assessment    I have reviewed the Patient Summary Reports.     I have reviewed the Nursing Notes.   I have reviewed the Medications.     Review of Systems  Anesthesia Hx:  No problems with previous Anesthesia  History of prior surgery of interest to airway management or planning:  Denies Personal Hx of Anesthesia complications.   Social:  Alcohol Use, Smoker    Hematology/Oncology:  Hematology Normal   Oncology Normal     EENT/Dental:EENT/Dental Normal   Cardiovascular:   Exercise tolerance: good Hypertension, well controlled Past MI CAD asymptomatic  ECG has been reviewed.    Pulmonary:  Pulmonary Normal    Hepatic/GI:   Liver Disease, Hepatitis, C Hx of colovesical fistula   Neurological:  Neurology Normal    Psych:   Psychiatric History depression          Physical Exam  General:  Well nourished    Airway/Jaw/Neck:  Airway Findings: Mouth Opening: Normal General Airway Assessment: Adult  Mallampati: II  Improves to II with phonation.  Jaw/Neck Findings:  Limited Ability to Prognath  Neck ROM: Normal ROM     Eyes/Ears/Nose:  Eyes/Ears/Nose Findings:    Dental:  Dental Findings: Periodontal disease, Severe   Chest/Lungs:  Chest/Lungs Findings: Clear to auscultation, Normal Respiratory Rate     Heart/Vascular:  Heart Findings: Rate: Normal  Rhythm: Regular Rhythm  Sounds: Normal        Mental Status:  Mental Status Findings:  Cooperative, Alert and Oriented         Anesthesia Plan  Type of Anesthesia, risks & benefits discussed:  Anesthesia Type:  general  Patient's Preference:   Intra-op Monitoring Plan: standard ASA monitors  Intra-op Monitoring Plan Comments:   Post Op Pain Control Plan: IV/PO Opioids PRN  Post Op Pain Control Plan Comments:   Induction:   IV  Beta Blocker:  Patient is not currently on a Beta-Blocker (No further  documentation required).       Informed Consent: Patient understands risks and agrees with Anesthesia plan.  Questions answered. Anesthesia consent signed with patient.  ASA Score: 2     Day of Surgery Review of History & Physical: I have interviewed and examined the patient. I have reviewed the patient's H&P dated: 2/26/19.   H&P update referred to the provider.         Ready For Surgery From Anesthesia Perspective.

## 2019-02-26 NOTE — [employer] PATIENT INSTRUCTIONS
Discharge Summary/Instructions after an Endoscopic Procedure  Patient Name: Chris Aguirre  Patient MRN: [id number]  Patient YOB: 1965  Tuesday, February 26, 2019  Mikal Nino MD  RESTRICTIONS:  During your procedure today, you received medications for sedation.  These   medications may affect your judgment, balance and coordination.  Therefore,   for 24 hours, you have the following restrictions:   - DO NOT drive a car, operate machinery, make legal/financial decisions,   sign important papers or drink alcohol.    ACTIVITY:  Today: no heavy lifting, straining or running due to procedural   sedation/anesthesia.  The following day: return to full activity including work.  DIET:  Eat and drink normally unless instructed otherwise.     TREATMENT FOR COMMON SIDE EFFECTS:  - Mild abdominal pain, nausea, belching, bloating or excessive gas:  rest,   eat lightly and use a heating pad.  - Sore Throat: treat with throat lozenges and/or gargle with warm salt   water.  - Because air was used during the procedure, expelling large amounts of air   from your rectum or belching is normal.  - If a bowel prep was taken, you may not have a bowel movement for 1-3 days.    This is normal.  SYMPTOMS TO WATCH FOR AND REPORT TO YOUR PHYSICIAN:  1. Abdominal pain or bloating, other than gas cramps.  2. Chest pain.  3. Back pain.  4. Signs of infection such as: chills or fever occurring within 24 hours   after the procedure.  5. Rectal bleeding, which would show as bright red, maroon, or black stools.   (A tablespoon of blood from the rectum is not serious, especially if   hemorrhoids are present.)  6. Vomiting.  7. Weakness or dizziness.  GO DIRECTLY TO THE NEAREST EMERGENCY ROOM IF YOU HAVE ANY OF THE FOLLOWING:      Difficulty breathing              Chills and/or fever over 101 F   Persistent vomiting and/or vomiting blood   Severe abdominal pain   Severe chest pain   Black, tarry stools   Bleeding- more than one  tablespoon   Any other symptom or condition that you feel may need urgent attention  Your doctor recommends these additional instructions:  If any biopsies were taken, your doctors clinic will contact you in 1 to 2   weeks with any results.  - Discharge patient to home.   - Resume previous diet.   - Continue present medications.   - Await pathology results.   - Repeat colonoscopy in 1 year for surveillance.   - Patient has a contact number available for emergencies.  The signs and   symptoms of potential delayed complications were discussed with the   patient.  Return to normal activities tomorrow.  Written discharge   instructions were provided to the patient.  For questions, problems or results please call your physician - Mikal Nino MD at Work:  [phone].  [facility], EMERGENCY ROOM PHONE NUMBER: [phone]  IF A COMPLICATION OR EMERGENCY SITUATION ARISES AND YOU ARE UNABLE TO REACH   YOUR PHYSICIAN - GO DIRECTLY TO THE EMERGENCY ROOM.  Mikal Nino MD  2/26/2019 10:32:24 AM  This report has been verified and signed electronically.  [employer]

## 2019-02-26 NOTE — ANESTHESIA POSTPROCEDURE EVALUATION
"Anesthesia Post Evaluation    Patient: Chris Aguirre     Procedure(s) Performed: Procedure(s) (LRB):  COLONOSCOPY (N/A)    Final Anesthesia Type: general  Patient location during evaluation: PACU  Patient participation: Yes- Able to Participate  Level of consciousness: awake and alert and oriented  Post-procedure vital signs: reviewed and stable  Pain management: adequate  Airway patency: patent  PONV status at discharge: No PONV  Anesthetic complications: no      Cardiovascular status: blood pressure returned to baseline  Respiratory status: unassisted  Hydration status: euvolemic  Follow-up not needed.        Visit Vitals  /85   Pulse 85   Temp 37 °C (98.6 °F)   Resp 18   Ht 5' 11" (1.803 m)   Wt 72.6 kg (160 lb)   SpO2 97%   BMI 22.32 kg/m²       Pain/Marcella Score: Marcella Score: 9 (2/26/2019 10:42 AM)        "

## 2019-02-28 ENCOUNTER — TELEPHONE (OUTPATIENT)
Dept: PHARMACY | Facility: CLINIC | Age: 54
End: 2019-02-28

## 2019-03-13 ENCOUNTER — PATIENT MESSAGE (OUTPATIENT)
Dept: ADMINISTRATIVE | Facility: OTHER | Age: 54
End: 2019-03-13

## 2019-04-25 ENCOUNTER — TELEPHONE (OUTPATIENT)
Dept: PHARMACY | Facility: CLINIC | Age: 54
End: 2019-04-25

## 2019-04-25 RX ORDER — LAMIVUDINE 100 MG/1
100 TABLET, FILM COATED ORAL DAILY
Qty: 30 TABLET | Refills: 0 | Status: SHIPPED | OUTPATIENT
Start: 2019-04-25 | End: 2019-06-27

## 2019-04-25 NOTE — TELEPHONE ENCOUNTER
Lamivudine refill and f/u attempted. No answer. LVM for call back.   - Refills requested from provider

## 2019-05-02 NOTE — TELEPHONE ENCOUNTER
Lamivudine refill and f/u attempted. No answer. LVM for call back.   - Refills received from provider.

## 2019-05-10 NOTE — TELEPHONE ENCOUNTER
Lamivudine Refill and follow-up  No answer, left message for refill and follow up. Will follow up.

## 2019-05-15 NOTE — TELEPHONE ENCOUNTER
Lamivudine Refill and follow-up  No answer, left message for refill and follow up. Will follow up.

## 2019-05-17 ENCOUNTER — TELEPHONE (OUTPATIENT)
Dept: HEPATOLOGY | Facility: CLINIC | Age: 54
End: 2019-05-17

## 2019-05-17 DIAGNOSIS — B18.2 CHRONIC HEPATITIS C WITHOUT HEPATIC COMA: Primary | ICD-10-CM

## 2019-05-17 NOTE — TELEPHONE ENCOUNTER
Spoke to pt  Confirmed that he completed chemo 11/2019  Plan was to continue lamivudine x 6 months after chemo, so additional refills of lamivudine are not needed at this time.  · Still needs to return for HCV rx  · Still needs HBV monitoring x 12 months    Pls schedule:  · Labs Wed 5/[facility] at [facility]: HBV DNA, HBsAg, CMP, INR, CBC, HCV RNA, HIV  · Visit w/ me in 2-3 weeks

## 2019-05-17 NOTE — TELEPHONE ENCOUNTER
Lamivudine refill and follow-up:   Lamivudine refill confirmed. Patient will  Lamivudine refill on  from [facility] $0.00 copay- 004. Confirmed 2 patient identifiers - name and .     Patient has 0 doses of Lamivudine remaining, states he has not taken medication for about 2 weeks because he was out. Compliance and importance of adherence with this medication reinforced, patient verbalized understanding and stated he will  mediation today.  Mr Perez stated the only reason he has not been taking the medication is because we haven't been calling him for his refills.  I reassured him that [facility] has been following up with him and leaving messages for a call back with no return. Mr Perez stated he lost his phone and his mother's home number ( [phone]) is the best number to reach him, this number has been listed in  patient notes in [facility] for future refills and follow-up. Also informed Mr Perez that if he has 7 days of medication remaining to call [facility] for refill.  Patient reports they are not having any side effects so far. No new medications, no new allergies or health conditions reported at this time. All questions answered and addressed to patients satisfaction. Pt verbalized understanding. Will follow-up. Clinical Adherence Assessment activity opened.

## 2019-05-17 NOTE — TELEPHONE ENCOUNTER
----- Message from Nancy Rodriguez PharmD sent at 5/17/2019 11:18 AM CDT -----  Good morning CATRACHITA Guzmán, I spoke with Mr Blackman, he reported missing about 2 weeks of mediation due to running out, he is supposed to  today. I will follow-up.                      Thanks,                   Nancy Rodriguez PharmD                   Clinical Pharmacist  - [facility]

## 2019-05-22 ENCOUNTER — LAB VISIT (OUTPATIENT)
Dept: LAB | Facility: HOSPITAL | Age: 54
End: 2019-05-22
Payer: MEDICAID

## 2019-05-22 DIAGNOSIS — R76.8 HEPATITIS B CORE ANTIBODY POSITIVE: ICD-10-CM

## 2019-05-22 DIAGNOSIS — B18.2 CHRONIC HEPATITIS C WITHOUT HEPATIC COMA: ICD-10-CM

## 2019-05-22 LAB
ALBUMIN SERPL BCP-MCNC: 4.4 G/DL (ref 3.5–5.2)
ALP SERPL-CCNC: 68 U/L (ref 55–135)
ALT SERPL W/O P-5'-P-CCNC: 62 U/L (ref 10–44)
ANION GAP SERPL CALC-SCNC: 15 MMOL/L (ref 8–16)
AST SERPL-CCNC: 48 U/L (ref 10–40)
BASOPHILS # BLD AUTO: 0.02 K/UL (ref 0–0.2)
BASOPHILS NFR BLD: 0.2 % (ref 0–1.9)
BILIRUB SERPL-MCNC: 1.1 MG/DL (ref 0.1–1)
BUN SERPL-MCNC: 18 MG/DL (ref 6–20)
CALCIUM SERPL-MCNC: 10.4 MG/DL (ref 8.7–10.5)
CHLORIDE SERPL-SCNC: 99 MMOL/L (ref 95–110)
CO2 SERPL-SCNC: 23 MMOL/L (ref 23–29)
CREAT SERPL-MCNC: 1 MG/DL (ref 0.5–1.4)
DIFFERENTIAL METHOD: ABNORMAL
EOSINOPHIL # BLD AUTO: 0.1 K/UL (ref 0–0.5)
EOSINOPHIL NFR BLD: 1.1 % (ref 0–8)
ERYTHROCYTE [DISTWIDTH] IN BLOOD BY AUTOMATED COUNT: 16.1 % (ref 11.5–14.5)
EST. GFR  (AFRICAN AMERICAN): >60 ML/MIN/1.73 M^2
EST. GFR  (NON AFRICAN AMERICAN): >60 ML/MIN/1.73 M^2
GLUCOSE SERPL-MCNC: 76 MG/DL (ref 70–110)
HBV SURFACE AG SERPL QL IA: NEGATIVE
HCT VFR BLD AUTO: 44.7 % (ref 40–54)
HGB BLD-MCNC: 14.9 G/DL (ref 14–18)
HIV 1+2 AB+HIV1 P24 AG SERPL QL IA: NEGATIVE
IMM GRANULOCYTES # BLD AUTO: 0.02 K/UL (ref 0–0.04)
IMM GRANULOCYTES NFR BLD AUTO: 0.2 % (ref 0–0.5)
INR PPP: 0.9 (ref 0.8–1.2)
LYMPHOCYTES # BLD AUTO: 2.8 K/UL (ref 1–4.8)
LYMPHOCYTES NFR BLD: 32.2 % (ref 18–48)
MCH RBC QN AUTO: 28.7 PG (ref 27–31)
MCHC RBC AUTO-ENTMCNC: 33.3 G/DL (ref 32–36)
MCV RBC AUTO: 86 FL (ref 82–98)
MONOCYTES # BLD AUTO: 0.6 K/UL (ref 0.3–1)
MONOCYTES NFR BLD: 6.5 % (ref 4–15)
NEUTROPHILS # BLD AUTO: 5.2 K/UL (ref 1.8–7.7)
NEUTROPHILS NFR BLD: 59.8 % (ref 38–73)
NRBC BLD-RTO: 0 /100 WBC
PLATELET # BLD AUTO: 201 K/UL (ref 150–350)
PMV BLD AUTO: 10.9 FL (ref 9.2–12.9)
POTASSIUM SERPL-SCNC: 3.2 MMOL/L (ref 3.5–5.1)
PROT SERPL-MCNC: 8.8 G/DL (ref 6–8.4)
PROTHROMBIN TIME: 9.6 SEC (ref 9–12.5)
RBC # BLD AUTO: 5.19 M/UL (ref 4.6–6.2)
SODIUM SERPL-SCNC: 137 MMOL/L (ref 136–145)
WBC # BLD AUTO: 8.77 K/UL (ref 3.9–12.7)

## 2019-05-22 PROCEDURE — 87517 HEPATITIS B DNA QUANT: CPT

## 2019-05-22 PROCEDURE — 85610 PROTHROMBIN TIME: CPT

## 2019-05-22 PROCEDURE — 86703 HIV-1/HIV-2 1 RESULT ANTBDY: CPT

## 2019-05-22 PROCEDURE — 87522 HEPATITIS C REVRS TRNSCRPJ: CPT

## 2019-05-22 PROCEDURE — 36415 COLL VENOUS BLD VENIPUNCTURE: CPT

## 2019-05-22 PROCEDURE — 85025 COMPLETE CBC W/AUTO DIFF WBC: CPT

## 2019-05-22 PROCEDURE — 80053 COMPREHEN METABOLIC PANEL: CPT

## 2019-05-22 PROCEDURE — 87340 HEPATITIS B SURFACE AG IA: CPT

## 2019-05-28 LAB
HBV DNA SERPL NAA+PROBE-ACNC: <10 IU/ML
HBV DNA SERPL NAA+PROBE-LOG IU: <1 LOG (10) IU/ML
HBV DNA SERPL QL NAA+PROBE: NOT DETECTED
HCV RNA SERPL NAA+PROBE-LOG IU: 5.44 LOG (10) IU/ML
HCV RNA SERPL QL NAA+PROBE: DETECTED IU/ML
HCV RNA SPEC NAA+PROBE-ACNC: ABNORMAL IU/ML

## 2019-06-06 ENCOUNTER — HOSPITAL ENCOUNTER (INPATIENT)
Facility: HOSPITAL | Age: 54
LOS: 1 days | Discharge: HOME | DRG: 917 | End: 2019-06-06
Attending: EMERGENCY MEDICINE | Admitting: HOSPITALIST
Payer: MEDICAID

## 2019-06-06 ENCOUNTER — CLINICAL SUPPORT (OUTPATIENT)
Dept: SMOKING CESSATION | Facility: CLINIC | Age: 54
End: 2019-06-06
Payer: COMMERCIAL

## 2019-06-06 ENCOUNTER — TELEPHONE (OUTPATIENT)
Dept: HEPATOLOGY | Facility: CLINIC | Age: 54
End: 2019-06-06

## 2019-06-06 VITALS
HEIGHT: 71 IN | BODY MASS INDEX: 22.4 KG/M2 | DIASTOLIC BLOOD PRESSURE: 89 MMHG | SYSTOLIC BLOOD PRESSURE: 130 MMHG | RESPIRATION RATE: 18 BRPM | HEART RATE: 84 BPM | WEIGHT: 160 LBS | TEMPERATURE: 98 F | OXYGEN SATURATION: 97 %

## 2019-06-06 DIAGNOSIS — I21.4 NSTEMI (NON-ST ELEVATED MYOCARDIAL INFARCTION): Primary | ICD-10-CM

## 2019-06-06 DIAGNOSIS — F17.210 CIGARETTE SMOKER: Primary | ICD-10-CM

## 2019-06-06 DIAGNOSIS — F15.10 METHAMPHETAMINE ABUSE: ICD-10-CM

## 2019-06-06 PROBLEM — D64.9 ANEMIA: Status: ACTIVE | Noted: 2019-06-06

## 2019-06-06 PROBLEM — E83.42 HYPOMAGNESEMIA: Status: ACTIVE | Noted: 2019-06-06

## 2019-06-06 PROBLEM — E87.6 HYPOKALEMIA: Status: ACTIVE | Noted: 2019-06-06

## 2019-06-06 LAB
ALBUMIN SERPL BCP-MCNC: 3.9 G/DL (ref 3.5–5.2)
ALP SERPL-CCNC: 55 U/L (ref 55–135)
ALT SERPL W/O P-5'-P-CCNC: 14 U/L (ref 10–44)
AMPHET+METHAMPHET UR QL: NORMAL
ANION GAP SERPL CALC-SCNC: 14 MMOL/L (ref 8–16)
AORTIC ROOT ANNULUS: 3.26 CM
AST SERPL-CCNC: 24 U/L (ref 10–40)
AV INDEX (PROSTH): 1.04
AV MEAN GRADIENT: 2.56 MMHG
AV PEAK GRADIENT: 4.75 MMHG
AV VALVE AREA: 4.81 CM2
AV VELOCITY RATIO: 0.83
BARBITURATES UR QL SCN>200 NG/ML: NEGATIVE
BASOPHILS # BLD AUTO: 0.02 K/UL (ref 0–0.2)
BASOPHILS NFR BLD: 0.2 % (ref 0–1.9)
BENZODIAZ UR QL SCN>200 NG/ML: NEGATIVE
BILIRUB SERPL-MCNC: 0.7 MG/DL (ref 0.1–1)
BNP SERPL-MCNC: 498 PG/ML (ref 0–99)
BSA FOR ECHO PROCEDURE: 1.91 M2
BUN SERPL-MCNC: 17 MG/DL (ref 6–20)
BZE UR QL SCN: NEGATIVE
CALCIUM SERPL-MCNC: 10 MG/DL (ref 8.7–10.5)
CANNABINOIDS UR QL SCN: NEGATIVE
CHLORIDE SERPL-SCNC: 102 MMOL/L (ref 95–110)
CHOLEST SERPL-MCNC: 123 MG/DL (ref 120–199)
CHOLEST/HDLC SERPL: 3.6 {RATIO} (ref 2–5)
CK SERPL-CCNC: 155 U/L (ref 20–200)
CO2 SERPL-SCNC: 23 MMOL/L (ref 23–29)
CREAT SERPL-MCNC: 0.9 MG/DL (ref 0.5–1.4)
CREAT UR-MCNC: 44 MG/DL (ref 23–375)
CV ECHO LV RWT: 0.33 CM
DIFFERENTIAL METHOD: ABNORMAL
DOP CALC AO PEAK VEL: 1.09 M/S
DOP CALC AO VTI: 23.59 CM
DOP CALC LVOT AREA: 4.64 CM2
DOP CALC LVOT DIAMETER: 2.43 CM
DOP CALC LVOT PEAK VEL: 0.91 M/S
DOP CALC LVOT STROKE VOLUME: 113.38 CM3
DOP CALCLVOT PEAK VEL VTI: 24.46 CM
E WAVE DECELERATION TIME: 133.24 MSEC
E/A RATIO: 0.7
ECHO LV POSTERIOR WALL: 0.84 CM (ref 0.6–1.1)
EOSINOPHIL # BLD AUTO: 0.1 K/UL (ref 0–0.5)
EOSINOPHIL NFR BLD: 0.7 % (ref 0–8)
ERYTHROCYTE [DISTWIDTH] IN BLOOD BY AUTOMATED COUNT: 14.6 % (ref 11.5–14.5)
EST. GFR  (AFRICAN AMERICAN): >60 ML/MIN/1.73 M^2
EST. GFR  (NON AFRICAN AMERICAN): >60 ML/MIN/1.73 M^2
ESTIMATED AVG GLUCOSE: 108 MG/DL (ref 68–131)
ETHANOL UR-MCNC: <10 MG/DL
FRACTIONAL SHORTENING: 23 % (ref 28–44)
GLUCOSE SERPL-MCNC: 150 MG/DL (ref 70–110)
HBA1C MFR BLD HPLC: 5.4 % (ref 4–5.6)
HCT VFR BLD AUTO: 39.5 % (ref 40–54)
HDLC SERPL-MCNC: 34 MG/DL (ref 40–75)
HDLC SERPL: 27.6 % (ref 20–50)
HGB BLD-MCNC: 13.3 G/DL (ref 14–18)
INTERVENTRICULAR SEPTUM: 1.21 CM (ref 0.6–1.1)
LA MAJOR: 4.6 CM
LA MINOR: 4.45 CM
LA WIDTH: 3.07 CM
LDLC SERPL CALC-MCNC: 75 MG/DL (ref 63–159)
LEFT ATRIUM SIZE: 3.54 CM
LEFT ATRIUM VOLUME INDEX: 21.8 ML/M2
LEFT ATRIUM VOLUME: 41.79 CM3
LEFT INTERNAL DIMENSION IN SYSTOLE: 3.94 CM (ref 2.1–4)
LEFT VENTRICLE DIASTOLIC VOLUME INDEX: 65.42 ML/M2
LEFT VENTRICLE DIASTOLIC VOLUME: 125.46 ML
LEFT VENTRICLE MASS INDEX: 102.3 G/M2
LEFT VENTRICLE SYSTOLIC VOLUME INDEX: 35.3 ML/M2
LEFT VENTRICLE SYSTOLIC VOLUME: 67.69 ML
LEFT VENTRICULAR INTERNAL DIMENSION IN DIASTOLE: 5.13 CM (ref 3.5–6)
LEFT VENTRICULAR MASS: 196.24 G
LYMPHOCYTES # BLD AUTO: 2.9 K/UL (ref 1–4.8)
LYMPHOCYTES NFR BLD: 35.7 % (ref 18–48)
MAGNESIUM SERPL-MCNC: 1.4 MG/DL (ref 1.6–2.6)
MCH RBC QN AUTO: 29 PG (ref 27–31)
MCHC RBC AUTO-ENTMCNC: 33.7 G/DL (ref 32–36)
MCV RBC AUTO: 86 FL (ref 82–98)
METHADONE UR QL SCN>300 NG/ML: NEGATIVE
MONOCYTES # BLD AUTO: 1 K/UL (ref 0.3–1)
MONOCYTES NFR BLD: 13 % (ref 4–15)
MV PEAK A VEL: 0.71 M/S
MV PEAK E VEL: 0.5 M/S
NEUTROPHILS # BLD AUTO: 4 K/UL (ref 1.8–7.7)
NEUTROPHILS NFR BLD: 50.2 % (ref 38–73)
NONHDLC SERPL-MCNC: 89 MG/DL
OPIATES UR QL SCN: NEGATIVE
PCP UR QL SCN>25 NG/ML: NEGATIVE
PLATELET # BLD AUTO: 429 K/UL (ref 150–350)
PMV BLD AUTO: 10.1 FL (ref 9.2–12.9)
POTASSIUM SERPL-SCNC: 3.3 MMOL/L (ref 3.5–5.1)
PROT SERPL-MCNC: 7.7 G/DL (ref 6–8.4)
PULM VEIN S/D RATIO: 2.07
PV PEAK D VEL: 0.27 M/S
PV PEAK S VEL: 0.56 M/S
PV PEAK VELOCITY: 0.69 CM/S
RA MAJOR: 3.9 CM
RA PRESSURE: 3 MMHG
RBC # BLD AUTO: 4.58 M/UL (ref 4.6–6.2)
RIGHT VENTRICULAR END-DIASTOLIC DIMENSION: 2.82 CM
SODIUM SERPL-SCNC: 139 MMOL/L (ref 136–145)
STJ: 3.03 CM
TOXICOLOGY INFORMATION: NORMAL
TRIGL SERPL-MCNC: 70 MG/DL (ref 30–150)
TROPONIN I SERPL DL<=0.01 NG/ML-MCNC: 4.04 NG/ML (ref 0–0.03)
TROPONIN I SERPL DL<=0.01 NG/ML-MCNC: 4.39 NG/ML (ref 0–0.03)
TSH SERPL DL<=0.005 MIU/L-ACNC: 0.57 UIU/ML (ref 0.4–4)
WBC # BLD AUTO: 8.03 K/UL (ref 3.9–12.7)

## 2019-06-06 PROCEDURE — 25000003 PHARM REV CODE 250: Performed by: EMERGENCY MEDICINE

## 2019-06-06 PROCEDURE — 83735 ASSAY OF MAGNESIUM: CPT

## 2019-06-06 PROCEDURE — 83880 ASSAY OF NATRIURETIC PEPTIDE: CPT

## 2019-06-06 PROCEDURE — 93010 EKG 12-LEAD: ICD-10-PCS | Mod: ,,, | Performed by: INTERNAL MEDICINE

## 2019-06-06 PROCEDURE — 84484 ASSAY OF TROPONIN QUANT: CPT | Mod: 91

## 2019-06-06 PROCEDURE — 83036 HEMOGLOBIN GLYCOSYLATED A1C: CPT

## 2019-06-06 PROCEDURE — 93010 ELECTROCARDIOGRAM REPORT: CPT | Mod: ,,, | Performed by: INTERNAL MEDICINE

## 2019-06-06 PROCEDURE — 99232 SBSQ HOSP IP/OBS MODERATE 35: CPT | Mod: 25,,, | Performed by: STUDENT IN AN ORGANIZED HEALTH CARE EDUCATION/TRAINING PROGRAM

## 2019-06-06 PROCEDURE — 99407 BEHAV CHNG SMOKING > 10 MIN: CPT | Mod: S$GLB,,,

## 2019-06-06 PROCEDURE — 84484 ASSAY OF TROPONIN QUANT: CPT

## 2019-06-06 PROCEDURE — 11000001 HC ACUTE MED/SURG PRIVATE ROOM

## 2019-06-06 PROCEDURE — 99232 PR SUBSEQUENT HOSPITAL CARE,LEVL II: ICD-10-PCS | Mod: 25,,, | Performed by: STUDENT IN AN ORGANIZED HEALTH CARE EDUCATION/TRAINING PROGRAM

## 2019-06-06 PROCEDURE — 82550 ASSAY OF CK (CPK): CPT

## 2019-06-06 PROCEDURE — 80061 LIPID PANEL: CPT

## 2019-06-06 PROCEDURE — 80053 COMPREHEN METABOLIC PANEL: CPT

## 2019-06-06 PROCEDURE — 96374 THER/PROPH/DIAG INJ IV PUSH: CPT

## 2019-06-06 PROCEDURE — 94761 N-INVAS EAR/PLS OXIMETRY MLT: CPT

## 2019-06-06 PROCEDURE — 93005 ELECTROCARDIOGRAM TRACING: CPT

## 2019-06-06 PROCEDURE — 85025 COMPLETE CBC W/AUTO DIFF WBC: CPT

## 2019-06-06 PROCEDURE — 93010 EKG 12-LEAD: ICD-10-PCS | Mod: ,,, | Performed by: STUDENT IN AN ORGANIZED HEALTH CARE EDUCATION/TRAINING PROGRAM

## 2019-06-06 PROCEDURE — 93010 ELECTROCARDIOGRAM REPORT: CPT | Mod: ,,, | Performed by: STUDENT IN AN ORGANIZED HEALTH CARE EDUCATION/TRAINING PROGRAM

## 2019-06-06 PROCEDURE — 99291 CRITICAL CARE FIRST HOUR: CPT | Mod: 25

## 2019-06-06 PROCEDURE — 36415 COLL VENOUS BLD VENIPUNCTURE: CPT

## 2019-06-06 PROCEDURE — 63600175 PHARM REV CODE 636 W HCPCS: Performed by: EMERGENCY MEDICINE

## 2019-06-06 PROCEDURE — 99407 PR TOBACCO USE CESSATION INTENSIVE >10 MINUTES: ICD-10-PCS | Mod: S$GLB,,,

## 2019-06-06 PROCEDURE — 84443 ASSAY THYROID STIM HORMONE: CPT

## 2019-06-06 PROCEDURE — 96361 HYDRATE IV INFUSION ADD-ON: CPT

## 2019-06-06 PROCEDURE — 80307 DRUG TEST PRSMV CHEM ANLYZR: CPT

## 2019-06-06 RX ORDER — ONDANSETRON 2 MG/ML
4 INJECTION INTRAMUSCULAR; INTRAVENOUS EVERY 8 HOURS PRN
Status: DISCONTINUED | OUTPATIENT
Start: 2019-06-06 | End: 2019-06-06 | Stop reason: HOSPADM

## 2019-06-06 RX ORDER — SODIUM CHLORIDE 0.9 % (FLUSH) 0.9 %
10 SYRINGE (ML) INJECTION
Status: DISCONTINUED | OUTPATIENT
Start: 2019-06-06 | End: 2019-06-06 | Stop reason: HOSPADM

## 2019-06-06 RX ORDER — CLOPIDOGREL BISULFATE 75 MG/1
75 TABLET ORAL DAILY
Qty: 30 TABLET | Refills: 2 | Status: SHIPPED | OUTPATIENT
Start: 2019-06-06 | End: 2019-08-27 | Stop reason: SDUPTHER

## 2019-06-06 RX ORDER — PRAVASTATIN SODIUM 10 MG/1
10 TABLET ORAL DAILY
Qty: 30 TABLET | Refills: 2 | Status: SHIPPED | OUTPATIENT
Start: 2019-06-06 | End: 2019-06-24 | Stop reason: ALTCHOICE

## 2019-06-06 RX ORDER — BUSPIRONE HYDROCHLORIDE 5 MG/1
5 TABLET ORAL 2 TIMES DAILY PRN
Qty: 20 TABLET | Refills: 0 | Status: SHIPPED | OUTPATIENT
Start: 2019-06-06 | End: 2019-06-19 | Stop reason: SDUPTHER

## 2019-06-06 RX ORDER — ASPIRIN 81 MG/1
81 TABLET ORAL DAILY
Refills: 0 | COMMUNITY
Start: 2019-06-06 | End: 2020-10-01

## 2019-06-06 RX ORDER — ACETAMINOPHEN 325 MG/1
650 TABLET ORAL EVERY 4 HOURS PRN
Status: DISCONTINUED | OUTPATIENT
Start: 2019-06-06 | End: 2019-06-06 | Stop reason: HOSPADM

## 2019-06-06 RX ORDER — ACETAMINOPHEN 325 MG/1
650 TABLET ORAL EVERY 8 HOURS PRN
Status: DISCONTINUED | OUTPATIENT
Start: 2019-06-06 | End: 2019-06-06 | Stop reason: HOSPADM

## 2019-06-06 RX ORDER — CARVEDILOL 3.12 MG/1
3.12 TABLET ORAL 2 TIMES DAILY
Qty: 60 TABLET | Refills: 2 | Status: SHIPPED | OUTPATIENT
Start: 2019-06-06 | End: 2019-12-19 | Stop reason: DRUGHIGH

## 2019-06-06 RX ORDER — LORAZEPAM 1 MG/1
2 TABLET ORAL
Status: COMPLETED | OUTPATIENT
Start: 2019-06-06 | End: 2019-06-06

## 2019-06-06 RX ORDER — SODIUM CHLORIDE 9 MG/ML
1000 INJECTION, SOLUTION INTRAVENOUS
Status: COMPLETED | OUTPATIENT
Start: 2019-06-06 | End: 2019-06-06

## 2019-06-06 RX ADMIN — LORAZEPAM 2 MG: 1 TABLET ORAL at 01:06

## 2019-06-06 RX ADMIN — LORAZEPAM 2 MG: 2 INJECTION INTRAMUSCULAR; INTRAVENOUS at 12:06

## 2019-06-06 RX ADMIN — SODIUM CHLORIDE 1000 ML: 0.9 INJECTION, SOLUTION INTRAVENOUS at 12:06

## 2019-06-06 NOTE — HPI
55yo male with history of ETOH use, Hepatitis C and HTN with complaints of dizziness, rash and thirst. He reports his symptoms started about an hour after snorting crystal meth and was fearful of poisoning therefore he presented to the ER. He was seen in the echo lab while undergoing his echocardiogram. He works a physical labor job and reports chest pain with activity that only lasts a few seconds and resolves spontaneously and does not typically recur. He complains of SOB with exertion but was unable to elaborate. He denies any history of CAD, MI, PCI or CABG. He is an active smoker and does not have any strong family history of CAD.

## 2019-06-06 NOTE — H&P
"[facility] Medicine  History & Physical    Patient Name: Chris Aguirre Jr.  MRN: [id number]  Admission Date: 6/6/2019  Attending Physician: Bin Clements MD   Primary Care Provider: Ash Hung NP         Patient information was obtained from patient and ER records.     Subjective:     Principal Problem:NSTEMI (non-ST elevated myocardial infarction)    Chief Complaint:   Chief Complaint   Patient presents with    Addiction Problem     To ER per EMS with c/o dizziness, thirst and "rash" to bilateral legs after taking crystal meth one hour ago.  Benadryl given to pt per EMS.        HPI: Chris Aguirre is a 54-year-old male with a past medical history of Anxiety, colon Cancer, Coronary Artery Disease, Depression, Hepatitis C, Hypertension, Myocardial infarction, Psychosis and Suicide attempt. Patient has a history of Methamphetamine abuse. He lives in [place], [place]. His PCP is Ash Hung NP.     He presented to [facility] 6/6/2019 via EMS with a chief complaint of dizziness, thirst, and a rash to both legs. The patient reports snorting crystal meth and feels he was poisoned. Upon ED presentation, Blood pressure (194/111), Heart rate (125), Hgb (13.3), Hct (39.5), K (3.3), Glucose (150), Troponin (4.393), Mg (1.4), BNP (498), CXR unremarkable. Patient given IV fluids and Benzos in ED, Workup revealed NSTEMI likely secondary to amphetamine use. Admitted for further evaluation and treatment.    Past Medical History:   Diagnosis Date    Anxiety about health 6/20/2018    Colon cancer     Coronary artery disease     Depression     Hepatitis C 3/9/2018    History of psychiatric care     History of psychiatric hospitalization     Hypertension     MI (myocardial infarction) 2007    Neuropathy     Psychiatric problem     Psychosis     Self-harming behavior     Suicide attempt        Past Surgical History:   Procedure Laterality Date    ABDOMINAL " SURGERY      History of perforated ulcer, unknown surgery    APPENDECTOMY      CLOSURE,ILEOSTOMY N/A 1/11/2019    [facility] 2ND [facility]LR    COLECTOMY-SIGMOID POSSIBLE BLADDER RESECTION N/A 3/13/2018    [facility] by [facility] OR 2ND [facility]LR    COLONOSCOPY N/A 2/26/2019    Performed by [facility] at [facility] ENDO (4TH FLR)    CYSTECTOMY-PARTIAL w/ bladder resection  3/13/2018    Performed by [facility] at [facility] OR 2ND FLR    CYSTOSCOPY WITH STENT PLACEMENT Bilateral 3/13/2018    [facility]formed by [facility] at [facility] OR 2ND FLR    ILEOSTOMY      ILEOSTOMY N/A 3/13/2018    [facility]formed by [facility] at [facility] OR 2ND FLR    ILEOSTOMY CLOSURE      REMOVAL N/A 2/11/2019    [facility]formed by [facility] at [facility] OR 2ND FLR    SIGMOIDOSCOPY-FLEXIBLE N/A 3/12/2018    Performed by [facility] at [facility] ENDO (2ND FLR)       Review of patient's allergies indicates:  No Known Allergies    No current facility-administered medications on file prior to encounter.      Current Outpatient Medications on File Prior to Encounter   Medication Sig    lamiVUDine (EPIVIR) 100 MG Tab Take 1 tablet (100 mg total) by mouth once daily.    multivitamin with minerals tablet Take 1 tablet by mouth once daily.     Family History     Problem Relation (Age of Onset)    ADD / ADHD Cousin    No Known Problems Mother, Father, Sister, Brother, Maternal Aunt, Paternal Aunt, Maternal Uncle, Paternal Uncle, Maternal Grandfather, Maternal Grandmother, Paternal Grandfather, Paternal Grandmother        Tobacco Use    Smoking status: Current Every Day Smoker     Packs/day: 1.00     Years: 30.00     Pack years: 30.00    Smokeless tobacco: Never Used   Substance and Sexual Activity    Alcohol use: Yes     Comment: occasional    Drug use: No    Sexual activity: Yes     Partners: Female     Birth control/protection: None     Review of Systems   Unable to perform ROS: Acuity of  condition     Objective:     Vital Signs (Most Recent):  Temp: 97 °F (36.1 °C) (06/06/19 0015)  Pulse: 77 (06/06/19 0310)  Resp: 19 (06/06/19 0310)  BP: (!) 112/57 (06/06/19 0310)  SpO2: 100 % (06/06/19 0310) Vital Signs (24h Range):  Temp:  [97 °F (36.1 °C)] 97 °F (36.1 °C)  Pulse:  [] 77  Resp:  [18-21] 19  SpO2:  [97 %-100 %] 100 %  BP: (112-194)/() 112/57     Weight: 72.6 kg (160 lb)  Body mass index is 22.32 kg/m².    Physical Exam   Constitutional: He is oriented to person, place, and time.   Appears to be under the influence of an illegal substance   HENT:   Head: Normocephalic and atraumatic.   Eyes: Pupils are equal, round, and reactive to light.   Neck: Normal range of motion. No JVD present.   Cardiovascular:   Sinus Tachycardia  Irregular    Pulmonary/Chest: Effort normal and breath sounds normal.   Abdominal: Soft. Bowel sounds are normal. He exhibits no distension.   Musculoskeletal: Normal range of motion.   Neurological: He is alert and oriented to person, place, and time.   Skin: Skin is warm and dry. Capillary refill takes less than 2 seconds. Rash noted. He is not diaphoretic.   Psychiatric:   Animated         CRANIAL NERVES     CN III, IV, VI   Pupils are equal, round, and reactive to light.       Significant Labs:   BMP:   Recent Labs   Lab 06/06/19 0041   *      K 3.3*      CO2 23   BUN 17   CREATININE 0.9   CALCIUM 10.0   MG 1.4*     CBC:   Recent Labs   Lab 06/06/19 0041   WBC 8.03   HGB 13.3*   HCT 39.5*   *     CMP:   Recent Labs   Lab 06/06/19 0041      K 3.3*      CO2 23   *   BUN 17   CREATININE 0.9   CALCIUM 10.0   PROT 7.7   ALBUMIN 3.9   BILITOT 0.7   ALKPHOS 55   AST 24   ALT 14   ANIONGAP 14   EGFRNONAA >60     Cardiac Markers:   Recent Labs   Lab 06/06/19 0041   *     Troponin:   Recent Labs   Lab 06/06/19 0041   TROPONINI 4.393*     TSH:   Recent Labs   Lab 06/06/19 0041   TSH 0.572     All pertinent labs within  the past 24 hours have been reviewed.    Significant Imaging: I have reviewed all pertinent imaging results/findings within the past 24 hours.     Imaging Results          X-Ray Chest AP Portable (Final result)  Result time 06/06/19 00:50:46    Final result by Briseyda Multani MD (06/06/19 00:50:46)                 Impression:      No acute cardiopulmonary process identified.      Electronically signed by: Briseyda Multani MD  Date:    06/06/2019  Time:    00:50             Narrative:    EXAMINATION:  XR CHEST AP PORTABLE    CLINICAL HISTORY:  methamphetamine;    TECHNIQUE:  Single frontal view of the chest was performed.    COMPARISON:  September 2014.    FINDINGS:  Cardiac silhouette is normal in size.  Lungs are symmetrically expanded.  No evidence of focal consolidative process, pneumothorax, or significant effusion.  No acute osseous abnormality identified.                                  Assessment/Plan:     * NSTEMI (non-ST elevated myocardial infarction)  NSTEMI, likely secondary to amphetamine use.    Troponin (4.393) on admit  Trend Troponin  BNP (498)  Cardiology consult pending  Continous cardiac monitoring       Hypomagnesemia  Mg (1.4) on admit  Continue to monitor      Hypokalemia  K (3.3) on admit  Continue to monitor      Anemia  Hgb (13.3), Hct (39.5) on admit  Continue to monitor       Hepatitis C  Chronic       Alcohol dependence  Substance induced mood disorder  Smoking Addiction    Monitor for Delirium   Patient will require ongoing counseling on cessation of illegal substances          VTE Risk Mitigation (From admission, onward)        Ordered     IP VTE HIGH RISK PATIENT  Once      06/06/19 0402     Place sequential compression device  Until discontinued      06/06/19 0402             Yandy Mitchell, APRN, FNP-C  Department of Hospital Medicine   [facility]

## 2019-06-06 NOTE — ASSESSMENT & PLAN NOTE
-BP elevated at 194/111  -down to 120s-140s/80s-90s  -reports previous elevated readings with no antihypertensive prescribed in the past  -possibly drug induced  -follow up with PCP

## 2019-06-06 NOTE — HOSPITAL COURSE
6/6/2019 Presented to the ER with complaints of dizziness, rash and thirst after crystal meth. Initial troponin 4.393 with trend down to 4.040. /111 upon admission. EKG with NSR normal axis; anterior infarct ?lead placement and nonspecific STTWC. ETOH 293 . SBP down to 130s-140s. Admitted to [facility]. Cardiology consulted due to elevated troponin/NSTEMI

## 2019-06-06 NOTE — ED TRIAGE NOTES
Pt. To the ER via  EMS with c/o dizziness and shortness of breath after snorting methadone within 30 minutes PTA. Pt. Moving around on stretcher yelling that he can't breath. Pt. Placed on cardiac, BP and continuous pulse oximetry monitors. Skin is PWD. Bed in the low position, side rails elevated and family at the bedside.

## 2019-06-06 NOTE — ASSESSMENT & PLAN NOTE
-presented with noncardiac complaints  -complaints of chest pain and SOB with exertion somewhat atypical in presentation  -initial troponin 4.393 with trend down to 4.040; EKG with NSR normal axis; anterior infarct ?lead placement and nonspecific STTWC   -risk factors for CAD: age, HTN, tobacco use; currently NSTEMI type I vs type II; feel likely more type II given recent recreational drug use and elevated BP; appears not to be the best invasive candidate  -recommend medical management with ASA, statin, BB and Plavix therapy; echo with pending LVEF (if depressed will need to re evaluate)  -recommend cardiology follow up upon discharge

## 2019-06-06 NOTE — ED PROVIDER NOTES
"Encounter Date: 6/6/2019    SCRIBE #1 NOTE: I, Tylor Zafar, am scribing for, and in the presence of,  Dr.Lefort. I have scribed the entire note.       History     Chief Complaint   Patient presents with    Addiction Problem     To ER per EMS with c/o dizziness, thirst and "rash" to bilateral legs after taking crystal meth one hour ago.  Benadryl given to pt per EMS.     Chris Aguirre Jr. is a 54 y.o. male who  has a past medical history of Anxiety about health (6/20/2018), Colon cancer, Coronary artery disease, Depression, Hepatitis C (3/9/2018), History of psychiatric care, History of psychiatric hospitalization, Hypertension, MI (myocardial infarction) (2007), Neuropathy, Psychiatric problem, Psychosis, Self-harming behavior, and Suicide attempt.    The patient presents to the ED due to a rash. Patient also complains of dizziness, trouble swallowing, thirst. Patient snorted crystal meth and thinks he was "poisoned."  Patient is a poor historian    The history is provided by the patient.     Review of patient's allergies indicates:  No Known Allergies  Past Medical History:   Diagnosis Date    Anxiety about health 6/20/2018    Colon cancer     Coronary artery disease     Depression     Hepatitis C 3/9/2018    History of psychiatric care     History of psychiatric hospitalization     Hypertension     MI (myocardial infarction) 2007    Neuropathy     Psychiatric problem     Psychosis     Self-harming behavior     Suicide attempt      Past Surgical History:   Procedure Laterality Date    ABDOMINAL SURGERY      History of perforated ulcer, unknown surgery    APPENDECTOMY      CLOSURE,ILEOSTOMY N/A 1/11/2019    [facility] 2ND FLR    COLECTOMY-SIGMOID POSSIBLE BLADDER RESECTION N/A 3/13/2018    [facility]formed by [facility] at [facility] OR 2ND FLR    COLONOSCOPY N/A 2/26/2019    Performed by [facility] at [facility] ENDO (4TH FLR)    CYSTECTOMY-PARTIAL w/ " bladder resection  3/13/2018    [facility]formed by [facility] at [facility] 2ND FLR    CYSTOSCOPY WITH STENT PLACEMENT Bilateral 3/13/2018    Performed by [facility] at [facility] OR 2ND FLR    ILEOSTOMY      ILEOSTOMY N/A 3/13/2018    Performed by [facility] at [facility] OR 2ND FLR    ILEOSTOMY CLOSURE      REMOVAL N/A 2/11/2019    Performed by [facility] at [facility] OR 2ND FLR    SIGMOIDOSCOPY-FLEXIBLE N/A 3/12/2018    Performed by [facility] at [facility] ENDO (2ND FLR)     Family History   Problem Relation Age of Onset    No Known Problems Mother     No Known Problems Father     No Known Problems Sister     No Known Problems Brother     No Known Problems Maternal Aunt     No Known Problems Paternal Aunt     No Known Problems Maternal Uncle     No Known Problems Paternal Uncle     No Known Problems Maternal Grandfather     No Known Problems Maternal Grandmother     No Known Problems Paternal Grandfather     No Known Problems Paternal Grandmother     ADD / ADHD Cousin     Alcohol abuse Neg Hx     Anxiety disorder Neg Hx     Bipolar disorder Neg Hx     Dementia Neg Hx     Depression Neg Hx     Drug abuse Neg Hx     OCD Neg Hx     Paranoid behavior Neg Hx     Physical abuse Neg Hx     Schizophrenia Neg Hx     Seizures Neg Hx     Self injury Neg Hx     Sexual abuse Neg Hx     Suicide Neg Hx      Social History     Tobacco Use    Smoking status: Current Every Day Smoker     Packs/day: 1.00     Years: 30.00     Pack years: 30.00    Smokeless tobacco: Never Used   Substance Use Topics    Alcohol use: Yes     Comment: occasional    Drug use: No     Review of Systems   Reason unable to perform ROS: limited due to patient's condition.       Physical Exam     Initial Vitals [06/06/19 0015]   BP Pulse Resp Temp SpO2   (!) 194/111 (!) 125 18 97 °F (36.1 °C) 97 %      MAP       --         Physical Exam    Nursing note and vitals reviewed.  Constitutional: He appears  well-developed and well-nourished.   uncomfortable  Appears intoxicated  Moving all extremities    HENT:   Head: Normocephalic and atraumatic.   Mouth/Throat: Mucous membranes are dry.   Neck: Neck supple.   Cardiovascular: Normal rate, regular rhythm, normal heart sounds and intact distal pulses. Exam reveals no gallop and no friction rub.    No murmur heard.  Pulmonary/Chest: Breath sounds normal. He has no wheezes. He has no rhonchi. He has no rales.   Abdominal: Soft. He exhibits no distension. There is no tenderness.   Musculoskeletal: Normal range of motion.   Neurological: He is alert.   Skin: No rash noted. No erythema.   Psychiatric: He has a normal mood and affect.         ED Course   Critical Care  Date/Time: 6/6/2019 1:52 AM  Performed by: Guy J. Lefort, MD  Authorized by: Guy J. Lefort, MD   Total critical care time (exclusive of procedural time) : 30 minutes  Critical care time was exclusive of separately billable procedures and treating other patients and teaching time.  Critical care was necessary to treat or prevent imminent or life-threatening deterioration of the following conditions: cardiac failure.  Critical care was time spent personally by me on the following activities: blood draw for specimens, development of treatment plan with patient or surrogate, interpretation of cardiac output measurements, evaluation of patient's response to treatment, examination of patient, obtaining history from patient or surrogate, ordering and performing treatments and interventions, ordering and review of laboratory studies, ordering and review of radiographic studies, pulse oximetry, re-evaluation of patient's condition and review of old charts.        Labs Reviewed   CBC W/ AUTO DIFFERENTIAL - Abnormal; Notable for the following components:       Result Value    RBC 4.58 (*)     Hemoglobin 13.3 (*)     Hematocrit 39.5 (*)     RDW 14.6 (*)     Platelets 429 (*)     All other components within normal limits    COMPREHENSIVE METABOLIC PANEL - Abnormal; Notable for the following components:    Potassium 3.3 (*)     Glucose 150 (*)     All other components within normal limits   TROPONIN I - Abnormal; Notable for the following components:    Troponin I 4.393 (*)     All other components within normal limits   MAGNESIUM - Abnormal; Notable for the following components:    Magnesium 1.4 (*)     All other components within normal limits   CK   TSH   B-TYPE NATRIURETIC PEPTIDE     EKG Readings: (Independently Interpreted)   Rate 108  Sinus Tachycardia  No ST segment changes  No STEMI         Imaging Results          X-Ray Chest AP Portable (Final result)  Result time 06/06/19 00:50:46    Final result by Briseyda Multani MD (06/06/19 00:50:46)                 Impression:      No acute cardiopulmonary process identified.      Electronically signed by: Briseyda Multani MD  Date:    06/06/2019  Time:    00:50             Narrative:    EXAMINATION:  XR CHEST AP PORTABLE    CLINICAL HISTORY:  methamphetamine;    TECHNIQUE:  Single frontal view of the chest was performed.    COMPARISON:  September 2014.    FINDINGS:  Cardiac silhouette is normal in size.  Lungs are symmetrically expanded.  No evidence of focal consolidative process, pneumothorax, or significant effusion.  No acute osseous abnormality identified.                                 Medical Decision Making:   History:   Old Medical Records: I decided to obtain old medical records.  Old Records Summarized: records from previous admission(s) and records from clinic visits.  Differential Diagnosis:   Drugs, toxins, electrolytes, arrhythmia, PE, mi, sepsis  Independently Interpreted Test(s):   I have ordered and independently interpreted X-rays - see prior notes.  I have ordered and independently interpreted EKG Reading(s) - see prior notes  Clinical Tests:   Lab Tests: Ordered and Reviewed  Radiological Study: Ordered and Reviewed  Medical Tests: Ordered and Reviewed  ED  Management:  Workup remarkable for NSTEMI appears to be secondary to amphetamine use.  Given benzos and IV fluids with significant improvement in patient's symptoms, patient, with no complaints, hypertension tachycardia improved.                   ED Course as of Jun 06 0212   Thu Jun 06, 2019   0156 Spoke with  who recommends hospital admission given amphetamine use and NSTEMI    [KM]   0157 Spoke with Hospital Medicine, will admit    [KM]   0159 No further recommendations from Cardiology at this time    [KM]      ED Course User Index  [KM] Tylor Zafar     Clinical Impression:       ICD-10-CM ICD-9-CM   1. NSTEMI (non-ST elevated myocardial infarction) I21.4 410.70   2. Methamphetamine abuse F15.10 305.70           Disposition:   Disposition: Admitted  Condition: Fair        I, Dr. Guy Lefort, personally performed the services described in this documentation. All medical record entries made by the scribe were at my direction and in my presence. I have reviewed the chart and agree that the record reflects my personal performance and is accurate and complete. Guy Lefort, MD.  2:12 AM 06/06/2019  .leScribe Attestation                 Guy J. Lefort, MD  06/06/19 0213

## 2019-06-06 NOTE — SUBJECTIVE & OBJECTIVE
Past Medical History:   Diagnosis Date    Anxiety about health 6/20/2018    Colon cancer     Coronary artery disease     Depression     Hepatitis C 3/9/2018    History of psychiatric care     History of psychiatric hospitalization     Hypertension     MI (myocardial infarction) 2007    Neuropathy     Psychiatric problem     Psychosis     Self-harming behavior     Suicide attempt        Past Surgical History:   Procedure Laterality Date    ABDOMINAL SURGERY      History of perforated ulcer, unknown surgery    APPENDECTOMY      CLOSURE,ILEOSTOMY N/A 1/11/2019    [facility] 2ND FLR    COLECTOMY-SIGMOID POSSIBLE BLADDER RESECTION N/A 3/13/2018    [facility] OR 2ND FLR    COLONOSCOPY N/A 2/26/2019    [facility]formed by [facility] at [facility] ENDO (4TH FLR)    CYSTECTOMY-PARTIAL w/ bladder resection  3/13/2018    [facility] by [facility] at [facility] OR 2ND FLR    CYSTOSCOPY WITH STENT PLACEMENT Bilateral 3/13/2018    [facility] by [facility] at [facility] OR 2ND FLR    ILEOSTOMY      ILEOSTOMY N/A 3/13/2018    [facility]formed by [facility] OR 2ND FLR    ILEOSTOMY CLOSURE      REMOVAL N/A 2/11/2019    Performed by [facility] Diagnostic Provider at [facility] OR 2ND FLR    SIGMOIDOSCOPY-FLEXIBLE N/A 3/12/2018    Performed by [facility] at [facility] ENDO (2ND FLR)       Review of patient's allergies indicates:  No Known Allergies    No current facility-administered medications on file prior to encounter.      Current Outpatient Medications on File Prior to Encounter   Medication Sig    multivitamin with minerals tablet Take 1 tablet by mouth once daily.    lamiVUDine (EPIVIR) 100 MG Tab Take 1 tablet (100 mg total) by mouth once daily.     Family History     Problem Relation (Age of Onset)    ADD / ADHD Cousin    No Known Problems Mother, Father, Sister, Brother, Maternal Aunt, Paternal Aunt, Maternal Uncle, Paternal Uncle, Maternal  Grandfather, Maternal Grandmother, Paternal Grandfather, Paternal Grandmother        Tobacco Use    Smoking status: Current Every Day Smoker     Packs/day: 1.00     Years: 39.00     Pack years: 39.00     Start date: 1980    Smokeless tobacco: Never Used    [facility]   Substance and Sexual Activity    Alcohol use: Yes     Comment: occasional    Drug use: No    Sexual activity: Yes     Partners: Female     Birth control/protection: None     Review of Systems   Constitution: Negative for chills, decreased appetite, diaphoresis and fever.   Cardiovascular: Positive for chest pain and dyspnea on exertion. Negative for claudication, cyanosis, irregular heartbeat, leg swelling, near-syncope, orthopnea, palpitations, paroxysmal nocturnal dyspnea and syncope.   Respiratory: Negative for cough, hemoptysis, shortness of breath and wheezing.    Gastrointestinal: Negative for bloating, abdominal pain, constipation, diarrhea, melena, nausea and vomiting.   Neurological: Negative for dizziness and weakness.     Objective:     Vital Signs (Most Recent):  Temp: 98.1 °F (36.7 °C) (06/06/19 1124)  Pulse: 89 (06/06/19 1124)  Resp: 18 (06/06/19 1124)  BP: 130/89 (06/06/19 1124)  SpO2: 96 % (06/06/19 0842) Vital Signs (24h Range):  Temp:  [96.2 °F (35.7 °C)-98.1 °F (36.7 °C)] 98.1 °F (36.7 °C)  Pulse:  [] 89  Resp:  [18-21] 18  SpO2:  [95 %-100 %] 96 %  BP: (112-194)/() 130/89     Weight: 72.6 kg (160 lb)  Body mass index is 22.33 kg/m².    SpO2: 96 %  O2 Device (Oxygen Therapy): room air      Intake/Output Summary (Last 24 hours) at 6/6/2019 1216  Last data filed at 6/6/2019 0134  Gross per 24 hour   Intake 1000 ml   Output 900 ml   Net 100 ml       Lines/Drains/Airways     Drain                 Ileostomy 03/13/18 1727 Loop  days                Physical Exam   Constitutional: He is oriented to person, place, and time. He appears  well-developed and well-nourished. No distress.   Cardiovascular: Normal rate and regular rhythm. Exam reveals no gallop.   No murmur heard.  Pulmonary/Chest: Effort normal and breath sounds normal. No respiratory distress. He has no wheezes.   Abdominal: Soft. Bowel sounds are normal. He exhibits no distension. There is no tenderness.   Neurological: He is alert and oriented to person, place, and time.   Skin: Skin is warm and dry.       Significant Labs:     Recent Labs   Lab 06/06/19 0041   WBC 8.03   RBC 4.58*   HGB 13.3*   HCT 39.5*   *   MCV 86   MCH 29.0   MCHC 33.7     Recent Labs   Lab 06/06/19 0041      K 3.3*      CO2 23   BUN 17   CREATININE 0.9   MG 1.4*       Recent Labs   Lab 06/06/19 0041 06/06/19  0548     --    TROPONINI 4.393* 4.040*       Significant Imaging:     TTE 6/6/2019     Ordered; results pending

## 2019-06-06 NOTE — SUBJECTIVE & OBJECTIVE
Past Medical History:   Diagnosis Date    Anxiety about health 6/20/2018    Colon cancer     Coronary artery disease     Depression     Hepatitis C 3/9/2018    History of psychiatric care     History of psychiatric hospitalization     Hypertension     MI (myocardial infarction) 2007    Neuropathy     Psychiatric problem     Psychosis     Self-harming behavior     Suicide attempt        Past Surgical History:   Procedure Laterality Date    ABDOMINAL SURGERY      History of perforated ulcer, unknown surgery    APPENDECTOMY      CLOSURE,ILEOSTOMY N/A 1/11/2019    Performed by [facility] at [facility] OR 2ND FLR    COLECTOMY-SIGMOID POSSIBLE BLADDER RESECTION N/A 3/13/2018    Performed by [facility]ino [facility] at [facility] OR 2ND FLR    COLONOSCOPY N/A 2/26/2019    Performed by Mikal Nino MD at [facility] ENDO (4TH FLR)    CYSTECTOMY-PARTIAL w/ bladder resection  3/13/2018    Performed by Meng Morrow MD at [facility] OR 2ND FLR    CYSTOSCOPY WITH STENT PLACEMENT Bilateral 3/13/2018    Performed by [facility]eng [facility]row MD at [facility] OR 2ND FLR    ILEOSTOMY      ILEOSTOMY N/A 3/13/2018    Performed by [facility]ino [facility] at [facility] OR 2ND FLR    ILEOSTOMY CLOSURE      REMOVAL N/A 2/11/2019    Performed by [facility] Provider at [facility] OR 2ND FLR    SIGMOIDOSCOPY-FLEXIBLE N/A 3/12/2018    Performed by Mikal Nino MD at [facility] ENDO (2ND FLR)       Review of patient's allergies indicates:  No Known Allergies    No current facility-administered medications on file prior to encounter.      Current Outpatient Medications on File Prior to Encounter   Medication Sig    lamiVUDine (EPIVIR) 100 MG Tab Take 1 tablet (100 mg total) by mouth once daily.    multivitamin with minerals tablet Take 1 tablet by mouth once daily.     Family History     Problem Relation (Age of Onset)    ADD / ADHD Cousin    No Known Problems Mother, Father, Sister, Brother, Maternal Aunt, Paternal Aunt, Maternal Uncle, Paternal Uncle, Maternal  Grandfather, Maternal Grandmother, Paternal Grandfather, Paternal Grandmother        Tobacco Use    Smoking status: Current Every Day Smoker     Packs/day: 1.00     Years: 30.00     Pack years: 30.00    Smokeless tobacco: Never Used   Substance and Sexual Activity    Alcohol use: Yes     Comment: occasional    Drug use: No    Sexual activity: Yes     Partners: Female     Birth control/protection: None     Review of Systems   Unable to perform ROS: Acuity of condition     Objective:     Vital Signs (Most Recent):  Temp: 97 °F (36.1 °C) (06/06/19 0015)  Pulse: 77 (06/06/19 0310)  Resp: 19 (06/06/19 0310)  BP: (!) 112/57 (06/06/19 0310)  SpO2: 100 % (06/06/19 0310) Vital Signs (24h Range):  Temp:  [97 °F (36.1 °C)] 97 °F (36.1 °C)  Pulse:  [] 77  Resp:  [18-21] 19  SpO2:  [97 %-100 %] 100 %  BP: (112-194)/() 112/57     Weight: 72.6 kg (160 lb)  Body mass index is 22.32 kg/m².    Physical Exam   Constitutional: He is oriented to person, place, and time.   Appears to be under the influence of an illegal substance   HENT:   Head: Normocephalic and atraumatic.   Eyes: Pupils are equal, round, and reactive to light.   Neck: Normal range of motion. No JVD present.   Cardiovascular:   Sinus Tachycardia  Irregular    Pulmonary/Chest: Effort normal and breath sounds normal.   Abdominal: Soft. Bowel sounds are normal. He exhibits no distension.   Musculoskeletal: Normal range of motion.   Neurological: He is alert and oriented to person, place, and time.   Skin: Skin is warm and dry. Capillary refill takes less than 2 seconds. Rash noted. He is not diaphoretic.   Psychiatric:   Animated         CRANIAL NERVES     CN III, IV, VI   Pupils are equal, round, and reactive to light.       Significant Labs:   BMP:   Recent Labs   Lab 06/06/19 0041   *      K 3.3*      CO2 23   BUN 17   CREATININE 0.9   CALCIUM 10.0   MG 1.4*     CBC:   Recent Labs   Lab 06/06/19 0041   WBC 8.03   HGB 13.3*   HCT  39.5*   *     CMP:   Recent Labs   Lab 06/06/19 0041      K 3.3*      CO2 23   *   BUN 17   CREATININE 0.9   CALCIUM 10.0   PROT 7.7   ALBUMIN 3.9   BILITOT 0.7   ALKPHOS 55   AST 24   ALT 14   ANIONGAP 14   EGFRNONAA >60     Cardiac Markers:   Recent Labs   Lab 06/06/19 0041   *     Troponin:   Recent Labs   Lab 06/06/19 0041   TROPONINI 4.393*     TSH:   Recent Labs   Lab 06/06/19 0041   TSH 0.572     All pertinent labs within the past 24 hours have been reviewed.    Significant Imaging: I have reviewed all pertinent imaging results/findings within the past 24 hours.     Imaging Results          X-Ray Chest AP Portable (Final result)  Result time 06/06/19 00:50:46    Final result by Briseyda Multani MD (06/06/19 00:50:46)                 Impression:      No acute cardiopulmonary process identified.      Electronically signed by: Briseyda Multani MD  Date:    06/06/2019  Time:    00:50             Narrative:    EXAMINATION:  XR CHEST AP PORTABLE    CLINICAL HISTORY:  methamphetamine;    TECHNIQUE:  Single frontal view of the chest was performed.    COMPARISON:  September 2014.    FINDINGS:  Cardiac silhouette is normal in size.  Lungs are symmetrically expanded.  No evidence of focal consolidative process, pneumothorax, or significant effusion.  No acute osseous abnormality identified.

## 2019-06-06 NOTE — DISCHARGE SUMMARY
[facility] Hospital Medicine  Discharge Summary      Patient Name: Chris Aguirre Jr.  MRN: [id number]  Admission Date: 6/6/2019  Hospital Length of Stay: 1 days  Discharge Date and Time: 6/6/2019  4:22 PM  Attending Physician: Bin Clements MD   Discharging Provider: Bin Clements MD  Primary Care Provider: Ash Hung NP      HPI:   Chris Aguirre is a 54-year-old male with a past medical history of Anxiety, colon Cancer, Coronary Artery Disease, Depression, Hepatitis C, Hypertension, Myocardial infarction, Psychosis and Suicide attempt. Patient has a history of Methamphetamine abuse. He lives in [place], [place]. His PCP is Ash Hung NP.     He presented to [facility] ED 6/6/2019 via EMS with a chief complaint of dizziness, thirst, and a rash to both legs. The patient reports snorting crystal meth and feels he was poisoned. Upon ED presentation, Blood pressure (194/111), Heart rate (125), Hgb (13.3), Hct (39.5), K (3.3), Glucose (150), Troponin (4.393), Mg (1.4), BNP (498), CXR unremarkable. Patient given IV fluids and Benzos in ED, Workup revealed NSTEMI likely secondary to amphetamine use. Admitted for further evaluation and treatment.    * No surgery found *      Hospital Course:   No chest pain. Blood pressure is controlled without treatment. Appreciate Cardiology evaluation. 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 No WMA on echo with LVEF of 50%. Will discharge with medical management of DAPT, statin and beta blocker. Refer to outpatient Cardiology at [facility]. Give buspirone prn on discharge for anxiety.      Consults:   Consults (From admission, onward)        Status Ordering Provider     Inpatient consult to [facility]  Once     Provider:  (Not yet assigned)    RONNI Ruvalcaba          No new Assessment & Plan notes have been filed under this hospital service since the last note was generated.  Service: [facility] Medicine    Final  Active Diagnoses:    Diagnosis Date Noted POA    PRINCIPAL PROBLEM:  NSTEMI (non-ST elevated myocardial infarction) [I21.4] 06/06/2019 Yes    Anemia [D64.9] 06/06/2019 Yes    Hypokalemia [E87.6] 06/06/2019 Yes    Hypomagnesemia [E83.42] 06/06/2019 Yes    Essential hypertension [I10] 10/25/2018 Yes    Hepatitis C [B19.20] 03/09/2018 Yes    Alcohol dependence [F10.20] 09/03/2014 Yes     Chronic      Problems Resolved During this Admission:       Discharged Condition: good    Disposition: Home or Self Care    Follow Up:  Follow-up Information     Ash Hung NP. Schedule an appointment as soon as possible for a visit in 4 weeks.    Specialty:  Family Medicine  Contact information:  [address] [facility] [address] [phone]             Francine Medina MD PhD. Schedule an appointment as soon as possible for a visit in 4 weeks.    Specialty:  Cardiology  Contact information:  [address] [facility] [address] [place] [address] [phone]                 Patient Instructions:      Ambulatory consult to Cardiology   Referral Priority: Routine Referral Type: Consultation   Referral Reason: Specialty Services Required   Referred to Provider: FRANCINE MEDINA Requested Specialty: Cardiology   Number of Visits Requested: 1     Diet Cardiac     Notify your health care provider if you experience any of the following:  temperature >100.4     Notify your health care provider if you experience any of the following:  severe uncontrolled pain     Notify your health care provider if you experience any of the following:  difficulty breathing or increased cough     Notify your health care provider if you experience any of the following:  persistent dizziness, light-headedness, or visual disturbances     Activity as tolerated       Significant Diagnostic Studies: Labs:   BMP:   Recent Labs   Lab 06/06/19  0041   *      K 3.3*      CO2 23   BUN 17    CREATININE 0.9   CALCIUM 10.0   MG 1.4*   , CBC   Recent Labs   Lab 06/06/19  0041   WBC 8.03   HGB 13.3*   HCT 39.5*   *   , Lipid Panel   Lab Results   Component Value Date    CHOL 123 06/06/2019    HDL 34 (L) 06/06/2019    LDLCALC 75.0 06/06/2019    TRIG 70 06/06/2019    CHOLHDL 27.6 06/06/2019   , Troponin   Recent Labs   Lab 06/06/19  0548   TROPONINI 4.040*    and A1C:   Recent Labs   Lab 06/06/19  0548   HGBA1C 5.4     Radiology:   Imaging Results          X-Ray Chest AP Portable (Final result)  Result time 06/06/19 00:50:46    Final result by Briseyda Multani MD (06/06/19 00:50:46)                 Impression:      No acute cardiopulmonary process identified.      Electronically signed by: Briseyda Multani MD  Date:    06/06/2019  Time:    00:50             Narrative:    EXAMINATION:  XR CHEST AP PORTABLE    CLINICAL HISTORY:  methamphetamine;    TECHNIQUE:  Single frontal view of the chest was performed.    COMPARISON:  September 2014.    FINDINGS:  Cardiac silhouette is normal in size.  Lungs are symmetrically expanded.  No evidence of focal consolidative process, pneumothorax, or significant effusion.  No acute osseous abnormality identified.                              Cardiac Graphics: Echocardiogram:   Transthoracic echo (TTE) complete (Cupid Only):   Results for orders placed or performed during the hospital encounter of 06/06/19   Transthoracic echo (TTE) 2D with Color Flow   Result Value Ref Range    STJ 3.03 cm    AV mean gradient 2.56 mmHg    Ao peak rubén 1.09 m/s    Ao VTI 23.59 cm    IVS 1.21 (A) 0.6 - 1.1 cm    LA size 3.54 cm    Left Atrium Major Axis 4.60 cm    Left Atrium Minor Axis 4.45 cm    LVIDD 5.13 3.5 - 6.0 cm    LVIDS 3.94 2.1 - 4.0 cm    LVOT diameter 2.43 cm    LVOT peak VTI 24.46 cm    PW 0.84 0.6 - 1.1 cm    MV Peak A Rubén 0.71 m/s    E wave decelartion time 133.24 msec    MV Peak E Rubén 0.50 m/s    PV Peak D Rubén 0.27 m/s    PV Peak S Rubén 0.56 m/s    RA Major Axis 3.90 cm     RVDD 2.82 cm    LA WIDTH 3.07 cm    Ao root annulus 3.26 cm    PV PEAK VELOCITY 0.69 cm/s    LV Diastolic Volume 125.46 mL    LV Systolic Volume 67.69 mL    LVOT peak shanel [id number] m/s    FS 23 %    LA volume 41.79 cm3    LV mass 196.24 g    Left Ventricle Relative Wall Thickness 0.33 cm    AV valve area 4.81 cm2    AV Velocity Ratio 0.83     AV index (prosthetic) 1.04     E/A ratio 0.70     Pulm vein S/D ratio 2.07     LVOT area 4.64 cm2    LVOT stroke volume 113.38 cm3    AV peak gradient 4.75 mmHg    LV Systolic Volume Index 35.3 mL/m2    LV Diastolic Volume Index 65.42 mL/m2    LA Volume Index 21.8 mL/m2    LV Mass Index 102.3 g/m2    BSA 1.91 m2    Right Atrial Pressure (from IVC) 3 mmHg       Pending Diagnostic Studies:     Procedure Component Value Units Date/Time    Toxicology screen, urine [[id number]] Collected:  06/06/19 1340    Order Status:  Sent Lab Status:  In process Updated:  06/06/19 1618    Specimen:  Urine, Clean Catch          Medications:  Reconciled Home Medications:      Medication List      START taking these medications    aspirin 81 MG EC tablet  Commonly known as:  ECOTRIN  Take 1 tablet (81 mg total) by mouth once daily.     busPIRone 5 MG Tab  Commonly known as:  BUSPAR  Take 1 tablet (5 mg total) by mouth 2 (two) times daily as needed (anxiety).     carvedilol 3.125 MG tablet  Commonly known as:  COREG  Take 1 tablet (3.125 mg total) by mouth 2 (two) times daily.     clopidogrel 75 mg tablet  Commonly known as:  PLAVIX  Take 1 tablet (75 mg total) by mouth once daily.     pravastatin 10 MG tablet  Commonly known as:  PRAVACHOL  Take 1 tablet (10 mg total) by mouth once daily.        CONTINUE taking these medications    lamiVUDine 100 MG Tab  Commonly known as:  EPIVIR  Take 1 tablet (100 mg total) by mouth once daily.     multivitamin with minerals tablet  Take 1 tablet by mouth once daily.            Indwelling Lines/Drains at time of discharge:   Lines/Drains/Airways     Drain                  Ileostomy 03/13/18 1727 Loop  days                Time spent on the discharge of patient: 35 minutes  Patient was seen and examined on the date of discharge and determined to be suitable for discharge.         Bin Clements MD  Department of Hospital Medicine  [facility]

## 2019-06-06 NOTE — HOSPITAL COURSE
No chest pain. Blood pressure is controlled without treatment. Appreciate Cardiology evaluation. 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 No WMA on echo with LVEF of 50%. Will discharge with medical management of DAPT, statin and beta blocker. Refer to outpatient Cardiology at [facility]. Give buspirone prn on discharge for anxiety.

## 2019-06-06 NOTE — CONSULTS
[facility]  Cardiology  Consult Note    Patient Name: Chris Aguirre Jr.  MRN: [id number]  Admission Date: 6/6/2019  Hospital Length of Stay: 0 days  Code Status: Full Code   Attending Provider: Bin Clements MD   Consulting Provider: JUANI Bennett ANP  Primary Care Physician: Ash Hung NP  Principal Problem:NSTEMI (non-ST elevated myocardial infarction)    Patient information was obtained from patient and past medical records.     Inpatient consult to [facility]  Consult performed by: JUANI Baker, ALONDRA  Consult ordered by: Yandy Mitchell NP  Reason for consult: NSTEMI         Subjective:     Chief Complaint:  Dizziness, rash and thirst     HPI:   55yo male with history of ETOH use, Hepatitis C and HTN with complaints of dizziness, rash and thirst. He reports his symptoms started about an hour after snorting crystal meth and was fearful of poisoning therefore he presented to the ER. He was seen in the echo lab while undergoing his echocardiogram. He works a physical labor job and reports chest pain with activity that only lasts a few seconds and resolves spontaneously and does not typically recur. He complains of SOB with exertion but was unable to elaborate. He denies any history of CAD, MI, PCI or CABG. He is an active smoker and does not have any strong family history of CAD.     Hospital Course:    6/6/2019 Presented to the ER with complaints of dizziness, rash and thirst after crystal meth. Initial troponin 4.393 with trend down to 4.040. /111 upon admission. EKG with NSR normal axis; anterior infarct ?lead placement and nonspecific STTWC. ETOH 293 . SBP down to 130s-140s. Admitted to [facility] Medicine. Cardiology consulted due to elevated troponin/NSTEMI    Past Medical History:   Diagnosis Date    Anxiety about health 6/20/2018    Colon cancer     Coronary artery disease     Depression     Hepatitis C 3/9/2018    History  of psychiatric care     History of psychiatric hospitalization     Hypertension     MI (myocardial infarction) 2007    Neuropathy     Psychiatric problem     Psychosis     Self-harming behavior     Suicide attempt        Past Surgical History:   Procedure Laterality Date    ABDOMINAL SURGERY      History of perforated ulcer, unknown surgery    APPENDECTOMY      CLOSURE,ILEOSTOMY N/A 1/11/2019    Performed by [facility] at [facility] OR 2ND FLR    COLECTOMY-SIGMOID POSSIBLE BLADDER RESECTION N/A 3/13/2018    Performed by [facility] at [facility] OR 2ND FLR    COLONOSCOPY N/A 2/26/2019    Performed by [facility]al Nino MD at [facility] ENDO (4TH FLR)    CYSTECTOMY-PARTIAL w/ bladder resection  3/13/2018    Performed by Meng Morrow MD at [facility] OR 2ND FLR    CYSTOSCOPY WITH STENT PLACEMENT Bilateral 3/13/2018    Performed by [facility]row MD at [facility] OR 2ND FLR    ILEOSTOMY      ILEOSTOMY N/A 3/13/2018    Performed by [facility] MD at [facility] OR 2ND FLR    ILEOSTOMY CLOSURE      REMOVAL N/A 2/11/2019    Performed by [facility] Diagnostic Provider at [facility] OR 2ND FLR    SIGMOIDOSCOPY-FLEXIBLE N/A 3/12/2018    Performed by Mikal Nino MD at [facility] ENDO (2ND FLR)       Review of patient's allergies indicates:  No Known Allergies    No current facility-administered medications on file prior to encounter.      Current Outpatient Medications on File Prior to Encounter   Medication Sig    multivitamin with minerals tablet Take 1 tablet by mouth once daily.    lamiVUDine (EPIVIR) 100 MG Tab Take 1 tablet (100 mg total) by mouth once daily.     Family History     Problem Relation (Age of Onset)    ADD / ADHD Cousin    No Known Problems Mother, Father, Sister, Brother, Maternal Aunt, Paternal Aunt, Maternal Uncle, Paternal Uncle, Maternal Grandfather, Maternal Grandmother, Paternal Grandfather, Paternal Grandmother        Tobacco Use    Smoking status: Current Every Day Smoker     Packs/day: 1.00     Years:  39.00     Pack years: 39.00     Start date: 1980    Smokeless tobacco: Never Used    [facility] to Sm[facility]essation program   Substance and Sexual Activity    Alcohol use: Yes     Comment: occasional    Drug use: No    Sexual activity: Yes     Partners: Female     Birth control/protection: None     Review of Systems   Constitution: Negative for chills, decreased appetite, diaphoresis and fever.   Cardiovascular: Positive for chest pain and dyspnea on exertion. Negative for claudication, cyanosis, irregular heartbeat, leg swelling, near-syncope, orthopnea, palpitations, paroxysmal nocturnal dyspnea and syncope.   Respiratory: Negative for cough, hemoptysis, shortness of breath and wheezing.    Gastrointestinal: Negative for bloating, abdominal pain, constipation, diarrhea, melena, nausea and vomiting.   Neurological: Negative for dizziness and weakness.     Objective:     Vital Signs (Most Recent):  Temp: 98.1 °F (36.7 °C) (06/06/19 1124)  Pulse: 89 (06/06/19 1124)  Resp: 18 (06/06/19 1124)  BP: 130/89 (06/06/19 1124)  SpO2: 96 % (06/06/19 0842) Vital Signs (24h Range):  Temp:  [96.2 °F (35.7 °C)-98.1 °F (36.7 °C)] 98.1 °F (36.7 °C)  Pulse:  [] 89  Resp:  [18-21] 18  SpO2:  [95 %-100 %] 96 %  BP: (112-194)/() 130/89     Weight: 72.6 kg (160 lb)  Body mass index is 22.33 kg/m².    SpO2: 96 %  O2 Device (Oxygen Therapy): room air      Intake/Output Summary (Last 24 hours) at 6/6/2019 1216  Last data filed at 6/6/2019 0134  Gross per 24 hour   Intake 1000 ml   Output 900 ml   Net 100 ml       Lines/Drains/Airways     Drain                 Ileostomy 03/13/18 1727 Loop  days                Physical Exam   Constitutional: He is oriented to person, place, and time. He appears well-developed and well-nourished. No distress.   Cardiovascular: Normal rate and regular rhythm. Exam reveals no gallop.   No murmur heard.  Pulmonary/Chest: Effort normal  and breath sounds normal. No respiratory distress. He has no wheezes.   Abdominal: Soft. Bowel sounds are normal. He exhibits no distension. There is no tenderness.   Neurological: He is alert and oriented to person, place, and time.   Skin: Skin is warm and dry.       Significant Labs:     Recent Labs   Lab 06/06/19 0041   WBC 8.03   RBC 4.58*   HGB 13.3*   HCT 39.5*   *   MCV 86   MCH 29.0   MCHC 33.7     Recent Labs   Lab 06/06/19 0041      K 3.3*      CO2 23   BUN 17   CREATININE 0.9   MG 1.4*       Recent Labs   Lab 06/06/19 0041 06/06/19  0548     --    TROPONINI 4.393* 4.040*       Significant Imaging:     TTE 6/6/2019     Ordered; results pending     Assessment and Plan:     * NSTEMI (non-ST elevated myocardial infarction)  -presented with noncardiac complaints  -complaints of chest pain and SOB with exertion somewhat atypical in presentation  -initial troponin 4.393 with trend down to 4.040; EKG with NSR normal axis; anterior infarct ?lead placement and nonspecific STTWC   -risk factors for CAD: age, HTN, tobacco use; currently NSTEMI type I vs type II; feel likely more type II given recent recreational drug use and elevated BP; appears not to be the best invasive candidate  -recommend medical management with ASA, statin, BB and Plavix therapy; echo with pending LVEF (if depressed will need to re evaluate)  -recommend cardiology follow up upon discharge     Essential hypertension  -BP elevated at 194/111  -down to 120s-140s/80s-90s  -reports previous elevated readings with no antihypertensive prescribed in the past  -possibly drug induced  -follow up with PCP         VTE Risk Mitigation (From admission, onward)        Ordered     IP VTE HIGH RISK PATIENT  Once      06/06/19 0402     Place sequential compression device  Until discontinued      06/06/19 0402          Thank you for your consult. I will follow-up with patient. Please contact us if you have any additional  questions.    Yuni Watson, APRN, ANP  Cardiology   [facility]

## 2019-06-06 NOTE — TELEPHONE ENCOUNTER
----- Message from Doris Solis sent at 6/6/2019  1:42 PM CDT -----  Contact: Patient   Needs Advice    Reason for call: Reschedule appts        Communication Preference: [phone]     Additional Information: N/A

## 2019-06-06 NOTE — NURSING
Pt discharged home with all belongings. AVS printed and discharged instructions reviewed with pt and his mother. Questions answered. Pt desires to walk downstairs because he is waiting on a ride.

## 2019-06-06 NOTE — ASSESSMENT & PLAN NOTE
NSTEMI, likely secondary to amphetamine use.    Troponin (4.393) on admit  Trend Troponin  BNP (498)  Cardiology consult pending  Continous cardiac monitoring

## 2019-06-06 NOTE — PROGRESS NOTES
Individual Follow-Up Form    6/6/2019    Quit Date: To be determined    Clinical Status of Patient: Inpatient    Length of Service: 30 minutes    Comments: Smoking cessation education and materials provided.  Pt states that he has been smoking since the age of 15, and that he smokes 1 pack of cigarettes per day on average.   Pt is complaining of nicotine withdrawal symptoms; MD contacted to request order for 21 mg nicotine patch.  Pt states that he is ready to quit smoking and he was enrolled in the [employer] during this encounter.     Diagnosis: F17.210    Next Visit:Ambulatory referral to Smoking Cessation program following hospital discharge.

## 2019-06-06 NOTE — TELEPHONE ENCOUNTER
Pt currently admitted. Pt appointment with PA Scheuermann rescheduled. Reminder notice mailed to pt.

## 2019-06-19 ENCOUNTER — OFFICE VISIT (OUTPATIENT)
Dept: FAMILY MEDICINE | Facility: HOSPITAL | Age: 54
End: 2019-06-19
Attending: FAMILY MEDICINE
Payer: MEDICAID

## 2019-06-19 VITALS
TEMPERATURE: 99 F | BODY MASS INDEX: 25.12 KG/M2 | WEIGHT: 179.44 LBS | HEART RATE: 73 BPM | HEIGHT: 71 IN | DIASTOLIC BLOOD PRESSURE: 87 MMHG | SYSTOLIC BLOOD PRESSURE: 127 MMHG

## 2019-06-19 DIAGNOSIS — I21.4 NSTEMI (NON-ST ELEVATED MYOCARDIAL INFARCTION): Primary | ICD-10-CM

## 2019-06-19 DIAGNOSIS — K42.9 UMBILICAL HERNIA WITHOUT OBSTRUCTION AND WITHOUT GANGRENE: ICD-10-CM

## 2019-06-19 DIAGNOSIS — F10.20 UNCOMPLICATED ALCOHOL DEPENDENCE: Chronic | ICD-10-CM

## 2019-06-19 DIAGNOSIS — N43.2 OTHER HYDROCELE: ICD-10-CM

## 2019-06-19 DIAGNOSIS — I10 ESSENTIAL HYPERTENSION: ICD-10-CM

## 2019-06-19 DIAGNOSIS — B07.9 VIRAL WARTS, UNSPECIFIED TYPE: ICD-10-CM

## 2019-06-19 DIAGNOSIS — R45.89 ANXIETY ABOUT HEALTH: ICD-10-CM

## 2019-06-19 PROCEDURE — 99215 OFFICE O/P EST HI 40 MIN: CPT | Performed by: PHYSICIAN ASSISTANT

## 2019-06-19 RX ORDER — BUSPIRONE HYDROCHLORIDE 5 MG/1
5 TABLET ORAL 2 TIMES DAILY PRN
Qty: 60 TABLET | Refills: 1 | Status: SHIPPED | OUTPATIENT
Start: 2019-06-19 | End: 2019-08-12

## 2019-06-19 NOTE — PROGRESS NOTES
"FAMILY MEDICINE  New Visit Progress Note   Recent Hospital Discharge     PRESENTING HISTORY     Chief Complaint/Reason for Admission:  STEMI; Follow up Hospital Discharge   Chief Complaint   Patient presents with    Hospital Follow Up    Medication Refill     buspirone     PCP: Ash Hung NP    History of Present Illness:  Mr. Chris Aguirre Jr. is a 54 y.o. male who was recently admitted to the hospital.    Admission Date: 6/6/2019  Hospital Length of Stay: 1 days  Discharge Date and Time: 6/6/2019   ___________________________________________________________________    Today:  Patient was seen in [facility] today for hospital follow up.  Recently hospitalized for STEMI after presenting to the ED with a chief complaint of dizziness, thirst, and a rash to both legs.     PMH of anxiety, colon/bladder cancer, CAD, depression, HepC, HTN, MI, and pyschosis with previous suicide attempt.  Patient reported using methamphetamine prior to his ED presentation and thought he might have been drugged; denies long term use of meth (had a friend in town).  In ED, Blood pressure (194/111), Heart rate (125), Hgb (13.3), Hct (39.5), K (3.3), Glucose (150), Troponin (4.393), Mg (1.4), BNP (498), CXR unremarkable.   Given IV fluids and Benzos, workup revealed NSTEMI likely secondary to amphetamine use.      Denied chest pain.   Blood pressure controlled without treatment.   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   ECHO with LVEF of 50% without WMA.   Medical management of DAPT, statin and beta blocker.   Buspirone prn on discharge for anxiety.      Since discharge the patient presented to the ED evaluation for bilateral leg pain with "bumps" to legs, which have completely resolved since he was seen.     Today the patient is unaccompanied during the visit today.  Denies continued meth use since he was admitted; not his usual drug (alcohol).   He is out of the buspirone and reports that it helps " a lot with his anxiety and also helps to keep him from drinking.   He has no continued complaints today about his admission, but he does have a few other issues he would like to discuss today.    Hernia:  Reports developing an umbilical hernia ~4 months ago. Specifically he began to notice it after he had is colostomy reversed. Daily, it does not give him any paint, but it does bother him when he lifts anything heavy. He does construction for a living and would like to have it fixed before it progresses.     HepC:  Followed at [facility] for chronic hepatitis C (Jennifer Scheuermann, PA-C). Has follow up next week. He is currently being vaccinated for HepB and will begin HepC treatment soon.    Colon/Bladder Cancer:  Diagnosed with colon cancer ~1.5 years ago. In Feb of 2018 he had a partial colectomy and also had part of his bladder removed due to adhesions (?). Colostomy was just reversed 02/2019. Patient will need follow up colonoscopy 03/2020.     Testicle Swelling:  Patient reports that his left testicle is much bigger than his right. He also reports that the right testicle is much higher than the left and that sometimes his right testicle disappears inside his body. This has been happening for a couple years. Denies any pain or trauma.    Low Back Pain:  Reports pain at the tip of his spine described as stabbing when he exerts himself. The pain lasts about an hour, during which time he needs to lay down before it will improve. Denies any trauma, bowel bladder dysfunction, numbness/tingling.     Skin Lesion:  Has a mole on the back of his leg that started out as a tiny black dot, but it has quadrupled in size in the past 4 years. Sometimes it stings and he does report bleeding.     Review of Systems  General ROS: negative for chills, fever or weight loss  Psychological ROS: negative for hallucination, depression or suicidal ideation  Ophthalmic ROS: negative for blurry vision, photophobia or eye pain  ENT ROS:  negative for epistaxis, sore throat or rhinorrhea  Respiratory ROS: no cough, shortness of breath, or wheezing  Cardiovascular ROS: no chest pain or dyspnea on exertion  Gastrointestinal ROS: no abdominal pain, change in bowel habits, or black/ bloody stools; +hernia  Genito-Urinary ROS: no dysuria, trouble voiding, or hematuria; +testicular enlargement  Musculoskeletal ROS: negative for gait disturbance or muscular weakness  Neurological ROS: no syncope or seizures; no ataxia  Dermatological ROS: negative for pruritis, rash and jaundice; +skin lesion    PAST HISTORY:     Past Medical History:   Diagnosis Date    Anxiety about health 6/20/2018    Bladder cancer     Colon cancer     Coronary artery disease     Depression     Hepatitis C 3/9/2018    History of psychiatric care     History of psychiatric hospitalization     Hypertension     MI (myocardial infarction) 2007    Neuropathy     Psychiatric problem     Psychosis     Self-harming behavior     Suicide attempt        Past Surgical History:   Procedure Laterality Date    ABDOMINAL SURGERY      History of perforated ulcer, unknown surgery    APPENDECTOMY      CLOSURE,ILEOSTOMY N/A 1/11/2019    Performed by [facility]al [facility] at [facility] OR 2ND FLR    COLECTOMY-SIGMOID POSSIBLE BLADDER RESECTION N/A 3/13/2018    Performed by [facility] at [facility] OR 2ND FLR    COLONOSCOPY N/A 2/26/2019    Performed by [facility]al [facility] MD at [facility] ENDO (4TH FLR)    CYSTECTOMY-PARTIAL w/ bladder resection  3/13/2018    Performed by Meng Morrow MD at [facility] OR 2ND FLR    CYSTOSCOPY WITH STENT PLACEMENT Bilateral 3/13/2018    Performed by [facility]eng Morrow [facility] at [facility] OR 2ND FLR    ILEOSTOMY      ILEOSTOMY N/A 3/13/2018    Performed by [facility] at [facility] OR 2ND FLR    ILEOSTOMY CLOSURE      REMOVAL N/A 2/11/2019    Performed by [facility] at [facility] OR 2ND FLR    SIGMOIDOSCOPY-FLEXIBLE N/A 3/12/2018    Performed by Mikal Nino MD at [facility]  ENDO (2ND FLR)       Family History   Problem Relation Age of Onset    No Known Problems Mother     No Known Problems Father     No Known Problems Sister     No Known Problems Brother     No Known Problems Maternal Aunt     No Known Problems Paternal Aunt     No Known Problems Maternal Uncle     No Known Problems Paternal Uncle     No Known Problems Maternal Grandfather     No Known Problems Maternal Grandmother     No Known Problems Paternal Grandfather     No Known Problems Paternal Grandmother     ADD / ADHD Cousin     Alcohol abuse Neg Hx     Anxiety disorder Neg Hx     Bipolar disorder Neg Hx     Dementia Neg Hx     Depression Neg Hx     Drug abuse Neg Hx     OCD Neg Hx     Paranoid behavior Neg Hx     Physical abuse Neg Hx     Schizophrenia Neg Hx     Seizures Neg Hx     Self injury Neg Hx     Sexual abuse Neg Hx     Suicide Neg Hx        Social History     Socioeconomic History    Marital status: Single     Spouse name: Not on file    Number of children: Not on file    Years of education: Not on file    Highest education level: Not on file   Occupational History    Not on file   Social Needs    Financial resource strain: Not on file    Food insecurity:     Worry: Not on file     Inability: Not on file    Transportation needs:     Medical: Not on file     Non-medical: Not on file   Tobacco Use    Smoking status: Current Every Day Smoker     Packs/day: 1.00     Years: 39.00     Pack years: 39.00     Start date: 1980    Smokeless tobacco: Never Used    [facility]ess[facility]   Substance and Sexual Activity    Alcohol use: Yes     Comment: occasional    Drug use: No    Sexual activity: Yes     Partners: Female     Birth control/protection: None   Lifestyle    Physical activity:     Days per week: Not on file     Minutes per session: Not on file    Stress: Not on file   Relationships    Social  connections:     Talks on phone: Not on file     Gets together: Not on file     Attends Yarsanism service: Not on file     Active member of club or organization: Not on file     Attends meetings of clubs or organizations: Not on file     Relationship status: Not on file   Other Topics Concern    Patient feels they ought to cut down on drinking/drug use No    Patient annoyed by others criticizing their drinking/drug use No    Patient has felt bad or guilty about drinking/drug use No    Patient has had a drink/used drugs as an eye opener in the AM No   Social History Narrative    Not on file       MEDICATIONS & ALLERGIES:     Current Outpatient Medications on File Prior to Visit   Medication Sig Dispense Refill    aspirin (ECOTRIN) 81 MG EC tablet Take 1 tablet (81 mg total) by mouth once daily.  0    carvedilol (COREG) 3.125 MG tablet Take 1 tablet (3.125 mg total) by mouth 2 (two) times daily. 60 tablet 2    clopidogrel (PLAVIX) 75 mg tablet Take 1 tablet (75 mg total) by mouth once daily. 30 tablet 2    multivitamin with minerals tablet Take 1 tablet by mouth once daily.      naproxen (NAPROSYN) 500 MG tablet Take 1 tablet (500 mg total) by mouth 2 (two) times daily with meals. 14 tablet 0    pravastatin (PRAVACHOL) 10 MG tablet Take 1 tablet (10 mg total) by mouth once daily. 30 tablet 2    [DISCONTINUED] busPIRone (BUSPAR) 5 MG Tab Take 1 tablet (5 mg total) by mouth 2 (two) times daily as needed (anxiety). 20 tablet 0    lamiVUDine (EPIVIR) 100 MG Tab Take 1 tablet (100 mg total) by mouth once daily. 30 tablet 0     No current facility-administered medications on file prior to visit.         Review of patient's allergies indicates:  No Known Allergies    OBJECTIVE:     Vital Signs:  Vitals:    06/19/19 0833   BP: 127/87   Pulse: 73   Temp: 98.5 °F (36.9 °C)     Wt Readings from Last 1 Encounters:   06/19/19 0833 81.4 kg (179 lb 7.3 oz)     Body mass index is 25.03 kg/m².      Physical Exam:  BP  "127/87   Pulse 73   Temp 98.5 °F (36.9 °C)   Ht 5' 11" (1.803 m)   Wt 81.4 kg (179 lb 7.3 oz)   BMI 25.03 kg/m²   General appearance: Alert, cooperative, no distress  Constitutional: Oriented to person, place, and time. Appears well-developed and well-nourished.  HEENT: Normocephalic, atraumatic, neck symmetrical, no nasal discharge   Eyes: Conjunctivae/corneas clear, EOM's intact  Lungs: Clear to auscultation bilaterally  Heart: Regular rate and rhythm without m/g/r  Abdomen: Soft, non-tender; bowel sounds normoactive; +multiple scars from previous abdominal surgeries +small, fully reducible, non-tender umbilical hernia  : Right testicle normal size, non-tender, no masses palpated; +Left testicle ~4x times size of right, non-tender,   Extremities: Extremities symmetric; no clubbing, cyanosis, or edema  Integument: Skin color, texture, turgor normal; +~3cm pedunculated lesion to posterior thigh (see image below)   Neurologic: Alert and oriented X 3, normal strength, normal coordination and gait  Psychiatric: no pressured speech; normal affect; no evidence of impaired cognition         Laboratory  Lab Results   Component Value Date    WBC 8.03 06/06/2019    HGB 13.3 (L) 06/06/2019    HCT 39.5 (L) 06/06/2019    MCV 86 06/06/2019     (H) 06/06/2019     BMP  Lab Results   Component Value Date     06/06/2019    K 3.3 (L) 06/06/2019     06/06/2019    CO2 23 06/06/2019    BUN 17 06/06/2019    CREATININE 0.9 06/06/2019    CALCIUM 10.0 06/06/2019    ANIONGAP 14 06/06/2019    ESTGFRAFRICA >60 06/06/2019    EGFRNONAA >60 06/06/2019     Lab Results   Component Value Date    ALT 14 06/06/2019    AST 24 06/06/2019    ALKPHOS 55 06/06/2019    BILITOT 0.7 06/06/2019     Lab Results   Component Value Date    INR 0.9 05/22/2019    INR 1.0 02/11/2019    INR 0.9 08/16/2018     Lab Results   Component Value Date    HGBA1C 5.4 06/06/2019     No results for input(s): POCTGLUCOSE in the last 72 " hours.    Diagnostic Results:    TRANSTHORACIC ECHO (TTE) COMPLETE   Conclusion     · Low normal left ventricular systolic function. The estimated ejection fraction is 50%  · Grade I (mild) left ventricular diastolic dysfunction consistent with impaired relaxation.  · Normal right ventricular systolic function.  · Mild mitral regurgitation.  · Normal central venous pressure (3 mm Hg).     EKG  Normal sinus rhythm  Anterior infarct ,age undetermined  Abnormal ECG  When compared with ECG of 07-JAN-2019 14:36,  Anterior infarct is now Present  T wave amplitude has increased in Lateral leads  QT has lengthened    ASSESSMENT & PLAN:     NSTEMI (non-ST elevated myocardial infarction)   -No complaints today; no continued meth usage   -Continue DAPT, BB, statin   -Cardio follow up with Dr. Medina scheduled in [place]  Uncomplicated alcohol dependence   -Buspar has been helping him cut back   -Encouraged meetings/outside support  Essential hypertension   -BP today 127/87   -Continue current medications  Anxiety about health  -     busPIRone (BUSPAR) 5 MG Tab; Take 1 tablet (5 mg total) by mouth 2 (two) times daily as needed (anxiety).  Dispense: 60 tablet; Refill: 1  Umbilical hernia without obstruction and without gangrene  -     Ambulatory referral to General Surgery  Other hydrocele  -     US Scrotum And Testicles; Future; Expected date: 06/19/2019  -     Ambulatory referral to Urology  Skin Lesion   -Likely a wart; possibly HPV   -Will return to clinic in ~2 weeks with Dr. Paulino for removal    Instructions for the patient:      Scheduled Follow-up :  Future Appointments   Date Time Provider Department Center   6/24/2019  1:30 PM Kumar Medina MD PhD [facility] CARDIO [place]   6/27/2019  1:00 PM Jennifer B. Scheuermann, PA [facility] HEPC [address]   7/1/2019  8:00 AM Ayana Thapa, RRT [facility] SMOKE [facility]   7/9/2019 10:20 AM Carl Paulino MD [facility] LSUFE [facility]       Post Visit Medication List:      Medication List           Accurate as of 6/19/19 10:29 AM. If you have any questions, ask your nurse or doctor.               CONTINUE taking these medications    aspirin 81 MG EC tablet  Commonly known as:  ECOTRIN  Take 1 tablet (81 mg total) by mouth once daily.     busPIRone 5 MG Tab  Commonly known as:  BUSPAR  Take 1 tablet (5 mg total) by mouth 2 (two) times daily as needed (anxiety).     carvedilol 3.125 MG tablet  Commonly known as:  COREG  Take 1 tablet (3.125 mg total) by mouth 2 (two) times daily.     clopidogrel 75 mg tablet  Commonly known as:  PLAVIX  Take 1 tablet (75 mg total) by mouth once daily.     lamiVUDine 100 MG Tab  Commonly known as:  EPIVIR  Take 1 tablet (100 mg total) by mouth once daily.     multivitamin with minerals tablet     naproxen 500 MG tablet  Commonly known as:  NAPROSYN  Take 1 tablet (500 mg total) by mouth 2 (two) times daily with meals.     pravastatin 10 MG tablet  Commonly known as:  PRAVACHOL  Take 1 tablet (10 mg total) by mouth once daily.           Where to Get Your Medications      These medications were sent to [facility]  [address], [place] [address]    Hours:  Mon-Fri, 8a-5:30p Phone:  [phone]   · busPIRone 5 MG Tab         Signing Physician:  Ancelmo Covarrubias PA-C

## 2019-06-24 ENCOUNTER — OFFICE VISIT (OUTPATIENT)
Dept: CARDIOLOGY | Facility: CLINIC | Age: 54
End: 2019-06-24
Payer: MEDICAID

## 2019-06-24 VITALS
WEIGHT: 180 LBS | HEART RATE: 76 BPM | SYSTOLIC BLOOD PRESSURE: 120 MMHG | BODY MASS INDEX: 25.2 KG/M2 | RESPIRATION RATE: 18 BRPM | OXYGEN SATURATION: 98 % | HEIGHT: 71 IN | DIASTOLIC BLOOD PRESSURE: 70 MMHG

## 2019-06-24 DIAGNOSIS — F10.29 ALCOHOL DEPENDENCE WITH UNSPECIFIED ALCOHOL-INDUCED DISORDER: Primary | Chronic | ICD-10-CM

## 2019-06-24 DIAGNOSIS — I10 ESSENTIAL HYPERTENSION: ICD-10-CM

## 2019-06-24 DIAGNOSIS — F19.94 SUBSTANCE INDUCED MOOD DISORDER: ICD-10-CM

## 2019-06-24 DIAGNOSIS — F17.200 SMOKING ADDICTION: ICD-10-CM

## 2019-06-24 DIAGNOSIS — I21.4 NSTEMI (NON-ST ELEVATED MYOCARDIAL INFARCTION): ICD-10-CM

## 2019-06-24 PROCEDURE — 99999 PR PBB SHADOW E&M-EST. PATIENT-LVL III: ICD-10-PCS | Mod: PBBFAC,,, | Performed by: INTERNAL MEDICINE

## 2019-06-24 PROCEDURE — 99215 OFFICE O/P EST HI 40 MIN: CPT | Mod: S$PBB,,, | Performed by: INTERNAL MEDICINE

## 2019-06-24 PROCEDURE — 99215 PR OFFICE/OUTPT VISIT, EST, LEVL V, 40-54 MIN: ICD-10-PCS | Mod: S$PBB,,, | Performed by: INTERNAL MEDICINE

## 2019-06-24 PROCEDURE — 99999 PR PBB SHADOW E&M-EST. PATIENT-LVL III: CPT | Mod: PBBFAC,,, | Performed by: INTERNAL MEDICINE

## 2019-06-24 PROCEDURE — 99213 OFFICE O/P EST LOW 20 MIN: CPT | Mod: PBBFAC,PN | Performed by: INTERNAL MEDICINE

## 2019-06-24 RX ORDER — ATORVASTATIN CALCIUM 40 MG/1
40 TABLET, FILM COATED ORAL DAILY
Qty: 90 TABLET | Refills: 3 | Status: SHIPPED | OUTPATIENT
Start: 2019-06-24 | End: 2020-07-17

## 2019-06-24 NOTE — PROGRESS NOTES
[facility]       Chief Complaint   Patient presents with    Consult     Dr. Bin Zapata    Shortness of Breath     x 2 days ago    Chest Tightness     x 2 days ago       Patient ID: Chris Aguirre Jr. is a 54 y.o. male with a past medical history of CAD s/p PCI/stents in 2011 in [place], HTN, Hep C, depression, who presents for follow up after hospital discharge for a NSTEMI.  Pertinent history events are as follows:    -On 6/6/2019, pt admitted to [facility] with complaint of dizziness.  Found to have NSTEMI (details as per discharge summary below):      Hospital Course:   No chest pain. Blood pressure is controlled without treatment. Appreciate Cardiology evaluation. 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 No WMA on echo with LVEF of 50%. Will discharge with medical management of DAPT, statin and beta blocker. Refer to outpatient Cardiology at [facility]. Give buspirone prn on discharge for anxiety    NSTEMI (non-ST elevated myocardial infarction)  -presented with noncardiac complaints  -complaints of chest pain and SOB with exertion somewhat atypical in presentation  -initial troponin 4.393 with trend down to 4.040; EKG with NSR normal axis; anterior infarct ?lead placement and nonspecific STTWC   -risk factors for CAD: age, HTN, tobacco use; currently NSTEMI type I vs type II; feel likely more type II given recent recreational drug use and elevated BP; appears not to be the best invasive candidate  -recommend medical management with ASA, statin, BB and Plavix therapy; echo with pending LVEF (if depressed will need to re evaluate)  -recommend cardiology follow up upon discharge     HPI:  Mr. Aguirre reports occasional SOB and chest tightness since hospital discharge.  No chest tightness or SOB currently.  Discharged on ASA, plavix, beta blocker, statin.      Past Medical History:   Diagnosis Date    Anxiety about health 6/20/2018    Bladder cancer     Colon cancer     Coronary  artery disease     Depression     Hepatitis C 3/9/2018    History of psychiatric care     History of psychiatric hospitalization     Hypertension     MI (myocardial infarction) 2007    Neuropathy     Psychiatric problem     Psychosis     Self-harming behavior     Suicide attempt      Past Surgical History:   Procedure Laterality Date    ABDOMINAL SURGERY      History of perforated ulcer, unknown surgery    APPENDECTOMY      CLOSURE,ILEOSTOMY N/A 1/11/2019    [facility] 2ND FLR    COLECTOMY-SIGMOID POSSIBLE BLADDER RESECTION N/A 3/13/2018    Performed by [facility] at [facility] OR 2ND FLR    COLONOSCOPY N/A 2/26/2019    Performed by [facility] at [facility] ENDO (4TH FLR)    CYSTECTOMY-PARTIAL w/ bladder resection  3/13/2018    Performed by Meng Morrow MD at [facility] OR 2ND FLR    CYSTOSCOPY WITH STENT PLACEMENT Bilateral 3/13/2018    Performed by [facility] at [facility] OR 2ND FLR    ILEOSTOMY      ILEOSTOMY N/A 3/13/2018    Performed by [facility] at [facility] OR 2ND FLR    ILEOSTOMY CLOSURE      REMOVAL N/A 2/11/2019    Performed by [facility] at [facility] OR 2ND FLR    SIGMOIDOSCOPY-FLEXIBLE N/A 3/12/2018    Performed by [facility]ino [facility] at [facility] ENDO (2ND FLR)     Social History     Socioeconomic History    Marital status: Single     Spouse name: Not on file    Number of children: Not on file    Years of education: Not on file    Highest education level: Not on file   Occupational History    Not on file   Social Needs    Financial resource strain: Not on file    Food insecurity:     Worry: Not on file     Inability: Not on file    Transportation needs:     Medical: Not on file     Non-medical: Not on file   Tobacco Use    Smoking status: Current Every Day Smoker     Packs/day: 1.00     Years: 39.00     Pack years: 39.00     Start date: 1980    Smokeless tobacco: Never Used    [facility]  Ambulatory referral to Smoking Cessation program   Substance and Sexual Activity    Alcohol use: Yes     Comment: occasional    Drug use: No    Sexual activity: Yes     Partners: Female     Birth control/protection: None   Lifestyle    Physical activity:     Days per week: Not on file     Minutes per session: Not on file    Stress: Not on file   Relationships    Social connections:     Talks on phone: Not on file     Gets together: Not on file     Attends Yazdanism service: Not on file     Active member of club or organization: Not on file     Attends meetings of clubs or organizations: Not on file     Relationship status: Not on file   Other Topics Concern    Patient feels they ought to cut down on drinking/drug use No    Patient annoyed by others criticizing their drinking/drug use No    Patient has felt bad or guilty about drinking/drug use No    Patient has had a drink/used drugs as an eye opener in the AM No   Social History Narrative    Not on file     Family History   Problem Relation Age of Onset    No Known Problems Mother     No Known Problems Father     No Known Problems Sister     No Known Problems Brother     No Known Problems Maternal Aunt     No Known Problems Paternal Aunt     No Known Problems Maternal Uncle     No Known Problems Paternal Uncle     No Known Problems Maternal Grandfather     No Known Problems Maternal Grandmother     No Known Problems Paternal Grandfather     No Known Problems Paternal Grandmother     ADD / ADHD Cousin     Alcohol abuse Neg Hx     Anxiety disorder Neg Hx     Bipolar disorder Neg Hx     Dementia Neg Hx     Depression Neg Hx     Drug abuse Neg Hx     OCD Neg Hx     Paranoid behavior Neg Hx     Physical abuse Neg Hx     Schizophrenia Neg Hx     Seizures Neg Hx     Self injury Neg Hx     Sexual abuse Neg Hx     Suicide Neg Hx        Review of patient's allergies indicates:  No Known Allergies    Medication List with Changes/Refills  "  Current Medications    ASPIRIN (ECOTRIN) 81 MG EC TABLET    Take 1 tablet (81 mg total) by mouth once daily.    BUSPIRONE (BUSPAR) 5 MG TAB    Take 1 tablet (5 mg total) by mouth 2 (two) times daily as needed (anxiety).    CARVEDILOL (COREG) 3.125 MG TABLET    Take 1 tablet (3.125 mg total) by mouth 2 (two) times daily.    CLOPIDOGREL (PLAVIX) 75 MG TABLET    Take 1 tablet (75 mg total) by mouth once daily.    LAMIVUDINE (EPIVIR) 100 MG TAB    Take 1 tablet (100 mg total) by mouth once daily.    MULTIVITAMIN WITH MINERALS TABLET    Take 1 tablet by mouth once daily.    NAPROXEN (NAPROSYN) 500 MG TABLET    Take 1 tablet (500 mg total) by mouth 2 (two) times daily with meals.    PRAVASTATIN (PRAVACHOL) 10 MG TABLET    Take 1 tablet (10 mg total) by mouth once daily.       Review of Systems  Constitution: Denies chills, fever, and sweats.  HENT: Denies headaches or blurry vision.  Cardiovascular: Denies chest pain or irregular heart beat.  Respiratory: Denies cough or shortness of breath.  Gastrointestinal: Denies abdominal pain, nausea, or vomiting.  Musculoskeletal: Denies muscle cramps.  Neurological: Denies dizziness or focal weakness.  Psychiatric/Behavioral: Normal mental status.  Hematologic/Lymphatic: Denies bleeding problem or easy bruising/bleeding.  Skin: Denies rash or suspicious lesions    Physical Examination  /70 (BP Location: Left arm, Patient Position: Sitting, BP Method: X-Large (Manual))   Pulse 76   Resp 18   Ht 5' 11" (1.803 m)   Wt 81.6 kg (180 lb)   SpO2 98%   BMI 25.10 kg/m²     Constitutional: No acute distress, conversant  HEENT: Sclera anicteric, Pupils equal, round and reactive to light, extraocular motions intact, Oropharynx clear  Neck: No JVD, no carotid bruits  Cardiovascular: regular rate and rhythm, no murmur, rubs or gallops, normal S1/S2  Pulmonary: Clear to auscultation bilaterally  Abdominal: Abdomen soft, nontender, nondistended, positive bowel sounds  Extremities: " No lower extremity edema,   Pulses:  Carotid pulses are 2+ on the right side, and 2+ on the left side.  Radial pulses are 2+ on the right side, and 2+ on the left side.   Femoral pulses are 2+ on the right side, and 2+ on the left side.  Popliteal pulses are 2+ on the right side, and 2+ on the left side.   Dorsalis pedis pulses are 2+ on the right side, and 2+ on the left side.   Posterior tibial pulses are 2+ on the right side, and 2+ on the left side.    Skin: No ecchymosis, erythema, or ulcers  Psych: Alert and oriented x 3, appropriate affect  Neuro: CNII-XII intact, no focal deficits    Labs:  Most Recent Data  CBC:   Lab Results   Component Value Date    WBC 8.03 06/06/2019    HGB 13.3 (L) 06/06/2019    HCT 39.5 (L) 06/06/2019     (H) 06/06/2019    MCV 86 06/06/2019    RDW 14.6 (H) 06/06/2019     BMP:   Lab Results   Component Value Date     06/06/2019    K 3.3 (L) 06/06/2019     06/06/2019    CO2 23 06/06/2019    BUN 17 06/06/2019    CREATININE 0.9 06/06/2019     (H) 06/06/2019    CALCIUM 10.0 06/06/2019    MG 1.4 (L) 06/06/2019    PHOS 3.9 01/20/2019     LFTS;   Lab Results   Component Value Date    PROT 7.7 06/06/2019    ALBUMIN 3.9 06/06/2019    BILITOT 0.7 06/06/2019    AST 24 06/06/2019    ALKPHOS 55 06/06/2019    ALT 14 06/06/2019     COAGS:   Lab Results   Component Value Date    INR 0.9 05/22/2019     FLP:   Lab Results   Component Value Date    CHOL 123 06/06/2019    HDL 34 (L) 06/06/2019    LDLCALC 75.0 06/06/2019    TRIG 70 06/06/2019    CHOLHDL 27.6 06/06/2019     CARDIAC:   Lab Results   Component Value Date    TROPONINI 4.040 (H) 06/06/2019     (H) 06/06/2019         EKG 6/6/2019:  Normal sinus rhythm  Anterior infarct ,age undetermined    Echo:  · Low normal left ventricular systolic function. The estimated ejection fraction is 50%  · Grade I (mild) left ventricular diastolic dysfunction consistent with impaired relaxation.  · Normal right ventricular systolic  function.  · Mild mitral regurgitation.  · Normal central venous pressure (3 mm Hg).      Assessment/Plan:  Chris Aguirre Jr. is a 54 y.o. male with a past medical history of CAD s/p PCI/stents in 2011 in [place], HTN, Hep C, depression, smoking, who presents for follow up after hospital discharge for a NSTEMI.      1. NSTEMI with history of CAD s/p PCI/stents- Pt with known history of CAD and previous stents.  Blood pressure now well controlled.  Will schedule for left heart cath.  The procedure will be done by Dr. Hernandez.  Discontinue pravastatin and start atorvastatin 40 mg daily.  Continue ASA, beta blocker, plavix.      The risks, benefits, and alternatives of coronary vascular angiography and possible intervention were discussed with pt. All questions were answered and informed consent will be obtained. I had a detailed discussion with the patient regarding risk of stroke, MI, bleeding access site complications, renal failure, emergent need for heart surgery, acute limb complications including ischemia and loss, contrast allergy and death. Pt understands the risks and benefits of the procedure and wishes to proceed. If stents are needed and there is preference for ABEL, pt understands that would necessitate aspirin for life with plavix for at least 1 year. Additionally, pt is aware that non compliance is likely to result in stent clotting with heart attack, heart failure, and/or death.    2. HTN- Controlled.  Continue current medication regimen.     3. Smoking- Continue to encourage smoking cessation.      Follow up in 3 weeks    Total duration of face to face visit time 30 minutes.  Total time spent counseling greater than fifty percent of total visit time.  Counseling included discussion regarding imaging findings, diagnosis, possibilities, treatment options, risks and benefits.  The patient had many questions regarding the options and long-term effects.    Kumar Medina MD, PhD  Interventional  Cardiology

## 2019-06-24 NOTE — PATIENT INSTRUCTIONS
Assessment/Plan:  Chris Aguirre Jr. is a 54 y.o. male with a past medical history of CAD s/p PCI/stents in 2011 in [place], HTN, Hep C, depression, smoking, who presents for follow up after hospital discharge for a NSTEMI.      1. NSTEMI with history of CAD s/p PCI/stents- Pt with known history of CAD and previous stents.  Blood pressure now well controlled.  Will schedule for left heart cath.  The procedure will be done by Dr. Hernandez.  Discontinue pravastatin and start atorvastatin 40 mg daily.  Continue ASA, beta blocker, plavix.      The risks, benefits, and alternatives of coronary vascular angiography and possible intervention were discussed with pt. All questions were answered and informed consent will be obtained. I had a detailed discussion with the patient regarding risk of stroke, MI, bleeding access site complications, renal failure, emergent need for heart surgery, acute limb complications including ischemia and loss, contrast allergy and death. Pt understands the risks and benefits of the procedure and wishes to proceed. If stents are needed and there is preference for ABEL, pt understands that would necessitate aspirin for life with plavix for at least 1 year. Additionally, pt is aware that non compliance is likely to result in stent clotting with heart attack, heart failure, and/or death.    2. HTN- Controlled.  Continue current medication regimen.     3. Smoking- Continue to encourage smoking cessation.      Follow up in 3 weeks

## 2019-06-24 NOTE — LETTER
June 24, 2019      Bin Clements MD  [address]  [facility] [address]           [facility]  [address] [facility]   [place] [address]  Phone: [phone]  Fax: [phone]          Patient: Chris Aguirre Jr.   MR Number: [id number]   YOB: 1965   Date of Visit: 6/24/2019       Dear Dr. Bin Clements:    Thank you for referring Chris Aguirre to me for evaluation. Attached you will find relevant portions of my assessment and plan of care.    If you have questions, please do not hesitate to call me. I look forward to following Chris Aguirre along with you.    Sincerely,    Kumar Medina MD PhD    Enclosure  CC:  No Recipients    If you would like to receive this communication electronically, please contact [email or web address] or [phone] to request more information on [employer] access.    For providers and/or their staff who would like to refer a patient to [facility], please contact us through our one-stop-shop provider referral line, [facility], at [phone].    If you feel you have received this communication in error or would no longer like to receive these types of communications, please e-mail [email or web address]

## 2019-06-27 ENCOUNTER — LAB VISIT (OUTPATIENT)
Dept: LAB | Facility: HOSPITAL | Age: 54
End: 2019-06-27
Payer: MEDICAID

## 2019-06-27 ENCOUNTER — TELEPHONE (OUTPATIENT)
Dept: HEPATOLOGY | Facility: CLINIC | Age: 54
End: 2019-06-27

## 2019-06-27 ENCOUNTER — OFFICE VISIT (OUTPATIENT)
Dept: HEPATOLOGY | Facility: CLINIC | Age: 54
End: 2019-06-27
Payer: MEDICAID

## 2019-06-27 VITALS
WEIGHT: 180.75 LBS | RESPIRATION RATE: 18 BRPM | SYSTOLIC BLOOD PRESSURE: 137 MMHG | DIASTOLIC BLOOD PRESSURE: 90 MMHG | BODY MASS INDEX: 25.31 KG/M2 | HEIGHT: 71 IN | HEART RATE: 79 BPM | OXYGEN SATURATION: 99 %

## 2019-06-27 DIAGNOSIS — B18.2 CHRONIC HEPATITIS C WITHOUT HEPATIC COMA: Primary | ICD-10-CM

## 2019-06-27 DIAGNOSIS — K76.9 LIVER LESION: ICD-10-CM

## 2019-06-27 LAB
AFP SERPL-MCNC: 1.7 NG/ML (ref 0–8.4)
HIV 1+2 AB+HIV1 P24 AG SERPL QL IA: NEGATIVE

## 2019-06-27 PROCEDURE — 82105 ALPHA-FETOPROTEIN SERUM: CPT

## 2019-06-27 PROCEDURE — 99999 PR PBB SHADOW E&M-EST. PATIENT-LVL IV: ICD-10-PCS | Mod: PBBFAC,,, | Performed by: PHYSICIAN ASSISTANT

## 2019-06-27 PROCEDURE — 99214 PR OFFICE/OUTPT VISIT, EST, LEVL IV, 30-39 MIN: ICD-10-PCS | Mod: S$PBB,,, | Performed by: PHYSICIAN ASSISTANT

## 2019-06-27 PROCEDURE — 99214 OFFICE O/P EST MOD 30 MIN: CPT | Mod: PBBFAC | Performed by: PHYSICIAN ASSISTANT

## 2019-06-27 PROCEDURE — 99999 PR PBB SHADOW E&M-EST. PATIENT-LVL IV: CPT | Mod: PBBFAC,,, | Performed by: PHYSICIAN ASSISTANT

## 2019-06-27 PROCEDURE — 36415 COLL VENOUS BLD VENIPUNCTURE: CPT

## 2019-06-27 PROCEDURE — 86703 HIV-1/HIV-2 1 RESULT ANTBDY: CPT

## 2019-06-27 PROCEDURE — 99214 OFFICE O/P EST MOD 30 MIN: CPT | Mod: S$PBB,,, | Performed by: PHYSICIAN ASSISTANT

## 2019-06-27 NOTE — TELEPHONE ENCOUNTER
6/27 Labs reviewed    pls tell pt HIV negative and tumor marker lab normal  Proceed w/ MRI as we planned    thanks

## 2019-06-27 NOTE — PROGRESS NOTES
"HEPATOLOGY CLINIC VISIT NOTE - HCV clinic    CHIEF COMPLAINT: Hepatitis C     HISTORY: This is a 54 y.o. White male with prior HBV exposure (recently on lamivudine prophylaxis due to chemo for colon CA) and HCV, here for f/u    Interval history:  Completed 5FU for colon CA 11/2019. Per AASLD guidelines, remained on lamivudine prophylaxis x 6 months after chemo ended (through 5/2019)   5/2019 labs: neg HBV DNA, HBsAg,     Hospitalized 6/6/19 at [facility] w/ NSTEMI (from crystal meth?). LVEF 6/2019 - 50%   Saw cardiology for f/u earlier this week: pravastatin changed to atorvastatin 40mg daily.   Left heart cath being scheduled    Feels okay  Denies jaundice, dark urine, hematemesis, melena, slowed mentation, abdominal distention.     Reports above drug use was isolated incident; does not use regularly  Drinks alcohol some but has decreased this b/c "takes him a week to recover"      HCV history:  Originally diagnosed 2001 while in [facility]  Risks for HCV:  Tattoo placement - first one age 25    IVDA and nasal 18-20 yrs old    In [facility] [id number]  - Treatment naive  - Genotype 1a  - NS5A resistance not known      Liver staging:  FibroScan 9/17/18 - kPa 7.9, (F2 Fibrosis) ;  (No steatosis)  Labs and imaging reveal no evidence of advanced fibrosis or portal HTN      Abnormal liver imaging:  Prior CT scans throughout 2018   3/2018 CT abd w/ contrast:  - subcentimeter hypoattenuating lesion in left hepatic lobe, too small for characterization  - 2 faint foci of irregular peripheral enhancement that normalize with hepatic parenchymal enhancement on delayed image, ?perfusional anomaly versus flash filling hemangiomas  - Punctate hypoattenuating focus in the right hepatic lobe, too small for characterization    4/2018 CT abdomen w/ contrast  - Stable subcentimeter hypodensities in left and right hepatic lobes, too small to fully characterize. ?cysts    11/2018 CT abd w/ contrast  - Stable 1.1 cm arterial enhancing " focus at the anterior margin of the left hepatic lobe  - stable enhancing focus at the posterior margin of hepatic segment III  - Subcentimeter hepatic hypodensities are similar in appearance and too small to characterize  Appearance is nonspecific, but in light of patient's history continued surveillance is recommended    AFP 4/2018 - 1.8                    Past Medical History:   Diagnosis Date    Anxiety about health 6/20/2018    Bladder cancer     Colon cancer     Coronary artery disease     Depression     Hepatitis C 3/9/2018    History of psychiatric care     History of psychiatric hospitalization     Hypertension     MI (myocardial infarction) 2007    Neuropathy     Psychiatric problem     Psychosis     Self-harming behavior     Suicide attempt        Past Surgical History:   Procedure Laterality Date    ABDOMINAL SURGERY      History of perforated ulcer, unknown surgery    APPENDECTOMY      CLOSURE,ILEOSTOMY N/A 1/11/2019    Performed by [facility] at [facility] OR 2ND FLR    COLECTOMY-SIGMOID POSSIBLE BLADDER RESECTION N/A 3/13/2018    Performed by [facility] at [facility] OR 2ND FLR    COLONOSCOPY N/A 2/26/2019    Performed by [facility]al [facility]ino MD at [facility] ENDO (4TH FLR)    CYSTECTOMY-PARTIAL w/ bladder resection  3/13/2018    Performed by Meng Morrow MD at [facility] OR 2ND FLR    CYSTOSCOPY WITH STENT PLACEMENT Bilateral 3/13/2018    Performed by [facility]eng [facility]row MD at [facility] OR 2ND FLR    ILEOSTOMY      ILEOSTOMY N/A 3/13/2018    Performed by [facility] at [facility] OR 2ND FLR    ILEOSTOMY CLOSURE      REMOVAL N/A 2/11/2019    Performed by [facility] Provider at [facility] OR 2ND FLR    SIGMOIDOSCOPY-FLEXIBLE N/A 3/12/2018    Performed by Mikal Nino MD at [facility] ENDO (2ND FLR)       FAMILY HISTORY: Negative for liver disease    SOCIAL HISTORY:   Social History     Tobacco Use   Smoking Status Current Every Day Smoker    Packs/day: 1.00    Years: 39.00    Pack years: 39.00     Start date: 1980   Smokeless Tobacco Never Used   Tobacco Comment    [facility] to Sm[facility] Cessation program     Alcohol - daily alcohol for many years in the past. Reports he's decreased this but still drinks some. See above.   Drugs - IVDA and nasal 18-20 yrs old. Recently snorted meth (6/2019)      ROS:   No fever, chills, weight loss, fatigue  No chest pain, palpitations, dyspnea, cough  No abdominal pain, nausea, vomiting  No headaches  No lower extremity edema  No depression or anxiety      PHYSICAL EXAM:  Friendly White male, in no acute distress; alert and oriented to person, place and time  VITALS: reviewed  HEENT: Sclerae anicteric.   NECK: Supple  LUNGS: Normal respiratory effort. Clear bilaterally  CVS: RRR, no murmurs  ABDOMEN: Flat, soft, nondistended. No organomegaly or masses     SKIN: Warm and dry. No jaundice, No obvious rashes. (+) tattoos  EXTREMITIES: No lower extremity edema  NEURO/PSYCH: Normal gate. Memory intact. Thought and speech pattern appropriate. Behavior normal. No depression or anxiety noted.      RECENT LABS:  Lab Results   Component Value Date    WBC 8.03 06/06/2019    HGB 13.3 (L) 06/06/2019     (H) 06/06/2019     Lab Results   Component Value Date    INR 0.9 05/22/2019     Lab Results   Component Value Date    AST 24 06/06/2019    ALT 14 06/06/2019    BILITOT 0.7 06/06/2019    ALBUMIN 3.9 06/06/2019    ALKPHOS 55 06/06/2019    CREATININE 0.9 06/06/2019    BUN 17 06/06/2019     06/06/2019    K 3.3 (L) 06/06/2019    AFP 1.8 04/13/2018       RECENT IMAGING:  Results for orders placed during the hospital encounter of 11/15/18   CT Abdomen Pelvis With Contrast    Narrative EXAMINATION:  CT ABDOMEN PELVIS WITH CONTRAST    CLINICAL HISTORY:  rlq pain around ostomy, hx of sigmoid colon CA;    TECHNIQUE:  Low dose axial images were obtained from the lung bases to the pubic symphysis following the oral administration of 75 cc of  Omnipaque 350.  Sagittal and coronal reformats were provided.    COMPARISON:  CT abdomen pelvis 04/27/2018.  The    FINDINGS:  Lung bases: Symmetrically expanded.  No consolidation, pneumothorax, or pleural effusion.    Liver: Normal in size and attenuation.  Stable 1.1 cm arterial enhancing focus at the anterior margin of the left hepatic lobe and stable enhancing focus at the posterior margin of hepatic segment III.  Subcentimeter hepatic hypodensities are similar in appearance and too small to characterize.  No new hepatic lesions.    Gallbladder: Normal in appearance without evidence for cholecystitis.    Bile Ducts: No intra or extrahepatic biliary ductal dilation.    Pancreas: No pancreatic mass lesion or peripancreatic inflammatory change.    Spleen: Unremarkable.    Adrenals: Unremarkable.    Kidneys/ Ureters: Normal in size and location.  Normal concentration and excretion of contrast. No focal renal abnormality or nephrolithiasis.  Interval removal of bilateral double-J ureteral stents with development of mild bilateral hydroureteronephrosis, left greater than right.    Bladder: Status post partial cystectomy with bladder repair, and left ureteral reimplantation.  No bladder wall thickening.    Reproductive organs: The patient is status post partial prostatectomy.    GI Tract/Mesentery: Stomach is normal appearance.  Postsurgical changes of sigmoidectomy and right lower quadrant diverting loop ileostomy.  Visualized loops of small and large bowel are normal in caliber without evidence for obstruction or inflammation.    Peritoneal Space: No abdominopelvic ascites or intraperitoneal free air.    Retroperitoneum: No significant adenopathy.    Abdominal wall: Small umbilical hernia containing a loop of unobstructed distal ileum, just proximal to the ileostomy.    Vasculature: Abdominal aorta is normal in caliber, contour, and course with prominent calcific atherosclerosis of the aortoiliac arteries.    Bones:  No acute fracture or aggressive lytic or blastic osseous process.  Stable degenerative changes of the spine with compression deformity of L1 and severe degenerative disc disease most pronounced at L5/S1.      Impression Postsurgical changes of sigmoidectomy and right lower quadrant ileostomy.  No evidence of local recurrence.  There is retained fecal material in the right colon without evidence of obstruction or inflammation.    Interval removal of bilateral ureteral stents with development of mild bilateral hydronephrosis, left slightly greater than right.    Stable appearance of 2 arterial enhancing left hepatic lobe foci and 2 hepatic hypodensities since prior examination of 03/09/2018.  Appearance is nonspecific, but in light of patient's history continued surveillance is recommended. No new hepatic lesions.    Electronically signed by resident: Usama Hernandez  Date:    11/15/2018  Time:    09:57    Electronically signed by: Meng Pang MD  Date:    11/15/2018  Time:    10:28         ASSESSMENT  54 y.o. White male with:  1. CHRONIC HEPATITIS C, GENOTYPE 1a - treatment naive  -- FibroScan 9/2018- F2  -- Lacking Immunity to HAV - vaccine series underway    2. PRIOR HBV EXPOSURE  -- Pos HBcAb, neg HBsAg -- risk of reactivation w/ chemo so recently completed lamivudine while on 5FU  -- Per AASLD guidelines, needs vaccine due to chemo - s/p 1st and 2nd doses  -- recent HBV DNA, HBsAg neg  -- requires HBV monitoring through 11/2019    3. ABNORMAL LIVER LESIONS  -- due for surveillance imaging    4.  SIGMOID COLON CANCER  -- s/p surgery 3/2018, completed 5FU in 11/2019    4. RECENT NSTEMI 6/2019  -- seeing cardiology now, heart cath being scheduled    5. RECENT DRUG USE, ONGOING ALCOHOL USE         PLAN:  1. Labs today: AFP, HIV  2. MRI abdomen  3. F/U 6 months. Plans to initiate HCV therapy at that time. Will repeat HBV labs at this visit.    Reminded pt of risk of liver disease progression due to alcohol. Recommend  complete alcohol abstinence.  Discussed risk of HIV and HCV transmission through IVDA, intranasal drug use and need for HIV screening.

## 2019-06-28 NOTE — TELEPHONE ENCOUNTER
Spoke with pt''s mother,  PA Scheuermann msg relayed. Pt verbalized understanding and agreement. MRI scheduled previously.

## 2019-07-07 PROBLEM — I21.4 NSTEMI (NON-ST ELEVATED MYOCARDIAL INFARCTION): Status: RESOLVED | Noted: 2019-06-06 | Resolved: 2019-07-07

## 2019-07-09 ENCOUNTER — TELEPHONE (OUTPATIENT)
Dept: SMOKING CESSATION | Facility: CLINIC | Age: 54
End: 2019-07-09

## 2019-07-09 NOTE — TELEPHONE ENCOUNTER
Spoke with pt mother, he was not home and gave my phone number to call and reschedule. [phone].

## 2019-07-11 ENCOUNTER — OFFICE VISIT (OUTPATIENT)
Dept: FAMILY MEDICINE | Facility: HOSPITAL | Age: 54
End: 2019-07-11
Attending: FAMILY MEDICINE
Payer: MEDICAID

## 2019-07-11 VITALS
HEART RATE: 80 BPM | BODY MASS INDEX: 25.44 KG/M2 | SYSTOLIC BLOOD PRESSURE: 143 MMHG | HEIGHT: 71 IN | WEIGHT: 181.69 LBS | DIASTOLIC BLOOD PRESSURE: 95 MMHG

## 2019-07-11 DIAGNOSIS — L82.1 SEBORRHEIC KERATOSIS: Primary | ICD-10-CM

## 2019-07-11 PROCEDURE — 99213 OFFICE O/P EST LOW 20 MIN: CPT | Performed by: STUDENT IN AN ORGANIZED HEALTH CARE EDUCATION/TRAINING PROGRAM

## 2019-07-11 NOTE — PROGRESS NOTES
Shave Biopsy Procedure Note    Pre-operative Diagnosis: seborrheic keratosis    Post-operative Diagnosis: same    Locations:posterior thigh    Indications: obtain biopsy for diagnosis    Anesthesia: Bupivacaine 0.25% with epinephrine without added sodium bicarbonate    Procedure Details   History of allergy to iodine: no    Patient informed of the risks (including bleeding and infection) and benefits of the   procedure and Written informed consent obtained.    The lesion and surrounding area were given a sterile prep using betadyne and draped in the usual sterile fashion. A scalpel was used to shave an area of skin approximately 3cm by 3cm.  Hemostasis achieved with pressure and monsel's. Antibiotic ointment and a sterile dressing applied.  The specimen was sent for pathologic examination. The patient tolerated the procedure well.    EBL: 200 ml    Condition:  Stable    Complications:  bleeding.    Plan:  1. Instructed to keep the wound dry and covered for 24-48h and clean thereafter.  2. Warning signs of infection were reviewed.    3. Recommended that the patient use OTC analgesics as needed for pain.   4. Will call patient with results

## 2019-07-14 NOTE — PROGRESS NOTES
I assume primary medical responsibility for this patient. I have reviewed the procedure note with the resident and agree that the care is reasonable and necessary. This service has been performed by a resident with the presence of a teaching physician during the entire procedure. If necessary, an addendum of additional findings or evaluation beyond the resident documentation will be noted below.    Suspected SK, though possibly atypical wart. Large & causing patient discomfort from location. Sent for path. Removal complicated by bleeding as pt on dual antiplatelet for recent NSTEMTI    Kasia Joseph MD

## 2019-07-16 ENCOUNTER — OFFICE VISIT (OUTPATIENT)
Dept: CARDIOLOGY | Facility: CLINIC | Age: 54
End: 2019-07-16
Payer: MEDICAID

## 2019-07-16 ENCOUNTER — TELEPHONE (OUTPATIENT)
Dept: SMOKING CESSATION | Facility: CLINIC | Age: 54
End: 2019-07-16

## 2019-07-16 VITALS
HEIGHT: 71 IN | SYSTOLIC BLOOD PRESSURE: 120 MMHG | OXYGEN SATURATION: 98 % | WEIGHT: 183 LBS | BODY MASS INDEX: 25.62 KG/M2 | DIASTOLIC BLOOD PRESSURE: 82 MMHG | HEART RATE: 74 BPM

## 2019-07-16 DIAGNOSIS — I10 ESSENTIAL HYPERTENSION: ICD-10-CM

## 2019-07-16 DIAGNOSIS — F17.200 SMOKING ADDICTION: ICD-10-CM

## 2019-07-16 DIAGNOSIS — R06.02 SOB (SHORTNESS OF BREATH): ICD-10-CM

## 2019-07-16 DIAGNOSIS — I21.4 NSTEMI (NON-ST ELEVATED MYOCARDIAL INFARCTION): ICD-10-CM

## 2019-07-16 DIAGNOSIS — F10.288 ALCOHOL DEPENDENCE WITH OTHER ALCOHOL-INDUCED DISORDER: Primary | Chronic | ICD-10-CM

## 2019-07-16 DIAGNOSIS — I20.89 STABLE ANGINA PECTORIS: ICD-10-CM

## 2019-07-16 PROBLEM — R07.9 CHEST PAIN: Status: ACTIVE | Noted: 2019-07-16

## 2019-07-16 PROCEDURE — 99999 PR PBB SHADOW E&M-EST. PATIENT-LVL III: CPT | Mod: PBBFAC,,, | Performed by: INTERNAL MEDICINE

## 2019-07-16 PROCEDURE — 99999 PR PBB SHADOW E&M-EST. PATIENT-LVL III: ICD-10-PCS | Mod: PBBFAC,,, | Performed by: INTERNAL MEDICINE

## 2019-07-16 PROCEDURE — 99215 PR OFFICE/OUTPT VISIT, EST, LEVL V, 40-54 MIN: ICD-10-PCS | Mod: S$PBB,,, | Performed by: INTERNAL MEDICINE

## 2019-07-16 PROCEDURE — 99213 OFFICE O/P EST LOW 20 MIN: CPT | Mod: PBBFAC,PN | Performed by: INTERNAL MEDICINE

## 2019-07-16 PROCEDURE — 99215 OFFICE O/P EST HI 40 MIN: CPT | Mod: S$PBB,,, | Performed by: INTERNAL MEDICINE

## 2019-07-16 RX ORDER — ISOSORBIDE MONONITRATE 30 MG/1
30 TABLET, EXTENDED RELEASE ORAL DAILY
Qty: 30 TABLET | Refills: 11 | Status: SHIPPED | OUTPATIENT
Start: 2019-07-16 | End: 2019-12-19

## 2019-07-16 NOTE — PATIENT INSTRUCTIONS
Assessment/Plan:  Chris Aguirre Jr. is a 54 y.o. male with a past medical history of CAD s/p PCI/stents in 2011 in [place], HTN, Hep C, depression, smoking, who presents for follow up after hospital discharge for a NSTEMI.      1. NSTEMI with history of CAD s/p PCI/stents- Pt with known history of CAD and previous stents.  Blood pressure now well controlled.  Will schedule for left heart cath.  The procedure will be done by Dr. Hernandez.  Start imdur 30 mg daily.  Continue atorvastatin 40 mg daily.  Continue ASA, beta blocker, plavix.      The risks, benefits, and alternatives of coronary vascular angiography and possible intervention were discussed with pt. All questions were answered and informed consent will be obtained. I had a detailed discussion with the patient regarding risk of stroke, MI, bleeding access site complications, renal failure, emergent need for heart surgery, acute limb complications including ischemia and loss, contrast allergy and death. Pt understands the risks and benefits of the procedure and wishes to proceed. If stents are needed and there is preference for ABEL, pt understands that would necessitate aspirin for life with plavix for at least 1 year. Additionally, pt is aware that non compliance is likely to result in stent clotting with heart attack, heart failure, and/or death.    2. HTN- Controlled.  Continue current medication regimen.     3. Smoking- Continue to encourage smoking cessation.      Follow up in 3 weeks

## 2019-07-16 NOTE — PROGRESS NOTES
[facility]       Chief Complaint   Patient presents with    Chest Pain     tightness       Patient ID: Chris Aguirre Jr. is a 54 y.o. male with a past medical history of CAD s/p PCI/stents in 2011 in [place], HTN, Hep C, depression, who presents for follow up after hospital discharge for a NSTEMI.  Pertinent history events are as follows:    -On 6/6/2019, pt admitted to [facility] with complaint of dizziness.  Found to have NSTEMI (details as per discharge summary below):      Hospital Course:   No chest pain. Blood pressure is controlled without treatment. Appreciate Cardiology evaluation. 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 No WMA on echo with LVEF of 50%. Will discharge with medical management of DAPT, statin and beta blocker. Refer to outpatient Cardiology at [facility]. Give buspirone prn on discharge for anxiety    NSTEMI (non-ST elevated myocardial infarction)  -presented with noncardiac complaints  -complaints of chest pain and SOB with exertion somewhat atypical in presentation  -initial troponin 4.393 with trend down to 4.040; EKG with NSR normal axis; anterior infarct ?lead placement and nonspecific STTWC   -risk factors for CAD: age, HTN, tobacco use; currently NSTEMI type I vs type II; feel likely more type II given recent recreational drug use and elevated BP; appears not to be the best invasive candidate  -recommend medical management with ASA, statin, BB and Plavix therapy; echo with pending LVEF (if depressed will need to re evaluate)  -recommend cardiology follow up upon discharge     -At our initial clinic visit on 6/24/2019, Mr. Aguirre reports occasional SOB and chest tightness since hospital discharge.  No chest tightness or SOB currently.  Discharged on ASA, plavix, beta blocker, statin.    Plan:   NSTEMI with history of CAD s/p PCI/stents- Pt with known history of CAD and previous stents.  Blood pressure now well controlled.  Will schedule for left heart cath.   The procedure will be done by Dr. Hernandez.  Discontinue pravastatin and start atorvastatin 40 mg daily.  Continue ASA, beta blocker, plavix.    HTN- Controlled.  Continue current medication regimen.   Smoking- Continue to encourage smoking cessation.      HPI:  Mr. Aguirre reports continued episodes of chest pain and SOB.  Awaiting left heart cath.      Past Medical History:   Diagnosis Date    Anxiety about health 6/20/2018    Bladder cancer     Colon cancer     Coronary artery disease     Depression     Hepatitis C 3/9/2018    History of psychiatric care     History of psychiatric hospitalization     Hypertension     MI (myocardial infarction) 2007    Neuropathy     Psychiatric problem     Psychosis     Self-harming behavior     Suicide attempt      Past Surgical History:   Procedure Laterality Date    ABDOMINAL SURGERY      History of perforated ulcer, unknown surgery    APPENDECTOMY      CLOSURE,ILEOSTOMY N/A 1/11/2019    [facility]formed [facility] at [facility]ND [facility]    COLECTOMY-SIGMOID POSSIBLE BLADDER RESECTION N/A 3/13/2018    [facility]formed [facility] at [facility]ND [facility]LR    COLONOSCOPY N/A 2/26/2019    Performed by [facility] at [facility] END[facility] (4TH FLR)    CYSTECTOMY-PARTIAL w/ bladder resection  3/13/2018    Performed by [facility]eng Morrow [facility] at [facility] 2ND [facility]LR    CYSTOSCOPY WITH STENT PLACEMENT Bilateral 3/13/2018    Performed by [facility]row [facility] at [facility] 2ND [facility]LR    ILEOSTOMY      ILEOSTOMY N/A 3/13/2018    Per[facility] at [facility] 2ND [facility]    ILEOSTOMY CLOSURE      REMOVAL N/A 2/11/2019    Performed by [facility] at [facility] OR 2ND FLR    SIGMOIDOSCOPY-FLEXIBLE N/A 3/12/2018    Performed by [facility]ino [facility] at [facility] (2ND FLR)     Social History     Socioeconomic History    Marital status: Single     Spouse name: Not on file    Number of children: Not on file    Years of education: Not on file    Highest education level: Not on  file   Occupational History    Not on file   Social Needs    Financial resource strain: Not on file    Food insecurity:     Worry: Not on file     Inability: Not on file    Transportation needs:     Medical: Not on file     Non-medical: Not on file   Tobacco Use    Smoking status: Current Every Day Smoker     Packs/day: 0.50     Years: 39.00     Pack years: 19.50     Types: Cigarettes     Start date: 1980    Smokeless tobacco: Never Used    [facility] to Sm[facility] Cessation program   Substance and Sexual Activity    Alcohol use: Yes     Comment: occasional    Drug use: No    Sexual activity: Yes     Partners: Female     Birth control/protection: None   Lifestyle    Physical activity:     Days per week: Not on file     Minutes per session: Not on file    Stress: Not on file   Relationships    Social connections:     Talks on phone: Not on file     Gets together: Not on file     Attends Baptism service: Not on file     Active member of club or organization: Not on file     Attends meetings of clubs or organizations: Not on file     Relationship status: Not on file   Other Topics Concern    Patient feels they ought to cut down on drinking/drug use No    Patient annoyed by others criticizing their drinking/drug use No    Patient has felt bad or guilty about drinking/drug use No    Patient has had a drink/used drugs as an eye opener in the AM No   Social History Narrative    Not on file     Family History   Problem Relation Age of Onset    No Known Problems Mother     No Known Problems Father     No Known Problems Sister     No Known Problems Brother     No Known Problems Maternal Aunt     No Known Problems Paternal Aunt     No Known Problems Maternal Uncle     No Known Problems Paternal Uncle     No Known Problems Maternal Grandfather     No Known Problems Maternal Grandmother     No Known Problems Paternal Grandfather     No Known  "Problems Paternal Grandmother     ADD / ADHD Cousin     Alcohol abuse Neg Hx     Anxiety disorder Neg Hx     Bipolar disorder Neg Hx     Dementia Neg Hx     Depression Neg Hx     Drug abuse Neg Hx     OCD Neg Hx     Paranoid behavior Neg Hx     Physical abuse Neg Hx     Schizophrenia Neg Hx     Seizures Neg Hx     Self injury Neg Hx     Sexual abuse Neg Hx     Suicide Neg Hx        Review of patient's allergies indicates:  No Known Allergies    Medication List with Changes/Refills   Current Medications    ASPIRIN (ECOTRIN) 81 MG EC TABLET    Take 1 tablet (81 mg total) by mouth once daily.    ATORVASTATIN (LIPITOR) 40 MG TABLET    Take 1 tablet (40 mg total) by mouth once daily.    BUSPIRONE (BUSPAR) 5 MG TAB    Take 1 tablet (5 mg total) by mouth 2 (two) times daily as needed (anxiety).    CARVEDILOL (COREG) 3.125 MG TABLET    Take 1 tablet (3.125 mg total) by mouth 2 (two) times daily.    CLOPIDOGREL (PLAVIX) 75 MG TABLET    Take 1 tablet (75 mg total) by mouth once daily.    MULTIVITAMIN WITH MINERALS TABLET    Take 1 tablet by mouth once daily.    NAPROXEN (NAPROSYN) 500 MG TABLET    Take 1 tablet (500 mg total) by mouth 2 (two) times daily with meals.       Review of Systems  Constitution: Denies chills, fever, and sweats.  HENT: Denies headaches or blurry vision.  Cardiovascular: Denies chest pain or irregular heart beat.  Respiratory: Denies cough or shortness of breath.  Gastrointestinal: Denies abdominal pain, nausea, or vomiting.  Musculoskeletal: Denies muscle cramps.  Neurological: Denies dizziness or focal weakness.  Psychiatric/Behavioral: Normal mental status.  Hematologic/Lymphatic: Denies bleeding problem or easy bruising/bleeding.  Skin: Denies rash or suspicious lesions    Physical Examination  /82 (BP Location: Left arm, Patient Position: Sitting, BP Method: X-Large (Manual))   Pulse 74   Ht 5' 11" (1.803 m)   Wt 83 kg (183 lb)   SpO2 98%   BMI 25.52 kg/m² "     Constitutional: No acute distress, conversant  HEENT: Sclera anicteric, Pupils equal, round and reactive to light, extraocular motions intact, Oropharynx clear  Neck: No JVD, no carotid bruits  Cardiovascular: regular rate and rhythm, no murmur, rubs or gallops, normal S1/S2  Pulmonary: Clear to auscultation bilaterally  Abdominal: Abdomen soft, nontender, nondistended, positive bowel sounds  Extremities: No lower extremity edema,   Pulses:  Carotid pulses are 2+ on the right side, and 2+ on the left side.  Radial pulses are 2+ on the right side, and 2+ on the left side.   Femoral pulses are 2+ on the right side, and 2+ on the left side.  Popliteal pulses are 2+ on the right side, and 2+ on the left side.   Dorsalis pedis pulses are 2+ on the right side, and 2+ on the left side.   Posterior tibial pulses are 2+ on the right side, and 2+ on the left side.    Skin: No ecchymosis, erythema, or ulcers  Psych: Alert and oriented x 3, appropriate affect  Neuro: CNII-XII intact, no focal deficits    Labs:  Most Recent Data  CBC:   Lab Results   Component Value Date    WBC 8.03 06/06/2019    HGB 13.3 (L) 06/06/2019    HCT 39.5 (L) 06/06/2019     (H) 06/06/2019    MCV 86 06/06/2019    RDW 14.6 (H) 06/06/2019     BMP:   Lab Results   Component Value Date     06/06/2019    K 3.3 (L) 06/06/2019     06/06/2019    CO2 23 06/06/2019    BUN 17 06/06/2019    CREATININE 0.9 06/06/2019     (H) 06/06/2019    CALCIUM 10.0 06/06/2019    MG 1.4 (L) 06/06/2019    PHOS 3.9 01/20/2019     LFTS;   Lab Results   Component Value Date    PROT 7.7 06/06/2019    ALBUMIN 3.9 06/06/2019    BILITOT 0.7 06/06/2019    AST 24 06/06/2019    ALKPHOS 55 06/06/2019    ALT 14 06/06/2019     COAGS:   Lab Results   Component Value Date    INR 0.9 05/22/2019     FLP:   Lab Results   Component Value Date    CHOL 123 06/06/2019    HDL 34 (L) 06/06/2019    LDLCALC 75.0 06/06/2019    TRIG 70 06/06/2019    CHOLHDL 27.6 06/06/2019      CARDIAC:   Lab Results   Component Value Date    TROPONINI 4.040 (H) 06/06/2019     (H) 06/06/2019         EKG 6/6/2019:  Normal sinus rhythm  Anterior infarct ,age undetermined    Echo:  · Low normal left ventricular systolic function. The estimated ejection fraction is 50%  · Grade I (mild) left ventricular diastolic dysfunction consistent with impaired relaxation.  · Normal right ventricular systolic function.  · Mild mitral regurgitation.  · Normal central venous pressure (3 mm Hg).      Assessment/Plan:  Chris Aguirre Jr. is a 54 y.o. male with a past medical history of CAD s/p PCI/stents in 2011 in [place], HTN, Hep C, depression, smoking, who presents for follow up after hospital discharge for a NSTEMI.      1. NSTEMI with history of CAD s/p PCI/stents- Pt with known history of CAD and previous stents.  Blood pressure now well controlled.  Will schedule for left heart cath.  The procedure will be done by Dr. Hernandez.  Start imdur 30 mg daily.  Continue atorvastatin 40 mg daily.  Continue ASA, beta blocker, plavix.      The risks, benefits, and alternatives of coronary vascular angiography and possible intervention were discussed with pt. All questions were answered and informed consent will be obtained. I had a detailed discussion with the patient regarding risk of stroke, MI, bleeding access site complications, renal failure, emergent need for heart surgery, acute limb complications including ischemia and loss, contrast allergy and death. Pt understands the risks and benefits of the procedure and wishes to proceed. If stents are needed and there is preference for ABEL, pt understands that would necessitate aspirin for life with plavix for at least 1 year. Additionally, pt is aware that non compliance is likely to result in stent clotting with heart attack, heart failure, and/or death.    2. HTN- Controlled.  Continue current medication regimen.     3. Smoking- Continue to encourage smoking  cessation.      Follow up in 3 weeks    Total duration of face to face visit time 30 minutes.  Total time spent counseling greater than fifty percent of total visit time.  Counseling included discussion regarding imaging findings, diagnosis, possibilities, treatment options, risks and benefits.  The patient had many questions regarding the options and long-term effects.    Kumar Medina MD, PhD  Interventional Cardiology

## 2019-07-24 ENCOUNTER — TELEPHONE (OUTPATIENT)
Dept: SMOKING CESSATION | Facility: CLINIC | Age: 54
End: 2019-07-24

## 2019-07-24 ENCOUNTER — TELEPHONE (OUTPATIENT)
Dept: HEPATOLOGY | Facility: CLINIC | Age: 54
End: 2019-07-24

## 2019-07-24 NOTE — TELEPHONE ENCOUNTER
Left a message regarding a missed appointment and gave my number to call and reschedule [phone].

## 2019-07-24 NOTE — TELEPHONE ENCOUNTER
PA Scheuermann not in clinic on 12/27/19.  Attempt made to reach patient.  I left  informing him of the above and that his appt with provider moved to 12/6; reminder notice mailed with a note stressing change.

## 2019-08-12 DIAGNOSIS — R45.89 ANXIETY ABOUT HEALTH: ICD-10-CM

## 2019-08-12 NOTE — TELEPHONE ENCOUNTER
----- Message from Sienna Cagle sent at 8/12/2019 10:54 AM CDT -----  PT NEED REFILL ON busPIRone (BUSPAR) 5 MG Tab HE SAW DR MONDRAGON IN July

## 2019-08-18 RX ORDER — BUSPIRONE HYDROCHLORIDE 5 MG/1
5 TABLET ORAL 2 TIMES DAILY PRN
Qty: 60 TABLET | Refills: 1 | Status: SHIPPED | OUTPATIENT
Start: 2019-08-18 | End: 2019-12-19

## 2019-10-07 ENCOUNTER — PATIENT MESSAGE (OUTPATIENT)
Dept: UROLOGY | Facility: CLINIC | Age: 54
End: 2019-10-07

## 2019-11-15 ENCOUNTER — TELEPHONE (OUTPATIENT)
Dept: SURGERY | Facility: CLINIC | Age: 54
End: 2019-11-15

## 2019-11-15 NOTE — TELEPHONE ENCOUNTER
----- Message from Smitha Kennedy sent at 11/15/2019  2:04 PM CST -----  Contact: Pt  Patient requesting a call back in regards to scheduling appt    Please call    Phone [phone]

## 2019-11-18 ENCOUNTER — PATIENT MESSAGE (OUTPATIENT)
Dept: UROLOGY | Facility: CLINIC | Age: 54
End: 2019-11-18

## 2019-11-18 ENCOUNTER — TELEPHONE (OUTPATIENT)
Dept: UROLOGY | Facility: CLINIC | Age: 54
End: 2019-11-18

## 2019-12-06 ENCOUNTER — TELEPHONE (OUTPATIENT)
Dept: HEPATOLOGY | Facility: CLINIC | Age: 54
End: 2019-12-06

## 2019-12-06 NOTE — TELEPHONE ENCOUNTER
----- Message from Larry Nava sent at 12/6/2019  8:17 AM CST -----  Contact: Pt  Pt need to reschedule appt and also set up imaging exam.    Pt# [phone]

## 2019-12-12 ENCOUNTER — HOSPITAL ENCOUNTER (OUTPATIENT)
Dept: RADIOLOGY | Facility: HOSPITAL | Age: 54
Discharge: HOME | End: 2019-12-12
Attending: PHYSICIAN ASSISTANT
Payer: MEDICAID

## 2019-12-12 ENCOUNTER — TELEPHONE (OUTPATIENT)
Dept: HEPATOLOGY | Facility: CLINIC | Age: 54
End: 2019-12-12

## 2019-12-12 DIAGNOSIS — B18.2 CHRONIC HEPATITIS C WITHOUT HEPATIC COMA: Primary | ICD-10-CM

## 2019-12-12 DIAGNOSIS — K76.9 LIVER LESION: ICD-10-CM

## 2019-12-12 PROCEDURE — 74183 MRI ABD W/O CNTR FLWD CNTR: CPT | Mod: 26,,, | Performed by: RADIOLOGY

## 2019-12-12 PROCEDURE — 74183 MRI ABDOMEN W WO CONTRAST: ICD-10-PCS | Mod: 26,,, | Performed by: RADIOLOGY

## 2019-12-12 PROCEDURE — 74183 MRI ABD W/O CNTR FLWD CNTR: CPT | Mod: TC

## 2019-12-12 PROCEDURE — A9585 GADOBUTROL INJECTION: HCPCS | Performed by: PHYSICIAN ASSISTANT

## 2019-12-12 PROCEDURE — 25500020 PHARM REV CODE 255: Performed by: PHYSICIAN ASSISTANT

## 2019-12-12 RX ORDER — GADOBUTROL 604.72 MG/ML
10 INJECTION INTRAVENOUS
Status: COMPLETED | OUTPATIENT
Start: 2019-12-12 | End: 2019-12-12

## 2019-12-12 RX ADMIN — GADOBUTROL 10 ML: 604.72 INJECTION INTRAVENOUS at 05:12

## 2019-12-13 NOTE — TELEPHONE ENCOUNTER
Spoke with pt, instructed pt he needs to complete labs prior to visit. Pt stated he will complete labs on 12/16. Labs scheduled . Reminder notices mailed to pt.

## 2019-12-13 NOTE — TELEPHONE ENCOUNTER
MRI just done and visit scheduled next week  Needs labs - ideally prior to visit    pls schedule:  CBC, CMP, INR, AFP, HBV DNA, HBsAg

## 2019-12-14 ENCOUNTER — TELEPHONE (OUTPATIENT)
Dept: HEPATOLOGY | Facility: CLINIC | Age: 54
End: 2019-12-14

## 2019-12-14 DIAGNOSIS — B18.2 CHRONIC HEPATITIS C WITHOUT HEPATIC COMA: Primary | ICD-10-CM

## 2019-12-16 ENCOUNTER — LAB VISIT (OUTPATIENT)
Dept: LAB | Facility: HOSPITAL | Age: 54
End: 2019-12-16
Payer: MEDICAID

## 2019-12-16 DIAGNOSIS — B18.2 CHRONIC HEPATITIS C WITHOUT HEPATIC COMA: ICD-10-CM

## 2019-12-16 LAB
AFP SERPL-MCNC: 1.9 NG/ML (ref 0–8.4)
ALBUMIN SERPL BCP-MCNC: 4.2 G/DL (ref 3.5–5.2)
ALP SERPL-CCNC: 43 U/L (ref 55–135)
ALT SERPL W/O P-5'-P-CCNC: 47 U/L (ref 10–44)
ANION GAP SERPL CALC-SCNC: 10 MMOL/L (ref 8–16)
AST SERPL-CCNC: 35 U/L (ref 10–40)
BASOPHILS # BLD AUTO: 0.03 K/UL (ref 0–0.2)
BASOPHILS NFR BLD: 0.6 % (ref 0–1.9)
BILIRUB SERPL-MCNC: 0.7 MG/DL (ref 0.1–1)
BUN SERPL-MCNC: 11 MG/DL (ref 6–20)
CALCIUM SERPL-MCNC: 9.8 MG/DL (ref 8.7–10.5)
CHLORIDE SERPL-SCNC: 100 MMOL/L (ref 95–110)
CO2 SERPL-SCNC: 28 MMOL/L (ref 23–29)
CREAT SERPL-MCNC: 0.9 MG/DL (ref 0.5–1.4)
DIFFERENTIAL METHOD: ABNORMAL
EOSINOPHIL # BLD AUTO: 0.1 K/UL (ref 0–0.5)
EOSINOPHIL NFR BLD: 1.2 % (ref 0–8)
ERYTHROCYTE [DISTWIDTH] IN BLOOD BY AUTOMATED COUNT: 17.3 % (ref 11.5–14.5)
EST. GFR  (AFRICAN AMERICAN): >60 ML/MIN/1.73 M^2
EST. GFR  (NON AFRICAN AMERICAN): >60 ML/MIN/1.73 M^2
GLUCOSE SERPL-MCNC: 109 MG/DL (ref 70–110)
HBV SURFACE AG SERPL QL IA: NEGATIVE
HCT VFR BLD AUTO: 34.9 % (ref 40–54)
HGB BLD-MCNC: 10.3 G/DL (ref 14–18)
IMM GRANULOCYTES # BLD AUTO: 0.01 K/UL (ref 0–0.04)
IMM GRANULOCYTES NFR BLD AUTO: 0.2 % (ref 0–0.5)
INR PPP: 1 (ref 0.8–1.2)
LYMPHOCYTES # BLD AUTO: 1.9 K/UL (ref 1–4.8)
LYMPHOCYTES NFR BLD: 38 % (ref 18–48)
MCH RBC QN AUTO: 23.8 PG (ref 27–31)
MCHC RBC AUTO-ENTMCNC: 29.5 G/DL (ref 32–36)
MCV RBC AUTO: 81 FL (ref 82–98)
MONOCYTES # BLD AUTO: 0.9 K/UL (ref 0.3–1)
MONOCYTES NFR BLD: 17.4 % (ref 4–15)
NEUTROPHILS # BLD AUTO: 2.2 K/UL (ref 1.8–7.7)
NEUTROPHILS NFR BLD: 42.6 % (ref 38–73)
NRBC BLD-RTO: 0 /100 WBC
PLATELET # BLD AUTO: 315 K/UL (ref 150–350)
PMV BLD AUTO: 10.6 FL (ref 9.2–12.9)
POTASSIUM SERPL-SCNC: 3.7 MMOL/L (ref 3.5–5.1)
PROT SERPL-MCNC: 7.9 G/DL (ref 6–8.4)
PROTHROMBIN TIME: 10.6 SEC (ref 9–12.5)
RBC # BLD AUTO: 4.33 M/UL (ref 4.6–6.2)
SODIUM SERPL-SCNC: 138 MMOL/L (ref 136–145)
WBC # BLD AUTO: 5.05 K/UL (ref 3.9–12.7)

## 2019-12-16 PROCEDURE — 85025 COMPLETE CBC W/AUTO DIFF WBC: CPT

## 2019-12-16 PROCEDURE — 82105 ALPHA-FETOPROTEIN SERUM: CPT

## 2019-12-16 PROCEDURE — 87517 HEPATITIS B DNA QUANT: CPT

## 2019-12-16 PROCEDURE — 80053 COMPREHEN METABOLIC PANEL: CPT

## 2019-12-16 PROCEDURE — 85610 PROTHROMBIN TIME: CPT

## 2019-12-16 PROCEDURE — 87340 HEPATITIS B SURFACE AG IA: CPT

## 2019-12-19 ENCOUNTER — TELEPHONE (OUTPATIENT)
Dept: PHARMACY | Facility: CLINIC | Age: 54
End: 2019-12-19

## 2019-12-19 ENCOUNTER — PROCEDURE VISIT (OUTPATIENT)
Dept: HEPATOLOGY | Facility: CLINIC | Age: 54
End: 2019-12-19
Payer: MEDICAID

## 2019-12-19 ENCOUNTER — OFFICE VISIT (OUTPATIENT)
Dept: HEPATOLOGY | Facility: CLINIC | Age: 54
End: 2019-12-19
Payer: MEDICAID

## 2019-12-19 ENCOUNTER — TELEPHONE (OUTPATIENT)
Dept: HEPATOLOGY | Facility: CLINIC | Age: 54
End: 2019-12-19

## 2019-12-19 VITALS
OXYGEN SATURATION: 100 % | HEIGHT: 71 IN | WEIGHT: 186.31 LBS | RESPIRATION RATE: 18 BRPM | HEART RATE: 72 BPM | DIASTOLIC BLOOD PRESSURE: 87 MMHG | SYSTOLIC BLOOD PRESSURE: 124 MMHG | BODY MASS INDEX: 26.08 KG/M2

## 2019-12-19 DIAGNOSIS — B18.2 CHRONIC HEPATITIS C WITHOUT HEPATIC COMA: Primary | ICD-10-CM

## 2019-12-19 DIAGNOSIS — R76.8 HEPATITIS B CORE ANTIBODY POSITIVE: ICD-10-CM

## 2019-12-19 DIAGNOSIS — K76.9 LIVER LESION: ICD-10-CM

## 2019-12-19 DIAGNOSIS — B18.2 CHRONIC HEPATITIS C WITHOUT HEPATIC COMA: ICD-10-CM

## 2019-12-19 PROCEDURE — 99999 PR PBB SHADOW E&M-EST. PATIENT-LVL IV: ICD-10-PCS | Mod: PBBFAC,,, | Performed by: PHYSICIAN ASSISTANT

## 2019-12-19 PROCEDURE — 99214 PR OFFICE/OUTPT VISIT, EST, LEVL IV, 30-39 MIN: ICD-10-PCS | Mod: S$PBB,,, | Performed by: PHYSICIAN ASSISTANT

## 2019-12-19 PROCEDURE — 91200 LIVER ELASTOGRAPHY: CPT | Mod: PBBFAC | Performed by: PHYSICIAN ASSISTANT

## 2019-12-19 PROCEDURE — 99214 OFFICE O/P EST MOD 30 MIN: CPT | Mod: S$PBB,,, | Performed by: PHYSICIAN ASSISTANT

## 2019-12-19 PROCEDURE — 99999 PR PBB SHADOW E&M-EST. PATIENT-LVL IV: CPT | Mod: PBBFAC,,, | Performed by: PHYSICIAN ASSISTANT

## 2019-12-19 PROCEDURE — 91200 LIVER ELASTOGRAPHY: CPT | Mod: 26,S$PBB,, | Performed by: PHYSICIAN ASSISTANT

## 2019-12-19 PROCEDURE — 91200 FIBROSCAN (VIBRATION CONTROLLED TRANSIENT ELASTOGRAPHY): ICD-10-PCS | Mod: 26,S$PBB,, | Performed by: PHYSICIAN ASSISTANT

## 2019-12-19 PROCEDURE — 99214 OFFICE O/P EST MOD 30 MIN: CPT | Mod: PBBFAC,25 | Performed by: PHYSICIAN ASSISTANT

## 2019-12-19 RX ORDER — ATORVASTATIN CALCIUM 10 MG/1
10 TABLET, FILM COATED ORAL DAILY
Qty: 30 TABLET | Refills: 2 | Status: SHIPPED | OUTPATIENT
Start: 2019-12-19 | End: 2020-12-18

## 2019-12-19 RX ORDER — VELPATASVIR AND SOFOSBUVIR 100; 400 MG/1; MG/1
1 TABLET, FILM COATED ORAL DAILY
Qty: 28 TABLET | Refills: 2 | Status: ON HOLD | OUTPATIENT
Start: 2019-12-19 | End: 2020-05-12 | Stop reason: HOSPADM

## 2019-12-19 NOTE — TELEPHONE ENCOUNTER
----- Message from Juice Interiano MD sent at 12/17/2019  3:17 PM CST -----  Regarding: RE: abnl imaging  MRI in 3 months    ----- Message -----  From: Jennifer B. Scheuermann, PA  Sent: 12/16/2019   4:00 PM CST  To: Juice Interiano MD  Subject: abnl imaging                                     55 y/o WM w/ HCV. Coming to discuss rx this week  Stage 2 in 2018    Prior CT scans throughout 2018:   3/2018 CT abd w/ contrast:  - subcentimeter hypoattenuating lesion in left hepatic lobe, too small for characterization  - 2 faint foci of irregular peripheral enhancement that normalize with hepatic parenchymal enhancement on delayed image, ?perfusional anomaly versus flash filling hemangiomas  - Punctate hypoattenuating focus in the right hepatic lobe, too small for characterization     4/2018 CT abdomen w/ contrast  - Stable subcentimeter hypodensities in left and right hepatic lobes, too small to fully characterize. ?cysts     11/2018 CT abd w/ contrast  - Stable 1.1 cm arterial enhancing focus at the anterior margin of the left hepatic lobe  - stable enhancing focus at the posterior margin of hepatic segment III  - Subcentimeter hepatic hypodensities are similar in appearance and too small to characterize  Appearance is nonspecific, but in light of patient's history continued surveillance is recommended    We planned on 6/2019 imaging but pt didn't show.    Current MRI 12/2019:  1. Multiple indeterminate arterial hyperenhancing foci within the caudate, right and left hepatic lobes.  Follow-up MRI recommended.  2. Multiple hemangiomas throughout the right and left hepatic lobes.     AFP has remained normal    What / when do you recommend next surveillance?

## 2019-12-19 NOTE — PROGRESS NOTES
HEPATOLOGY CLINIC VISIT NOTE - HCV clinic    CHIEF COMPLAINT: Hepatitis C     HISTORY: This is a 54 y.o. White male with prior HBV exposure (recently on lamivudine prophylaxis due to chemo for colon CA), HCV and abnormal liver imaging, here for f/u to initiate HCV therapy    Feels okay  Motivated to have HCV treated  Denies jaundice, dark urine, hematemesis, melena, slowed mentation, abdominal distention.     HCV history:  Originally diagnosed 2001 while in shelter  Risks for HCV:  Tattoo placement - first one age 25    IVDA and nasal 18-20 yrs old    In shelter [id number]  - Treatment naive  - Genotype 1a  - NS5A resistance not known    Liver staging:  FibroScan 9/17/18 - kPa 7.9, (F2 Fibrosis) ;  (No steatosis)  FibroScan today 12/18/19 - kPa 6.6, F0-1  Labs and imaging reveal no evidence of advanced fibrosis or portal HTN    Abnormal liver imaging:  3/2018 CT abd w/ contrast:  - subcentimeter hypoattenuating lesion in left hepatic lobe, too small for characterization  - 2 faint foci of irregular peripheral enhancement that normalize with hepatic parenchymal enhancement on delayed image, ?perfusional anomaly versus flash filling hemangiomas  - Punctate hypoattenuating focus in the right hepatic lobe, too small for characterization    4/2018 CT abdomen w/ contrast  - Stable subcentimeter hypodensities in left and right hepatic lobes, too small to fully characterize. ?cysts    11/2018 CT abd w/ contrast  - Stable 1.1 cm arterial enhancing focus at the anterior margin of the left hepatic lobe  - stable enhancing focus at the posterior margin of hepatic segment III  - Subcentimeter hepatic hypodensities are similar in appearance and too small to characterize  Appearance is nonspecific, but in light of patient's history continued surveillance is recommended    MRI recommended 6/2019 - not done    MRI 12/2019 -   - Multiple indeterminate arterial hyperenhancing foci within the caudate, right and left hepatic lobes.   Follow-up MRI recommended.  - Multiple hemangiomas throughout the right and left hepatic lobes.    AFP normal    *Reviewed w/ Dr Interiano - repeat MRI due in 3 months (3/2020)    HBV history:  Prior resolved HBV (pos HBcAb and HBsAb, neg HBsAg)  Completed 5FU for colon CA 11/2018.   -- Per AASLD guidelines, remained on lamivudine prophylaxis x 6 months after chemo ended (through 5/2019). Requires monitoring x 12 months (through 5/2020)  -- 12/2019 labs: neg HBV DNA, HBsAg                    Past Medical History:   Diagnosis Date    Anxiety about health 6/20/2018    Bladder cancer     Colon cancer     Coronary artery disease     Depression     Hepatitis C 3/9/2018    History of psychiatric care     History of psychiatric hospitalization     Hypertension     MI (myocardial infarction) 2007    Neuropathy     NSTEMI (non-ST elevated myocardial infarction) 06/2019    Due to crystal meth? LVEF 6/2019: 50%    Psychiatric problem     Psychosis     Self-harming behavior     Suicide attempt        Past Surgical History:   Procedure Laterality Date    ABDOMINAL SURGERY      History of perforated ulcer, unknown surgery    APPENDECTOMY      COLONOSCOPY N/A 2/26/2019    Procedure: COLONOSCOPY;  Surgeon: Mikal Nino MD;  Location: [facility] ([address] [place] [address]);  Service: Colon and Rectal;  Laterality: N/A;    ILEOSTOMY      ILEOSTOMY CLOSURE         FAMILY HISTORY: Negative for liver disease    SOCIAL HISTORY:   Social History     Tobacco Use   Smoking Status Current Every Day Smoker    Packs/day: 0.50    Years: 39.00    Pack years: 19.50    Types: Cigarettes    Start date: 1980   Smokeless Tobacco Never Used   Tobacco Comment    [facility]     Alcohol - daily alcohol for many years in the past. Reports he's decreased this but still drinks some. Describes heavy alcohol once or twice a month  Drugs - IVDA and nasal 18-20 yrs old. Recently  snorted meth (6/2019)      ROS:   No fever, chills, weight loss, fatigue  No chest pain, palpitations, dyspnea, cough  No abdominal pain, nausea, vomiting  No headaches  No lower extremity edema  No depression or anxiety      PHYSICAL EXAM:  Friendly White male, in no acute distress; alert and oriented to person, place and time  VITALS: reviewed  HEENT: Sclerae anicteric.   NECK: Supple  LUNGS: Normal respiratory effort. Clear bilaterally  CVS: RRR, no murmurs  ABDOMEN: Flat, soft, nondistended.  SKIN: Warm and dry. No jaundice, No obvious rashes. (+) tattoos  EXTREMITIES: No lower extremity edema  NEURO/PSYCH: Normal gate. Memory intact. Thought and speech pattern appropriate. Behavior normal. No depression or anxiety noted.      RECENT LABS:  Lab Results   Component Value Date    WBC 5.05 12/16/2019    HGB 10.3 (L) 12/16/2019     12/16/2019     Lab Results   Component Value Date    INR 1.0 12/16/2019     Lab Results   Component Value Date    AST 35 12/16/2019    ALT 47 (H) 12/16/2019    BILITOT 0.7 12/16/2019    ALBUMIN 4.2 12/16/2019    ALKPHOS 43 (L) 12/16/2019    CREATININE 0.9 12/16/2019    BUN 11 12/16/2019     12/16/2019    K 3.7 12/16/2019    AFP 1.9 12/16/2019       RECENT IMAGING:  Results for orders placed during the hospital encounter of 12/12/19   MRI Abdomen W WO Contrast    Narrative EXAMINATION:  MRI ABDOMEN W WO CONTRAST    CLINICAL HISTORY:  Liver lesion, >1cm, liver disease with risk of hcc;  Liver disease, unspecified    TECHNIQUE:  Routine MRI evaluation of the abdomen performed with and without the use of 10 cc of Gadavist IV contrast.    COMPARISON:  CT 11/15/2018.    FINDINGS:  The liver is slightly enlarged.  There are multiple hepatic lesions as follows:    *Anterior left hepatic lobe (series 6, image 15): 1.2 cm T2 hyperintense lesion that demonstrates discontinuous nodular enhancement on the early arterial phase that progressively fills in on the portal venous and delayed  phases in keeping with a hemangioma.  *Posterior left hepatic lobe (series 6, image 19): 1 cm T2 hyperintense lesion that appears to demonstrate discontinuous nodular enhancement on the early arterial phase that progressively fills in on the portal venous and delayed phases in keeping with a hemangioma.  *Posterior right hepatic lobe (series 6, image 19): 0.7 cm T2 hyperintense lesion that is hypoenhancing on the early arterial phase and progressively fills in on the portal venous and delayed phases in keeping with a hemangioma.  *Anterior right hepatic lobe (series 6, image 17): 0.5 cm T2 hyperintense lesion that is hypoenhancing on the early arterial phase and fills in on the portal venous and delayed phases in keeping with a hemangioma.  *Arterial hyperenhancing indeterminate foci within the caudate, right and left hepatic lobes (labeled series 14/early arterial phase) that do not demonstrate corresponding washout or definite parenchymal signal abnormality on precontrast images.  *Nonenhancing cysts within the right and left lobes largest of which measure 1.2 cm (series 6, image 17) and 0.9 cm (series 6, image 18).  No intrahepatic bile duct dilatation.  Portal vasculature is patent.    Gallbladder, common bile duct, stomach, duodenum, spleen, pancreas and adrenal glands are within normal limits.    Kidneys are normal in size and location.  Subcentimeter cortical cyst noted within the left kidney.  No cortical enhancing lesions.  No hydronephrosis or hydroureter.    No ascites.  No pleural or pericardial effusions.    The aorta tapers normally with prominent atherosclerotic calcification.  IVC is patent.    There is a fat and bowel containing umbilical hernia.  Possible surgical scar noted within the anterior midline abdominal wall.  Remaining subcutaneous soft tissues are unremarkable.    Degenerative changes of the spine noted.      Impression 1. Multiple indeterminate arterial hyperenhancing foci within the  caudate, right and left hepatic lobes.  Follow-up MRI recommended.  2. Multiple hemangiomas throughout the right and left hepatic lobes.  3. At least 2 simple hepatic cysts.  This report was flagged in Epic as abnormal.      Electronically signed by: Ford Miles MD  Date:    12/12/2019  Time:    20:35         ASSESSMENT  54 y.o. White male with:  1. CHRONIC HEPATITIS C, GENOTYPE 1a - treatment naive  -- FibroScan 9/2018- F2, FibroScan 12/2019 - F0-1  -- Lacking Immunity to HAV - s/p vaccine series     2. PRIOR HBV EXPOSURE  -- Pos HBcAb, neg HBsAg -- risk of reactivation w/ chemo (5FU for colon cancer through 11/2018); completed lamivudine 5/2019   -- Per AASLD guidelines, needs vaccine due to chemo - s/p 1st and 2nd doses. 3rd dose due 3/2020  -- recent HBV DNA, HBsAg neg  -- requires HBV monitoring through 5/2020 due to chemo use    3. ABNORMAL LIVER LESIONS  -- some hemangiomas, some indeterminate. Requires continued monitoring    4.  SIGMOID COLON CANCER  -- s/p surgery 3/2018, completed 5FU in 11/2018    4. ONGOING ALCOHOL USE         PLAN:  1. Obtain authorization to treat HCV with Epclusa x 12 weeks  -- Rx will be routed to [facility]  -- Patient will notify me of exact treatment start date so appropriate lab f/u can be scheduled.  2. MRI abdomen and AFP due 3/2020  3. Will require continued HBV monitoring due to prior chemo (through 5/2020) and while on HCV rx through [facility]  4. Twinrix #3 due 3/2020  5. D/C alcohol  6. Reduce lipitor to 10mg while on Epclusa    Discussed goal of HCV eradication to prevent progression of liver disease.  Discussed use of Epclusa daily x 12 weeks w/ potential side effects of fatigue and headache.     Reviewed limitations on acid suppressant medications due to DDI w/ Epclusa:  -- Antacids, H2 Receptor Antagonist, PPI - Pt not taking  Patient instructed to contact me if experiencing acid related symptoms so further recommendations can be made regarding acid  suppression therapy.      Reviewed limitations on statin therapy and Epclusa:  -- Patient taking lipitor 40mg. Will reduce to 10mg while on epclusa    Herbal / alternative therapies must be discontinued  Discussed importance of medication adherence and risk of treatment failure / viral resistance if not adherent. Pt has verbalized understanding.

## 2019-12-19 NOTE — PROCEDURES
FibroScan (Vibration Controlled Transient Elastography)  Date/Time: 12/19/2019 9:45 AM  Performed by: Jennifer B. Scheuermann, PA  Authorized by: Jennifer B. Scheuermann, PA     Diagnosis:  HCV    Probe:  M    Universal Protocol: Patient's identity, procedure and site were verified, confirmatory pause was performed.  Discussed procedure including risks and potential complications.  Questions answered.  Patient verbalizes understanding and wishes to proceed with VCTE.     Procedure: After providing explanations of the procedure, patient was placed in the supine position with right arm in maximum abduction to allow optimal exposure of right lateral abdomen.  Patient was briefly assessed, Testing was performed in the mid-axillary location, 50Hz Shear Wave pulses were applied and the resulting Shear Wave and Propagation Speed detected with a 3.5 MHz ultrasonic signal, using the FibroScan probe, Skin to liver capsule distance and liver parenchyma were accessed during the entire examination with the FibroScan probe, Patient was instructed to breathe normally and to abstain from sudden movements during the procedure, allowing for random measurements of liver stiffness. At least 10 Shear Waves were produced, Individual measurements of each Shear Wave were calculated.  Patient tolerated the procedure well with no complications.  Meets discharge criteria as was dismissed.  Rates pain 0 out of 10.  Patient will follow up with ordering provider to review results.      Findings  Median liver stiffness score:  6.6  CAP Reading: dB/m:  219    IQR/med %:  12  Interpretation  Fibrosis interpretation is based on medial liver stiffness - Kilopascal (kPa).    Fibrosis Stage:  F 0-1  Steatosis interpretation is based on controlled attenuation parameter - (dB/m).    Steatosis Grade:  <S1

## 2019-12-23 NOTE — TELEPHONE ENCOUNTER
DOCUMENTATION ONLY:  AG Epclusa does not required prior authorization with insurance company.     Co-pay: $0    Patient Assistance IS NOT required.     Forward to clinical pharmacist for consult & shipment.     Lipitor: $0

## 2020-01-01 NOTE — PLAN OF CARE
Problem: Chemotherapy Effects (Adult)  Goal: Signs and Symptoms of Listed Potential Problems Will be Absent, Minimized or Managed (Chemotherapy Effects)  Signs and symptoms of listed potential problems will be absent, minimized or managed by discharge/transition of care (reference Chemotherapy Effects (Adult) CPG).   Outcome: Ongoing (interventions implemented as appropriate)  Pt here for pump d/c, pump complete bag empty, port flushed per policy and deaccessed without issue, pt to rtc Monday 10/15/18 for pump placement pt aware, no distress noted upon d/c to home      
No

## 2020-01-02 NOTE — TELEPHONE ENCOUNTER
Epclusa attempt to schedule initial consultation (Attempt 1). Reached patient's mother, Francesca. Patient was unavailable at the time. Francesca will have patient contact [facility] to schedule consultation. Will f/u with patient if no return call.

## 2020-01-06 ENCOUNTER — TELEPHONE (OUTPATIENT)
Dept: PHARMACY | Facility: CLINIC | Age: 55
End: 2020-01-06

## 2020-01-06 ENCOUNTER — EPISODE CHANGES (OUTPATIENT)
Dept: HEPATOLOGY | Facility: CLINIC | Age: 55
End: 2020-01-06

## 2020-01-06 ENCOUNTER — TELEPHONE (OUTPATIENT)
Dept: HEPATOLOGY | Facility: CLINIC | Age: 55
End: 2020-01-06

## 2020-01-06 DIAGNOSIS — B18.2 CHRONIC HEPATITIS C WITHOUT HEPATIC COMA: Primary | ICD-10-CM

## 2020-01-06 NOTE — TELEPHONE ENCOUNTER
Pt beginning 12 weeks of Epclusa on 1/9/20  Renee 1a, tx naive  F1-2    Pls schedule:  - CMP, HCV RNA, AFP at week 6 - 2/19/20  - twinrix shot #3 due 3/2020 (reminder - he was getting this in pharmacy)  - CMP, HCV RNA, HBV DNA, HBsAg - [facility] - 6/24/20

## 2020-01-06 NOTE — TELEPHONE ENCOUNTER
I spoke with patient and msg from PA Scheuermann relayed and mailed to him.  Labs scheduled; appt notices mailed.

## 2020-01-06 NOTE — TELEPHONE ENCOUNTER
Initial AG Epclusa Consultation:   Initial Epclusa consult completed on . Epclusa will be shipped on  to arrive at patient's home on  via [employer]. $0.00 copay. Patient will start Epclusa on . Address confirmed, CC on file. Confirmed 2 patient identifiers - name and . Therapy Appropriate.     Epclusa 400/100mg - Take one tablet by mouth daily x 12 weeks  Counseling was reviewed:   1. Patient MUST take Epclusa at the SAME time every day.    2. Patient MUST avoid acid reducers without consulting with myself or provider first. Antacids are to be spaced out at least 4 hours apart from Epclusa.    3. Potential Side effects include: headaches and fatigue.    Headache: Patient may treat with OTC remedies. If Tylenol is used, dose should not exceed 2000mg per day.    4. Medication list reviewed. No DDIs or allergies noted. Patient MUST contact myself or provider prior to starting any new OTC, herbal, or prescription drugs to avoid potential DDIs.    DDI: Medication list reviewed and potential DDIs addressed.  - DDI Lipitor: Patient will reduce Lipitor to 10 mg during treatment. Patient advised to remove Lipitor 40 mg from medication supply to avoid accidental administration. Patient verbally agreed. Patient advised to monitor for new onset muscle pain/weakness. Patient verbally agreed and will report if this happens to [facility] or provider.   - DDI Coreg: Coreg concentrations may be increased by Epclusa. Patient advised to monitor HR and BP while on treatment since it potentially can be decreased. Patient verbally agreed.     Discussed the importance of staying well hydrated while on therapy. Compliance stressed - patient to take missed doses as soon as remembered, but NOT to take 2 doses in one day. Patient will report questions or concerns to myself or practitioner. Patient verbalizes understanding. Patient plans to start Epclusa on .  Consultation included: indication; goals of treatment; administration;  storage and handling; side effects; how to handle side effects; the importance of compliance; how to handle missed doses; the importance of laboratory monitoring; the importance of keeping all follow up appointments.  Patient understands to report any medication changes to [facility] and provider. All questions answered and addressed to patients satisfaction. I will f/u with her in 1 week from start, [facility] to contact patient in 3 weeks for refills.     Iris Valadez, Pharm D  [facility]   [phone]

## 2020-01-15 ENCOUNTER — TELEPHONE (OUTPATIENT)
Dept: PHARMACY | Facility: CLINIC | Age: 55
End: 2020-01-15

## 2020-01-15 NOTE — TELEPHONE ENCOUNTER
Epclusa 7-day touch base attempted. Called both numbers on file. No answer. LVM for call back on both lines. Will f/u with patient at refill if no call back.

## 2020-01-20 ENCOUNTER — IMMUNIZATION (OUTPATIENT)
Dept: PHARMACY | Facility: CLINIC | Age: 55
End: 2020-01-20
Payer: MEDICAID

## 2020-01-20 ENCOUNTER — IMMUNIZATION (OUTPATIENT)
Dept: PHARMACY | Facility: CLINIC | Age: 55
End: 2020-01-20

## 2020-01-31 ENCOUNTER — TELEPHONE (OUTPATIENT)
Dept: PHARMACY | Facility: CLINIC | Age: 55
End: 2020-01-31

## 2020-01-31 NOTE — TELEPHONE ENCOUNTER
AG Epclusa refill (2 of 3) confirmed and reassessment complete. We will ship AG Epclusa refill on 2/3 via fedex to arrive on . $0.00 copay- 004. [facility] shipping Lipitor 10 mg daily (reduced dose) with AG Epclusa. Confirmed 2 patient identifiers - name and . Therapy appropriate.     Patient has 8 doses of AG Epclusa remaining and takes it around 8 AM daily.  Pt reports they are not having any side effects so far. No missed doses, no new medications, no new allergies or health conditions reported at this time. No need for any acid suppressants. Allergies reviewed and medication reconciliation complete (reviewed and documented in [facility]).  Disease education reviewed (including transmission and prevention). Patient counseled on importance of maintaining adherence and keeping lab appointments which were scheduled. All questions answered and addressed to patients satisfaction. Advised to call [facility] and provider if any issues arise.  Pt verbalized understanding.

## 2020-01-31 NOTE — TELEPHONE ENCOUNTER
Epclusa (2 of 3) refill and reassessment attempted. (Attempt 1). Mother answered. Pt was not available requested mom LM for call back. Will f/u with patient if no return call.

## 2020-02-24 ENCOUNTER — EPISODE CHANGES (OUTPATIENT)
Dept: HEPATOLOGY | Facility: CLINIC | Age: 55
End: 2020-02-24

## 2020-02-26 ENCOUNTER — TELEPHONE (OUTPATIENT)
Dept: PHARMACY | Facility: CLINIC | Age: 55
End: 2020-02-26

## 2020-02-26 NOTE — TELEPHONE ENCOUNTER
Epclusa and Lipitor 10 mg refill (3 of 3) confirmed and reassessment complete. We will ship Epclusa and Lipitor refill on  via fedex to arrive on . $0.00 copay- 004. Confirmed 2 patient identifiers - name and . Therapy appropriate.     Patient has 7 doses of Epclusa remaining and takes it around 8:00 am daily. Pt reports they are not having any side effects so far. No missed doses, no new medications, no new allergies or health conditions reported at this time. Allergies reviewed and medication reconciliation complete (reviewed and documented in [facility]). Disease education reviewed (including transmission and prevention). Patient counseled on importance of maintaining adherence and keeping lab appointments which were scheduled. All questions answered and addressed to patients satisfaction. Advised to call [facility] and provider if any issues arise.  Pt verbalized understanding.

## 2020-03-06 ENCOUNTER — TELEPHONE (OUTPATIENT)
Dept: HEPATOLOGY | Facility: CLINIC | Age: 55
End: 2020-03-06

## 2020-03-06 ENCOUNTER — EPISODE CHANGES (OUTPATIENT)
Dept: HEPATOLOGY | Facility: CLINIC | Age: 55
End: 2020-03-06

## 2020-03-06 NOTE — TELEPHONE ENCOUNTER
Patient missed MRI and Lab draw on 3/5/20.  I spoke with him and testing rescheduled on 3/10/20.  He reports missing approximately 5 days of tx because he was incarcerated.  [facility] lab date adjusted to 7/1/20; appt notice mailed.

## 2020-03-10 ENCOUNTER — HOSPITAL ENCOUNTER (EMERGENCY)
Facility: HOSPITAL | Age: 55
Discharge: HOME | End: 2020-03-10
Attending: EMERGENCY MEDICINE
Payer: MEDICAID

## 2020-03-10 ENCOUNTER — HOSPITAL ENCOUNTER (OUTPATIENT)
Dept: RADIOLOGY | Facility: HOSPITAL | Age: 55
Discharge: HOME | End: 2020-03-10
Attending: PHYSICIAN ASSISTANT
Payer: MEDICAID

## 2020-03-10 VITALS
OXYGEN SATURATION: 98 % | HEIGHT: 71 IN | BODY MASS INDEX: 25.9 KG/M2 | SYSTOLIC BLOOD PRESSURE: 112 MMHG | TEMPERATURE: 99 F | DIASTOLIC BLOOD PRESSURE: 70 MMHG | HEART RATE: 86 BPM | RESPIRATION RATE: 18 BRPM | WEIGHT: 185 LBS

## 2020-03-10 DIAGNOSIS — B18.2 CHRONIC HEPATITIS C WITHOUT HEPATIC COMA: ICD-10-CM

## 2020-03-10 DIAGNOSIS — K76.9 LIVER LESION: ICD-10-CM

## 2020-03-10 DIAGNOSIS — N50.89 TESTICULAR SWELLING, LEFT: ICD-10-CM

## 2020-03-10 DIAGNOSIS — N43.3 LEFT HYDROCELE: Primary | ICD-10-CM

## 2020-03-10 DIAGNOSIS — K42.9 UMBILICAL HERNIA WITHOUT OBSTRUCTION AND WITHOUT GANGRENE: ICD-10-CM

## 2020-03-10 LAB
BILIRUB UR QL STRIP: NEGATIVE
CLARITY UR REFRACT.AUTO: CLEAR
COLOR UR AUTO: NORMAL
GLUCOSE UR QL STRIP: NEGATIVE
HGB UR QL STRIP: NEGATIVE
KETONES UR QL STRIP: NEGATIVE
LEUKOCYTE ESTERASE UR QL STRIP: NEGATIVE
NITRITE UR QL STRIP: NEGATIVE
PH UR STRIP: 5 [PH] (ref 5–8)
PROT UR QL STRIP: NEGATIVE
SP GR UR STRIP: 1.01 (ref 1–1.03)
URN SPEC COLLECT METH UR: NORMAL

## 2020-03-10 PROCEDURE — A9585 GADOBUTROL INJECTION: HCPCS | Performed by: PHYSICIAN ASSISTANT

## 2020-03-10 PROCEDURE — 74183 MRI ABDOMEN W WO CONTRAST: ICD-10-PCS | Mod: 26,,, | Performed by: RADIOLOGY

## 2020-03-10 PROCEDURE — 99284 EMERGENCY DEPT VISIT MOD MDM: CPT | Mod: ,,, | Performed by: EMERGENCY MEDICINE

## 2020-03-10 PROCEDURE — 25000003 PHARM REV CODE 250: Performed by: EMERGENCY MEDICINE

## 2020-03-10 PROCEDURE — 99284 PR EMERGENCY DEPT VISIT,LEVEL IV: ICD-10-PCS | Mod: ,,, | Performed by: EMERGENCY MEDICINE

## 2020-03-10 PROCEDURE — 99284 EMERGENCY DEPT VISIT MOD MDM: CPT | Mod: 25

## 2020-03-10 PROCEDURE — 74183 MRI ABD W/O CNTR FLWD CNTR: CPT | Mod: 26,,, | Performed by: RADIOLOGY

## 2020-03-10 PROCEDURE — 25500020 PHARM REV CODE 255: Performed by: PHYSICIAN ASSISTANT

## 2020-03-10 PROCEDURE — 81003 URINALYSIS AUTO W/O SCOPE: CPT

## 2020-03-10 PROCEDURE — 74183 MRI ABD W/O CNTR FLWD CNTR: CPT | Mod: TC

## 2020-03-10 RX ORDER — IBUPROFEN 400 MG/1
800 TABLET ORAL
Status: COMPLETED | OUTPATIENT
Start: 2020-03-10 | End: 2020-03-10

## 2020-03-10 RX ORDER — IBUPROFEN 600 MG/1
600 TABLET ORAL EVERY 8 HOURS PRN
Qty: 12 TABLET | Refills: 0 | Status: SHIPPED | OUTPATIENT
Start: 2020-03-10 | End: 2020-03-14

## 2020-03-10 RX ORDER — GADOBUTROL 604.72 MG/ML
10 INJECTION INTRAVENOUS
Status: COMPLETED | OUTPATIENT
Start: 2020-03-10 | End: 2020-03-10

## 2020-03-10 RX ADMIN — GADOBUTROL 10 ML: 604.72 INJECTION INTRAVENOUS at 11:03

## 2020-03-10 RX ADMIN — IBUPROFEN 800 MG: 400 TABLET, FILM COATED ORAL at 01:03

## 2020-03-10 NOTE — ED PROVIDER NOTES
Encounter Date: 3/10/2020    SCRIBE #1 NOTE: I, Judy Arreola, am scribing for, and in the presence of,  Dr. Connor. I have scribed the entire note.       History     Chief Complaint   Patient presents with    Hernia     umbilical hernia has inc in size for past few days after lifting bag of cement    Male  Problem     swollen testicle for month     Time patient was seen by the provider: 12:56 PM      The patient is a 55 y.o. male with PMHX including: umbilical hernia, hepatitis C, who presents to the ED with a complaint of hernia and testicular pain/swelling. The patient reports known umbilical hernia which he has had for a few years now without issue. Last week he was lifting up a bag of cement causing increasing pain to the area of the hernia, noticed a bulge but states he was able to reduce the hernia on his own. He reports discomfort in the hernia region at the time, not present upon ED visit. He also complains of testicular pain for a month, but now swelling to his left testicle in the past day. The pain worsens by walking and movement. Denies fever, chills, urinary symptoms, changes in stooling, N/V.     The history is provided by the patient and medical records.     Review of patient's allergies indicates:  No Known Allergies  Past Medical History:   Diagnosis Date    Anxiety about health 6/20/2018    Bladder cancer     Colon cancer     Coronary artery disease     Depression     Hepatitis C 3/9/2018    History of psychiatric care     History of psychiatric hospitalization     Hypertension     MI (myocardial infarction) 2007    Neuropathy     NSTEMI (non-ST elevated myocardial infarction) 06/2019    Due to crystal meth? LVEF 6/2019: 50%    Psychiatric problem     Psychosis     Self-harming behavior     Suicide attempt      Past Surgical History:   Procedure Laterality Date    ABDOMINAL SURGERY      History of perforated ulcer, unknown surgery    APPENDECTOMY      COLONOSCOPY N/A  2/26/2019    Procedure: COLONOSCOPY;  Surgeon: Mikal Nino MD;  Location: [facility] ([address] [place] [address]);  Service: Colon and Rectal;  Laterality: N/A;    ILEOSTOMY      ILEOSTOMY CLOSURE       Family History   Problem Relation Age of Onset    No Known Problems Mother     No Known Problems Father     No Known Problems Sister     No Known Problems Brother     No Known Problems Maternal Aunt     No Known Problems Paternal Aunt     No Known Problems Maternal Uncle     No Known Problems Paternal Uncle     No Known Problems Maternal Grandfather     No Known Problems Maternal Grandmother     No Known Problems Paternal Grandfather     No Known Problems Paternal Grandmother     ADD / ADHD Cousin     Alcohol abuse Neg Hx     Anxiety disorder Neg Hx     Bipolar disorder Neg Hx     Dementia Neg Hx     Depression Neg Hx     Drug abuse Neg Hx     OCD Neg Hx     Paranoid behavior Neg Hx     Physical abuse Neg Hx     Schizophrenia Neg Hx     Seizures Neg Hx     Self injury Neg Hx     Sexual abuse Neg Hx     Suicide Neg Hx      Social History     Tobacco Use    Smoking status: Current Every Day Smoker     Packs/day: 0.50     Years: 39.00     Pack years: 19.50     Types: Cigarettes     Start date: 1980    Smokeless tobacco: Never Used    [facility] to [facility]essation program   Substance Use Topics    Alcohol use: Yes     Comment: occasional    Drug use: No     Review of Systems   Constitutional: Negative for chills, fatigue and fever.   Eyes: Negative for visual disturbance.   Respiratory: Negative for cough and shortness of breath.    Cardiovascular: Negative for chest pain and leg swelling.   Gastrointestinal: Positive for abdominal pain. Negative for blood in stool, diarrhea, nausea and vomiting.        Neg changes in stool   Genitourinary: Positive for testicular pain. Negative for dysuria, flank pain, frequency, hematuria, penile pain, penile  swelling and scrotal swelling.        Neg changes in urination  +Left testicular swelling   Musculoskeletal: Negative for arthralgias, back pain, joint swelling and myalgias.   Skin: Negative for color change and rash.   Allergic/Immunologic: Negative for immunocompromised state.   Neurological: Negative for headaches.   Hematological: Does not bruise/bleed easily.       Physical Exam     Initial Vitals [03/10/20 1234]   BP Pulse Resp Temp SpO2   123/82 100 18 98.5 °F (36.9 °C) 100 %      MAP       --         Physical Exam    Nursing note and vitals reviewed.  Constitutional: He appears well-developed and well-nourished. He is not diaphoretic. No distress.   HENT:   Head: Normocephalic and atraumatic.   Nose: Nose normal.   Eyes: Conjunctivae and EOM are normal. Pupils are equal, round, and reactive to light.   Neck: Normal range of motion. Neck supple.   Cardiovascular: Normal rate and regular rhythm.   No murmur heard.  Pulmonary/Chest: Breath sounds normal. No respiratory distress. He has no wheezes. He has no rhonchi. He has no rales. He exhibits no tenderness.   Abdominal: Soft. Bowel sounds are normal. He exhibits no distension. There is no tenderness. A hernia is present.   Reducible umbilical hernia without erythema.    Genitourinary: Penis normal. Left testis shows swelling and tenderness. Uncircumcised.   Genitourinary Comments: Normal scrotum without evidence of cellulitis. Normal penis. Uncircumcised. Left testicle is swollen and mildly tender     Musculoskeletal: Normal range of motion. He exhibits no edema.   Neurological: He is alert and oriented to person, place, and time. He has normal strength.   Skin: Skin is warm and dry. No rash noted.   Psychiatric: He has a normal mood and affect. His behavior is normal. Thought content normal.         ED Course   Procedures  Labs Reviewed   URINALYSIS, REFLEX TO URINE CULTURE    Narrative:     Preferred Collection Type->Urine, Clean Catch          Imaging  Results          US Scrotum And Testicles (Final result)  Result time 03/10/20 15:04:12    Final result by Christopher Tesfaye MD (03/10/20 15:04:12)                 Impression:      Large left-sided spermatocele.  Otherwise unremarkable sonographic appearance of testicles.    Electronically signed by resident: Alin Mcmahon  Date:    03/10/2020  Time:    14:38    Electronically signed by: Christopher Tesfaye MD  Date:    03/10/2020  Time:    15:04             Narrative:    EXAMINATION:  US SCROTUM AND TESTICLES    CLINICAL HISTORY:  Other specified disorders of the male genital organs    TECHNIQUE:  Sonography of the scrotum and testes.    COMPARISON:  None.    FINDINGS:  Right Testicle:    *Size: 5.0 x 2.5 x 2.9 cm  *Appearance: Normal.  *Flow: Normal arterial and venous flow  *Epididymis: Normal.  *Hydrocele: None.  *Varicocele: None.  .    Left Testicle:    *Size: 4.4 x 2.9 x 3.9 cm  *Appearance: Normal.  *Flow: Normal arterial and venous flow  *Epididymis: Normal.  *Hydrocele: Large (6.7 x 5.1 x 5.9 cm) with scattered, low-level, internal echogenicity.  *Varicocele: None.  .    Other findings: None.                                 Medical Decision Making:   History:   Old Medical Records: I decided to obtain old medical records.  Initial Assessment:   On exam small reducible umbilical hernia, no TTP and no skin changes concerning for incarceration/strangulation. No indication for emergent surgery. Patient has moderately enlarged and tender left scrotum without evidence of cellulitis. Possible hydrocele versus epididymitis.   Differential Diagnosis:   Hydrocele, epididymitis, varicocele, mass, torsion  Independently Interpreted Test(s):   I have ordered and independently interpreted X-rays - see prior notes.  Clinical Tests:   Lab Tests: Ordered and Reviewed  Radiological Study: Ordered and Reviewed  ED Management:  Provided patient referral to outpatient surgery for reducible hernia. Given scrotal enlargement  will obtain US scrotum and testicles as well as UA.    3:17 PM  UA is negative. US shows a large left hydrocele. Will treat with ibuprofen and supportive care with referral to urology.             Scribe Attestation:   Scribe #1: I performed the above scribed service and the documentation accurately describes the services I performed. I attest to the accuracy of the note.               This document was recorded by a scribe and is not considered final until signed by attending physician.             Clinical Impression:       ICD-10-CM ICD-9-CM   1. Left hydrocele N43.3 603.9   2. Testicular swelling, left N50.89 608.86   3. Umbilical hernia without obstruction and without gangrene K42.9 553.1         Disposition:   Disposition: Discharged  Condition: Stable     ED Disposition Condition    Discharge Stable        ED Prescriptions     Medication Sig Dispense Start Date End Date Auth. Provider    ibuprofen (ADVIL,MOTRIN) 600 MG tablet (Expires today) Take 1 tablet (600 mg total) by mouth every 8 (eight) hours as needed for Pain. 12 tablet 3/10/2020 3/14/2020 Yandy Connor MD        Follow-up Information     Follow up With Specialties Details Why Contact Info Additional Information    [facility] - Urology [facility] Floor Urology Schedule an appointment as soon as possible for a visit   [address] [facility] [address] [phone] [address] - Nephrology/Colon & Rectal/Urology Nephrology, Colon and Rectal Surgery, Urology Schedule an appointment as soon as possible for a visit   [address] [facility] [address]  [phone]       [facility] - General Surgery Surgery Schedule an appointment as soon as possible for a visit   [address] [facility] [id number] [phone] [facility], [address] [place] [address] - Trauma/General Surgery Trauma Surgery, General Surgery, Surgery Schedule an appointment as soon as  possible for a visit   [address] [facility] [address]  [phone]                                        Yandy Connor MD  03/14/20 8699

## 2020-03-10 NOTE — ED TRIAGE NOTES
Pain and swelling to left groin x 1 week. Picked up some heavy cement causing increased pain to hernia.

## 2020-03-10 NOTE — DISCHARGE INSTRUCTIONS
You were seen in the emergency department for testicular swelling.  Your ultrasound showed a hydrocele, for which you should follow-up with urology.  In the meantime take ibuprofen as prescribed to relieve pain and inflammation.  Please also follow-up with General surgery regarding your hernia for future repair.

## 2020-03-11 ENCOUNTER — PES CALL (OUTPATIENT)
Dept: ADMINISTRATIVE | Facility: CLINIC | Age: 55
End: 2020-03-11

## 2020-03-11 ENCOUNTER — EPISODE CHANGES (OUTPATIENT)
Dept: HEPATOLOGY | Facility: CLINIC | Age: 55
End: 2020-03-11

## 2020-03-12 ENCOUNTER — OFFICE VISIT (OUTPATIENT)
Dept: SURGERY | Facility: CLINIC | Age: 55
End: 2020-03-12
Payer: MEDICAID

## 2020-03-12 ENCOUNTER — TELEPHONE (OUTPATIENT)
Dept: HEPATOLOGY | Facility: CLINIC | Age: 55
End: 2020-03-12

## 2020-03-12 VITALS
HEART RATE: 99 BPM | HEIGHT: 71 IN | DIASTOLIC BLOOD PRESSURE: 85 MMHG | SYSTOLIC BLOOD PRESSURE: 133 MMHG | WEIGHT: 185 LBS | BODY MASS INDEX: 25.9 KG/M2

## 2020-03-12 DIAGNOSIS — B18.2 CHRONIC HEPATITIS C WITHOUT HEPATIC COMA: Primary | ICD-10-CM

## 2020-03-12 DIAGNOSIS — Z85.038 HISTORY OF COLON CANCER: ICD-10-CM

## 2020-03-12 DIAGNOSIS — K76.9 LIVER LESION: ICD-10-CM

## 2020-03-12 DIAGNOSIS — K42.9 UMBILICAL HERNIA WITHOUT OBSTRUCTION AND WITHOUT GANGRENE: ICD-10-CM

## 2020-03-12 PROCEDURE — 99203 PR OFFICE/OUTPT VISIT, NEW, LEVL III, 30-44 MIN: ICD-10-PCS | Mod: S$PBB,,, | Performed by: SURGERY

## 2020-03-12 PROCEDURE — 99203 OFFICE O/P NEW LOW 30 MIN: CPT | Mod: S$PBB,,, | Performed by: SURGERY

## 2020-03-12 PROCEDURE — 99999 PR PBB SHADOW E&M-EST. PATIENT-LVL III: ICD-10-PCS | Mod: PBBFAC,,, | Performed by: SURGERY

## 2020-03-12 PROCEDURE — 99213 OFFICE O/P EST LOW 20 MIN: CPT | Mod: PBBFAC | Performed by: SURGERY

## 2020-03-12 PROCEDURE — 99999 PR PBB SHADOW E&M-EST. PATIENT-LVL III: CPT | Mod: PBBFAC,,, | Performed by: SURGERY

## 2020-03-12 NOTE — PROGRESS NOTES
General Surgery  History and Physical Exam    Subjective:     HPI:  Chris Aguirre Jr. is a 55 y.o. male who is presenting with an surgical hernia at the umbilicus. Two years ago he had part of his colon removed due to colon cancer and had an ileostomy placed. The ileostomy was reversed last year. Last week he was picking up a heavy bag of cement and felt a painful pop in his abdomen. It has not hurt since but he has not strained hard out of fear of pain. He has no vomiting or constipation. He reports no fever or weight loss. He works as a  and would like to have it repaired so it does not get any worse. He is a social drinker and smokes half a pack of cigarettes a day for the last 35 years.     ROS:  Gen: no fevers, no chills, no recent weight loss  CV: no palpitations, no peripheral edema  Respit: no cough, no SOB  Abd: no nausea, no vomiting.   Some abdo pain when the hernia first appeared but nothing since then   Skin: no rash, no wound  MSK: no back pain, no arthralgias, no myalgias  Endo: no excessive hunger or thirst  Heme: no lymphadenopathy  Neuro: no headache, no dizziness  Psych: no depression, no anxiety    Medical History    Current Outpatient Medications:     amLODIPine (NORVASC) 10 MG tablet, Take 1 tablet (10 mg total) by mouth once daily., Disp: 90 tablet, Rfl: 3    aspirin (ECOTRIN) 81 MG EC tablet, Take 1 tablet (81 mg total) by mouth once daily., Disp: , Rfl: 0    atorvastatin (LIPITOR) 10 MG tablet, Take 1 tablet (10 mg total) by mouth once daily., Disp: 30 tablet, Rfl: 2    atorvastatin (LIPITOR) 40 MG tablet, Take 1 tablet (40 mg total) by mouth once daily., Disp: 90 tablet, Rfl: 3    carvediloL (COREG) 6.25 MG tablet, Take 1 tablet by mouth 2 times a day., Disp: 180 tablet, Rfl: 3    clopidogreL (PLAVIX) 75 mg tablet, Take 1 tablet by mouth once a day., Disp: 90 tablet, Rfl: 3    ibuprofen (ADVIL,MOTRIN) 600 MG tablet, Take 1 tablet (600 mg total) by mouth  every 8 (eight) hours as needed for Pain., Disp: 12 tablet, Rfl: 0    multivitamin with minerals tablet, Take 1 tablet by mouth once daily., Disp: , Rfl:     sofosbuvir-velpatasvir 400-100 mg Tab, Take 1 tablet by mouth once daily., Disp: 28 tablet, Rfl: 2    Review of patient's allergies indicates:  No Known Allergies    Past Medical History:   Diagnosis Date    Anxiety about health 6/20/2018    Bladder cancer     Colon cancer     Coronary artery disease     Depression     Hepatitis C 3/9/2018    History of psychiatric care     History of psychiatric hospitalization     Hypertension     MI (myocardial infarction) 2007    Neuropathy     NSTEMI (non-ST elevated myocardial infarction) 06/2019    Due to crystal meth? LVEF 6/2019: 50%    Psychiatric problem     Psychosis     Self-harming behavior     Suicide attempt      Past Surgical History:   Procedure Laterality Date    ABDOMINAL SURGERY      History of perforated ulcer, unknown surgery    APPENDECTOMY      COLONOSCOPY N/A 2/26/2019    Procedure: COLONOSCOPY;  Surgeon: Mikal Nino MD;  Location: [facility] ([address] [place] [address]);  Service: Colon and Rectal;  Laterality: N/A;    ILEOSTOMY      ILEOSTOMY CLOSURE       Family History     Problem Relation (Age of Onset)    ADD / ADHD Cousin    No Known Problems Mother, Father, Sister, Brother, Maternal Aunt, Paternal Aunt, Maternal Uncle, Paternal Uncle, Maternal Grandfather, Maternal Grandmother, Paternal Grandfather, Paternal Grandmother        Tobacco Use    Smoking status: Current Every Day Smoker     Packs/day: 0.50     Years: 39.00     Pack years: 19.50     Types: Cigarettes     Start date: 1980    Smokeless tobacco: Never Used    [facility] to [facility] program   Substance and Sexual Activity    Alcohol use: Yes     Comment: occasional    Drug use: No    Sexual activity: Yes     Partners: Female     Birth control/protection: None        Objective:     Vitals:   Vitals:    03/12/20 1644   BP: 133/85   Pulse: 99       Physical Exam:  Gen: well appearing, no acute distress  HEENT: normocephalic, EOMI  Neck: no tracheal deviation, no cervical LAD  CV: RRR  Respit: breathing non-labored  Abd: soft, non-tender, non-distended   Small, reducible incisional hernia at the umbilicus    10cm surgical scar that runs midline through the umbilicus, no additional hernias felt on palpation of the scar  Extr: warm and well perfused  MSK: moves all extremities appropriately  Neuro: alert, CN II - XII grossly intact  Psych: normal mood and affect      Assessment/Plan:     Chris Aguirre JrLizzette is a 55 y.o. male with an incisional hernia at the umbilicus.     He was consented for an open hernia repair.   He was advised to stop smoking before the surgery to help wound healing and reduce complications with other systems.   Sign off from cardiology is needed to stop his plavix 5-7 days before surgery.

## 2020-03-12 NOTE — LETTER
[facility] - General Surgery  [address]  [facility] [address]  Phone: [phone] March 12, 2020      Ash Hung, NP  [address] [facility] [place] [address]    Patient: Chris Aguirre Jr.   MR Number: [id number]   YOB: 1965   Date of Visit: 3/12/2020     Dear Dr. Hung:    Thank you for referring Chris Aguirre to me for evaluation. Attached you will find relevant portions of my assessment and plan of care.    Mr. Aguirre presents with symptomatic umbilical hernia/incisional hernia.  He denies coughing, constipation, difficulty urinating and vomiting.  He is a smoker.   He works in construction.      On physical examination he has a 2.5 cm with a 1 cm defect.  I do not detect any other hernias.  We will plan for open repair with mesh.  He will need PCP clearance and help with smoking cessation (which will decrease risk of cancer and hernia recurrence).    If you have questions, please do not hesitate to call me. I look forward to following Chris Aguirre along with you.    Sincerely,    Kumar Wright M.D.  Professor, [employer]  Section Head, General Surgery  [facility]    WSR/javier

## 2020-03-12 NOTE — PROGRESS NOTES
I have seen the patient, reviewed the Resident's history and physical, assessment and plan. I have personally interviewed and examined the patient at bedside and: agree with the findings.     Symptomatic umbilical hernia/incisional hernia.  He says this occurred at work.  He denies coughing, constipation, difficulty urinating and vomiting.  Smoker.   He works in construction.  On PE he has a 2.5 cm with a 1 cm defect.  I don't detect any other hernias.  For open repair with mesh.  Cardiology clearance (off plavix) and help with smoking cessation (which will decrease risk of cancer and hernia recurrence).

## 2020-03-12 NOTE — TELEPHONE ENCOUNTER
HCV LAB REVIEW // HCC screening  Week 8 of Epclusa, planning on 12 weeks treatment  Renee 1a, tx naive  F1-2      Pertinent labs:  3/10/20  CMP stable, LFT normal  AFP 1.9  HCV neg    MRI 3/10/20 - 2 indeterminate enhancing hepatic lesions, unchanged.     pls call pt:  - Labs look good:  Liver enzymes now normal.   HCV is now negative in blood. Very important to continue treatment since it is likely still in the liver.   Liver cancer tumor marker is normal  - Continue Epclusa - 1 pill daily - don't miss any doses.  - Should be finishing treatment in a few weeks.  - There is a small chance the virus can return during the 3 months after treatment ends. We will monitor blood for this.   - MRI looked okay - spots on liver are again seen but they do not look worrisome at this time. We will continue to monitor in 3 months    Next labs to see if Hep C is cured are due:  - CMP, HCV RNA, HBV DNA, HBsAg - SVR12 - 7/1/20  - pls schedule AFP, CMP, MRI abdomen 6/2020    Reminder - twinrix shot #3 due 3/2020 (reminder - he was getting this in pharmacy)

## 2020-03-13 ENCOUNTER — EPISODE CHANGES (OUTPATIENT)
Dept: HEPATOLOGY | Facility: CLINIC | Age: 55
End: 2020-03-13

## 2020-03-13 NOTE — TELEPHONE ENCOUNTER
I spoke with patient and msg from PA Scheuermann relayed and mailed to him.  It was stressed that he have vaccine done in pharmacy this month.  MRI and labs scheduled 6/9/20; appt notice mailed.  SVR draw already setup.

## 2020-03-16 ENCOUNTER — DOCUMENTATION ONLY (OUTPATIENT)
Dept: BARIATRICS | Facility: CLINIC | Age: 55
End: 2020-03-16

## 2020-03-17 ENCOUNTER — TELEPHONE (OUTPATIENT)
Dept: UROLOGY | Facility: CLINIC | Age: 55
End: 2020-03-17

## 2020-03-17 NOTE — TELEPHONE ENCOUNTER
----- Message from Rebecca Taylor sent at 3/17/2020  1:23 PM CDT -----  Contact: [phone]/self   Patient called in returning your call. Please advise.

## 2020-03-18 ENCOUNTER — OFFICE VISIT (OUTPATIENT)
Dept: UROLOGY | Facility: CLINIC | Age: 55
End: 2020-03-18
Payer: MEDICAID

## 2020-03-18 VITALS
WEIGHT: 185 LBS | RESPIRATION RATE: 18 BRPM | HEART RATE: 81 BPM | DIASTOLIC BLOOD PRESSURE: 82 MMHG | HEIGHT: 71 IN | OXYGEN SATURATION: 98 % | SYSTOLIC BLOOD PRESSURE: 114 MMHG | TEMPERATURE: 99 F | BODY MASS INDEX: 25.9 KG/M2

## 2020-03-18 DIAGNOSIS — Z09 ENCOUNTER FOR EXAMINATION FOLLOWING TREATMENT AT HOSPITAL: ICD-10-CM

## 2020-03-18 DIAGNOSIS — N43.3 LEFT HYDROCELE: Primary | ICD-10-CM

## 2020-03-18 DIAGNOSIS — R35.1 NOCTURIA: ICD-10-CM

## 2020-03-18 DIAGNOSIS — N50.819 ORCHALGIA: ICD-10-CM

## 2020-03-18 DIAGNOSIS — N43.3 HYDROCELE: ICD-10-CM

## 2020-03-18 DIAGNOSIS — N43.3 HYDROCELE, UNSPECIFIED HYDROCELE TYPE: Primary | ICD-10-CM

## 2020-03-18 LAB
BILIRUB SERPL-MCNC: NORMAL MG/DL
BLOOD URINE, POC: NORMAL
COLOR, POC UA: YELLOW
GLUCOSE UR QL STRIP: NORMAL
KETONES UR QL STRIP: NORMAL
LEUKOCYTE ESTERASE URINE, POC: NORMAL
NITRITE, POC UA: NORMAL
PH, POC UA: 7
PROTEIN, POC: NORMAL
SPECIFIC GRAVITY, POC UA: 1.02
UROBILINOGEN, POC UA: 0.2

## 2020-03-18 PROCEDURE — 81002 URINALYSIS NONAUTO W/O SCOPE: CPT | Mod: PBBFAC,PO | Performed by: NURSE PRACTITIONER

## 2020-03-18 PROCEDURE — 99213 PR OFFICE/OUTPT VISIT, EST, LEVL III, 20-29 MIN: ICD-10-PCS | Mod: S$PBB,,, | Performed by: NURSE PRACTITIONER

## 2020-03-18 PROCEDURE — 99999 PR PBB SHADOW E&M-EST. PATIENT-LVL IV: CPT | Mod: PBBFAC,,, | Performed by: NURSE PRACTITIONER

## 2020-03-18 PROCEDURE — 99214 OFFICE O/P EST MOD 30 MIN: CPT | Mod: PBBFAC,PO | Performed by: NURSE PRACTITIONER

## 2020-03-18 PROCEDURE — 87086 URINE CULTURE/COLONY COUNT: CPT

## 2020-03-18 PROCEDURE — 99999 PR PBB SHADOW E&M-EST. PATIENT-LVL IV: ICD-10-PCS | Mod: PBBFAC,,, | Performed by: NURSE PRACTITIONER

## 2020-03-18 PROCEDURE — 99213 OFFICE O/P EST LOW 20 MIN: CPT | Mod: S$PBB,,, | Performed by: NURSE PRACTITIONER

## 2020-03-18 RX ORDER — CIPROFLOXACIN 2 MG/ML
400 INJECTION, SOLUTION INTRAVENOUS
Status: CANCELLED | OUTPATIENT
Start: 2020-03-18

## 2020-03-18 RX ORDER — SODIUM CHLORIDE 9 MG/ML
INJECTION, SOLUTION INTRAVENOUS CONTINUOUS
Status: CANCELLED | OUTPATIENT
Start: 2020-03-18

## 2020-03-18 NOTE — PATIENT INSTRUCTIONS
1. Urine dipstick  2. Urine cx  3. Consider using jock strap for scrotal support.   4. Take OTC Tylenol for pain.  5. Stop taking aspirin at this time. Patient states he's no longer taking Plavix at this time.   6. Schedule left hydrocelectomy by Dr. Schultz for Thursday, 3/26/2020.  7. Follow-up post-op.

## 2020-03-18 NOTE — PROGRESS NOTES
Subjective:       Patient ID: Chris Aguirre Jr. is a 55 y.o. male.    Chief Complaint: Hospital Follow Up (left hydrocele and testicle pain follow up from ER)    Patient is new to me. He is a 54 yo WM who is here today for an ER follow-up for left testicular pain and swelling on 3/10/2020. U/S performed at that time and showed a large left hydrocele. Results discussed with patient today in clinic. Patient reports he is only taking a baby aspirin at this time. He is not currently taking plavix b/c he was supposed to be having hernia sx today, but it was cancel due to pandemic. He never resumed Plavix.     Testicle Pain   The patient's primary symptoms include scrotal swelling (left) and testicular pain (left). The patient's pertinent negatives include no genital injury, genital itching, genital lesions, penile discharge or penile pain. This is a chronic problem. Episode onset: 8 months ago. The problem occurs daily. The problem has been gradually worsening. The pain is medium (5/10). Pertinent negatives include no abdominal pain, anorexia, chills, constipation, diarrhea, discolored urine, dysuria, fever, flank pain, frequency, headaches, hematuria, hesitancy, joint swelling, nausea, painful intercourse, urgency, urinary retention or vomiting. The testicular pain affects the left testicle. There is swelling in the left testicle. The color of the testicles is normal. The symptoms are aggravated by activity and tactile pressure (sitting). He is sexually active. There is no history of BPH, a femoral hernia, an inguinal hernia, kidney stones, prostatitis or varicocele.     Review of Systems   Constitutional: Negative for appetite change, chills, fatigue and fever.   Gastrointestinal: Negative for abdominal pain, anorexia, constipation, diarrhea, nausea and vomiting.   Genitourinary: Positive for scrotal swelling (left) and testicular pain (left). Negative for decreased urine volume, difficulty urinating,  discharge, dysuria, flank pain, frequency, hematuria, hesitancy, penile pain, penile swelling and urgency.        Nocturia x4-5   Musculoskeletal: Positive for back pain (chronic lower back pain).   Neurological: Negative.  Negative for headaches.   Psychiatric/Behavioral: Negative.        Objective:      Physical Exam   Constitutional: He is oriented to person, place, and time. He appears well-developed and well-nourished. No distress.   HENT:   Head: Normocephalic and atraumatic.   Eyes: Pupils are equal, round, and reactive to light. EOM are normal.   Neck: Normal range of motion.   Cardiovascular: Normal rate.   Pulmonary/Chest: Effort normal. No respiratory distress.   Abdominal: Soft. There is no tenderness.   Genitourinary: Right testis shows no mass, no swelling and no tenderness. Left testis shows swelling and tenderness. Left testis shows no mass.         Genitourinary Comments: Left hydrocele noted where graphically documented.    Musculoskeletal: Normal range of motion. He exhibits no edema.   Neurological: He is alert and oriented to person, place, and time. Coordination normal.   Skin: Skin is warm and dry.   Psychiatric: He has a normal mood and affect. His behavior is normal. Judgment and thought content normal.   Nursing note and vitals reviewed.      Assessment:       1. Left hydrocele    2. Orchalgia    3. Nocturia    4. Encounter for examination following treatment at hospital        Plan:       Chris was seen today for hospital follow up.    Diagnoses and all orders for this visit:    Left hydrocele  -     POCT URINE DIPSTICK WITHOUT MICROSCOPE  -     Urine culture    Orchalgia  -     POCT URINE DIPSTICK WITHOUT MICROSCOPE  -     Urine culture    Nocturia  -     POCT URINE DIPSTICK WITHOUT MICROSCOPE  -     Urine culture    Encounter for examination following treatment at hospital    Other orders  1. Consider using jock strap for scrotal support.   2. Take OTC Tylenol for pain.  3. Stop taking  aspirin at this time. Patient states he's no longer taking Plavix at this time.   4. Schedule left hydrocelectomy by Dr. Schultz for Thursday, 3/26/2020.    Follow-up post-op.     Renee Sotelo NP

## 2020-03-19 LAB — BACTERIA UR CULT: NO GROWTH

## 2020-03-26 PROBLEM — N43.3 HYDROCELE: Status: ACTIVE | Noted: 2020-03-26

## 2020-04-03 ENCOUNTER — TELEPHONE (OUTPATIENT)
Dept: UROLOGY | Facility: CLINIC | Age: 55
End: 2020-04-03

## 2020-04-03 RX ORDER — KETOROLAC TROMETHAMINE 10 MG/1
10 TABLET, FILM COATED ORAL EVERY 6 HOURS
Qty: 20 TABLET | Refills: 1 | Status: SHIPPED | OUTPATIENT
Start: 2020-04-03 | End: 2020-04-08

## 2020-04-03 NOTE — TELEPHONE ENCOUNTER
----- Message from Sofi Chow sent at 4/3/2020  1:20 PM CDT -----  Contact: Self [phone]  Patient would like to speak with you about still having pain and swelling after surgery and has no medication left. Please advise

## 2020-05-05 ENCOUNTER — TELEPHONE (OUTPATIENT)
Dept: SURGERY | Facility: CLINIC | Age: 55
End: 2020-05-05

## 2020-05-05 NOTE — TELEPHONE ENCOUNTER
----- Message from Maurice Grigsby sent at 5/5/2020  2:19 PM CDT -----  Contact: Pt    The Pt states that he would like to be scheduled for his hernia surgery since he heard that the office would be opening up again.    Phone # [phone]

## 2020-05-07 DIAGNOSIS — K43.9 VENTRAL HERNIA WITHOUT OBSTRUCTION OR GANGRENE: Primary | ICD-10-CM

## 2020-05-08 DIAGNOSIS — K43.9 VENTRAL HERNIA WITHOUT OBSTRUCTION OR GANGRENE: Primary | ICD-10-CM

## 2020-05-11 ENCOUNTER — HOSPITAL ENCOUNTER (INPATIENT)
Facility: HOSPITAL | Age: 55
LOS: 2 days | Discharge: HOME | DRG: 281 | End: 2020-05-13
Attending: EMERGENCY MEDICINE | Admitting: INTERNAL MEDICINE
Payer: MEDICAID

## 2020-05-11 DIAGNOSIS — K92.1 BLOOD IN STOOL: ICD-10-CM

## 2020-05-11 DIAGNOSIS — R06.02 SHORTNESS OF BREATH: ICD-10-CM

## 2020-05-11 DIAGNOSIS — R79.89 ELEVATED TROPONIN: Primary | ICD-10-CM

## 2020-05-11 DIAGNOSIS — R93.89 ABNORMAL CHEST X-RAY: ICD-10-CM

## 2020-05-11 DIAGNOSIS — I21.4 NSTEMI (NON-ST ELEVATED MYOCARDIAL INFARCTION): ICD-10-CM

## 2020-05-11 DIAGNOSIS — R55 SYNCOPE, UNSPECIFIED SYNCOPE TYPE: ICD-10-CM

## 2020-05-11 DIAGNOSIS — F19.10 POLYSUBSTANCE ABUSE: ICD-10-CM

## 2020-05-11 DIAGNOSIS — R11.2 NAUSEA AND VOMITING: ICD-10-CM

## 2020-05-11 PROBLEM — E78.5 HYPERLIPIDEMIA: Status: ACTIVE | Noted: 2019-07-31

## 2020-05-11 PROBLEM — I25.110 CORONARY ARTERY DISEASE INVOLVING NATIVE CORONARY ARTERY OF NATIVE HEART WITH UNSTABLE ANGINA PECTORIS: Status: ACTIVE | Noted: 2019-07-25

## 2020-05-11 PROBLEM — I20.9 ANGINA, CLASS III: Status: ACTIVE | Noted: 2019-08-09

## 2020-05-11 LAB
ALBUMIN SERPL BCP-MCNC: 4.5 G/DL (ref 3.5–5.2)
ALLENS TEST: ABNORMAL
ALP SERPL-CCNC: 53 U/L (ref 55–135)
ALT SERPL W/O P-5'-P-CCNC: 16 U/L (ref 10–44)
AMPHET+METHAMPHET UR QL: NORMAL
ANION GAP SERPL CALC-SCNC: 18 MMOL/L (ref 8–16)
AST SERPL-CCNC: 24 U/L (ref 10–40)
BACTERIA #/AREA URNS HPF: ABNORMAL /HPF
BARBITURATES UR QL SCN>200 NG/ML: NEGATIVE
BASOPHILS # BLD AUTO: 0.03 K/UL (ref 0–0.2)
BASOPHILS NFR BLD: 0.4 % (ref 0–1.9)
BENZODIAZ UR QL SCN>200 NG/ML: NEGATIVE
BILIRUB SERPL-MCNC: 1.1 MG/DL (ref 0.1–1)
BILIRUB UR QL STRIP: ABNORMAL
BUN SERPL-MCNC: 20 MG/DL (ref 6–20)
BZE UR QL SCN: NORMAL
CALCIUM SERPL-MCNC: 11.4 MG/DL (ref 8.7–10.5)
CANNABINOIDS UR QL SCN: NORMAL
CHLORIDE SERPL-SCNC: 96 MMOL/L (ref 95–110)
CK SERPL-CCNC: 193 U/L (ref 20–200)
CLARITY UR: CLEAR
CO2 SERPL-SCNC: 22 MMOL/L (ref 23–29)
COLOR UR: ABNORMAL
CREAT SERPL-MCNC: 1.4 MG/DL (ref 0.5–1.4)
CREAT UR-MCNC: 295.3 MG/DL (ref 23–375)
CRP SERPL-MCNC: 6.6 MG/L (ref 0–8.2)
DELSYS: ABNORMAL
DIFFERENTIAL METHOD: ABNORMAL
EOSINOPHIL # BLD AUTO: 0.1 K/UL (ref 0–0.5)
EOSINOPHIL NFR BLD: 1.1 % (ref 0–8)
ERYTHROCYTE [DISTWIDTH] IN BLOOD BY AUTOMATED COUNT: 18.6 % (ref 11.5–14.5)
EST. GFR  (AFRICAN AMERICAN): >60 ML/MIN/1.73 M^2
EST. GFR  (NON AFRICAN AMERICAN): 57 ML/MIN/1.73 M^2
ETHANOL SERPL-MCNC: <10 MG/DL
FERRITIN SERPL-MCNC: 22 NG/ML (ref 20–300)
GLUCOSE SERPL-MCNC: 84 MG/DL (ref 70–110)
GLUCOSE UR QL STRIP: NEGATIVE
HCO3 UR-SCNC: 22.8 MMOL/L (ref 24–28)
HCT VFR BLD AUTO: 36.1 % (ref 40–54)
HGB BLD-MCNC: 11.4 G/DL (ref 14–18)
HGB UR QL STRIP: NEGATIVE
HYALINE CASTS #/AREA URNS LPF: 3 /LPF
IMM GRANULOCYTES # BLD AUTO: 0.02 K/UL (ref 0–0.04)
IMM GRANULOCYTES NFR BLD AUTO: 0.3 % (ref 0–0.5)
KETONES UR QL STRIP: ABNORMAL
LACTATE SERPL-SCNC: 2.6 MMOL/L (ref 0.5–2.2)
LDH SERPL L TO P-CCNC: 168 U/L (ref 110–260)
LEUKOCYTE ESTERASE UR QL STRIP: NEGATIVE
LIPASE SERPL-CCNC: 34 U/L (ref 4–60)
LYMPHOCYTES # BLD AUTO: 3 K/UL (ref 1–4.8)
LYMPHOCYTES NFR BLD: 39.9 % (ref 18–48)
MCH RBC QN AUTO: 21.8 PG (ref 27–31)
MCHC RBC AUTO-ENTMCNC: 31.6 G/DL (ref 32–36)
MCV RBC AUTO: 69 FL (ref 82–98)
METHADONE UR QL SCN>300 NG/ML: NEGATIVE
MICROSCOPIC COMMENT: ABNORMAL
MONOCYTES # BLD AUTO: 0.8 K/UL (ref 0.3–1)
MONOCYTES NFR BLD: 10.7 % (ref 4–15)
NEUTROPHILS # BLD AUTO: 3.6 K/UL (ref 1.8–7.7)
NEUTROPHILS NFR BLD: 47.6 % (ref 38–73)
NITRITE UR QL STRIP: NEGATIVE
NRBC BLD-RTO: 0 /100 WBC
OPIATES UR QL SCN: NEGATIVE
PCO2 BLDA: 19.5 MMHG (ref 35–45)
PCP UR QL SCN>25 NG/ML: NEGATIVE
PH SMN: 7.68 [PH] (ref 7.35–7.45)
PH UR STRIP: 8 [PH] (ref 5–8)
PLATELET # BLD AUTO: 292 K/UL (ref 150–350)
PMV BLD AUTO: 10.4 FL (ref 9.2–12.9)
PO2 BLDA: 20 MMHG (ref 80–100)
POC BE: 2 MMOL/L
POC SATURATED O2: 50 % (ref 95–100)
POC TCO2: 23 MMOL/L (ref 23–27)
POTASSIUM SERPL-SCNC: 3 MMOL/L (ref 3.5–5.1)
PROCALCITONIN SERPL IA-MCNC: 0.05 NG/ML
PROT SERPL-MCNC: 8.4 G/DL (ref 6–8.4)
PROT UR QL STRIP: ABNORMAL
RBC # BLD AUTO: 5.24 M/UL (ref 4.6–6.2)
RBC #/AREA URNS HPF: 0 /HPF (ref 0–4)
SALICYLATES SERPL-MCNC: <5 MG/DL (ref 15–30)
SAMPLE: ABNORMAL
SARS-COV-2 RDRP RESP QL NAA+PROBE: NEGATIVE
SITE: ABNORMAL
SODIUM SERPL-SCNC: 136 MMOL/L (ref 136–145)
SP GR UR STRIP: 1.01 (ref 1–1.03)
SQUAMOUS #/AREA URNS HPF: 2 /HPF
TOXICOLOGY INFORMATION: NORMAL
TROPONIN I SERPL DL<=0.01 NG/ML-MCNC: 0.42 NG/ML (ref 0–0.03)
TROPONIN I SERPL DL<=0.01 NG/ML-MCNC: 0.47 NG/ML (ref 0–0.03)
URN SPEC COLLECT METH UR: ABNORMAL
UROBILINOGEN UR STRIP-ACNC: ABNORMAL EU/DL
WBC # BLD AUTO: 7.45 K/UL (ref 3.9–12.7)
WBC #/AREA URNS HPF: 0 /HPF (ref 0–5)

## 2020-05-11 PROCEDURE — 96361 HYDRATE IV INFUSION ADD-ON: CPT

## 2020-05-11 PROCEDURE — 25500020 PHARM REV CODE 255: Performed by: EMERGENCY MEDICINE

## 2020-05-11 PROCEDURE — 82728 ASSAY OF FERRITIN: CPT

## 2020-05-11 PROCEDURE — 84484 ASSAY OF TROPONIN QUANT: CPT | Mod: 91

## 2020-05-11 PROCEDURE — 80053 COMPREHEN METABOLIC PANEL: CPT

## 2020-05-11 PROCEDURE — 86140 C-REACTIVE PROTEIN: CPT

## 2020-05-11 PROCEDURE — 99291 CRITICAL CARE FIRST HOUR: CPT | Mod: 25

## 2020-05-11 PROCEDURE — 84145 PROCALCITONIN (PCT): CPT

## 2020-05-11 PROCEDURE — 96375 TX/PRO/DX INJ NEW DRUG ADDON: CPT

## 2020-05-11 PROCEDURE — 63600175 PHARM REV CODE 636 W HCPCS: Performed by: EMERGENCY MEDICINE

## 2020-05-11 PROCEDURE — 83615 LACTATE (LD) (LDH) ENZYME: CPT

## 2020-05-11 PROCEDURE — 36600 WITHDRAWAL OF ARTERIAL BLOOD: CPT

## 2020-05-11 PROCEDURE — 82803 BLOOD GASES ANY COMBINATION: CPT

## 2020-05-11 PROCEDURE — 83690 ASSAY OF LIPASE: CPT

## 2020-05-11 PROCEDURE — U0002 COVID-19 LAB TEST NON-CDC: HCPCS

## 2020-05-11 PROCEDURE — 81000 URINALYSIS NONAUTO W/SCOPE: CPT | Mod: 59

## 2020-05-11 PROCEDURE — 83605 ASSAY OF LACTIC ACID: CPT

## 2020-05-11 PROCEDURE — 80307 DRUG TEST PRSMV CHEM ANLYZR: CPT

## 2020-05-11 PROCEDURE — 80320 DRUG SCREEN QUANTALCOHOLS: CPT

## 2020-05-11 PROCEDURE — 99900035 HC TECH TIME PER 15 MIN (STAT)

## 2020-05-11 PROCEDURE — 93005 ELECTROCARDIOGRAM TRACING: CPT

## 2020-05-11 PROCEDURE — 82746 ASSAY OF FOLIC ACID SERUM: CPT

## 2020-05-11 PROCEDURE — 83540 ASSAY OF IRON: CPT

## 2020-05-11 PROCEDURE — 63600175 PHARM REV CODE 636 W HCPCS: Performed by: PHYSICIAN ASSISTANT

## 2020-05-11 PROCEDURE — 82550 ASSAY OF CK (CPK): CPT

## 2020-05-11 PROCEDURE — 82607 VITAMIN B-12: CPT

## 2020-05-11 PROCEDURE — 36415 COLL VENOUS BLD VENIPUNCTURE: CPT

## 2020-05-11 PROCEDURE — 11000001 HC ACUTE MED/SURG PRIVATE ROOM

## 2020-05-11 PROCEDURE — 84484 ASSAY OF TROPONIN QUANT: CPT

## 2020-05-11 PROCEDURE — 85025 COMPLETE CBC W/AUTO DIFF WBC: CPT

## 2020-05-11 PROCEDURE — 63600175 PHARM REV CODE 636 W HCPCS: Performed by: STUDENT IN AN ORGANIZED HEALTH CARE EDUCATION/TRAINING PROGRAM

## 2020-05-11 PROCEDURE — 25000003 PHARM REV CODE 250: Performed by: EMERGENCY MEDICINE

## 2020-05-11 PROCEDURE — 96374 THER/PROPH/DIAG INJ IV PUSH: CPT

## 2020-05-11 PROCEDURE — 25000003 PHARM REV CODE 250: Performed by: STUDENT IN AN ORGANIZED HEALTH CARE EDUCATION/TRAINING PROGRAM

## 2020-05-11 RX ORDER — DIAZEPAM 5 MG/1
5 TABLET ORAL EVERY 6 HOURS
Status: COMPLETED | OUTPATIENT
Start: 2020-05-11 | End: 2020-05-12

## 2020-05-11 RX ORDER — NITROGLYCERIN 0.4 MG/1
0.4 TABLET SUBLINGUAL EVERY 5 MIN PRN
Status: DISCONTINUED | OUTPATIENT
Start: 2020-05-11 | End: 2020-05-13 | Stop reason: HOSPADM

## 2020-05-11 RX ORDER — ENOXAPARIN SODIUM 100 MG/ML
1 INJECTION SUBCUTANEOUS
Status: COMPLETED | OUTPATIENT
Start: 2020-05-11 | End: 2020-05-11

## 2020-05-11 RX ORDER — AMLODIPINE BESYLATE 5 MG/1
10 TABLET ORAL DAILY
Status: DISCONTINUED | OUTPATIENT
Start: 2020-05-12 | End: 2020-05-11

## 2020-05-11 RX ORDER — ONDANSETRON 2 MG/ML
4 INJECTION INTRAMUSCULAR; INTRAVENOUS
Status: COMPLETED | OUTPATIENT
Start: 2020-05-11 | End: 2020-05-11

## 2020-05-11 RX ORDER — ENOXAPARIN SODIUM 100 MG/ML
1 INJECTION SUBCUTANEOUS
Status: DISCONTINUED | OUTPATIENT
Start: 2020-05-12 | End: 2020-05-13 | Stop reason: HOSPADM

## 2020-05-11 RX ORDER — ACETAMINOPHEN 325 MG/1
650 TABLET ORAL EVERY 6 HOURS PRN
Status: DISCONTINUED | OUTPATIENT
Start: 2020-05-11 | End: 2020-05-13 | Stop reason: HOSPADM

## 2020-05-11 RX ORDER — SODIUM CHLORIDE, SODIUM LACTATE, POTASSIUM CHLORIDE, CALCIUM CHLORIDE 600; 310; 30; 20 MG/100ML; MG/100ML; MG/100ML; MG/100ML
INJECTION, SOLUTION INTRAVENOUS CONTINUOUS
Status: DISCONTINUED | OUTPATIENT
Start: 2020-05-11 | End: 2020-05-11

## 2020-05-11 RX ORDER — ASPIRIN 81 MG/1
81 TABLET ORAL DAILY
Status: DISCONTINUED | OUTPATIENT
Start: 2020-05-12 | End: 2020-05-13 | Stop reason: HOSPADM

## 2020-05-11 RX ORDER — CLOPIDOGREL BISULFATE 75 MG/1
75 TABLET ORAL DAILY
Status: DISCONTINUED | OUTPATIENT
Start: 2020-05-12 | End: 2020-05-13 | Stop reason: HOSPADM

## 2020-05-11 RX ORDER — SODIUM CHLORIDE 9 MG/ML
INJECTION, SOLUTION INTRAVENOUS CONTINUOUS
Status: ACTIVE | OUTPATIENT
Start: 2020-05-12 | End: 2020-05-12

## 2020-05-11 RX ORDER — DIAZEPAM 5 MG/1
5 TABLET ORAL EVERY 8 HOURS
Status: DISCONTINUED | OUTPATIENT
Start: 2020-05-12 | End: 2020-05-11

## 2020-05-11 RX ORDER — TAMSULOSIN HYDROCHLORIDE 0.4 MG/1
0.4 CAPSULE ORAL NIGHTLY
Status: DISCONTINUED | OUTPATIENT
Start: 2020-05-11 | End: 2020-05-13 | Stop reason: HOSPADM

## 2020-05-11 RX ORDER — SODIUM CHLORIDE 0.9 % (FLUSH) 0.9 %
10 SYRINGE (ML) INJECTION
Status: DISCONTINUED | OUTPATIENT
Start: 2020-05-11 | End: 2020-05-13 | Stop reason: HOSPADM

## 2020-05-11 RX ORDER — AMLODIPINE BESYLATE 5 MG/1
10 TABLET ORAL DAILY
Status: DISCONTINUED | OUTPATIENT
Start: 2020-05-11 | End: 2020-05-13 | Stop reason: HOSPADM

## 2020-05-11 RX ORDER — MAGNESIUM SULFATE HEPTAHYDRATE 40 MG/ML
2 INJECTION, SOLUTION INTRAVENOUS ONCE
Status: COMPLETED | OUTPATIENT
Start: 2020-05-12 | End: 2020-05-12

## 2020-05-11 RX ORDER — DIAZEPAM 5 MG/1
5 TABLET ORAL EVERY 12 HOURS
Status: DISCONTINUED | OUTPATIENT
Start: 2020-05-13 | End: 2020-05-11

## 2020-05-11 RX ORDER — BENZTROPINE MESYLATE 1 MG/ML
1 INJECTION, SOLUTION INTRAMUSCULAR; INTRAVENOUS
Status: DISCONTINUED | OUTPATIENT
Start: 2020-05-11 | End: 2020-05-11

## 2020-05-11 RX ORDER — DIAZEPAM 5 MG/1
5 TABLET ORAL EVERY 6 HOURS
Status: DISCONTINUED | OUTPATIENT
Start: 2020-05-12 | End: 2020-05-11

## 2020-05-11 RX ORDER — POTASSIUM CHLORIDE 20 MEQ/1
40 TABLET, EXTENDED RELEASE ORAL ONCE
Status: COMPLETED | OUTPATIENT
Start: 2020-05-12 | End: 2020-05-11

## 2020-05-11 RX ORDER — THIAMINE HCL 100 MG
100 TABLET ORAL DAILY
Status: DISCONTINUED | OUTPATIENT
Start: 2020-05-11 | End: 2020-05-13 | Stop reason: HOSPADM

## 2020-05-11 RX ORDER — KETOROLAC TROMETHAMINE 30 MG/ML
30 INJECTION, SOLUTION INTRAMUSCULAR; INTRAVENOUS
Status: COMPLETED | OUTPATIENT
Start: 2020-05-11 | End: 2020-05-11

## 2020-05-11 RX ORDER — DIAZEPAM 5 MG/1
5 TABLET ORAL EVERY 12 HOURS
Status: DISCONTINUED | OUTPATIENT
Start: 2020-05-13 | End: 2020-05-13 | Stop reason: HOSPADM

## 2020-05-11 RX ORDER — FOLIC ACID 1 MG/1
1 TABLET ORAL DAILY
Status: DISCONTINUED | OUTPATIENT
Start: 2020-05-12 | End: 2020-05-13 | Stop reason: HOSPADM

## 2020-05-11 RX ORDER — ATORVASTATIN CALCIUM 40 MG/1
40 TABLET, FILM COATED ORAL NIGHTLY
Status: DISCONTINUED | OUTPATIENT
Start: 2020-05-11 | End: 2020-05-13 | Stop reason: HOSPADM

## 2020-05-11 RX ORDER — DIAZEPAM 5 MG/1
5 TABLET ORAL EVERY 8 HOURS
Status: DISCONTINUED | OUTPATIENT
Start: 2020-05-12 | End: 2020-05-13 | Stop reason: HOSPADM

## 2020-05-11 RX ORDER — ASPIRIN 325 MG
325 TABLET ORAL
Status: COMPLETED | OUTPATIENT
Start: 2020-05-11 | End: 2020-05-11

## 2020-05-11 RX ORDER — CLOPIDOGREL BISULFATE 75 MG/1
75 TABLET ORAL ONCE
Status: COMPLETED | OUTPATIENT
Start: 2020-05-11 | End: 2020-05-11

## 2020-05-11 RX ORDER — ONDANSETRON 2 MG/ML
4 INJECTION INTRAMUSCULAR; INTRAVENOUS EVERY 6 HOURS PRN
Status: DISCONTINUED | OUTPATIENT
Start: 2020-05-11 | End: 2020-05-13 | Stop reason: HOSPADM

## 2020-05-11 RX ADMIN — LORAZEPAM 1 MG: 2 INJECTION INTRAMUSCULAR; INTRAVENOUS at 11:05

## 2020-05-11 RX ADMIN — KETOROLAC TROMETHAMINE 30 MG: 30 INJECTION, SOLUTION INTRAMUSCULAR at 06:05

## 2020-05-11 RX ADMIN — AMLODIPINE BESYLATE 10 MG: 5 TABLET ORAL at 10:05

## 2020-05-11 RX ADMIN — ENOXAPARIN SODIUM 90 MG: 100 INJECTION SUBCUTANEOUS at 08:05

## 2020-05-11 RX ADMIN — LORAZEPAM 1 MG: 2 INJECTION INTRAMUSCULAR; INTRAVENOUS at 08:05

## 2020-05-11 RX ADMIN — SODIUM CHLORIDE, SODIUM LACTATE, POTASSIUM CHLORIDE, AND CALCIUM CHLORIDE 1000 ML: .6; .31; .03; .02 INJECTION, SOLUTION INTRAVENOUS at 04:05

## 2020-05-11 RX ADMIN — POTASSIUM CHLORIDE 40 MEQ: 1500 TABLET, EXTENDED RELEASE ORAL at 11:05

## 2020-05-11 RX ADMIN — SODIUM CHLORIDE, SODIUM LACTATE, POTASSIUM CHLORIDE, AND CALCIUM CHLORIDE: .6; .31; .03; .02 INJECTION, SOLUTION INTRAVENOUS at 10:05

## 2020-05-11 RX ADMIN — ASPIRIN 325 MG ORAL TABLET 325 MG: 325 PILL ORAL at 06:05

## 2020-05-11 RX ADMIN — IOHEXOL 100 ML: 350 INJECTION, SOLUTION INTRAVENOUS at 06:05

## 2020-05-11 RX ADMIN — TAMSULOSIN HYDROCHLORIDE 0.4 MG: 0.4 CAPSULE ORAL at 10:05

## 2020-05-11 RX ADMIN — ATORVASTATIN CALCIUM 40 MG: 40 TABLET, FILM COATED ORAL at 10:05

## 2020-05-11 RX ADMIN — LORAZEPAM 1 MG: 2 INJECTION INTRAMUSCULAR; INTRAVENOUS at 06:05

## 2020-05-11 RX ADMIN — DIAZEPAM 5 MG: 5 TABLET ORAL at 10:05

## 2020-05-11 RX ADMIN — SODIUM CHLORIDE: 0.9 INJECTION, SOLUTION INTRAVENOUS at 11:05

## 2020-05-11 RX ADMIN — SODIUM CHLORIDE, SODIUM LACTATE, POTASSIUM CHLORIDE, AND CALCIUM CHLORIDE 1000 ML: .6; .31; .03; .02 INJECTION, SOLUTION INTRAVENOUS at 06:05

## 2020-05-11 RX ADMIN — CLOPIDOGREL BISULFATE 75 MG: 75 TABLET ORAL at 08:05

## 2020-05-11 RX ADMIN — ONDANSETRON 4 MG: 2 INJECTION INTRAMUSCULAR; INTRAVENOUS at 05:05

## 2020-05-11 RX ADMIN — MAGNESIUM SULFATE 2 G: 2 INJECTION INTRAVENOUS at 11:05

## 2020-05-11 NOTE — ED TRIAGE NOTES
Pt presents to ED via EMS and states COVID-19 concerns. Pt states he began with abd pain with nausea and wrenching, sob and generalized weakness. Pt states he used an inhaler and other medication with no relief.

## 2020-05-11 NOTE — ED PROVIDER NOTES
Encounter Date: 5/11/2020       History     Chief Complaint   Patient presents with    COVID-19 Concerns     abd pain with nausea and wreching, sob, and generally feeling weak. no relief with rx meds and inhaler.      Chris Aguirre Jr., a 55 y.o. male  has a past medical history of Anxiety about health (6/20/2018), Bladder cancer, Colon cancer, Coronary artery disease, Depression, Hepatitis C (3/9/2018), History of psychiatric care, History of psychiatric hospitalization, Hypertension, MI (myocardial infarction) (2007), Neuropathy, NSTEMI (non-ST elevated myocardial infarction) (06/2019), Psychiatric problem, Psychosis, Self-harming behavior, Suicide attempt, and Urinary tract infection.     He presents to the ED evaluation of N/V that started today while he was waiting for a ride to pick him up from work.  States that he went to stand and felt extremely light headed, then started vomiting.  Has abdominal pain to his hernia site.  States he was scheduled to have surgery to repair this but was postponed d/t COVID.  Has been 3 days since he's had a bowel movement.  Denies fever.  Feels SOB as well and it is worse with exertion, some what better with rest.  Denies CP.  No fevers.  Treatments tried PTA include administration of phenergan and albuterol with no improvement of his symptoms.      The history is provided by the patient.     Review of patient's allergies indicates:  No Known Allergies  Past Medical History:   Diagnosis Date    Anxiety about health 6/20/2018    Bladder cancer     Colon cancer     Coronary artery disease     Depression     Hepatitis C 3/9/2018    History of psychiatric care     History of psychiatric hospitalization     Hypertension     MI (myocardial infarction) 2007    Neuropathy     NSTEMI (non-ST elevated myocardial infarction) 06/2019    Due to crystal meth? LVEF 6/2019: 50%    Psychiatric problem     Psychosis     Self-harming behavior     Suicide attempt      Urinary tract infection      Past Surgical History:   Procedure Laterality Date    ABDOMINAL SURGERY      History of perforated ulcer, unknown surgery    APPENDECTOMY      COLONOSCOPY N/A 2/26/2019    Procedure: COLONOSCOPY;  Surgeon: Mikal Nino MD;  Location: [facility] ([address] [place] [address]);  Service: Colon and Rectal;  Laterality: N/A;    EXCISION OF HYDROCELE Left 3/26/2020    Procedure: HYDROCELECTOMY;  Surgeon: Mikal Schultz MD;  Location: [facility];  Service: Urology;  Laterality: Left;    ILEOSTOMY      ILEOSTOMY CLOSURE       Family History   Problem Relation Age of Onset    No Known Problems Mother     No Known Problems Father     No Known Problems Sister     No Known Problems Brother     No Known Problems Maternal Aunt     No Known Problems Paternal Aunt     No Known Problems Maternal Uncle     No Known Problems Paternal Uncle     No Known Problems Maternal Grandfather     No Known Problems Maternal Grandmother     No Known Problems Paternal Grandfather     No Known Problems Paternal Grandmother     ADD / ADHD Cousin     Alcohol abuse Neg Hx     Anxiety disorder Neg Hx     Bipolar disorder Neg Hx     Dementia Neg Hx     Depression Neg Hx     Drug abuse Neg Hx     OCD Neg Hx     Paranoid behavior Neg Hx     Physical abuse Neg Hx     Schizophrenia Neg Hx     Seizures Neg Hx     Self injury Neg Hx     Sexual abuse Neg Hx     Suicide Neg Hx     Prostate cancer Neg Hx     Kidney disease Neg Hx      Social History     Tobacco Use    Smoking status: Current Every Day Smoker     Packs/day: 0.50     Years: 39.00     Pack years: 19.50     Types: Cigarettes     Start date: 1980    Smokeless tobacco: Never Used    [facility] to Sm[facility] Cessation program   Substance Use Topics    Alcohol use: Not Currently     Comment: occasional    Drug use: Not Currently     Review of Systems   Constitutional: Positive for activity change.  Negative for fever.   HENT: Negative for congestion.    Eyes: Negative for visual disturbance.   Respiratory: Positive for cough and shortness of breath.    Cardiovascular: Negative for chest pain.   Gastrointestinal: Positive for abdominal pain, constipation, nausea and vomiting. Negative for blood in stool.   Genitourinary: Negative for dysuria.   Allergic/Immunologic: Negative for immunocompromised state.   Neurological: Positive for tremors, syncope, weakness and light-headedness. Negative for headaches.   Psychiatric/Behavioral: The patient is nervous/anxious.        Physical Exam     Initial Vitals [05/11/20 1550]   BP Pulse Resp Temp SpO2   131/63 (!) 118 (!) 25 99.4 °F (37.4 °C) 100 %      MAP       --         Physical Exam    Nursing note and vitals reviewed.  Constitutional: He appears well-developed and well-nourished. He is diaphoretic.   HENT:   Head: Normocephalic and atraumatic.   Right Ear: External ear normal.   Left Ear: External ear normal.   Nose: Nose normal.   Mouth/Throat: Oropharynx is clear and moist.   Eyes: EOM are normal.   Neck: Normal range of motion. Neck supple.   Cardiovascular: Normal rate and regular rhythm.   Pulmonary/Chest: Breath sounds normal. No respiratory distress. He has no wheezes. He has no rhonchi. He has no rales.   Abdominal: Soft. Bowel sounds are normal. He exhibits no distension. There is tenderness. There is no rebound and no guarding. A hernia is present. Hernia confirmed positive in the ventral area (around umbilicus ).   Patient actively retching during exam    Musculoskeletal: Normal range of motion. He exhibits no edema or tenderness.   Lymphadenopathy:     He has no cervical adenopathy.   Neurological: He is alert and oriented to person, place, and time. No cranial nerve deficit.   Skin: Skin is warm. No rash and no abscess noted. No erythema.   Psychiatric: He has a normal mood and affect. Thought content normal.         ED Course   Critical  Care  Date/Time: 5/11/2020 7:55 PM  Performed by: Stewart Rose MD  Authorized by: Stewart Rose MD   Direct patient critical care time: 20 minutes  Additional history critical care time: 5 minutes  Ordering / reviewing critical care time: 5 minutes  Documentation critical care time: 5 minutes  Consulting other physicians critical care time: 7 minutes  Total critical care time (exclusive of procedural time) : 42 minutes  Comments: Critical Care time: 42 minutes inclusive of direct patient care, review of previous records, interpretation of labs, imaging and ekg, as well as discussion of my impression and plan of care with the patient, family and other clinicians/consultants. This time is exclusive of any separate billable procedures and of treating other patients. Critical care was required for this patient who presented with elevated troponin, shortness of breath, syncope requiring my emergent evaluation and management in the emergency department.          Labs Reviewed   CBC W/ AUTO DIFFERENTIAL - Abnormal; Notable for the following components:       Result Value    Hemoglobin 11.4 (*)     Hematocrit 36.1 (*)     Mean Corpuscular Volume 69 (*)     Mean Corpuscular Hemoglobin 21.8 (*)     Mean Corpuscular Hemoglobin Conc 31.6 (*)     RDW 18.6 (*)     All other components within normal limits   COMPREHENSIVE METABOLIC PANEL - Abnormal; Notable for the following components:    Potassium 3.0 (*)     CO2 22 (*)     Calcium 11.4 (*)     Total Bilirubin 1.1 (*)     Alkaline Phosphatase 53 (*)     Anion Gap 18 (*)     eGFR if non  57 (*)     All other components within normal limits   TROPONIN I - Abnormal; Notable for the following components:    Troponin I 0.472 (*)     All other components within normal limits   ISTAT PROCEDURE - Abnormal; Notable for the following components:    POC PH 7.677 (*)     POC PCO2 19.5 (*)     POC PO2 20 (*)     POC HCO3 22.8 (*)     POC SATURATED O2 50 (*)     All other  components within normal limits   LIPASE   SARS-COV-2 RNA AMPLIFICATION, QUAL    Narrative:     What symptom criteria does the patient meet?->Shortness of  breath or difficulty breathing   CK   ALCOHOL,MEDICAL (ETHANOL)   C-REACTIVE PROTEIN   ALCOHOL,MEDICAL (ETHANOL)   URINALYSIS, REFLEX TO URINE CULTURE   DRUG SCREEN PANEL, URINE EMERGENCY   FERRITIN   LACTATE DEHYDROGENASE   LACTIC ACID, PLASMA   PROCALCITONIN          Imaging Results          CTA Chest Non-Coronary (PE Study) (Final result)  Result time 05/11/20 19:17:50    Final result by Briseyda Multani MD (05/11/20 19:17:50)                 Impression:      1. No evidence of PE.  No acute intrathoracic abnormalities identified.  2. Stable small focal region ground-glass opacity within right lobe, unchanged from April 2018.      Electronically signed by: Briseyda Multani MD  Date:    05/11/2020  Time:    19:17             Narrative:    EXAMINATION:  CTA CHEST NON CORONARY    CLINICAL HISTORY:  Chest pain, acute, PE suspected, high pretest prob;    TECHNIQUE:  Low dose axial images, sagittal and coronal reformations were obtained from the thoracic inlet to the lung bases following the IV administration of 100 mL of Omnipaque 350.  Contrast timing was optimized to evaluate the pulmonary arteries.  MIP images were performed.    COMPARISON:  CT chest from April 2018.    FINDINGS:  Structures at the base of the neck are unremarkable.  Aorta is non-aneurysmal.  The heart is normal in size without pericardial effusion.  Prominent coronary artery calcification is seen.  No intraluminal filling defects within the pulmonary arteries to suggest pulmonary thromboembolism.   There is no evidence of mediastinal, axillary, or hilar lymph node enlargement.  The esophagus is unremarkable along its course.    The trachea and bronchi are patent.  The lungs are symmetrically expanded.  Unchanged small focal region of ground-glass opacity seen within the right upper lobe.  This is  unchanged in appearance from April 2018.  Lungs are otherwise clear.  No evidence of focal consolidation, pneumothorax, or pleural effusion.    Two small hepatic hypodensities are visualized which are too small to characterize but may represent small cysts, unchanged.  Visualized upper abdominal structures otherwise show no significant abnormalities.  Osseous structures demonstrate mild degenerative change.  Extrathoracic soft tissues are unremarkable.                               X-Ray Chest AP Portable (Final result)  Result time 05/11/20 18:10:19    Final result by Briseyda Multani MD (05/11/20 18:10:19)                 Impression:      No acute cardiopulmonary process identified.      Electronically signed by: Briseyda Multani MD  Date:    05/11/2020  Time:    18:10             Narrative:    EXAMINATION:  XR CHEST AP PORTABLE    CLINICAL HISTORY:  Abnormal findings on diagnostic imaging of other specified body structures    TECHNIQUE:  Single frontal view of the chest was performed.    COMPARISON:  16:23.    FINDINGS:  Cardiac silhouette is normal in size.  Lungs are symmetrically expanded.  No evidence of focal consolidative process, pneumothorax, or significant effusion.  No acute osseous abnormality identified.                                X-Ray Chest AP Portable (Final result)  Result time 05/11/20 16:58:13    Final result by Cony Britton MD (05/11/20 16:58:13)                 Impression:      No acute cardiopulmonary abnormality.    9 mm nodular opacity in the left midlung, may represent nipple shadow.  Recommend repeat chest radiograph with nipple markers.  If the opacity does not correlate with nipple marker, recommend a non emergent CT chest for further evaluation.    This report was flagged in Epic as abnormal.      Electronically signed by: Cony Britton  Date:    05/11/2020  Time:    16:58             Narrative:    EXAMINATION:  XR CHEST AP PORTABLE    CLINICAL HISTORY:  Shortness of  breath    TECHNIQUE:  Single view of the chest was obtained.    COMPARISON:  Multiple priors, most recent 06/06/2019    FINDINGS:  Please note the left costophrenic angle is excluded from field of view.    Normal cardiomediastinal contour. No focal consolidation, pleural effusion or pneumothorax. 9 mm nodular opacity in the left midlung.                               X-Ray Abdomen AP 1 View (KUB) (Final result)  Result time 05/11/20 16:59:44    Final result by Cony Britton MD (05/11/20 16:59:44)                 Impression:      Nonobstructive bowel gas pattern.      Electronically signed by: Cony Britton  Date:    05/11/2020  Time:    16:59             Narrative:    EXAMINATION:  XR ABDOMEN AP 1 VIEW    CLINICAL HISTORY:  Nausea with vomiting, unspecified    TECHNIQUE:  AP View(s) of the abdomen was performed.    COMPARISON:  Multiple priors, most recent 01/18/2019,    FINDINGS:  Stool seen within the ascending and descending colon as well as in the rectum.  No bowel dilatation.  No radiopaque calculi at the expected location of the kidneys or ureters.  No acute osseous abnormality.  Surgical sutures in the left hemiabdomen.                                 Medical Decision Making:   Initial Assessment:   N/V, SOB   Differential Diagnosis:   Electrolyte abnormality, dehydration, gastroenteritis   ED Management:  Patient presents to ED for evaluation of N/V that started today abruptly.  TTP around umbilical hernia site.  Fluids, zofran given in ED.  Awaiting labs and imaging, care was turned over to Dr. Rose.                Attending Attestation:     Physician Attestation Statement for NP/PA:   I have conducted a face to face encounter with this patient in addition to the NP/PA, due to Patient Request    Other NP/PA Attestation Additions:      Medical Decision Making: Pt presents with vomiting, syncope x2, coughing fits, dyspnea and body aches. Pt concerned about COVID infection.                  ED Course as of  "May 11 1955   Mon May 11, 2020   1710 EKG:  Sinus tachycardia at 101 bpm, nl axis, nl intervals, no hypertrophy, no ST-T changes as read by me (Dr. Rose).  Impression:  Otherwise normal    [NP]   1825 Troponin I(!): 0.472 [NP]   1825 Sodium: 136 [NP]   1825 Potassium(!): 3.0 [NP]   1825 Chloride: 96 [NP]   1825 CO2(!): 22 [NP]   1825 Glucose: 84 [NP]   1825 BUN, Bld: 20 [NP]   1825 Creatinine: 1.4 [NP]   1825 Calcium(!): 11.4 [NP]   1825 AST: 24 [NP]   1825 ALT: 16 [NP]   1825 WBC: 7.45 [NP]   1825 Hemoglobin(!): 11.4 [NP]   1825 Platelets: 292 [NP]   1825 Troponin elevated.  Concern for NSTEMI.  Patient has prior history of NSTEMI in the past, also history of meth use.  Will further inquire with patient regarding any drug use.  Will follow with cardiology consultation.    [NP]   1826 Pt states that he smoked some marijuana with a little speed "last week". States that he feels much better after medication in the ED.  Respiratory resulted the VBG (missed the ABG), which shows very low PCO2.    Pt is tachy, incr RR, syncope and +trop. Will get STAT CTA PE protocol.    [NP]   1915 CTA PE study:  No obvious PE as read by me.  There is no infiltrate or consolidation.  There is no pleural effusion.  No cardiomegaly or pericardial effusion.       [NP]   1946 Official CT a read by Radiology states no PE, no pneumonia or other acute concerning findings.    Case was discussed with Dr. Molina, cardiology, who states that patient can go job Medicine and they will follow.  Possible methamphetamine associated troponin elevation.  Agrees with Lovenox x1 now.    [NP]   1948 Case discussed with \[facility]\"" Hospital Medicine, who accepted the patient under service.    [NP]      ED Course User Index  [NP] Stewart Rose MD                Clinical Impression:       ICD-10-CM ICD-9-CM   1. Elevated troponin R79.89 790.6   2. Shortness of breath R06.02 786.05   3. Nausea and vomiting R11.2 787.01   4. Abnormal chest x-ray R93.89 793.2   5. " Syncope, unspecified syncope type R55 780.2             ED Disposition Condition    Admit                           Stewart Rose MD  05/11/20 1956

## 2020-05-12 ENCOUNTER — CLINICAL SUPPORT (OUTPATIENT)
Dept: SMOKING CESSATION | Facility: CLINIC | Age: 55
End: 2020-05-12
Payer: COMMERCIAL

## 2020-05-12 VITALS
BODY MASS INDEX: 25.31 KG/M2 | TEMPERATURE: 98 F | OXYGEN SATURATION: 96 % | SYSTOLIC BLOOD PRESSURE: 130 MMHG | HEIGHT: 71 IN | DIASTOLIC BLOOD PRESSURE: 63 MMHG | RESPIRATION RATE: 18 BRPM | WEIGHT: 180.75 LBS | HEART RATE: 91 BPM

## 2020-05-12 DIAGNOSIS — F17.210 CIGARETTE SMOKER: Primary | ICD-10-CM

## 2020-05-12 PROBLEM — F19.10 POLYSUBSTANCE ABUSE: Status: ACTIVE | Noted: 2020-05-12

## 2020-05-12 PROBLEM — R55 SYNCOPE AND COLLAPSE: Status: ACTIVE | Noted: 2020-05-12

## 2020-05-12 LAB
ALBUMIN SERPL BCP-MCNC: 3.8 G/DL (ref 3.5–5.2)
ALP SERPL-CCNC: 45 U/L (ref 55–135)
ALT SERPL W/O P-5'-P-CCNC: 14 U/L (ref 10–44)
ANION GAP SERPL CALC-SCNC: 10 MMOL/L (ref 8–16)
AORTIC ROOT ANNULUS: 2.91 CM
AORTIC VALVE CUSP SEPERATION: 1.87 CM
AST SERPL-CCNC: 23 U/L (ref 10–40)
AV INDEX (PROSTH): 0.97
AV MEAN GRADIENT: 2 MMHG
AV PEAK GRADIENT: 5 MMHG
AV VALVE AREA: 3.52 CM2
AV VELOCITY RATIO: 0.79
BASOPHILS # BLD AUTO: 0.02 K/UL (ref 0–0.2)
BASOPHILS NFR BLD: 0.3 % (ref 0–1.9)
BILIRUB SERPL-MCNC: 0.9 MG/DL (ref 0.1–1)
BNP SERPL-MCNC: 81 PG/ML (ref 0–99)
BSA FOR ECHO PROCEDURE: 2.03 M2
BUN SERPL-MCNC: 18 MG/DL (ref 6–20)
CALCIUM SERPL-MCNC: 10 MG/DL (ref 8.7–10.5)
CHLORIDE SERPL-SCNC: 97 MMOL/L (ref 95–110)
CHOLEST SERPL-MCNC: 116 MG/DL (ref 120–199)
CHOLEST/HDLC SERPL: 2.9 {RATIO} (ref 2–5)
CO2 SERPL-SCNC: 27 MMOL/L (ref 23–29)
CREAT SERPL-MCNC: 1.2 MG/DL (ref 0.5–1.4)
CV ECHO LV RWT: 0.28 CM
DIFFERENTIAL METHOD: ABNORMAL
DOP CALC AO PEAK VEL: 1.07 M/S
DOP CALC AO VTI: 14.1 CM
DOP CALC LVOT AREA: 3.6 CM2
DOP CALC LVOT DIAMETER: 2.15 CM
DOP CALC LVOT PEAK VEL: 0.85 M/S
DOP CALC LVOT STROKE VOLUME: 49.64 CM3
DOP CALCLVOT PEAK VEL VTI: 13.68 CM
E WAVE DECELERATION TIME: 127.98 MSEC
E/A RATIO: 0.71
E/E' RATIO: 7.47 M/S
ECHO LV POSTERIOR WALL: 0.8 CM (ref 0.6–1.1)
EOSINOPHIL # BLD AUTO: 0.1 K/UL (ref 0–0.5)
EOSINOPHIL NFR BLD: 1.2 % (ref 0–8)
ERYTHROCYTE [DISTWIDTH] IN BLOOD BY AUTOMATED COUNT: 18.3 % (ref 11.5–14.5)
EST. GFR  (AFRICAN AMERICAN): >60 ML/MIN/1.73 M^2
EST. GFR  (NON AFRICAN AMERICAN): >60 ML/MIN/1.73 M^2
ESTIMATED AVG GLUCOSE: 114 MG/DL (ref 68–131)
FOLATE SERPL-MCNC: 18 NG/ML (ref 4–24)
FRACTIONAL SHORTENING: 25 % (ref 28–44)
GLUCOSE SERPL-MCNC: 102 MG/DL (ref 70–110)
HBA1C MFR BLD HPLC: 5.6 % (ref 4–5.6)
HCT VFR BLD AUTO: 30.8 % (ref 40–54)
HDLC SERPL-MCNC: 40 MG/DL (ref 40–75)
HDLC SERPL: 34.5 % (ref 20–50)
HGB BLD-MCNC: 9.4 G/DL (ref 14–18)
IMM GRANULOCYTES # BLD AUTO: 0.03 K/UL (ref 0–0.04)
IMM GRANULOCYTES NFR BLD AUTO: 0.5 % (ref 0–0.5)
INTERVENTRICULAR SEPTUM: 1 CM (ref 0.6–1.1)
IRON SERPL-MCNC: 27 UG/DL (ref 45–160)
IVRT: 41.86 MSEC
LA MAJOR: 5.3 CM
LA MINOR: 4.99 CM
LA WIDTH: 3.6 CM
LACTATE SERPL-SCNC: 0.9 MMOL/L (ref 0.5–2.2)
LDLC SERPL CALC-MCNC: 56 MG/DL (ref 63–159)
LEFT ATRIUM SIZE: 3.43 CM
LEFT ATRIUM VOLUME INDEX: 26.7 ML/M2
LEFT ATRIUM VOLUME: 53.95 CM3
LEFT INTERNAL DIMENSION IN SYSTOLE: 4.3 CM (ref 2.1–4)
LEFT VENTRICLE DIASTOLIC VOLUME INDEX: 58.72 ML/M2
LEFT VENTRICLE DIASTOLIC VOLUME: 118.62 ML
LEFT VENTRICLE MASS INDEX: 98 G/M2
LEFT VENTRICLE SYSTOLIC VOLUME INDEX: 41.1 ML/M2
LEFT VENTRICLE SYSTOLIC VOLUME: 83.05 ML
LEFT VENTRICULAR INTERNAL DIMENSION IN DIASTOLE: 5.7 CM (ref 3.5–6)
LEFT VENTRICULAR MASS: 197.52 G
LV LATERAL E/E' RATIO: 5.6 M/S
LV SEPTAL E/E' RATIO: 11.2 M/S
LYMPHOCYTES # BLD AUTO: 2.1 K/UL (ref 1–4.8)
LYMPHOCYTES NFR BLD: 31.8 % (ref 18–48)
MAGNESIUM SERPL-MCNC: 1.9 MG/DL (ref 1.6–2.6)
MCH RBC QN AUTO: 21.4 PG (ref 27–31)
MCHC RBC AUTO-ENTMCNC: 30.5 G/DL (ref 32–36)
MCV RBC AUTO: 70 FL (ref 82–98)
MONOCYTES # BLD AUTO: 0.7 K/UL (ref 0.3–1)
MONOCYTES NFR BLD: 10.8 % (ref 4–15)
MV PEAK A VEL: 0.79 M/S
MV PEAK E VEL: 0.56 M/S
NEUTROPHILS # BLD AUTO: 3.6 K/UL (ref 1.8–7.7)
NEUTROPHILS NFR BLD: 55.4 % (ref 38–73)
NONHDLC SERPL-MCNC: 76 MG/DL
NRBC BLD-RTO: 0 /100 WBC
PHOSPHATE SERPL-MCNC: 4.7 MG/DL (ref 2.7–4.5)
PISA TR MAX VEL: 2.28 M/S
PLATELET # BLD AUTO: 216 K/UL (ref 150–350)
PMV BLD AUTO: 10 FL (ref 9.2–12.9)
POTASSIUM SERPL-SCNC: 3.5 MMOL/L (ref 3.5–5.1)
PROT SERPL-MCNC: 6.9 G/DL (ref 6–8.4)
PULM VEIN S/D RATIO: 1.15
PV PEAK D VEL: 0.4 M/S
PV PEAK S VEL: 0.46 M/S
PV PEAK VELOCITY: 0.78 CM/S
RA MAJOR: 4.37 CM
RA PRESSURE: 3 MMHG
RA WIDTH: 3.16 CM
RBC # BLD AUTO: 4.39 M/UL (ref 4.6–6.2)
RIGHT VENTRICULAR END-DIASTOLIC DIMENSION: 3.14 CM
SATURATED IRON: 6 % (ref 20–50)
SODIUM SERPL-SCNC: 134 MMOL/L (ref 136–145)
TDI LATERAL: 0.1 M/S
TDI SEPTAL: 0.05 M/S
TDI: 0.08 M/S
TOTAL IRON BINDING CAPACITY: 475 UG/DL (ref 250–450)
TR MAX PG: 21 MMHG
TRANSFERRIN SERPL-MCNC: 321 MG/DL (ref 200–375)
TRIGL SERPL-MCNC: 100 MG/DL (ref 30–150)
TROPONIN I SERPL DL<=0.01 NG/ML-MCNC: 0.33 NG/ML (ref 0–0.03)
TV REST PULMONARY ARTERY PRESSURE: 24 MMHG
VIT B12 SERPL-MCNC: 1052 PG/ML (ref 210–950)
WBC # BLD AUTO: 6.51 K/UL (ref 3.9–12.7)

## 2020-05-12 PROCEDURE — 80053 COMPREHEN METABOLIC PANEL: CPT

## 2020-05-12 PROCEDURE — 80061 LIPID PANEL: CPT

## 2020-05-12 PROCEDURE — 83036 HEMOGLOBIN GLYCOSYLATED A1C: CPT

## 2020-05-12 PROCEDURE — 99407 PR TOBACCO USE CESSATION INTENSIVE >10 MINUTES: ICD-10-PCS | Mod: S$GLB,,,

## 2020-05-12 PROCEDURE — 83605 ASSAY OF LACTIC ACID: CPT

## 2020-05-12 PROCEDURE — 93005 ELECTROCARDIOGRAM TRACING: CPT

## 2020-05-12 PROCEDURE — 99407 BEHAV CHNG SMOKING > 10 MIN: CPT | Mod: S$GLB,,,

## 2020-05-12 PROCEDURE — 94761 N-INVAS EAR/PLS OXIMETRY MLT: CPT

## 2020-05-12 PROCEDURE — 84100 ASSAY OF PHOSPHORUS: CPT

## 2020-05-12 PROCEDURE — 99233 SBSQ HOSP IP/OBS HIGH 50: CPT | Mod: ,,, | Performed by: INTERNAL MEDICINE

## 2020-05-12 PROCEDURE — 85025 COMPLETE CBC W/AUTO DIFF WBC: CPT

## 2020-05-12 PROCEDURE — 36415 COLL VENOUS BLD VENIPUNCTURE: CPT

## 2020-05-12 PROCEDURE — 25000003 PHARM REV CODE 250: Performed by: STUDENT IN AN ORGANIZED HEALTH CARE EDUCATION/TRAINING PROGRAM

## 2020-05-12 PROCEDURE — 84484 ASSAY OF TROPONIN QUANT: CPT

## 2020-05-12 PROCEDURE — 11000001 HC ACUTE MED/SURG PRIVATE ROOM

## 2020-05-12 PROCEDURE — 83735 ASSAY OF MAGNESIUM: CPT

## 2020-05-12 PROCEDURE — 99233 PR SUBSEQUENT HOSPITAL CARE,LEVL III: ICD-10-PCS | Mod: ,,, | Performed by: INTERNAL MEDICINE

## 2020-05-12 PROCEDURE — 99999 PR PBB SHADOW E&M-EST. PATIENT-LVL I: CPT | Mod: PBBFAC,,,

## 2020-05-12 PROCEDURE — 99999 PR PBB SHADOW E&M-EST. PATIENT-LVL I: ICD-10-PCS | Mod: PBBFAC,,,

## 2020-05-12 PROCEDURE — 83880 ASSAY OF NATRIURETIC PEPTIDE: CPT

## 2020-05-12 PROCEDURE — 63600175 PHARM REV CODE 636 W HCPCS: Performed by: STUDENT IN AN ORGANIZED HEALTH CARE EDUCATION/TRAINING PROGRAM

## 2020-05-12 RX ORDER — FOLIC ACID 1 MG/1
1 TABLET ORAL DAILY
Qty: 60 TABLET | Refills: 5 | Status: SHIPPED | OUTPATIENT
Start: 2020-05-13 | End: 2021-05-13

## 2020-05-12 RX ORDER — CLOPIDOGREL BISULFATE 75 MG/1
75 TABLET ORAL DAILY
Qty: 30 TABLET | Refills: 11 | Status: SHIPPED | OUTPATIENT
Start: 2020-05-12 | End: 2021-05-12

## 2020-05-12 RX ORDER — LANOLIN ALCOHOL/MO/W.PET/CERES
100 CREAM (GRAM) TOPICAL DAILY
COMMUNITY
Start: 2020-05-13

## 2020-05-12 RX ADMIN — THERA TABS 1 TABLET: TAB at 09:05

## 2020-05-12 RX ADMIN — AMLODIPINE BESYLATE 10 MG: 5 TABLET ORAL at 09:05

## 2020-05-12 RX ADMIN — DIAZEPAM 5 MG: 5 TABLET ORAL at 05:05

## 2020-05-12 RX ADMIN — ASPIRIN 81 MG: 81 TABLET, COATED ORAL at 09:05

## 2020-05-12 RX ADMIN — ENOXAPARIN SODIUM 90 MG: 100 INJECTION SUBCUTANEOUS at 09:05

## 2020-05-12 RX ADMIN — DIAZEPAM 5 MG: 5 TABLET ORAL at 11:05

## 2020-05-12 RX ADMIN — ACETAMINOPHEN 650 MG: 325 TABLET ORAL at 09:05

## 2020-05-12 RX ADMIN — CLOPIDOGREL BISULFATE 75 MG: 75 TABLET ORAL at 09:05

## 2020-05-12 RX ADMIN — Medication 100 MG: at 09:05

## 2020-05-12 RX ADMIN — FOLIC ACID 1 MG: 1 TABLET ORAL at 09:05

## 2020-05-12 RX ADMIN — LORAZEPAM 1 MG: 2 INJECTION INTRAMUSCULAR; INTRAVENOUS at 07:05

## 2020-05-12 NOTE — PLAN OF CARE
Pt's mom will transport him home.  Heme/Onc referral sent to [facility].  No other needs identified at this time.  Rounds completed on pt.  All questions addressed.  Bedside nurse to discuss d/c medications.  Discussed importance to attend all f/u appts and take medications as prescribed.  Verbalized understanding.    Future Appointments   Date Time Provider Department Center   5/13/2020  4:00 PM COVID TESTING, PCW [facility] IM [facility]   5/25/2020  8:40 AM [facility]   6/9/2020 11:00 AM [facility] OIC-MRI1 [facility]   6/9/2020 12:10 PM LAB, APPOINTMENT [facility] LAB  [facility] W   7/1/2020 11:00 AM LAB, APPOINTMENT [place] [facility] LAB P [facility]          05/12/20 1427   Final Note   Assessment Type Final Discharge Note   Anticipated Discharge Disposition Home   Hospital Follow Up  Appt(s) scheduled? Yes   Discharge plans and expectations educations in teach back method with documentation complete? Yes   Right Care Referral Info   Post Acute Recommendation No Care     Ned Bello RN, CM  [phone]

## 2020-05-12 NOTE — SUBJECTIVE & OBJECTIVE
Past Medical History:   Diagnosis Date    Anxiety about health 6/20/2018    Bladder cancer     Colon cancer     Coronary artery disease     Depression     Hepatitis C 3/9/2018    History of psychiatric care     History of psychiatric hospitalization     Hypertension     MI (myocardial infarction) 2007    Neuropathy     NSTEMI (non-ST elevated myocardial infarction) 06/2019    Due to crystal meth? LVEF 6/2019: 50%    Psychiatric problem     Psychosis     Self-harming behavior     Suicide attempt     Urinary tract infection        Past Surgical History:   Procedure Laterality Date    ABDOMINAL SURGERY      History of perforated ulcer, unknown surgery    APPENDECTOMY      COLONOSCOPY N/A 2/26/2019    Procedure: COLONOSCOPY;  Surgeon: Mikal Nino MD;  [facility]  Service: Colon and Rectal;  Laterality: N/A;    EXCISION OF HYDROCELE Left 3/26/2020    Procedure: HYDROCELECTOMY;  Surgeon: Mikal Schultz MD;  Location: [facility] OR;  Service: Urology;  Laterality: Left;    ILEOSTOMY      ILEOSTOMY CLOSURE         Review of patient's allergies indicates:  No Known Allergies    No current facility-administered medications on file prior to encounter.      Current Outpatient Medications on File Prior to Encounter   Medication Sig    acetaminophen (TYLENOL) 325 MG tablet Take 1 tablet (325 mg total) by mouth every 6 (six) hours as needed for Pain.    amLODIPine (NORVASC) 10 MG tablet Take 1 tablet (10 mg total) by mouth once daily.    aspirin (ECOTRIN) 81 MG EC tablet Take 1 tablet (81 mg total) by mouth once daily.    atorvastatin (LIPITOR) 10 MG tablet Take 1 tablet (10 mg total) by mouth once daily.    carvediloL (COREG) 6.25 MG tablet Take 1 tablet by mouth 2 times a day.    clopidogreL (PLAVIX) 75 mg tablet Take 1 tablet by mouth once a day.    multivitamin with minerals tablet Take 1 tablet by mouth once daily.    atorvastatin (LIPITOR) 40 MG tablet Take 1 tablet (40 mg  total) by mouth once daily. (Patient not taking: Reported on 3/18/2020)    sofosbuvir-velpatasvir 400-100 mg Tab Take 1 tablet by mouth once daily.    tamsulosin (FLOMAX) 0.4 mg Cap Take 1 capsule (0.4 mg total) by mouth every evening.     Family History     Problem Relation (Age of Onset)    ADD / ADHD Cousin    No Known Problems Mother, Father, Sister, Brother, Maternal Aunt, Paternal Aunt, Maternal Uncle, Paternal Uncle, Maternal Grandfather, Maternal Grandmother, Paternal Grandfather, Paternal Grandmother        Tobacco Use    Smoking status: Current Every Day Smoker     Packs/day: 0.50     Years: 39.00     Pack years: 19.50     Types: Cigarettes     Start date: 1980    Smokeless tobacco: Never Used    [facility] to Smoking Cessation program   Substance and Sexual Activity    Alcohol use: Not Currently     Comment: occasional    Drug use: Not Currently    Sexual activity: Yes     Partners: Female     Birth control/protection: None     Review of Systems   Constitution: Negative for diaphoresis.   HENT: Negative.    Eyes: Negative.    Cardiovascular: Positive for chest pain, palpitations and syncope. Negative for irregular heartbeat, leg swelling, orthopnea and paroxysmal nocturnal dyspnea.   Respiratory: Positive for cough and shortness of breath.    Endocrine: Negative.    Hematologic/Lymphatic: Negative.    Skin: Negative.    Musculoskeletal: Positive for myalgias.   Gastrointestinal: Negative.    Genitourinary: Negative.    Neurological: Positive for loss of balance.   Psychiatric/Behavioral: Positive for depression.   Allergic/Immunologic: Negative.      Objective:     Vital Signs (Most Recent):  Temp: 99.2 °F (37.3 °C) (05/12/20 0748)  Pulse: 83 (05/12/20 0800)  Resp: 18 (05/12/20 0748)  BP: 121/71 (05/12/20 0748)  SpO2: 95 % (05/12/20 0748) Vital Signs (24h Range):  Temp:  [97.4 °F (36.3 °C)-99.4 °F (37.4 °C)] 99.2 °F (37.3 °C)  Pulse:  []  83  Resp:  [18-39] 18  SpO2:  [85 %-100 %] 95 %  BP: (119-142)/(63-97) 121/71     Weight: 82 kg (180 lb 12.4 oz)  Body mass index is 25.21 kg/m².    SpO2: 95 %  O2 Device (Oxygen Therapy): room air      Intake/Output Summary (Last 24 hours) at 5/12/2020 0959  Last data filed at 5/11/2020 1823  Gross per 24 hour   Intake 1000 ml   Output --   Net 1000 ml       Lines/Drains/Airways     Drain                 Ileostomy 03/13/18 1727 Loop  days          Peripheral Intravenous Line                 Peripheral IV - Single Lumen 03/26/20 0906 20 G Right Hand 47 days         Peripheral IV - Single Lumen 05/11/20 1657 20 G Left Antecubital less than 1 day                Physical Exam   Constitutional: No distress.   HENT:   Head: Atraumatic.   Eyes: Right eye exhibits no discharge. Left eye exhibits no discharge.   Neck: No JVD present.   Cardiovascular: Normal rate and regular rhythm. Exam reveals no gallop and no friction rub.   Murmur heard.  Pulmonary/Chest: Effort normal and breath sounds normal. He has no rales.   Abdominal: Soft. Bowel sounds are normal.   Musculoskeletal: He exhibits no edema.   Neurological: He is alert.   Skin: Skin is warm and dry. He is not diaphoretic.       Significant Labs:   BMP:   Recent Labs   Lab 05/11/20 1654 05/12/20  0301   GLU 84 102    134*   K 3.0* 3.5   CL 96 97   CO2 22* 27   BUN 20 18   CREATININE 1.4 1.2   CALCIUM 11.4* 10.0   MG  --  1.9   , CMP   Recent Labs   Lab 05/11/20 1654 05/12/20  0301    134*   K 3.0* 3.5   CL 96 97   CO2 22* 27   GLU 84 102   BUN 20 18   CREATININE 1.4 1.2   CALCIUM 11.4* 10.0   PROT 8.4 6.9   ALBUMIN 4.5 3.8   BILITOT 1.1* 0.9   ALKPHOS 53* 45*   AST 24 23   ALT 16 14   ANIONGAP 18* 10   ESTGFRAFRICA >60 >60   EGFRNONAA 57* >60   , CBC   Recent Labs   Lab 05/11/20 1654 05/12/20  0301   WBC 7.45 6.51   HGB 11.4* 9.4*   HCT 36.1* 30.8*    216   , INR No results for input(s): INR, PROTIME in the last 48 hours., Lipid Panel    Recent Labs   Lab 05/12/20  0301   CHOL 116*   HDL 40   LDLCALC 56.0*   TRIG 100   CHOLHDL 34.5   , Troponin   Recent Labs   Lab 05/11/20  1654 05/11/20  2237 05/12/20  0301   TROPONINI 0.472* 0.421* 0.333*    and All pertinent lab results from the last 24 hours have been reviewed.    Significant Imaging: Echocardiogram:   Transthoracic echo (TTE) complete (Cupid Only):   Results for orders placed or performed during the hospital encounter of 05/11/20   Echo Color Flow Doppler? Yes   Result Value Ref Range    BSA 2.03 m2    TDI SEPTAL 0.05 m/s    LV LATERAL E/E' RATIO 5.60 m/s    LV SEPTAL E/E' RATIO 11.20 m/s    LA WIDTH 3.60 cm    AORTIC VALVE CUSP SEPERATION 1.87 cm    TDI LATERAL 0.10 m/s    PV PEAK VELOCITY 0.78 cm/s    LVIDD 5.01 3.5 - 6.0 cm    IVS 1.47 (A) 0.6 - 1.1 cm    PW 1.35 (A) 0.6 - 1.1 cm    Ao root annulus 2.91 cm    LVIDS 4.30 (A) 2.1 - 4.0 cm    FS 14 28 - 44 %    LA volume 53.95 cm3    LV mass 295.38 g    LA size 3.43 cm    RVDD 3.14 cm    Left Ventricle Relative Wall Thickness 0.54 cm    AV mean gradient 2 mmHg    AV valve area 3.52 cm2    AV Velocity Ratio 0.79     AV index (prosthetic) 0.97     E/A ratio 0.71     Mean e' 0.08 m/s    E wave decelartion time 127.98 msec    IVRT 41.86 msec    Pulm vein S/D ratio 1.15     LVOT diameter 2.15 cm    LVOT area 3.6 cm2    LVOT peak rubén 0.85 m/s    LVOT peak VTI 13.68 cm    Ao peak rubén 1.07 m/s    Ao VTI 14.10 cm    LVOT stroke volume 49.64 cm3    AV peak gradient 5 mmHg    E/E' ratio 7.47 m/s    MV Peak E Rubén 0.56 m/s    TR Max Rubén 2.28 m/s    MV Peak A Rubén 0.79 m/s    PV Peak S Rubén 0.46 m/s    PV Peak D Rubén 0.40 m/s    LV Systolic Volume 83.05 mL    LV Systolic Volume Index 41.1 mL/m2    LV Diastolic Volume 118.62 mL    LV Diastolic Volume Index 58.72 mL/m2    LA Volume Index 26.7 mL/m2    LV Mass Index 146 g/m2    RA Major Axis 4.37 cm    Left Atrium Minor Axis 4.99 cm    Left Atrium Major Axis 5.30 cm    Triscuspid Valve Regurgitation Peak  Gradient 21 mmHg    RA Width 3.16 cm

## 2020-05-12 NOTE — ASSESSMENT & PLAN NOTE
Type II in setting of polysubstance abuse (UDS + for cocaine, amphetamines, THC)  Medical compliance questionable, not a candidate for invasive workup   Troponin mildly elevated at 0.4 and trending down  EKG ST/ST without acute ischemic changes  Chest pain has been constant since prior to coronary revascularization nearly 1 year ago   Continue DAPT, add statin if no contraindication, consider adding Coreg (alpha and beta properties)

## 2020-05-12 NOTE — NURSING
Patient safety maintained. Medications administered per order. Assistance provided as needed. IV and telemetry monitoring removed for discharge, patient tolerated well. Printed documentation provided.

## 2020-05-12 NOTE — PLAN OF CARE
Pt lives in [place] with his parents and is independent at home.  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  His mom will transport him home when discharged.  White board updated with CM name and contact information.  Discharge brochure provided.  Pt encouraged to call with any questions or concerns.  Cm will continue to follow pt through transitions of care and assist with any discharge needs.       05/12/20 0380   Discharge Assessment   Assessment Type Discharge Planning Assessment   Confirmed/corrected address and phone number on facesheet? Yes   Assessment information obtained from? Patient   Communicated expected length of stay with patient/caregiver yes   Prior to hospitilization cognitive status: Alert/Oriented   Prior to hospitalization functional status: Independent   Current cognitive status: Alert/Oriented   Current Functional Status: Independent   Facility Arrived From: home   Lives With parent(s)   Able to Return to Prior Arrangements yes   Is patient able to care for self after discharge? Yes   Who are your caregiver(s) and their phone number(s)? Mother Sonja Aguirre - [phone]   Patient's perception of discharge disposition home or selfcare   Readmission Within the Last 30 Days no previous admission in last 30 days   Patient currently being followed by outpatient case management? No   Patient currently receives any other outside agency services? No   Equipment Currently Used at Home none   Do you have any problems affording any of your prescribed medications? No   Is the patient taking medications as prescribed? yes   Does the patient have transportation home? Yes   Transportation Anticipated family or friend will provide   Discharge Plan A Home   Discharge Plan B Home with family   DME Needed Upon Discharge  none   Patient/Family in Agreement with Plan yes     Ned Bello RN,   [phone]

## 2020-05-12 NOTE — HPI
"Chris Aguirre is a 56 yo male with a history of CAD s/p PCI, colon CA, and alcohol abuse, drug abuse. Patient presented the ED following multiple reported syncopal episodes and falls at work. Patient is a poor historian. While waiting for his boss in the yard, he he passed out. It seems that he lost consciousness when rising to standing a position. The event was witnessed by co-workers who told him he was only out for a few seconds and did not have any seizure like activity. He presented to the ED after being urged to do so by his boss. He reports chest pain that started around the same time as the syncopal episode. He reports the pain is constant but goes from a 7/10 to a 2/10. Patient reports his chest pain has been constant since prior to his coronary revascularization in 07-08/2019. The pain is substernal, non-radiating, and described as a pressure. He reports association palpitations prior to the syncopal episode. He denies any drug use but his UDS is positive for cocaine, amphetamines, and THC. Drinks a pint "every now and then." Troponin mildly elevated at 0.4-> 0.3. EKG ST/SR without acute ischemic changes.   "

## 2020-05-12 NOTE — PLAN OF CARE
AAOx2, VSS, NADN, pt is confused at times, 1L NC, IV infusing, NSR on monitor, no further complaint of chest pain, Side rails x2, bed in lowest position, call bell within reach, pt advised to call for assistance. Maintain bed alarm for patient safety.

## 2020-05-12 NOTE — ASSESSMENT & PLAN NOTE
O,P LCX, OM1 ABEL 08/2019  P,M LAD ABEL in 08/2019    Continue DAPT, add statin if no contraindication, add coreg and ACEI if BP can tolerate    Follow up with Dr Vitale as outpatient     Cessation of drug and ETOH use, smoking cessation

## 2020-05-12 NOTE — PROGRESS NOTES
Individual Follow-Up Form    5/12/2020    Quit Date: To be determined    Clinical Status of Patient: Inpatient    Length of Service: 30 minutes    Comments: Smoking cessation education provided. Pt is a 2 pack per day cigarette smoker x 40 years.  21 mg nicotine patch order requested. Pt states that he is ready to quit smoking and he  is currently enrolled in the [employer].      Diagnosis: F17.210    Next Visit:  Ambulatory referral to Smoking Cessation program following hospital discharge.

## 2020-05-12 NOTE — H&P
"[facility] Medicine H&P Note     Admitting Team: [facility] Hospitalist Team B  Attending Physician: Ziyad Tejeda MD  Resident: Dinorah   Intern: Rama     Date of Admit: 5/11/2020    Chief Complaint     Multiple syncopal episodes x 1 day     Subjective:      History of Present Illness:  Patient is a 56 yo man with a history of CAD s/p PCI, colon CA, and alcohol abuse who presents after multiple syncopal episodes at work. Patient is a difficult historian and often agrees with every question. He reports that he woke up this morning with no issues. He ate breakfast and was waiting outside (either at job site or his home) for his boss when he passed out. It seems that he lost consciousness when rising to standing a position. The event was witnessed by co-workers who told him he was only out for a few seconds and did not have any seizure like activity. He presented to the ED after being urged to do so by his boss. He reports chest pain that started around the same time as the syncopal episode. He reports the pain is constant but goes from a 7/10 to a 2/10. The pain is substernal, non-radiating, and "twisting" in nature. The pain is somewhat similar to his previous MI but not as intense. He reports association palpitations prior to the syncopal episode and during the chest pain. He reports some shortness of breath but onset is not clear. He denies any drug use. Drinks a pint "every now and then." Denies history of withdrawal. Report nausea with some vomiting but reports good PO intake. Last BM was 3 days ago.     Past Medical History:  Past Medical History:   Diagnosis Date    Anxiety about health 6/20/2018    Bladder cancer     Colon cancer     Coronary artery disease     Depression     Hepatitis C 3/9/2018    History of psychiatric care     History of psychiatric hospitalization     Hypertension     MI (myocardial infarction) 2007    Neuropathy     NSTEMI (non-ST elevated myocardial infarction) 06/2019    " Due to crystal meth? LVEF 6/2019: 50%    Psychiatric problem     Psychosis     Self-harming behavior     Suicide attempt     Urinary tract infection        Past Surgical History:  Past Surgical History:   Procedure Laterality Date    ABDOMINAL SURGERY      History of perforated ulcer, unknown surgery    APPENDECTOMY      COLONOSCOPY N/A 2/26/2019    Procedure: COLONOSCOPY;  Surgeon: Mikal Nino MD;  Location: [facility] (4TH FLR);  Service: Colon and Rectal;  Laterality: N/A;    EXCISION OF HYDROCELE Left 3/26/2020    Procedure: HYDROCELECTOMY;  Surgeon: Mikal Schultz MD;  Location: [facility];  Service: Urology;  Laterality: Left;    ILEOSTOMY      ILEOSTOMY CLOSURE         Allergies:  Review of patient's allergies indicates:  No Known Allergies    Home Medications:  Prior to Admission medications    Medication Sig Start Date End Date Taking? Authorizing Provider   acetaminophen (TYLENOL) 325 MG tablet Take 1 tablet (325 mg total) by mouth every 6 (six) hours as needed for Pain. 3/26/20  Yes iMkal Schultz MD   amLODIPine (NORVASC) 10 MG tablet Take 1 tablet (10 mg total) by mouth once daily. 8/27/19  Yes    aspirin (ECOTRIN) 81 MG EC tablet Take 1 tablet (81 mg total) by mouth once daily. 6/6/19 6/5/20 Yes Bin Clements MD   atorvastatin (LIPITOR) 10 MG tablet Take 1 tablet (10 mg total) by mouth once daily. 12/19/19 12/18/20 Yes Jennifer B. Scheuermann, PA   carvediloL (COREG) 6.25 MG tablet Take 1 tablet by mouth 2 times a day. 8/27/19  Yes    clopidogreL (PLAVIX) 75 mg tablet Take 1 tablet by mouth once a day. 8/27/19  Yes    multivitamin with minerals tablet Take 1 tablet by mouth once daily.   Yes Historical Provider, MD   atorvastatin (LIPITOR) 40 MG tablet Take 1 tablet (40 mg total) by mouth once daily.  Patient not taking: Reported on 3/18/2020 6/24/19 7/17/20  Kumar Medina MD PhD   sofosbuvir-velpatasvir 400-100 mg Tab Take 1 tablet by mouth once daily. 12/19/19   Arielle COATS  Scheuermann, PA   tamsulosin (FLOMAX) 0.4 mg Cap Take 1 capsule (0.4 mg total) by mouth every evening. 3/26/20 4/25/20  Mikal Schultz MD       Family History:  Family History   Problem Relation Age of Onset    No Known Problems Mother     No Known Problems Father     No Known Problems Sister     No Known Problems Brother     No Known Problems Maternal Aunt     No Known Problems Paternal Aunt     No Known Problems Maternal Uncle     No Known Problems Paternal Uncle     No Known Problems Maternal Grandfather     No Known Problems Maternal Grandmother     No Known Problems Paternal Grandfather     No Known Problems Paternal Grandmother     ADD / ADHD Cousin     Alcohol abuse Neg Hx     Anxiety disorder Neg Hx     Bipolar disorder Neg Hx     Dementia Neg Hx     Depression Neg Hx     Drug abuse Neg Hx     OCD Neg Hx     Paranoid behavior Neg Hx     Physical abuse Neg Hx     Schizophrenia Neg Hx     Seizures Neg Hx     Self injury Neg Hx     Sexual abuse Neg Hx     Suicide Neg Hx     Prostate cancer Neg Hx     Kidney disease Neg Hx        Social History:  Social History     Tobacco Use    Smoking status: Current Every Day Smoker     Packs/day: 0.50     Years: 39.00     Pack years: 19.50     Types: Cigarettes     Start date:     Smokeless tobacco: Never Used    [facility]   Substance Use Topics    Alcohol use: Not Currently     Comment: occasional    Drug use: Not Currently       Review of Systems:  Pertinent items are noted in HPI. All other systems are reviewed and are negative.    Health Maintaince :   Primary Care Physician: None     Immunizations:   unknown     Cancer Screening:  Colonoscopy: UTD     Objective:   Last 24 Hour Vital Signs:  BP  Min: 129/97  Max: 142/75  Temp  Av.4 °F (36.9 °C)  Min: 97.9 °F (36.6 °C)  Max: 99.4 °F (37.4 °C)  Pulse  Av.8  Min: 90  Max: 144  Resp  Avg:  "25  Min: 18  Max: 39  SpO2  Av %  Min: 85 %  Max: 100 %  Height  Av' 11" (180.3 cm)  Min: 5' 11" (180.3 cm)  Max: 5' 11" (180.3 cm)  Weight  Av.8 kg (186 lb 14.2 oz)  Min: 82 kg (180 lb 12.4 oz)  Max: 87.5 kg (193 lb)  Body mass index is 25.21 kg/m².  I/O last 3 completed shifts:  In: 1000 [IV Piggyback:1000]  Out: -     Physical Examination:  General: Alert and awake, appears anxious; moving head back and forth, tremulous   Head:  Normocephalic and atraumatic  Eyes:  PERRL; EOMi with anicteric sclera and clear conjunctivae  Mouth:  Oropharynx clear and without exudate; dry mucous membranes  Cardio:  Regular rate and rhythm with normal S1 and S2; no murmurs or rubs  Resp:  CTAB; respirations increased but without accessory muscle use; no wheezes, crackles or rhonchi  Abdom: Soft, NTND with normoactive bowel sounds, extensive scarring from previous abdominal sx. Ventral hernia without strangulation   Extrem: Warm and well-perfused with no clubbing, cyanosis or edema  Skin:  No rashes, lesions, or color changes  Pulses: 2+ and symmetric distally  Neuro:  AAOx3;  no focal deficits.      Laboratory:  Most Recent Data:  CBC:   Lab Results   Component Value Date    WBC 7.45 2020    HGB 11.4 (L) 2020    HCT 36.1 (L) 2020     2020    MCV 69 (L) 2020    RDW 18.6 (H) 2020     BMP:   Lab Results   Component Value Date     2020    K 3.0 (L) 2020    CL 96 2020    CO2 22 (L) 2020    BUN 20 2020    CREATININE 1.4 2020    GLU 84 2020    CALCIUM 11.4 (H) 2020    MG 1.4 (L) 2019    PHOS 3.9 2019     LFTs:   Lab Results   Component Value Date    PROT 8.4 2020    ALBUMIN 4.5 2020    BILITOT 1.1 (H) 2020    AST 24 2020    ALKPHOS 53 (L) 2020    ALT 16 2020     Coags:   Lab Results   Component Value Date    INR 1.0 2019     FLP:   Lab Results   Component Value Date    CHOL " 123 06/06/2019    HDL 34 (L) 06/06/2019    LDLCALC 75.0 06/06/2019    TRIG 70 06/06/2019    CHOLHDL 27.6 06/06/2019     DM:   Lab Results   Component Value Date    HGBA1C 5.4 06/06/2019    LDLCALC 75.0 06/06/2019    CREATININE 1.4 05/11/2020     Thyroid:   Lab Results   Component Value Date    TSH 0.572 06/06/2019     Anemia:   Lab Results   Component Value Date    FERRITIN 22 05/11/2020     Cardiac:   Lab Results   Component Value Date    TROPONINI 0.472 (H) 05/11/2020     (H) 06/06/2019     Urinalysis:   Lab Results   Component Value Date    LABURIN No growth 03/18/2020    COLORU [place] (A) 05/11/2020    SPECGRAV 1.010 05/11/2020    NITRITE Negative 05/11/2020    PROTEINUR - 03/18/2020    KETONESU 1+ (A) 05/11/2020    UROBILINOGEN 2.0-3.0 (A) 05/11/2020    BILIRUBINUR - 03/18/2020    WBCUA 0 05/11/2020       Trended Lab Data:  Recent Labs   Lab 05/11/20  1654   WBC 7.45   HGB 11.4*   HCT 36.1*      MCV 69*   RDW 18.6*      K 3.0*   CL 96   CO2 22*   BUN 20   CREATININE 1.4   GLU 84   PROT 8.4   ALBUMIN 4.5   BILITOT 1.1*   AST 24   ALKPHOS 53*   ALT 16       Trended Cardiac Data:  Recent Labs   Lab 05/11/20  1654   TROPONINI 0.472*     Other Results:  EKG (my interpretation): Poor R wave progression, normal sinus   Radiology:  Imaging Results          CTA Chest Non-Coronary (PE Study) (Final result)  Result time 05/11/20 19:17:50    Final result by Briseyda Multani MD (05/11/20 19:17:50)                 Impression:      1. No evidence of PE.  No acute intrathoracic abnormalities identified.  2. Stable small focal region ground-glass opacity within right lobe, unchanged from April 2018.      Electronically signed by: Briseyda Multani MD  Date:    05/11/2020  Time:    19:17             Narrative:    EXAMINATION:  CTA CHEST NON CORONARY    CLINICAL HISTORY:  Chest pain, acute, PE suspected, high pretest prob;    TECHNIQUE:  Low dose axial images, sagittal and coronal reformations were obtained from  the thoracic inlet to the lung bases following the IV administration of 100 mL of Omnipaque 350.  Contrast timing was optimized to evaluate the pulmonary arteries.  MIP images were performed.    COMPARISON:  CT chest from April 2018.    FINDINGS:  Structures at the base of the neck are unremarkable.  Aorta is non-aneurysmal.  The heart is normal in size without pericardial effusion.  Prominent coronary artery calcification is seen.  No intraluminal filling defects within the pulmonary arteries to suggest pulmonary thromboembolism.   There is no evidence of mediastinal, axillary, or hilar lymph node enlargement.  The esophagus is unremarkable along its course.    The trachea and bronchi are patent.  The lungs are symmetrically expanded.  Unchanged small focal region of ground-glass opacity seen within the right upper lobe.  This is unchanged in appearance from April 2018.  Lungs are otherwise clear.  No evidence of focal consolidation, pneumothorax, or pleural effusion.    Two small hepatic hypodensities are visualized which are too small to characterize but may represent small cysts, unchanged.  Visualized upper abdominal structures otherwise show no significant abnormalities.  Osseous structures demonstrate mild degenerative change.  Extrathoracic soft tissues are unremarkable.                               X-Ray Chest AP Portable (Final result)  Result time 05/11/20 18:10:19    Final result by Briseyda Multani MD (05/11/20 18:10:19)                 Impression:      No acute cardiopulmonary process identified.      Electronically signed by: Briseyda Multani MD  Date:    05/11/2020  Time:    18:10             Narrative:    EXAMINATION:  XR CHEST AP PORTABLE    CLINICAL HISTORY:  Abnormal findings on diagnostic imaging of other specified body structures    TECHNIQUE:  Single frontal view of the chest was performed.    COMPARISON:  16:23.    FINDINGS:  Cardiac silhouette is normal in size.  Lungs are symmetrically  expanded.  No evidence of focal consolidative process, pneumothorax, or significant effusion.  No acute osseous abnormality identified.                                X-Ray Chest AP Portable (Final result)  Result time 05/11/20 16:58:13    Final result by Cony Britton MD (05/11/20 16:58:13)                 Impression:      No acute cardiopulmonary abnormality.    9 mm nodular opacity in the left midlung, may represent nipple shadow.  Recommend repeat chest radiograph with nipple markers.  If the opacity does not correlate with nipple marker, recommend a non emergent CT chest for further evaluation.    This report was flagged in Epic as abnormal.      Electronically signed by: Cony Britton  Date:    05/11/2020  Time:    16:58             Narrative:    EXAMINATION:  XR CHEST AP PORTABLE    CLINICAL HISTORY:  Shortness of breath    TECHNIQUE:  Single view of the chest was obtained.    COMPARISON:  Multiple priors, most recent 06/06/2019    FINDINGS:  Please note the left costophrenic angle is excluded from field of view.    Normal cardiomediastinal contour. No focal consolidation, pleural effusion or pneumothorax. 9 mm nodular opacity in the left midlung.                               X-Ray Abdomen AP 1 View (KUB) (Final result)  Result time 05/11/20 16:59:44    Final result by Cony Britton MD (05/11/20 16:59:44)                 Impression:      Nonobstructive bowel gas pattern.      Electronically signed by: Cony Britton  Date:    05/11/2020  Time:    16:59             Narrative:    EXAMINATION:  XR ABDOMEN AP 1 VIEW    CLINICAL HISTORY:  Nausea with vomiting, unspecified    TECHNIQUE:  AP View(s) of the abdomen was performed.    COMPARISON:  Multiple priors, most recent 01/18/2019,    FINDINGS:  Stool seen within the ascending and descending colon as well as in the rectum.  No bowel dilatation.  No radiopaque calculi at the expected location of the kidneys or ureters.  No acute osseous abnormality.  Surgical  sutures in the left hemiabdomen.                                 Assessment:     Chris Aguirre Jr. is a 55 y.o. male with:  Patient Active Problem List    Diagnosis Date Noted    NSTEMI (non-ST elevated myocardial infarction) 05/11/2020    Hydrocele 03/26/2020    Left hydrocele 03/18/2020    Orchalgia 03/18/2020    Nocturia 03/18/2020    Umbilical hernia without obstruction and without gangrene 03/12/2020    Angina, class III 08/09/2019    Hyperlipidemia 07/31/2019    Coronary artery disease involving native coronary artery of native heart with unstable angina pectoris 07/25/2019    Chest pain 07/16/2019    SOB (shortness of breath) 07/16/2019    Other hydrocele 06/19/2019    Anemia 06/06/2019    Hypokalemia 06/06/2019    Hypomagnesemia 06/06/2019    Status post reversal of ileostomy 01/15/2019    Essential hypertension 10/25/2018    Anemia due to antineoplastic chemotherapy 08/27/2018    Smoking addiction 07/02/2018    Anxiety about health 06/20/2018    Overlapping malignant neoplasm of colon 04/13/2018    Weight loss, non-intentional 04/13/2018    Colovesical fistula 03/11/2018    Pneumaturia 03/09/2018    Lower abdominal pain 03/09/2018    Blood in stool 03/09/2018    Other constipation 03/09/2018    Hepatitis C 03/09/2018    Substance induced mood disorder 09/03/2014    Alcohol dependence 09/03/2014        Plan:     NSTEMI, Hx of CAD s/p PCI   Hx of PCI in LCx and atherectomy with PCI of prox and mid LAD in 2019. Reportedly compliant on GDMT. Now presenting with ongoing chest pain x 1 day. Troponin 0.4, EKG with poor R wave progression but seen on previous. Denies drug use but positive for cocaine and meth on drug screen. Chest pain likely cocaine induced as opposed to true ACS but given known extensive CAD and risk factors, will initiate ACS treatment. Cardiology consulted by ED, no emergent need for catheretization.   -s/p ASA and plavix load in ED, continuing daily;  "continued home atorvastatin; anticoagulation with full dose lovenox; holding BB in setting of cocaine use  -valium taper with ativan PRN for possible cocaine induced chest pain, sublingual NTG; if continues to have pain will initiate CCB  -trending troponin EKG q6hrs  -NPO for Cardiology evaluation in AM; TTE ordered     Syncope  Reports multiple episodes day of admission. History suspicious for orthostasis but given NSTEMI and stimulant use will monitor for malignant arrhythmias. CT head negative.   -monitor telemetry  -TTE ordered   -will re-assess orthostatics in AM after fluid resuscitation     NESTOR  Creatinine 1.4, baseline around 1. Likely pre-renal in setting of substance abuse  -re-assess after IVF    Hypercalcemia  Ca 11.4. Likely due dehydration  -IVF with normal saline     Respiratory Alkalosis with Metabolic Alkalosis   PH 7.67, CO2 19.5, bicarb 22. Suspect respiratory alkalosis anxiety mediated. CTA negative for PE, not hypoxic, salicylate level normal. Suspect metabolic alkalosis due to vomiting; chloride low normal  -IVF, monitor RR    Microcytic Anemia  Denies any blood loss.   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 Underwent resection with ileostomy and required partial cystectomy for invasion of bladder wall. Completed 5-FU therapy. Ileostomy reversed in 1/2019  -no acute issues but may be lost to follow up with Heme/Onc; will ensure follow up arranged    Chronic Hepatitis C  Appears to be undergoing Tx  -no acute issues    Diet: NPO midnight for Cards evaluation  DVT: lovenox  Dispo; trend troponin/EKG; " treat cocaine induced chest pain, fluid resuscitate     Code Status:     Full    Mariah Copeland MD  [facility]    \[facility]\"" Medicine Hospitalist Pager numbers:   \[facility]\"" Hospitalist Medicine Team A (Ilana/Tarsha): [phone]  \[facility]\"" Hospitalist Medicine Team B (Brigitte/Ileana):  [phone]

## 2020-05-12 NOTE — CONSULTS
"[facility]  Cardiology  Consult Note    Patient Name: Chris Aguirre Jr.  MRN: [id number]  Admission Date: 5/11/2020  Hospital Length of Stay: 1 days  Code Status: Full Code   Attending Provider: Ziyad Tejeda MD   Consulting Provider: Garrick Quiroz NP  Primary Care Physician: No primary care provider on file.  Principal Problem:NSTEMI (non-ST elevated myocardial infarction)    Patient information was obtained from patient, past medical records and ER records.     Inpatient consult to [facility]  Consult performed by: Garrick Quiroz NP  Consult ordered by: Mariah Copealnd MD        Subjective:     Chief Complaint:  Syncope     HPI:   Chris Aguirre is a 54 yo male with a history of CAD s/p PCI, colon CA, and alcohol abuse, drug abuse. Patient presented the ED following multiple reported syncopal episodes and falls at work. Patient is a poor historian. While waiting for his boss in the yard, he he passed out. It seems that he lost consciousness when rising to standing a position. The event was witnessed by co-workers who told him he was only out for a few seconds and did not have any seizure like activity. He presented to the ED after being urged to do so by his boss. He reports chest pain that started around the same time as the syncopal episode. He reports the pain is constant but goes from a 7/10 to a 2/10. Patient reports his chest pain has been constant since prior to his coronary revascularization in 07-08/2019. The pain is substernal, non-radiating, and described as a pressure. He reports association palpitations prior to the syncopal episode. He denies any drug use but his UDS is positive for cocaine, amphetamines, and THC. Drinks a pint "every now and then." Troponin mildly elevated at 0.4-> 0.3. EKG ST/SR without acute ischemic changes.     Past Medical History:   Diagnosis Date    Anxiety about health 6/20/2018    Bladder cancer     Colon cancer     " Coronary artery disease     Depression     Hepatitis C 3/9/2018    History of psychiatric care     History of psychiatric hospitalization     Hypertension     MI (myocardial infarction) 2007    Neuropathy     NSTEMI (non-ST elevated myocardial infarction) 06/2019    Due to crystal meth? LVEF 6/2019: 50%    Psychiatric problem     Psychosis     Self-harming behavior     Suicide attempt     Urinary tract infection        Past Surgical History:   Procedure Laterality Date    ABDOMINAL SURGERY      History of perforated ulcer, unknown surgery    APPENDECTOMY      COLONOSCOPY N/A 2/26/2019    Procedure: COLONOSCOPY;  Surgeon: Mikal Nino MD;  [facility]  Service: Colon and Rectal;  Laterality: N/A;    EXCISION OF HYDROCELE Left 3/26/2020    Procedure: HYDROCELECTOMY;  Surgeon: Mikal Schultz MD;  Location: [facility];  Service: Urology;  Laterality: Left;    ILEOSTOMY      ILEOSTOMY CLOSURE         Review of patient's allergies indicates:  No Known Allergies    No current facility-administered medications on file prior to encounter.      Current Outpatient Medications on File Prior to Encounter   Medication Sig    acetaminophen (TYLENOL) 325 MG tablet Take 1 tablet (325 mg total) by mouth every 6 (six) hours as needed for Pain.    amLODIPine (NORVASC) 10 MG tablet Take 1 tablet (10 mg total) by mouth once daily.    aspirin (ECOTRIN) 81 MG EC tablet Take 1 tablet (81 mg total) by mouth once daily.    atorvastatin (LIPITOR) 10 MG tablet Take 1 tablet (10 mg total) by mouth once daily.    carvediloL (COREG) 6.25 MG tablet Take 1 tablet by mouth 2 times a day.    clopidogreL (PLAVIX) 75 mg tablet Take 1 tablet by mouth once a day.    multivitamin with minerals tablet Take 1 tablet by mouth once daily.    atorvastatin (LIPITOR) 40 MG tablet Take 1 tablet (40 mg total) by mouth once daily. (Patient not taking: Reported on 3/18/2020)    sofosbuvir-velpatasvir 400-100 mg Tab Take  1 tablet by mouth once daily.    tamsulosin (FLOMAX) 0.4 mg Cap Take 1 capsule (0.4 mg total) by mouth every evening.     Family History     Problem Relation (Age of Onset)    ADD / ADHD Cousin    No Known Problems Mother, Father, Sister, Brother, Maternal Aunt, Paternal Aunt, Maternal Uncle, Paternal Uncle, Maternal Grandfather, Maternal Grandmother, Paternal Grandfather, Paternal Grandmother        Tobacco Use    Smoking status: Current Every Day Smoker     Packs/day: 0.50     Years: 39.00     Pack years: 19.50     Types: Cigarettes     Start date: 1980    Smokeless tobacco: Never Used    [facility] to Sm[facility] Cessation program   Substance and Sexual Activity    Alcohol use: Not Currently     Comment: occasional    Drug use: Not Currently    Sexual activity: Yes     Partners: Female     Birth control/protection: None     Review of Systems   Constitution: Negative for diaphoresis.   HENT: Negative.    Eyes: Negative.    Cardiovascular: Positive for chest pain, palpitations and syncope. Negative for irregular heartbeat, leg swelling, orthopnea and paroxysmal nocturnal dyspnea.   Respiratory: Positive for cough and shortness of breath.    Endocrine: Negative.    Hematologic/Lymphatic: Negative.    Skin: Negative.    Musculoskeletal: Positive for myalgias.   Gastrointestinal: Negative.    Genitourinary: Negative.    Neurological: Positive for loss of balance.   Psychiatric/Behavioral: Positive for depression.   Allergic/Immunologic: Negative.      Objective:     Vital Signs (Most Recent):  Temp: 99.2 °F (37.3 °C) (05/12/20 0748)  Pulse: 83 (05/12/20 0800)  Resp: 18 (05/12/20 0748)  BP: 121/71 (05/12/20 0748)  SpO2: 95 % (05/12/20 0748) Vital Signs (24h Range):  Temp:  [97.4 °F (36.3 °C)-99.4 °F (37.4 °C)] 99.2 °F (37.3 °C)  Pulse:  [] 83  Resp:  [18-39] 18  SpO2:  [85 %-100 %] 95 %  BP: (119-142)/(63-97) 121/71     Weight: 82 kg (180 lb 12.4 oz)  Body  mass index is 25.21 kg/m².    SpO2: 95 %  O2 Device (Oxygen Therapy): room air      Intake/Output Summary (Last 24 hours) at 5/12/2020 0959  Last data filed at 5/11/2020 1823  Gross per 24 hour   Intake 1000 ml   Output --   Net 1000 ml       Lines/Drains/Airways     Drain                 Ileostomy 03/13/18 1727 Loop  days          Peripheral Intravenous Line                 Peripheral IV - Single Lumen 03/26/20 0906 20 G Right Hand 47 days         Peripheral IV - Single Lumen 05/11/20 1657 20 G Left Antecubital less than 1 day                Physical Exam   Constitutional: No distress.   HENT:   Head: Atraumatic.   Eyes: Right eye exhibits no discharge. Left eye exhibits no discharge.   Neck: No JVD present.   Cardiovascular: Normal rate and regular rhythm. Exam reveals no gallop and no friction rub.   Murmur heard.  Pulmonary/Chest: Effort normal and breath sounds normal. He has no rales.   Abdominal: Soft. Bowel sounds are normal.   Musculoskeletal: He exhibits no edema.   Neurological: He is alert.   Skin: Skin is warm and dry. He is not diaphoretic.       Significant Labs:   BMP:   Recent Labs   Lab 05/11/20 1654 05/12/20  0301   GLU 84 102    134*   K 3.0* 3.5   CL 96 97   CO2 22* 27   BUN 20 18   CREATININE 1.4 1.2   CALCIUM 11.4* 10.0   MG  --  1.9   , CMP   Recent Labs   Lab 05/11/20 1654 05/12/20  0301    134*   K 3.0* 3.5   CL 96 97   CO2 22* 27   GLU 84 102   BUN 20 18   CREATININE 1.4 1.2   CALCIUM 11.4* 10.0   PROT 8.4 6.9   ALBUMIN 4.5 3.8   BILITOT 1.1* 0.9   ALKPHOS 53* 45*   AST 24 23   ALT 16 14   ANIONGAP 18* 10   ESTGFRAFRICA >60 >60   EGFRNONAA 57* >60   , CBC   Recent Labs   Lab 05/11/20 1654 05/12/20  0301   WBC 7.45 6.51   HGB 11.4* 9.4*   HCT 36.1* 30.8*    216   , INR No results for input(s): INR, PROTIME in the last 48 hours., Lipid Panel   Recent Labs   Lab 05/12/20  0301   CHOL 116*   HDL 40   LDLCALC 56.0*   TRIG 100   CHOLHDL 34.5   , Troponin   Recent  Labs   Lab 05/11/20  1654 05/11/20  2237 05/12/20  0301   TROPONINI 0.472* 0.421* 0.333*    and All pertinent lab results from the last 24 hours have been reviewed.    Significant Imaging: Echocardiogram:   Transthoracic echo (TTE) complete (Cupid Only):   Results for orders placed or performed during the hospital encounter of 05/11/20   Echo Color Flow Doppler? Yes   Result Value Ref Range    BSA 2.03 m2    TDI SEPTAL 0.05 m/s    LV LATERAL E/E' RATIO 5.60 m/s    LV SEPTAL E/E' RATIO 11.20 m/s    LA WIDTH 3.60 cm    AORTIC VALVE CUSP SEPERATION 1.87 cm    TDI LATERAL 0.10 m/s    PV PEAK VELOCITY 0.78 cm/s    LVIDD 5.01 3.5 - 6.0 cm    IVS 1.47 (A) 0.6 - 1.1 cm    PW 1.35 (A) 0.6 - 1.1 cm    Ao root annulus 2.91 cm    LVIDS 4.30 (A) 2.1 - 4.0 cm    FS 14 28 - 44 %    LA volume 53.95 cm3    LV mass 295.38 g    LA size 3.43 cm    RVDD 3.14 cm    Left Ventricle Relative Wall Thickness 0.54 cm    AV mean gradient 2 mmHg    AV valve area 3.52 cm2    AV Velocity Ratio 0.79     AV index (prosthetic) 0.97     E/A ratio 0.71     Mean e' 0.08 m/s    E wave decelartion time 127.98 msec    IVRT 41.86 msec    Pulm vein S/D ratio 1.15     LVOT diameter 2.15 cm    LVOT area 3.6 cm2    LVOT peak rubén 0.85 m/s    LVOT peak VTI 13.68 cm    Ao peak rubén 1.07 m/s    Ao VTI 14.10 cm    LVOT stroke volume 49.64 cm3    AV peak gradient 5 mmHg    E/E' ratio 7.47 m/s    MV Peak E Rubén 0.56 m/s    TR Max Rubén 2.28 m/s    MV Peak A Rubén 0.79 m/s    PV Peak S Rubén 0.46 m/s    PV Peak D Rubén 0.40 m/s    LV Systolic Volume 83.05 mL    LV Systolic Volume Index 41.1 mL/m2    LV Diastolic Volume 118.62 mL    LV Diastolic Volume Index 58.72 mL/m2    LA Volume Index 26.7 mL/m2    LV Mass Index 146 g/m2    RA Major Axis 4.37 cm    Left Atrium Minor Axis 4.99 cm    Left Atrium Major Axis 5.30 cm    Triscuspid Valve Regurgitation Peak Gradient 21 mmHg    RA Width 3.16 cm     Assessment and Plan:     * NSTEMI (non-ST elevated myocardial infarction)  Type II in  setting of polysubstance abuse (UDS + for cocaine, amphetamines, THC)  Medical compliance questionable, not a candidate for invasive workup   Troponin mildly elevated at 0.4 and trending down  EKG ST/ST without acute ischemic changes  Chest pain has been constant since prior to coronary revascularization nearly 1 year ago   Continue DAPT, add statin if no contraindication, consider adding Coreg (alpha and beta properties)     Syncope and collapse  Felt to be in setting of polysubstance abuse      Polysubstance abuse  UDS positive for Cocaine, Amphetamines, THC  Cessation encouraged    Coronary artery disease involving native coronary artery of native heart with unstable angina pectoris  O,P LCX, OM1 ABEL 08/2019  P,M LAD ABEL in 08/2019    Continue DAPT, add statin if no contraindication, add coreg and ACEI if BP can tolerate    Follow up with Dr Vitale as outpatient     Cessation of drug and ETOH use, smoking cessation     Alcohol dependence  Cessation encouraged         VTE Risk Mitigation (From admission, onward)         Ordered     enoxaparin injection 90 mg  Every 12 hours (non-standard times)      05/11/20 2050                Thank you for your consult. I will sign off. Please contact us if you have any additional questions.    Garrick Quiroz, MACRINA  Cardiology   [facility]

## 2020-05-12 NOTE — PLAN OF CARE
VN cued into room to complete admit assessment, VIP model introduced, VN working alongside bedside treatment team.  Plan of care reviewed with patient. Patient verbalized understanding. Patient informed of fall risk and fall precautions, call light within reach, 2x bed rails. Patient notified to ask staff for assistance and pt verbalized complete understanding. Time allowed for questions. Pt. C/o pleuretic pain MD phoned to address.Will continue to monitor and intervene as needed.

## 2020-05-12 NOTE — PROGRESS NOTES
"[facility] Medicine Progress Note     Admitting Team: [facility] Hospitalist Team B  Attending Physician: Ziyad Tejeda MD  Resident: Dinorah   Intern: Rama     Date of Admit: 2020    Subjective:      Reports chest pain resolved overnight but has returned this AM. Reports improvement in his anxiety symptoms.      Objective:   Last 24 Hour Vital Signs:  BP  Min: 119/73  Max: 142/75  Temp  Av.2 °F (36.8 °C)  Min: 97.4 °F (36.3 °C)  Max: 99.4 °F (37.4 °C)  Pulse  Av.6  Min: 80  Max: 144  Resp  Av  Min: 18  Max: 39  SpO2  Av.1 %  Min: 85 %  Max: 100 %  Height  Av' 11" (180.3 cm)  Min: 5' 11" (180.3 cm)  Max: 5' 11" (180.3 cm)  Weight  Av.8 kg (184 lb 13.6 oz)  Min: 82 kg (180 lb 12.4 oz)  Max: 87.5 kg (193 lb)  Body mass index is 25.21 kg/m².  I/O last 3 completed shifts:  In: 1000 [IV Piggyback:1000]  Out: -     Physical Examination:  General: Alert and awake, resting comfortable   Head:  Normocephalic and atraumatic  Eyes:  PERRL; EOMi with anicteric sclera and clear conjunctivae  Mouth:  Oropharynx clear and without exudate; dry mucous membranes  Cardio:  Regular rate and rhythm with normal S1 and S2; no murmurs or rubs  Resp:  CTAB; respirations increased but without accessory muscle use; no wheezes, crackles or rhonchi  Abdom: Soft, NTND with normoactive bowel sounds, extensive scarring from previous abdominal sx. Ventral hernia without strangulation   Extrem: Warm and well-perfused with no clubbing, cyanosis or edema  Skin:  No rashes, lesions, or color changes  Pulses: 2+ and symmetric distally  Neuro:  AAOx3;  no focal deficits.      Laboratory:  Most Recent Data:  CBC:   Lab Results   Component Value Date    WBC 6.51 2020    HGB 9.4 (L) 2020    HCT 30.8 (L) 2020     2020    MCV 70 (L) 2020    RDW 18.3 (H) 2020     BMP:   Lab Results   Component Value Date     (L) 2020    K 3.5 2020    CL 97 2020    CO2 27 " 05/12/2020    BUN 18 05/12/2020    CREATININE 1.2 05/12/2020     05/12/2020    CALCIUM 10.0 05/12/2020    MG 1.9 05/12/2020    PHOS 4.7 (H) 05/12/2020     LFTs:   Lab Results   Component Value Date    PROT 6.9 05/12/2020    ALBUMIN 3.8 05/12/2020    BILITOT 0.9 05/12/2020    AST 23 05/12/2020    ALKPHOS 45 (L) 05/12/2020    ALT 14 05/12/2020     Coags:   Lab Results   Component Value Date    INR 1.0 12/16/2019     FLP:   Lab Results   Component Value Date    CHOL 123 06/06/2019    HDL 34 (L) 06/06/2019    LDLCALC 75.0 06/06/2019    TRIG 70 06/06/2019    CHOLHDL 27.6 06/06/2019     DM:   Lab Results   Component Value Date    HGBA1C 5.4 06/06/2019    LDLCALC 75.0 06/06/2019    CREATININE 1.2 05/12/2020     Thyroid:   Lab Results   Component Value Date    TSH 0.572 06/06/2019     Anemia:   Lab Results   Component Value Date    IRON 27 (L) 05/11/2020    TIBC 475 (H) 05/11/2020    FERRITIN 22 05/11/2020    ECMQHLOE05 1052 (H) 05/11/2020    FOLATE 18.0 05/11/2020     Cardiac:   Lab Results   Component Value Date    TROPONINI 0.333 (H) 05/12/2020    BNP 81 05/12/2020     Urinalysis:   Lab Results   Component Value Date    LABURIN No growth 03/18/2020    COLORU [place] (A) 05/11/2020    SPECGRAV 1.010 05/11/2020    NITRITE Negative 05/11/2020    PROTEINUR - 03/18/2020    KETONESU 1+ (A) 05/11/2020    UROBILINOGEN 2.0-3.0 (A) 05/11/2020    BILIRUBINUR - 03/18/2020    WBCUA 0 05/11/2020       Trended Lab Data:  Recent Labs   Lab 05/11/20  1654 05/12/20  0301   WBC 7.45 6.51   HGB 11.4* 9.4*   HCT 36.1* 30.8*    216   MCV 69* 70*   RDW 18.6* 18.3*    134*   K 3.0* 3.5   CL 96 97   CO2 22* 27   BUN 20 18   CREATININE 1.4 1.2   GLU 84 102   PROT 8.4 6.9   ALBUMIN 4.5 3.8   BILITOT 1.1* 0.9   AST 24 23   ALKPHOS 53* 45*   ALT 16 14       Trended Cardiac Data:  Recent Labs   Lab 05/11/20  1654 05/11/20  2237 05/12/20  0008 05/12/20  0301   TROPONINI 0.472* 0.421*  --  0.333*   BNP  --   --  81  --      Other  Results:  EKG (my interpretation): Poor R wave progression, normal sinus   Radiology:  Imaging Results          CTA Chest Non-Coronary (PE Study) (Final result)  Result time 05/11/20 19:17:50    Final result by Briseyda Multani MD (05/11/20 19:17:50)                 Impression:      1. No evidence of PE.  No acute intrathoracic abnormalities identified.  2. Stable small focal region ground-glass opacity within right lobe, unchanged from April 2018.      Electronically signed by: Briseyda Multani MD  Date:    05/11/2020  Time:    19:17             Narrative:    EXAMINATION:  CTA CHEST NON CORONARY    CLINICAL HISTORY:  Chest pain, acute, PE suspected, high pretest prob;    TECHNIQUE:  Low dose axial images, sagittal and coronal reformations were obtained from the thoracic inlet to the lung bases following the IV administration of 100 mL of Omnipaque 350.  Contrast timing was optimized to evaluate the pulmonary arteries.  MIP images were performed.    COMPARISON:  CT chest from April 2018.    FINDINGS:  Structures at the base of the neck are unremarkable.  Aorta is non-aneurysmal.  The heart is normal in size without pericardial effusion.  Prominent coronary artery calcification is seen.  No intraluminal filling defects within the pulmonary arteries to suggest pulmonary thromboembolism.   There is no evidence of mediastinal, axillary, or hilar lymph node enlargement.  The esophagus is unremarkable along its course.    The trachea and bronchi are patent.  The lungs are symmetrically expanded.  Unchanged small focal region of ground-glass opacity seen within the right upper lobe.  This is unchanged in appearance from April 2018.  Lungs are otherwise clear.  No evidence of focal consolidation, pneumothorax, or pleural effusion.    Two small hepatic hypodensities are visualized which are too small to characterize but may represent small cysts, unchanged.  Visualized upper abdominal structures otherwise show no significant  abnormalities.  Osseous structures demonstrate mild degenerative change.  Extrathoracic soft tissues are unremarkable.                               X-Ray Chest AP Portable (Final result)  Result time 05/11/20 18:10:19    Final result by Briseyda Multani MD (05/11/20 18:10:19)                 Impression:      No acute cardiopulmonary process identified.      Electronically signed by: Briseyda Multani MD  Date:    05/11/2020  Time:    18:10             Narrative:    EXAMINATION:  XR CHEST AP PORTABLE    CLINICAL HISTORY:  Abnormal findings on diagnostic imaging of other specified body structures    TECHNIQUE:  Single frontal view of the chest was performed.    COMPARISON:  16:23.    FINDINGS:  Cardiac silhouette is normal in size.  Lungs are symmetrically expanded.  No evidence of focal consolidative process, pneumothorax, or significant effusion.  No acute osseous abnormality identified.                                X-Ray Chest AP Portable (Final result)  Result time 05/11/20 16:58:13    Final result by Cony Britton MD (05/11/20 16:58:13)                 Impression:      No acute cardiopulmonary abnormality.    9 mm nodular opacity in the left midlung, may represent nipple shadow.  Recommend repeat chest radiograph with nipple markers.  If the opacity does not correlate with nipple marker, recommend a non emergent CT chest for further evaluation.    This report was flagged in Epic as abnormal.      Electronically signed by: Cony Britton  Date:    05/11/2020  Time:    16:58             Narrative:    EXAMINATION:  XR CHEST AP PORTABLE    CLINICAL HISTORY:  Shortness of breath    TECHNIQUE:  Single view of the chest was obtained.    COMPARISON:  Multiple priors, most recent 06/06/2019    FINDINGS:  Please note the left costophrenic angle is excluded from field of view.    Normal cardiomediastinal contour. No focal consolidation, pleural effusion or pneumothorax. 9 mm nodular opacity in the left midlung.                                X-Ray Abdomen AP 1 View (KUB) (Final result)  Result time 05/11/20 16:59:44    Final result by Cony Britton MD (05/11/20 16:59:44)                 Impression:      Nonobstructive bowel gas pattern.      Electronically signed by: Cony Britton  Date:    05/11/2020  Time:    16:59             Narrative:    EXAMINATION:  XR ABDOMEN AP 1 VIEW    CLINICAL HISTORY:  Nausea with vomiting, unspecified    TECHNIQUE:  AP View(s) of the abdomen was performed.    COMPARISON:  Multiple priors, most recent 01/18/2019,    FINDINGS:  Stool seen within the ascending and descending colon as well as in the rectum.  No bowel dilatation.  No radiopaque calculi at the expected location of the kidneys or ureters.  No acute osseous abnormality.  Surgical sutures in the left hemiabdomen.                                 Assessment:     Chris Aguirre  is a 55 y.o. male with:  Patient Active Problem List    Diagnosis Date Noted    NSTEMI (non-ST elevated myocardial infarction) 05/11/2020    Hydrocele 03/26/2020    Left hydrocele 03/18/2020    Orchalgia 03/18/2020    Nocturia 03/18/2020    Umbilical hernia without obstruction and without gangrene 03/12/2020    Angina, class III 08/09/2019    Hyperlipidemia 07/31/2019    Coronary artery disease involving native coronary artery of native heart with unstable angina pectoris 07/25/2019    Chest pain 07/16/2019    SOB (shortness of breath) 07/16/2019    Other hydrocele 06/19/2019    Anemia 06/06/2019    Hypokalemia 06/06/2019    Hypomagnesemia 06/06/2019    Status post reversal of ileostomy 01/15/2019    Essential hypertension 10/25/2018    Anemia due to antineoplastic chemotherapy 08/27/2018    Smoking addiction 07/02/2018    Anxiety about health 06/20/2018    Overlapping malignant neoplasm of colon 04/13/2018    Weight loss, non-intentional 04/13/2018    Colovesical fistula 03/11/2018    Pneumaturia 03/09/2018    Lower abdominal pain  03/09/2018    Blood in stool 03/09/2018    Other constipation 03/09/2018    Hepatitis C 03/09/2018    Substance induced mood disorder 09/03/2014    Alcohol dependence 09/03/2014        Plan:     NSTEMI, Hx of CAD s/p PCI   Hx of PCI in LCx and atherectomy with PCI of prox and mid LAD in 2019. Reportedly compliant on GDMT. Now presenting with ongoing chest pain x 1 day. Troponin 0.4, EKG with poor R wave progression but seen on previous. Denies drug use but positive for cocaine and meth on drug screen. Chest pain likely cocaine induced as opposed to true ACS but given known extensive CAD and risk factors, will initiate ACS treatment. Cardiology consulted by ED, no emergent need for catheretization.   -s/p ASA and plavix load in ED, continuing daily; continued home atorvastatin; anticoagulation with full dose lovenox; holding BB in setting of cocaine use  -valium taper with ativan PRN for possible cocaine induced chest pain, sublingual NTG; if continues to have pain will initiate CCB  -trended troponin EKG q6hrs, peaked at 0.4 and remained flat   -NPO for Cardiology evaluation; TTE ordered for this AM     Syncope  Reports multiple episodes day of admission. History suspicious for orthostasis but given NSTEMI and stimulant use will monitor for malignant arrhythmias. CT head negative.   -monitor telemetry, no issues overnight   -TTE ordered     NESTOR  Creatinine 1.4, baseline around 1. Likely pre-renal in setting of substance abuse  -improved with IVF    Hypercalcemia  Ca 11.4. Likely due dehydration  -improved with normal saline     Respiratory Alkalosis with Metabolic Alkalosis   PH 7.67, CO2 19.5, bicarb 22. Suspect respiratory alkalosis anxiety mediated. CTA negative for PE, not hypoxic, salicylate level normal. Suspect metabolic alkalosis due to vomiting; chloride low normal  -IVF, monitor RR    JUAN  Denies any blood loss.   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 Underwent resection with ileostomy and required partial cystectomy for invasion of bladder wall. Completed 5-FU therapy. Ileostomy reversed in 1/2019  -no acute issues but may be lost to follow up with Heme/Onc; will ensure follow up arranged    Chronic Hepatitis C  Appears to be undergoing Tx  -no acute issues    Diet: NPO for Cards evaluation  DVT: lovenox  Dispo: Cards recommendations    Code Status:     Full    Mariah Copeland MD  [facility] HO-II    [facility] Hospitalist Pager numbers:   [facility] Hospitalist Medicine Team A (Ilana/Tarsha): [phone]  [facility] Hospitalist Medicine Team B (Brigitte/Ileana):  [phone]        "

## 2020-05-12 NOTE — PLAN OF CARE
VN rounds:  VN cued into pt's room, pt is asleep on left side in bed in low position with call bell within reach.  Pt is without signs of pain, anxiety or dyspnea.  No acute distress noted.  Pt's chart, labs and vital signs reviewed.   Will continue to be available and intervene as needed.

## 2020-05-13 ENCOUNTER — TELEPHONE (OUTPATIENT)
Dept: SURGERY | Facility: CLINIC | Age: 55
End: 2020-05-13

## 2020-05-13 NOTE — TELEPHONE ENCOUNTER
----- Message from Corinne Young sent at 5/13/2020 10:19 AM CDT -----  Contact: pt called [phone]  Would like to speak with Nurse.  In regards to COVID-19 Test being done today.  States that he had Test done at [facility] in [place] on yesterday

## 2020-05-13 NOTE — DISCHARGE SUMMARY
"\[facility]\"" Hospital Medicine Discharge Summary    Primary Team: \[facility]\"" Hospitalist Team B  Attending Physician: Ziyad Tejeda MD  Resident: Obdulia Copeland  Intern: Jagdish Ontiveros    Date of Admit: 5/11/2020  Date of Discharge: 5/13/2020    Discharge to: Home  Condition: Good    Discharge Diagnoses     Patient Active Problem List   Diagnosis    Substance induced mood disorder    Alcohol dependence    Pneumaturia    Lower abdominal pain    Blood in stool    Other constipation    Hepatitis C    Colovesical fistula    Overlapping malignant neoplasm of colon    Weight loss, non-intentional    Anxiety about health    Smoking addiction    Anemia due to antineoplastic chemotherapy    Essential hypertension    Status post reversal of ileostomy    Anemia    Hypokalemia    Hypomagnesemia    Other hydrocele    Chest pain    SOB (shortness of breath)    Umbilical hernia without obstruction and without gangrene    Left hydrocele    Orchalgia    Nocturia    Hydrocele    Angina, class III    Coronary artery disease involving native coronary artery of native heart with unstable angina pectoris    Hyperlipidemia    NSTEMI (non-ST elevated myocardial infarction)    Polysubstance abuse    Syncope and collapse       Consultants and Procedures     Consultants:  Cardiology    Procedures:   N/A    Imaging:  CTA PE, CXR, Abd X-ray, Echo    Brief History of Present Illness      Patient is a 56 yo man with a history of CAD s/p PCI, colon CA, and alcohol abuse who presented after multiple syncopal episodes at work.  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 He additionally had constant chest pain (2/10 to 7/10 in severity) that was substernal, non-radiating, and "twisting" in nature, similar to a previous MI.  He reported shortness of breath and palpitations prior to the " syncopal episode.  He denied drug use but toxicity positive for amphetamines, cocaine, THC.    For the full HPI please refer to the History & Physical from this admission.    Hospital Course By Problem with Pertinent Findings     NSTEMI, Hx of CAD s/p PCI   Hx of PCI in LCx and atherectomy with PCI of prox and mid LAD in 2019. Reportedly compliant on GDMT. Presented with ongoing chest pain x 1 day. Troponin 0.4, EKG with poor R wave progression but seen on previous. Denied drug use but positive for cocaine and meth on drug screen. Chest pain likely cocaine induced as opposed to true ACS but given known extensive CAD and risk factors, initiated ACS treatment. Cardiology consulted by ED, no emergent need for catheretization.   -s/p ASA and plavix load in ED, continued daily; continued home atorvastatin; anticoagulation with full dose lovenox; held BB in setting of cocaine use  -valium taper with ativan PRN for possible cocaine induced chest pain, sublingual NTG  -trended troponin EKG q6hrs, peaked at 0.4 and remained flat   -TTE obtained; revealed wall motion abnormality; cardiology deems patient suitable for discharge with follow up     Syncope  Reports multiple episodes day of admission. History suspicious for orthostasis but given NSTEMI and stimulant use, monitored for malignant arrhythmias. CT head negative.   -monitored telemetry, no issues while inpatient  -TTE normal systolic and diastolic LV function; wall motion abnormality     NESTOR  Creatinine 1.4, baseline around 1. Likely pre-renal in setting of substance abuse  -improved with IVF     Hypercalcemia  Ca 11.4. Likely due dehydration  -improved with normal saline      Respiratory Alkalosis with Metabolic Alkalosis   PH 7.67, CO2 19.5, bicarb 22. Suspect respiratory alkalosis anxiety mediated. CTA negative for PE, not hypoxic, salicylate level normal. Suspect metabolic alkalosis due to vomiting; chloride low normal  -IVF, monitored RR; improved     JUAN  Denied  any blood loss.   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 Underwent resection with ileostomy and required partial cystectomy for invasion of bladder wall. Completed 5-FU therapy. Ileostomy reversed in 1/2019  -no acute issues but may be lost to follow up with Heme/Onc; will ensure follow up arranged     Chronic Hepatitis C  Appears to be undergoing Tx  -no acute issues    Discharge Medications      Chris Aguirre Jr.   Home Medication Instructions MAGNOLIA:[id number]    Printed on:05/13/20 7144   Medication Information                      acetaminophen (TYLENOL) 325 MG tablet  Take 1 tablet (325 mg total) by mouth every 6 (six) hours as needed for Pain.             amLODIPine (NORVASC) 10 MG tablet  Take 1 tablet (10 mg total) by mouth once daily.             aspirin (ECOTRIN) 81 MG EC tablet  Take 1 tablet (81 mg total) by mouth once daily.             atorvastatin (LIPITOR) 10 MG tablet  Take 1 tablet (10 mg total) by mouth once daily.             atorvastatin (LIPITOR) 40 MG tablet  Take 1 tablet (40 mg total) by mouth once daily.             clopidogreL (PLAVIX) 75 mg tablet  Take 1 tablet (75 mg total) by mouth once daily.             folic acid (FOLVITE) 1 MG tablet  Take 1 tablet (1 mg total) by mouth once daily.             multivitamin with minerals tablet  Take 1 tablet by mouth once daily.             tamsulosin (FLOMAX) 0.4 mg Cap  Take 1 capsule (0.4 mg total) by mouth every evening.             thiamine 100 MG tablet  Take 1 tablet (100 mg total) by mouth once daily.                 Discharge Information:   Diet:  Cardiac    Physical Activity:  As  tolerated             Instructions:  1. Take all medications as prescribed  2. Keep all follow-up appointments  3. Return to the hospital or call your primary care physicians if any worsening symptoms such as fever, chest pain, shortness of breath, return of symptoms, or any other concerns.    Follow-Up Appointments:  Hem/Onc    Yehuda Ontiveros MD  [facility] Internal [facility], [facility]

## 2020-05-13 NOTE — TELEPHONE ENCOUNTER
Informed pt that unfortunately that his surgery has to be rescheduled until he can get clearance from his cardiologist after his last episode in the ED.  Pt upset and doesn't understand why he has to put this surgery off.  Advised pt to follow up with his cardiologist.   Surgery put on hold.

## 2020-05-14 ENCOUNTER — DOCUMENTATION ONLY (OUTPATIENT)
Dept: SURGERY | Facility: CLINIC | Age: 55
End: 2020-05-14

## 2020-05-14 ENCOUNTER — TELEPHONE (OUTPATIENT)
Dept: SURGERY | Facility: CLINIC | Age: 55
End: 2020-05-14

## 2020-05-14 NOTE — TELEPHONE ENCOUNTER
----- Message from Maurice Grigsby sent at 5/14/2020  2:11 PM CDT -----  Contact: Pt    The Pt would like for Nicole to call him back about his surgery tomorrow.    Phone # [phone]   How Severe Is Your Skin Lesion?: moderate Is This A New Presentation, Or A Follow-Up?: Skin Lesions

## 2020-05-14 NOTE — TELEPHONE ENCOUNTER
Patient states that he is wanting to move forward with his surgery, he feel that  He won't need any clearances from a cardiology standpoint and would like to speak to the surgeon regarding this matter.

## 2020-05-14 NOTE — PROGRESS NOTES
Phone call: I left a message that we were unable to operate on him tomorrow as his cardiologist hasn't re-evalated him fully since he has been in the hospital for heart trouble.  He does have an updated clearance but I am unsure he has had an actual re-evaluation that covers the recent hospitalization.

## 2020-05-22 ENCOUNTER — TELEPHONE (OUTPATIENT)
Dept: SURGERY | Facility: CLINIC | Age: 55
End: 2020-05-22

## 2020-05-22 NOTE — TELEPHONE ENCOUNTER
----- Message from Chayo Anand sent at 5/22/2020  1:21 PM CDT -----  Pt is calling to see if  fax over his paper work to his dr to see if he is cleared for his surgery and would like for the nurse to give him a call back

## 2020-05-26 ENCOUNTER — TELEPHONE (OUTPATIENT)
Dept: SURGERY | Facility: CLINIC | Age: 55
End: 2020-05-26

## 2020-05-26 NOTE — TELEPHONE ENCOUNTER
----- Message from Corinne Young sent at 5/26/2020  4:17 PM CDT -----  Contact: Ann Marie Spencer from [facility] [place] [facility] the [facility] [phone]  Calling to speak with nurse about patient Clearance.

## 2020-05-26 NOTE — TELEPHONE ENCOUNTER
Returned Ann Marie's phone call and she informed me that they will be setting him up a clinic appt d/t him having a cocaine induced MI.    She will keep us updated on his clinic appt and further testing.  Pt states that he was set up for surgery on Friday 5/29, but she isn't sure if that will work.  Again, she will keep us posted on dates.

## 2020-06-01 ENCOUNTER — TELEPHONE (OUTPATIENT)
Dept: SMOKING CESSATION | Facility: CLINIC | Age: 55
End: 2020-06-01

## 2020-06-01 NOTE — TELEPHONE ENCOUNTER
Spoke to patient regarding a telephone appointment for quitting smoking. He stated he's running out the door to work, he will call me back and hung up.

## 2020-06-03 ENCOUNTER — TELEPHONE (OUTPATIENT)
Dept: SURGERY | Facility: CLINIC | Age: 55
End: 2020-06-03

## 2020-06-08 ENCOUNTER — TELEPHONE (OUTPATIENT)
Dept: SMOKING CESSATION | Facility: CLINIC | Age: 55
End: 2020-06-08

## 2020-06-08 NOTE — TELEPHONE ENCOUNTER
Left a message regarding a missed Smoking Cessation appointment. Left my name and number to call back to bhavesh Thapa [phone].

## 2020-06-16 ENCOUNTER — LAB VISIT (OUTPATIENT)
Dept: LAB | Facility: HOSPITAL | Age: 55
End: 2020-06-16
Payer: MEDICAID

## 2020-06-16 DIAGNOSIS — B18.2 CHRONIC HEPATITIS C WITHOUT HEPATIC COMA: ICD-10-CM

## 2020-06-16 DIAGNOSIS — Z85.038 HISTORY OF COLON CANCER: ICD-10-CM

## 2020-06-16 DIAGNOSIS — K76.9 LIVER LESION: ICD-10-CM

## 2020-06-16 LAB
AFP SERPL-MCNC: 1.6 NG/ML (ref 0–8.4)
ALBUMIN SERPL BCP-MCNC: 4.3 G/DL (ref 3.5–5.2)
ALP SERPL-CCNC: 46 U/L (ref 55–135)
ALT SERPL W/O P-5'-P-CCNC: 16 U/L (ref 10–44)
ANION GAP SERPL CALC-SCNC: 15 MMOL/L (ref 8–16)
AST SERPL-CCNC: 26 U/L (ref 10–40)
BILIRUB SERPL-MCNC: 1 MG/DL (ref 0.1–1)
BUN SERPL-MCNC: 16 MG/DL (ref 6–20)
CALCIUM SERPL-MCNC: 10.2 MG/DL (ref 8.7–10.5)
CHLORIDE SERPL-SCNC: 96 MMOL/L (ref 95–110)
CO2 SERPL-SCNC: 21 MMOL/L (ref 23–29)
CREAT SERPL-MCNC: 1.2 MG/DL (ref 0.5–1.4)
EST. GFR  (AFRICAN AMERICAN): >60 ML/MIN/1.73 M^2
EST. GFR  (NON AFRICAN AMERICAN): >60 ML/MIN/1.73 M^2
GLUCOSE SERPL-MCNC: 120 MG/DL (ref 70–110)
POTASSIUM SERPL-SCNC: 3.4 MMOL/L (ref 3.5–5.1)
PROT SERPL-MCNC: 8 G/DL (ref 6–8.4)
SODIUM SERPL-SCNC: 132 MMOL/L (ref 136–145)

## 2020-06-16 PROCEDURE — 80053 COMPREHEN METABOLIC PANEL: CPT

## 2020-06-16 PROCEDURE — 82105 ALPHA-FETOPROTEIN SERUM: CPT

## 2020-06-16 PROCEDURE — 36415 COLL VENOUS BLD VENIPUNCTURE: CPT

## 2020-06-17 ENCOUNTER — TELEPHONE (OUTPATIENT)
Dept: SMOKING CESSATION | Facility: CLINIC | Age: 55
End: 2020-06-17

## 2020-06-17 ENCOUNTER — TELEPHONE (OUTPATIENT)
Dept: HEPATOLOGY | Facility: CLINIC | Age: 55
End: 2020-06-17

## 2020-06-17 DIAGNOSIS — B18.2 CHRONIC HEPATITIS C WITHOUT HEPATIC COMA: Primary | ICD-10-CM

## 2020-06-17 DIAGNOSIS — K76.9 LIVER DISEASE, UNSPECIFIED: ICD-10-CM

## 2020-06-17 NOTE — TELEPHONE ENCOUNTER
6/16/20 CMP and AFP reviewed - stable    Pt was due for MRI 6/2020 (along w/ these labs)  Do we know what happened w/ that? I don't see it scheduled. I don't remember getting any sort of notification of denial or opportunity to do peer to peer.

## 2020-06-17 NOTE — TELEPHONE ENCOUNTER
Left a message regarding a missed Smoking Cessation appointment. Left my name and number to call back to bhavesh Thapa [phone].

## 2020-06-17 NOTE — TELEPHONE ENCOUNTER
Ok. Tell him labs looked okay    Proceed with: CMP, HCV RNA, HBV DNA, HBsAg - SVR12 - 7/1/20    Schedule CMP, INR, AFP, MRI abdomen 9/2020

## 2020-06-17 NOTE — TELEPHONE ENCOUNTER
I spoke with patient and msg from PA Scheuermann relayed.  He states that his insurance company stated that MRI was scheduled to soon and they denied coverage for test in writing so he cancelled 6/9 appt.

## 2020-06-18 DIAGNOSIS — K76.9 LIVER LESION: Primary | ICD-10-CM

## 2020-06-18 NOTE — TELEPHONE ENCOUNTER
Attempted to contact pt by phone. No answer. VM left. Awaiting call back. PA Scheuermann msg relayed via mail. Pt labs and MRI scheduled as instructed. Reminder notices mailed to pt as well.

## 2020-06-24 ENCOUNTER — EPISODE CHANGES (OUTPATIENT)
Dept: HEPATOLOGY | Facility: CLINIC | Age: 55
End: 2020-06-24

## 2020-07-01 ENCOUNTER — LAB VISIT (OUTPATIENT)
Dept: LAB | Facility: HOSPITAL | Age: 55
End: 2020-07-01
Payer: MEDICAID

## 2020-07-01 DIAGNOSIS — B18.2 CHRONIC HEPATITIS C WITHOUT HEPATIC COMA: ICD-10-CM

## 2020-07-01 LAB
ALBUMIN SERPL BCP-MCNC: 4.3 G/DL (ref 3.5–5.2)
ALP SERPL-CCNC: 41 U/L (ref 55–135)
ALT SERPL W/O P-5'-P-CCNC: 15 U/L (ref 10–44)
ANION GAP SERPL CALC-SCNC: 13 MMOL/L (ref 8–16)
AST SERPL-CCNC: 24 U/L (ref 10–40)
BILIRUB SERPL-MCNC: 0.4 MG/DL (ref 0.1–1)
BUN SERPL-MCNC: 11 MG/DL (ref 6–20)
CALCIUM SERPL-MCNC: 9.5 MG/DL (ref 8.7–10.5)
CHLORIDE SERPL-SCNC: 104 MMOL/L (ref 95–110)
CO2 SERPL-SCNC: 23 MMOL/L (ref 23–29)
CREAT SERPL-MCNC: 0.9 MG/DL (ref 0.5–1.4)
EST. GFR  (AFRICAN AMERICAN): >60 ML/MIN/1.73 M^2
EST. GFR  (NON AFRICAN AMERICAN): >60 ML/MIN/1.73 M^2
GLUCOSE SERPL-MCNC: 109 MG/DL (ref 70–110)
HBV SURFACE AG SERPL QL IA: NEGATIVE
POTASSIUM SERPL-SCNC: 3.8 MMOL/L (ref 3.5–5.1)
PROT SERPL-MCNC: 7.6 G/DL (ref 6–8.4)
SODIUM SERPL-SCNC: 140 MMOL/L (ref 136–145)

## 2020-07-01 PROCEDURE — 36415 COLL VENOUS BLD VENIPUNCTURE: CPT

## 2020-07-01 PROCEDURE — 80053 COMPREHEN METABOLIC PANEL: CPT

## 2020-07-01 PROCEDURE — 87522 HEPATITIS C REVRS TRNSCRPJ: CPT

## 2020-07-01 PROCEDURE — 87517 HEPATITIS B DNA QUANT: CPT

## 2020-07-01 PROCEDURE — 87340 HEPATITIS B SURFACE AG IA: CPT

## 2020-07-02 ENCOUNTER — EPISODE CHANGES (OUTPATIENT)
Dept: HEPATOLOGY | Facility: CLINIC | Age: 55
End: 2020-07-02

## 2020-07-08 ENCOUNTER — TELEPHONE (OUTPATIENT)
Dept: HEPATOLOGY | Facility: CLINIC | Age: 55
End: 2020-07-08

## 2020-07-08 DIAGNOSIS — Z86.19 HISTORY OF HEPATITIS C: ICD-10-CM

## 2020-07-08 DIAGNOSIS — Z86.19 HISTORY OF HEPATITIS C: Primary | ICD-10-CM

## 2020-07-08 PROBLEM — B19.20 HEPATITIS C: Status: RESOLVED | Noted: 2018-03-09 | Resolved: 2020-07-08

## 2020-07-08 LAB
HCV RNA SERPL NAA+PROBE-LOG IU: <1.08 LOG (10) IU/ML
HCV RNA SERPL QL NAA+PROBE: NOT DETECTED IU/ML
HCV RNA SPEC NAA+PROBE-ACNC: <12 IU/ML

## 2020-07-08 NOTE — TELEPHONE ENCOUNTER
Attempt made to reach pt, unsuccessful. PA Scheuermann msg relayed via mail. Appointment with PA Scheuermann scheduled as instructed. Reminder notices mailed to pt.

## 2020-07-17 ENCOUNTER — TELEPHONE (OUTPATIENT)
Dept: HEMATOLOGY/ONCOLOGY | Facility: CLINIC | Age: 55
End: 2020-07-17

## 2020-07-17 DIAGNOSIS — C18.8 OVERLAPPING MALIGNANT NEOPLASM OF COLON: Primary | ICD-10-CM

## 2020-07-17 DIAGNOSIS — Z85.030: ICD-10-CM

## 2020-07-17 DIAGNOSIS — Z08: ICD-10-CM

## 2020-07-17 NOTE — TELEPHONE ENCOUNTER
----- Message from Addis Olivarez sent at 7/17/2020 12:50 PM CDT -----  Regarding: Appt  Pt is calling to speak with Staff regarding being scheduled for an appt.  Pt says he used to see Dr. Marin for cancer in the past and recently had an x-ray where he was told a spot was seen.  Pt say he wants to make sure the cancer has not come back somewhere else.    He is requesting a returned call to [phone].    Thank you.

## 2020-07-20 ENCOUNTER — HOSPITAL ENCOUNTER (OUTPATIENT)
Dept: RADIOLOGY | Facility: HOSPITAL | Age: 55
Discharge: HOME | End: 2020-07-20
Attending: INTERNAL MEDICINE
Payer: MEDICAID

## 2020-07-20 DIAGNOSIS — Z08: ICD-10-CM

## 2020-07-20 DIAGNOSIS — C18.8 OVERLAPPING MALIGNANT NEOPLASM OF COLON: ICD-10-CM

## 2020-07-20 DIAGNOSIS — Z85.030: ICD-10-CM

## 2020-07-20 PROCEDURE — 74177 CT ABD & PELVIS W/CONTRAST: CPT | Mod: 26,,, | Performed by: RADIOLOGY

## 2020-07-20 PROCEDURE — 74177 CT ABD & PELVIS W/CONTRAST: CPT | Mod: TC

## 2020-07-20 PROCEDURE — 71260 CT CHEST ABDOMEN PELVIS WITH CONTRAST (XPD): ICD-10-PCS | Mod: 26,,, | Performed by: RADIOLOGY

## 2020-07-20 PROCEDURE — 25500020 PHARM REV CODE 255: Performed by: INTERNAL MEDICINE

## 2020-07-20 PROCEDURE — 74177 CT CHEST ABDOMEN PELVIS WITH CONTRAST (XPD): ICD-10-PCS | Mod: 26,,, | Performed by: RADIOLOGY

## 2020-07-20 PROCEDURE — 71260 CT THORAX DX C+: CPT | Mod: 26,,, | Performed by: RADIOLOGY

## 2020-07-20 RX ADMIN — IOHEXOL 15 ML: 350 INJECTION, SOLUTION INTRAVENOUS at 07:07

## 2020-07-20 RX ADMIN — IOHEXOL 75 ML: 350 INJECTION, SOLUTION INTRAVENOUS at 07:07

## 2020-07-23 ENCOUNTER — TELEPHONE (OUTPATIENT)
Dept: HEMATOLOGY/ONCOLOGY | Facility: CLINIC | Age: 55
End: 2020-07-23

## 2020-07-23 NOTE — TELEPHONE ENCOUNTER
"----- Message from Benson Tesfaye sent at 7/23/2020  4:10 PM CDT -----  Consult/Advisory:    Name Of Caller: Chris  Contact Preference?: [phone]    Provider Name: Dr Marin    What is the nature of the call?:  Pt states he will be a little late for appointment due to bus not arriving on time.    Additional Notes:  "Thank you for all that you do for our patients'"           "

## 2020-07-23 NOTE — TELEPHONE ENCOUNTER
Spoke to patient who reports that he has not been able to catch the bus to make it to his appt at 4:30. Rescheduled pt

## 2020-07-24 ENCOUNTER — OFFICE VISIT (OUTPATIENT)
Dept: HEMATOLOGY/ONCOLOGY | Facility: CLINIC | Age: 55
End: 2020-07-24
Payer: MEDICAID

## 2020-07-24 VITALS
SYSTOLIC BLOOD PRESSURE: 137 MMHG | HEIGHT: 71 IN | HEART RATE: 91 BPM | BODY MASS INDEX: 26.03 KG/M2 | TEMPERATURE: 98 F | DIASTOLIC BLOOD PRESSURE: 88 MMHG | WEIGHT: 185.94 LBS | RESPIRATION RATE: 16 BRPM | OXYGEN SATURATION: 100 %

## 2020-07-24 DIAGNOSIS — F41.9 ANXIETY: Primary | ICD-10-CM

## 2020-07-24 DIAGNOSIS — Z86.19 HISTORY OF HEPATITIS C: ICD-10-CM

## 2020-07-24 DIAGNOSIS — C18.8 OVERLAPPING MALIGNANT NEOPLASM OF COLON: ICD-10-CM

## 2020-07-24 DIAGNOSIS — R45.89 ANXIETY ABOUT HEALTH: ICD-10-CM

## 2020-07-24 DIAGNOSIS — D64.9 ANEMIA, UNSPECIFIED TYPE: ICD-10-CM

## 2020-07-24 PROCEDURE — 99214 PR OFFICE/OUTPT VISIT, EST, LEVL IV, 30-39 MIN: ICD-10-PCS | Mod: S$PBB,,, | Performed by: INTERNAL MEDICINE

## 2020-07-24 PROCEDURE — 99999 PR PBB SHADOW E&M-EST. PATIENT-LVL III: ICD-10-PCS | Mod: PBBFAC,,, | Performed by: INTERNAL MEDICINE

## 2020-07-24 PROCEDURE — 99999 PR PBB SHADOW E&M-EST. PATIENT-LVL III: CPT | Mod: PBBFAC,,, | Performed by: INTERNAL MEDICINE

## 2020-07-24 PROCEDURE — 99214 OFFICE O/P EST MOD 30 MIN: CPT | Mod: S$PBB,,, | Performed by: INTERNAL MEDICINE

## 2020-07-24 PROCEDURE — 99213 OFFICE O/P EST LOW 20 MIN: CPT | Mod: PBBFAC | Performed by: INTERNAL MEDICINE

## 2020-07-24 RX ORDER — LORAZEPAM 0.5 MG/1
0.5 TABLET ORAL 2 TIMES DAILY
Qty: 60 TABLET | Refills: 0 | OUTPATIENT
Start: 2020-07-24 | End: 2020-08-27

## 2020-07-24 NOTE — PROGRESS NOTES
CC: h/o colon cancer, here for follow up    Diagnosis: Adenocarcinoma colon  Site : Sigmoid colon  TNM Stage: pT3 pN0 M0  AJCC stage: Stage II    MSI status: MS stable / low    Treatment:       --3/13/18: partial cystectomy with complex cystorrhaphy and partial prostatectomy, left neoureterocystotomy with ureteral re-implantation, cystoscopy with bilateral ureteral catheter placement, bilateral ureteral stent placement, left pelvic lymph node dissection,sigmoid colectomy and ileostomy    --5/7/18- 11/3/18: Adjuvant 5FU /LV       HPI: is a 55 yoWM who presented to the ED on March 10th, 2018 with acute onset of stool and gas in his urine over the previous several days. Previously, he had progressively worsening constipation, for which he was on stool softeners. He also had given history of  bloody stools and  lower abdominal pain. He had  15 lbs over the past few months. He denies fatigue, fevers, CP, or SOB.   He was diagnosed with HCV about 15 years ago but never sought treatment for this. He was previously hospitalized and seen by inpatient psychiatry for substance abuse, past history of suicide attempt by cutting. He also has a history of an MI with placement of heart stents, on ASA.He underwent  partial cystectomy with complex cystorrhaphy and partial prostatectomy, left neoureterocystotomy with ureteral re-implantation, cystoscopy with bilateral ureteral catheter placement, bilateral ureteral stent placement, left pelvic lymph node dissection,sigmoid colectomy and ileostomy on 3/13/18.          Interval history: He is here for a follow up visit. He is doing well. He started adjuvant 5FU/LV on 5/7/18. He only received 3 weekly treatments. He missed chemotherapy as he lost his friend and was depressed.He completed adjuvant chemotherapy in Nov 2018. He is doing well, except for anxiety. He had colonoscopy in 2019. He had repeat CT.           Review of Systems   Constitutional: Positive for  malaise/fatigue. Negative for chills, fever and weight loss.   HENT: Negative for ear discharge and nosebleeds.    Eyes: Negative for blurred vision, double vision, photophobia, discharge and redness.   Respiratory: Negative for cough, hemoptysis and sputum production.    Cardiovascular: Negative for chest pain, palpitations, orthopnea, claudication and leg swelling.   Gastrointestinal: Negative for abdominal pain, blood in stool, diarrhea, heartburn, melena and vomiting.   Genitourinary: Negative for dysuria, frequency, hematuria and urgency.   Musculoskeletal: Negative for back pain, myalgias and neck pain.   Neurological: Negative for dizziness, tingling and sensory change.   Endo/Heme/Allergies: Negative for environmental allergies. Does not bruise/bleed easily.   Psychiatric/Behavioral: Negative for suicidal ideas. The patient is nervous/anxious.        Current Outpatient Medications   Medication Sig    acetaminophen (TYLENOL) 325 MG tablet Take 1 tablet (325 mg total) by mouth every 6 (six) hours as needed for Pain.    amLODIPine (NORVASC) 10 MG tablet Take 1 tablet (10 mg total) by mouth once daily.    aspirin (ECOTRIN) 81 MG EC tablet Take 1 tablet (81 mg total) by mouth once daily.    atorvastatin (LIPITOR) 10 MG tablet Take 1 tablet (10 mg total) by mouth once daily.    carvediloL (COREG) 12.5 MG tablet Take 1 tablet (12.5 mg total) by mouth 2 (two) times a day.    clopidogreL (PLAVIX) 75 mg tablet Take 1 tablet (75 mg total) by mouth once daily.    folic acid (FOLVITE) 1 MG tablet Take 1 tablet (1 mg total) by mouth once daily.    multivitamin with minerals tablet Take 1 tablet by mouth once daily.    tamsulosin (FLOMAX) 0.4 mg Cap Take 1 capsule (0.4 mg total) by mouth every evening.    thiamine 100 MG tablet Take 1 tablet (100 mg total) by mouth once daily.     No current facility-administered medications for this visit.              Vitals:    07/24/20 1654   BP: 137/88   Pulse: 91   Resp: 16    Temp: 98.2 °F (36.8 °C)             Physical Exam   Constitutional: He is oriented to person, place, and time. He appears well-developed.   HENT:   Head: Normocephalic and atraumatic.   Mouth/Throat: No oropharyngeal exudate.   Eyes: No scleral icterus.   Cardiovascular: Regular rhythm.   No murmur heard.  Pulmonary/Chest: Effort normal. No respiratory distress. He has no wheezes. He has no rales.   Abdominal: Soft. He exhibits no distension.   Musculoskeletal:         General: No edema.   Lymphadenopathy:     He has no cervical adenopathy.   Neurological: He is alert and oriented to person, place, and time. No cranial nerve deficit.   Skin: Skin is warm.   Psychiatric: He has a normal mood and affect.             3/9/18 CT abdomen/pelvis with cont        CT chest abdomen and pelvis with contrast    Clinical history: Weight loss    Comparison: None    Technique:  Axial images of the chest, abdomen, and pelvis were obtained at 5 mm intervals following administration of 75 cc Omni 350 IV contrast as well as oral and rectal contrast.  Coronal and sagittal and delayed images were reviewed.    Findings:  The structures at the base of the neck are grossly unremarkable.  No significant mediastinal lymphadenopathy.  The heart is not enlarged.  There is atherosclerotic calcification in the distribution of the coronary arteries.  No significant pericardial effusion.    The airways are patent.    There is a vague focus of groundglass attenuation within the right upper lobe, measuring up to 1.6 cm.  No significant volume of pleural fluid.  No pneumothorax.  No large focal consolidation.    The thoracic aorta tapers normally with atherosclerotic calcification.    The liver is hypoattenuating and enlarged, may reflect steatosis, correlation with LFTs recommended.  There is a subcentimeter hypoattenuating lesion within the left hepatic lobe, too small for characterization.  There are 2 faint foci of irregular peripheral  enhancement involving the medial aspect and anterior aspect of the left hepatic lobe.  These foci normalize with hepatic parenchymal enhancement on delayed image, and may reflect perfusional anomaly versus flash filling hemangiomas.  Punctate hypoattenuating focus within the right hepatic lobe is too small for characterization.  The spleen, pancreas, gallbladder and adrenal glands are grossly unremarkable.  There is no biliary dilation.  There is thickening at the gastric fundus, nonspecific, correlation with direct visualization as warranted.  No high grade obstruction.  The portal vein, splenic vein, SMV, celiac axis and SMA all are grossly patent.  Scattered shotty periaortic and paracaval lymph nodes are noted.    The kidneys enhance symmetrically and excrete contrast appropriately without hydronephrosis or nephrolithiasis.  The bilateral ureters are grossly unremarkable along their course to the urinary bladder without calculi seen.  There is air within the urinary bladder, possibly from catheterization.  Please see below for complete description.  The prostate is prominent.    Rectal contrast has been administered.  The rectum and distal sigmoid colon is unremarkable without wall thickening.  There is circumferential wall thickening involving the proximal sigmoid colon, without significant contrast passage through the region of thickening.  Wall thickening in the region measures up to 1.1 cm.  There is focal masslike thickening of the sigmoid colon, that abuts the urinary bladder.  There is urinary bladder wall thickening and distortion of the urinary bladder by this mass.  There is a focus of air and stool insinuating between the region of focal colonic thickening and the urinary bladder, finding is concerning for bladder wall invasion by colonic malignancy, with fistulous formation to the urinary bladder.  This may account for air within the urinary bladder.  Abscess in the region felt less likely.  There is  surrounding inflammation, and several adjacent nonenlarged although numerous lymph nodes.  Diverticula are noted throughout the remainder of the colon, with moderate to large amount stool throughout the colon suggesting superimposed constipation.  Oral contrast is noted to the level of the transverse colon.  The terminal ileum is grossly unremarkable.  No pericecal inflammation.  The small bowel is grossly unremarkable.  Scattered shotty periaortic and paracaval lymph nodes are noted.  There is a small amount reactive fluid in the left hemipelvis.    There is a mixed s sclerotic focus within the posterior aspect of the right iliac bone, the appearance is not specific for metastatic disease, however metastatic focus is not excluded in the setting of probable colonic malignancy.  Several additional sclerotic foci are noted throughout the spine, likely degenerative in nature.  Schmorl's node involves the superior endplate of L1.  There is Oddi wall cachexia.  There are bilateral fat containing inguinal hernias.  No significant axillary lymphadenopathy.   Impression        1.  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  Air within the urinary bladder is suspected to be on the bases of the same although correlation for recent catheterization and clinical symptomatology.  There is indistinctness about the colon region, with several although nonenlarged lymph nodes, and disorganized fluid.  Please see above for full description.    2.  Vague groundglass focus of attenuation within the right upper lobe of the right lung, nonspecific, could reflect nonspecific inflammation or infection, metastatic disease however cannot be excluded in this setting.  Followup is advised.    3.  Constipation.    4.  Several additional findings described above.      3/12/18 pathology        SPECIMEN  1) Rectal polyps x2, 8 mm polyp and 5 mm polyp,  hot snare polypectomies.  FINAL PATHOLOGIC DIAGNOSIS  Rectum, 8 mm and 5 mm polyps ×2, biopsy:  - Hyperplastic polyp, multiple fragments.     3/12/18 FINAL PATHOLOGIC DIAGNOSIS     Supplemental Diagnosis     MICROSATELLITE INSTABILITY, TUMOR:  RESULT SUMMARY:  -AMBERLY/MSI-L  RESULT:  MSI: AMBERLY/MSI-L (instability observed in 0 of 5 informative markers).  INTERPRETATION:  An AMBERLY/MSI-L phenotype suggests the presence of normal DNA mismatch repair function within the tumor .     1. Sigmoid colon) posterior bladder wall, mass, en bloc resection of sigmoid colon with attached posterior bladder wall:     Adenocarcinoma, measuring 4.7 cm in greatest dimension.  Tumor extends through the muscularis propria into the pericolorectal tissue with surrounding abcess formation with adherent bladder. There is no viable tumor seen invading bladder in multiple examined sections.  Proximal and distal colonic surgical margins are negative for malignancy.  Mesenteric surgical margin is negative for malignancy.  17 benign lymph nodes, negative for metastatic carcinoma (0/17).  See synoptic report in comment section for further details.     2. Right lateral bladder wall margin, biopsy:     Negative for malignancy.     3. Inferior bladder wall margin, biopsy:     Negative for malignancy.     4. Left lateral bladder wall margin, biopsy:     Negative for malignancy.     5. Superior bladder wall, biopsy:     Negative for malignancy.     6. Second right lateral bladder wall margin, biopsy:     Negative for malignancy.     7. Second left lateral bladder wall margin, biopsy:  Negative for malignancy.     8. Left internal iliac lymph nodes, regional resection:     3 benign lymph nodes, negative for metastatic carcinoma (see 0/3).     9. Proximal sigmoid colon, segmental resection:     Colon with congested vasculature and no evidence of malignancy.  Resection margins are viable and negative for dysplasia or malignancy.  8 benign lymph nodes, negative  "for metastatic carcinoma (0/8).     10. Proximal donut, biopsy:     Colonic mucosa with no significant histopathologic abnormality.  No evidence of malignancy.     11. Distal donut, biopsy:     Colonic mucosa with no significant histopathologic abnormality.  No evidence of malignancy     Comment: Synoptic report  Procedure: Sigmoid colon with attached bladder wall; en bloc resection  Tumor site: Sigmoid colon  Tumor size:  Greatest dimension: 4.7 cm  Macroscopic tumor perforation: Grossly lesion involves the full extent of the bowel wall and erodes into the bladder  Histologic type: Adenocarcinoma  Histologic grade: G2 moderately differentiated.:  Microscopic tumor extension: Tumor invades through the muscularis propria into pericolic rectal tissue .  Margins:  Proximal margin: Uninvolved by invasive carcinoma  Distal margin: Uninvolved by invasive carcinoma  Mesenteric margin: Uninvolved  Circumferential (radial) margin: Tumor extends into pericolic rectal adipose tissue and has gross tumor perforation with surrounding abscess formation     Distance of invasive carcinoma from closest margin: 9 cm from surgical margin "A"  Treatment effect: No known presurgical treatment  Lymphovascular invasion: Not identified     Perineural invasion: Not identified  Tumor deposits: Not identified  Regional lymph nodes:  Number of lymph nodes involved: 0  Number of lymph nodes examined: 28  Pathologic staging:  Primary tumor:  pT3: Tumor invades through the muscularis propria into pericolic tissues .  Regional lymph nodes:  pN0: No regional lymph node metastasis  Number examined: 28  Number involved: 0  Distant metastasis:  pMX: Cannot be assessed.                  4/27/18 CT     COMPARISON:  CT chest abdomen pelvis 03/09/2018, abdominal radiograph 03/14/2018    FINDINGS:  Thoracic soft tissues: No significant abnormality.    Aorta: Normal in caliber, course, and contour.  There are three branching vessels at the arch. Significant " calcific atherosclerosis involving the coronary arteries in a multivessel distribution, the origins of the great vessels,  and the aortic arch.    Heart: Normal in size with trace pericardial effusion likely of minimal clinical significance.    Myra/Mediastinum: No significant lymphadenopathy    Lungs: Well expanded bilaterally without consolidation, mass, or pleural effusion.  Stable focus of nonspecific ground-glass attenuation within the right upper lobe measuring approximately 1.6 cm (axial series 2, image 23); recommend continued follow-up with expected oncology surveillance.    Liver: Hepatomegaly, similar to prior.  Stable subcentimeter hypodensities within the left and right hepatic lobes, too small to fully characterize but possibly representing cysts.    Gallbladder: Mild wall thickening likely secondary to decompression.  No internal calcified gallstones or pericholecystic fluid.    Bile Ducts: No evidence of dilated ducts.    Pancreas: No mass or peripancreatic fat stranding.    Spleen: Unremarkable.    Adrenals: Unremarkable.    Kidneys: Normal in size and location without focal abnormality.  Normal physiologic ability to concentrate contrast appropriately.    Bladder/ureters/prostate: Postsurgical changes status post partial cystectomy with bladder repair, partial prostatectomy, and left ureteral reimplantation.  Bladder is not distended on today's examination and is thus poorly evaluated.  Bilateral double-J stents extending from the collecting systems to the bladder.  Of note, right double-J ureteral stent begins at the level of the ureteropelvic junction while the left ureteral stent begins within the renal pelvis.  No hydronephrosis or nephrolithiasis. No ureteral dilatation.    GI Tract/Mesentery: Postsurgical changes status post sigmoid colectomy and diverting loop ileostomy.  No evidence of recurrent disease within the surgical bed.    Peritoneal Space: No ascites. No free  air.    Retroperitoneum:  No significant adenopathy.    Abdominal wall:  Diverting loop ileostomy exiting the right upper abdominal quadrant.  Healing midline incision.  No significant abnormalities.    Vasculature: Significant calcific atherosclerosis involving the infrarenal abdominal aorta with extension into the proximal iliac arteries.  No aneurysm    Bones: Moderate degenerative changes of the visualized osseous structures without acute fracture or destructive osseous process.  Grade 1 retrolisthesis of L5 on S1.  Stable wedge compression fracture of L1.   Impression        In this patient with history of colon cancer, there have been extensive interval postsurgical changes involving the bladder and bowel as detailed above.  No evidence of recurrent or metastatic disease.  Of note, bladder is decompressed on today's examination and poorly evaluated.    Stable focus of nonspecific ground-glass attenuation within the right upper lobe; recommend continue follow-up with expected surveillance.    Stable hepatic hypodensities too small to fully characterize but likely representing hepatic cyst.    Additional, stable findings as above.    There are no measurable lesions per RECIST criteria.           7/20/20 CT C/A/P with cont     COMPARISON:  CTA chest 05/11/2020.  MRI abdomen 03/10/2020.     FINDINGS:  Base of Neck: No significant abnormality.     CHEST:     -Heart: Normal size. No pericardial effusion.  Atherosclerosis of the aortic arch and coronary arteries.     -Pulmonary vasculature: Pulmonary arteries distribute normally.  There are four pulmonary veins.     -Myra/Mediastinum: No pathologic nguyen enlargement.     -Trachea and Proximal airways: Patent.     -Lungs/Pleura: Small ground-glass opacity in the right upper lobe, similar to prior exam.  1.0 x 0.4 cm irregular opacity with soft tissue component in the posterior right lung (series 2, image 83).  Nodule in the posterior left lobe, similar prior exam.  No  pleural effusion or thickening.     -Esophagus: Normal course and caliber.     ABDOMEN:     - Liver: Normal in size and attenuation.  2 small hypodensities in the liver, too small to characterize, are unchanged from prior exam.  Subcentimeter hyperattenuating lesion in the anterior left hepatic lobe appears similar to prior exam.  Hyperenhancing lesion in the posterior left hepatic lobe described on previous CT exam is not visualized on this exam.     - Gallbladder: No calcified gallstones.  No wall thickening or pericholecystic fluid.     - Bile Ducts: No intra or extrahepatic biliary ductal dilatation.     - Stomach/Duodenum: Unremarkable.     - Spleen: Unremarkable.     - Pancreas: Unremarkable.     - Adrenals: Unremarkable.     - Kidneys/ureters/urinary bladder: Normal in size and location, concentrating excreting contrast appropriately on delayed imaging.  No hydronephrosis or stones.  The ureters appear normal in course and caliber without evidence of ureteral dilatation.  The urinary bladder is unremarkable.     - Retroperitoneum: No significant adenopathy.     PELVIS:     - Reproductive: Unremarkable.     - Other: No pelvic adenopathy, free fluid, or mass.     BOWEL/MESENTERY:     No evidence of bowel obstruction or inflammatory process.     VASCULATURE: Left-sided aortic arch with 3 branch vessels.  No aneurysm.  Significant atherosclerosis of the abdominal aorta..     BONES: No acute fracture or bony destructive process. Degenerative changes of the spine, especially at L5-S1.  Stable compression fracture of L1 vertebral body.     EXTRATHORACIC/EXTRAPERITONEAL SOFT TISSUES: Small, bowel containing umbilical hernia that is similar to prior exam.     Impression:     1.  No evidence of local recurrence or metastatic disease in patient with history of colon cancer.     2.  Irregular opacity with soft tissue component in the right posterior lung, attention on follow-up.  8 x 16 mm ground-glass opacity right apex  similar to CT chest May 11, 2020.     3.  Stable hypodensities in liver, too small to characterize but likely represent simple cysts.  Previously described hyperenhancing nodule in the posterior hepatic left lobe is not seen on this exam.  Hyperenhancing nodule in the anterior left lobe similar.     4.  Bowel containing umbilical hernia is unchanged from prior exam.     5.  Coronary artery calcifications advanced for age.     There are no measurable lesions per RECIST criteria.      Component      Latest Ref Rng & Units 7/20/2020   WBC      3.90 - 12.70 K/uL 8.69   RBC      4.60 - 6.20 M/uL 4.93   Hemoglobin      14.0 - 18.0 g/dL 11.0 (L)   Hematocrit      40.0 - 54.0 % 36.5 (L)   MCV      82 - 98 fL 74 (L)   MCH      27.0 - 31.0 pg 22.3 (L)   MCHC      32.0 - 36.0 g/dL 30.1 (L)   RDW      11.5 - 14.5 % 19.4 (H)   Platelets      150 - 350 K/uL 290   MPV      9.2 - 12.9 fL 10.6   Immature Granulocytes      0.0 - 0.5 % 0.2   Gran # (ANC)      1.8 - 7.7 K/uL 3.7   Immature Grans (Abs)      0.00 - 0.04 K/uL 0.02   Lymph #      1.0 - 4.8 K/uL 4.0   Mono #      0.3 - 1.0 K/uL 0.7   Eos #      0.0 - 0.5 K/uL 0.2   Baso #      0.00 - 0.20 K/uL 0.06   nRBC      0 /100 WBC 0   Gran%      38.0 - 73.0 % 42.0   Lymph%      18.0 - 48.0 % 46.3   Mono%      4.0 - 15.0 % 8.5   Eosinophil%      0.0 - 8.0 % 2.3   Basophil%      0.0 - 1.9 % 0.7   Differential Method       Automated   Sodium      136 - 145 mmol/L 140   Potassium      3.5 - 5.1 mmol/L 3.7   Chloride      95 - 110 mmol/L 105   CO2      23 - 29 mmol/L 24   Glucose      70 - 110 mg/dL 100   BUN, Bld      6 - 20 mg/dL 15   Creatinine      0.5 - 1.4 mg/dL 1.2   Calcium      8.7 - 10.5 mg/dL 9.7   PROTEIN TOTAL      6.0 - 8.4 g/dL 7.5   Albumin      3.5 - 5.2 g/dL 4.5   BILIRUBIN TOTAL      0.1 - 1.0 mg/dL 0.4   Alkaline Phosphatase      55 - 135 U/L 35 (L)   AST      10 - 40 U/L 19   ALT      10 - 44 U/L 13   Anion Gap      8 - 16 mmol/L 11   eGFR if African American      >60  mL/min/1.73 m:2 >60.0   eGFR if non African American      >60 mL/min/1.73 m:2 >60.0   CEA      0.0 - 5.0 ng/mL 2.2     Component      Latest Ref Rng & Units 7/1/2020   WBC      3.90 - 12.70 K/uL    RBC      4.60 - 6.20 M/uL    Hemoglobin      14.0 - 18.0 g/dL    Hematocrit      40.0 - 54.0 %    MCV      82 - 98 fL    MCH      27.0 - 31.0 pg    MCHC      32.0 - 36.0 g/dL    RDW      11.5 - 14.5 %    Platelets      150 - 350 K/uL    MPV      9.2 - 12.9 fL    Immature Granulocytes      0.0 - 0.5 %    Gran # (ANC)      1.8 - 7.7 K/uL    Immature Grans (Abs)      0.00 - 0.04 K/uL    Lymph #      1.0 - 4.8 K/uL    Mono #      0.3 - 1.0 K/uL    Eos #      0.0 - 0.5 K/uL    Baso #      0.00 - 0.20 K/uL    nRBC      0 /100 WBC    Gran%      38.0 - 73.0 %    Lymph%      18.0 - 48.0 %    Mono%      4.0 - 15.0 %    Eosinophil%      0.0 - 8.0 %    Basophil%      0.0 - 1.9 %    Differential Method          Sodium      136 - 145 mmol/L    Potassium      3.5 - 5.1 mmol/L    Chloride      95 - 110 mmol/L    CO2      23 - 29 mmol/L    Glucose      70 - 110 mg/dL    BUN, Bld      6 - 20 mg/dL    Creatinine      0.5 - 1.4 mg/dL    Calcium      8.7 - 10.5 mg/dL    PROTEIN TOTAL      6.0 - 8.4 g/dL    Albumin      3.5 - 5.2 g/dL    BILIRUBIN TOTAL      0.1 - 1.0 mg/dL    Alkaline Phosphatase      55 - 135 U/L    AST      10 - 40 U/L    ALT      10 - 44 U/L    Anion Gap      8 - 16 mmol/L    eGFR if African American      >60 mL/min/1.73 m:2    eGFR if non African American      >60 mL/min/1.73 m:2    HCV Log      <1.08 Log (10) IU/mL <1.08   HCV, Qualitative      Not detected IU/mL Not detected   HCV RNA Quant PCR LOG      <12 IU/mL <12   Hep B Viral DNA IU/ML      <10 IU/mL <10   Log HBV IU/mL      <1.00 Log (10) IU/mL <1.00   Hepatitis B Virus DNA      Not detected Not detected   CEA      0.0 - 5.0 ng/mL        ASSESSMENT:     1. Sigmoid colon adenocarcinoma, [id number]  2. Lung lesion on CT  3. Weight loss, unintentional  4. Chronic  hepatitis C, without coma  5. Hypertension, essential  6. Coronary artery disease  7. H/o depression  8. Anxiety  9. Anemia  10. Chronic smoker  11. Lung nodules        Plan:     1,2: He had AJCC stage II disease. There were no clear metastatic lesions on CT done in March 2018. However, there was a groundglass focus of attenuation within the right upper lobe of the right lung. It is unclear if this is atelactasis/pnemonia/metastatic lesion. He cannot have PET CT due to insurance. Repeat CT done on 4/27/18 again showed a focus of nonspecific ground-glass attenuation within the right upper lobe that was previously noted. No other lesions to suspect metastases. He is MSI stable/low. No clear high risk features other than macroscopic perforation. Margins were clear. He has no fecal matter in urine/air in his urine at this time. No clear, established role for adjuvant FOLFOX. . I discussed role for adjuvant 5FU, given weekly for 6 weeks each cycle and off for 2 weeks, for a total of 6 months.  He started 5FU on 5/7/18. However, he received only 3 infusions ( once weekly x 7 days). He was very agitated and inebriated when he presented for subsequent treatments and had to be held. He is now sober.   He completed adjuvant 5FU in Nov 2018. He was lost to follow up. He had repeat colonoscopy in 2-19 which showed normal appearing anastomotic site.  There was a small polyp in descending colon. It was resected. It was a tubular adenoma.   He had repeat CT on 7/20/20. There is no evidence of recurrence/ metastases.           4. He has untreated chronic hepatitis C genotype 1a.. He follows with hepatology. HCV reactivation/ fulminant hepatitis after chemotherapy has been rarely reported. Most recent (7/2020) HCV RNA negative. He also has positive hepatitis B antibody.     5: He will need treatment with betablocker if persistent. /88 mm Hg today     6. No symptoms at this time. He is on Aspirin     7,8 . He has previously  referred to psychologist. He requested Ativan for chronic anxiety and was prescribed today.    9.  Mild, microcytic. Hypochromic. Denies bleeding.      10. Assistance with smoking cessation was offered, including:  []  Medications  [x]  Counseling  []  Printed Information on Smoking Cessation  []  Referral to a Smoking Cessation Program    Patient was counseled regarding smoking for 3-10 minutes.      11. Stable. Needs repeat imaging in 6 months.

## 2020-08-27 ENCOUNTER — HOSPITAL ENCOUNTER (OUTPATIENT)
Facility: HOSPITAL | Age: 55
Discharge: HOME | End: 2020-08-27
Attending: EMERGENCY MEDICINE | Admitting: EMERGENCY MEDICINE
Payer: MEDICAID

## 2020-08-27 VITALS
HEIGHT: 71 IN | TEMPERATURE: 99 F | OXYGEN SATURATION: 97 % | DIASTOLIC BLOOD PRESSURE: 81 MMHG | SYSTOLIC BLOOD PRESSURE: 122 MMHG | RESPIRATION RATE: 20 BRPM | BODY MASS INDEX: 25.9 KG/M2 | HEART RATE: 81 BPM | WEIGHT: 185 LBS

## 2020-08-27 DIAGNOSIS — I20.0 UNSTABLE ANGINA: ICD-10-CM

## 2020-08-27 DIAGNOSIS — R07.9 CHEST PAIN: ICD-10-CM

## 2020-08-27 DIAGNOSIS — R07.9 CHEST PAIN, UNSPECIFIED TYPE: Primary | ICD-10-CM

## 2020-08-27 DIAGNOSIS — F15.10 METHAMPHETAMINE USE: ICD-10-CM

## 2020-08-27 LAB
ALBUMIN SERPL BCP-MCNC: 4.6 G/DL (ref 3.5–5.2)
ALBUMIN SERPL BCP-MCNC: 4.8 G/DL (ref 3.5–5.2)
ALP SERPL-CCNC: 55 U/L (ref 55–135)
ALP SERPL-CCNC: 59 U/L (ref 55–135)
ALT SERPL W/O P-5'-P-CCNC: 16 U/L (ref 10–44)
ALT SERPL W/O P-5'-P-CCNC: 17 U/L (ref 10–44)
AMPHET+METHAMPHET UR QL: NORMAL
ANION GAP SERPL CALC-SCNC: 15 MMOL/L (ref 8–16)
ANION GAP SERPL CALC-SCNC: 17 MMOL/L (ref 8–16)
AORTIC ROOT ANNULUS: 3.36 CM
ASCENDING AORTA: 3.21 CM
AST SERPL-CCNC: 27 U/L (ref 10–40)
AST SERPL-CCNC: 29 U/L (ref 10–40)
AV INDEX (PROSTH): 0.97
AV MEAN GRADIENT: 3 MMHG
AV PEAK GRADIENT: 4 MMHG
AV VALVE AREA: 3.8 CM2
AV VELOCITY RATIO: 0.95
BARBITURATES UR QL SCN>200 NG/ML: NEGATIVE
BASOPHILS # BLD AUTO: 0.04 K/UL (ref 0–0.2)
BASOPHILS # BLD AUTO: 0.05 K/UL (ref 0–0.2)
BASOPHILS NFR BLD: 0.5 % (ref 0–1.9)
BASOPHILS NFR BLD: 0.7 % (ref 0–1.9)
BENZODIAZ UR QL SCN>200 NG/ML: NORMAL
BILIRUB SERPL-MCNC: 0.6 MG/DL (ref 0.1–1)
BILIRUB SERPL-MCNC: 0.6 MG/DL (ref 0.1–1)
BNP SERPL-MCNC: 78 PG/ML (ref 0–99)
BSA FOR ECHO PROCEDURE: 2.05 M2
BUN SERPL-MCNC: 27 MG/DL (ref 6–20)
BUN SERPL-MCNC: 28 MG/DL (ref 6–20)
BZE UR QL SCN: NORMAL
CALCIUM SERPL-MCNC: 10.3 MG/DL (ref 8.7–10.5)
CALCIUM SERPL-MCNC: 11 MG/DL (ref 8.7–10.5)
CANNABINOIDS UR QL SCN: NORMAL
CHLORIDE SERPL-SCNC: 100 MMOL/L (ref 95–110)
CHLORIDE SERPL-SCNC: 99 MMOL/L (ref 95–110)
CO2 SERPL-SCNC: 20 MMOL/L (ref 23–29)
CO2 SERPL-SCNC: 23 MMOL/L (ref 23–29)
CREAT SERPL-MCNC: 1.4 MG/DL (ref 0.5–1.4)
CREAT SERPL-MCNC: 1.7 MG/DL (ref 0.5–1.4)
CREAT UR-MCNC: 226.3 MG/DL (ref 23–375)
CV ECHO LV RWT: 0.36 CM
DIFFERENTIAL METHOD: ABNORMAL
DIFFERENTIAL METHOD: ABNORMAL
DOP CALC AO PEAK VEL: 0.95 M/S
DOP CALC AO VTI: 16 CM
DOP CALC LVOT AREA: 3.9 CM2
DOP CALC LVOT DIAMETER: 2.24 CM
DOP CALC LVOT PEAK VEL: 0.9 M/S
DOP CALC LVOT STROKE VOLUME: 60.85 CM3
DOP CALCLVOT PEAK VEL VTI: 15.45 CM
E WAVE DECELERATION TIME: 167.27 MSEC
E/A RATIO: 0.59
E/E' RATIO: 7.64 M/S
ECHO LV POSTERIOR WALL: 0.9 CM (ref 0.6–1.1)
EOSINOPHIL # BLD AUTO: 0.2 K/UL (ref 0–0.5)
EOSINOPHIL # BLD AUTO: 0.3 K/UL (ref 0–0.5)
EOSINOPHIL NFR BLD: 2.6 % (ref 0–8)
EOSINOPHIL NFR BLD: 4 % (ref 0–8)
ERYTHROCYTE [DISTWIDTH] IN BLOOD BY AUTOMATED COUNT: 20.8 % (ref 11.5–14.5)
ERYTHROCYTE [DISTWIDTH] IN BLOOD BY AUTOMATED COUNT: 20.9 % (ref 11.5–14.5)
EST. GFR  (AFRICAN AMERICAN): 52 ML/MIN/1.73 M^2
EST. GFR  (AFRICAN AMERICAN): >60 ML/MIN/1.73 M^2
EST. GFR  (NON AFRICAN AMERICAN): 45 ML/MIN/1.73 M^2
EST. GFR  (NON AFRICAN AMERICAN): 57 ML/MIN/1.73 M^2
FRACTIONAL SHORTENING: 26 % (ref 28–44)
GLUCOSE SERPL-MCNC: 77 MG/DL (ref 70–110)
GLUCOSE SERPL-MCNC: 80 MG/DL (ref 70–110)
HCT VFR BLD AUTO: 41.2 % (ref 40–54)
HCT VFR BLD AUTO: 42.7 % (ref 40–54)
HGB BLD-MCNC: 13 G/DL (ref 14–18)
HGB BLD-MCNC: 13.8 G/DL (ref 14–18)
IMM GRANULOCYTES # BLD AUTO: 0.01 K/UL (ref 0–0.04)
IMM GRANULOCYTES # BLD AUTO: 0.02 K/UL (ref 0–0.04)
IMM GRANULOCYTES NFR BLD AUTO: 0.1 % (ref 0–0.5)
IMM GRANULOCYTES NFR BLD AUTO: 0.3 % (ref 0–0.5)
INTERVENTRICULAR SEPTUM: 1.2 CM (ref 0.6–1.1)
IVRT: 163.65 MSEC
LA MAJOR: 4.59 CM
LA MINOR: 4.73 CM
LA WIDTH: 3.52 CM
LACTATE SERPL-SCNC: 2.2 MMOL/L (ref 0.5–2.2)
LEFT ATRIUM SIZE: 3.52 CM
LEFT ATRIUM VOLUME INDEX: 24.1 ML/M2
LEFT ATRIUM VOLUME: 49.07 CM3
LEFT INTERNAL DIMENSION IN SYSTOLE: 3.75 CM (ref 2.1–4)
LEFT VENTRICLE DIASTOLIC VOLUME INDEX: 58.98 ML/M2
LEFT VENTRICLE DIASTOLIC VOLUME: 120.32 ML
LEFT VENTRICLE MASS INDEX: 97 G/M2
LEFT VENTRICLE SYSTOLIC VOLUME INDEX: 29.4 ML/M2
LEFT VENTRICLE SYSTOLIC VOLUME: 59.93 ML
LEFT VENTRICULAR INTERNAL DIMENSION IN DIASTOLE: 5.04 CM (ref 3.5–6)
LEFT VENTRICULAR MASS: 196.93 G
LV LATERAL E/E' RATIO: 6 M/S
LV SEPTAL E/E' RATIO: 10.5 M/S
LYMPHOCYTES # BLD AUTO: 3.3 K/UL (ref 1–4.8)
LYMPHOCYTES # BLD AUTO: 3.6 K/UL (ref 1–4.8)
LYMPHOCYTES NFR BLD: 45 % (ref 18–48)
LYMPHOCYTES NFR BLD: 45.1 % (ref 18–48)
MCH RBC QN AUTO: 23.6 PG (ref 27–31)
MCH RBC QN AUTO: 23.7 PG (ref 27–31)
MCHC RBC AUTO-ENTMCNC: 31.6 G/DL (ref 32–36)
MCHC RBC AUTO-ENTMCNC: 32.3 G/DL (ref 32–36)
MCV RBC AUTO: 73 FL (ref 82–98)
MCV RBC AUTO: 75 FL (ref 82–98)
METHADONE UR QL SCN>300 NG/ML: NEGATIVE
MONOCYTES # BLD AUTO: 0.7 K/UL (ref 0.3–1)
MONOCYTES # BLD AUTO: 0.8 K/UL (ref 0.3–1)
MONOCYTES NFR BLD: 10.1 % (ref 4–15)
MONOCYTES NFR BLD: 9.8 % (ref 4–15)
MV PEAK A VEL: 0.71 M/S
MV PEAK E VEL: 0.42 M/S
MV STENOSIS PRESSURE HALF TIME: 48.51 MS
MV VALVE AREA P 1/2 METHOD: 4.54 CM2
NEUTROPHILS # BLD AUTO: 2.9 K/UL (ref 1.8–7.7)
NEUTROPHILS # BLD AUTO: 3.3 K/UL (ref 1.8–7.7)
NEUTROPHILS NFR BLD: 40.1 % (ref 38–73)
NEUTROPHILS NFR BLD: 41.7 % (ref 38–73)
NRBC BLD-RTO: 0 /100 WBC
NRBC BLD-RTO: 0 /100 WBC
OPIATES UR QL SCN: NEGATIVE
PCP UR QL SCN>25 NG/ML: NEGATIVE
PISA TR MAX VEL: 1.67 M/S
PLATELET # BLD AUTO: 286 K/UL (ref 150–350)
PLATELET # BLD AUTO: 298 K/UL (ref 150–350)
PMV BLD AUTO: 10.1 FL (ref 9.2–12.9)
PMV BLD AUTO: 9.9 FL (ref 9.2–12.9)
POTASSIUM SERPL-SCNC: 3.2 MMOL/L (ref 3.5–5.1)
POTASSIUM SERPL-SCNC: 3.5 MMOL/L (ref 3.5–5.1)
PROT SERPL-MCNC: 7.7 G/DL (ref 6–8.4)
PROT SERPL-MCNC: 8.4 G/DL (ref 6–8.4)
PULM VEIN S/D RATIO: 3.47
PV PEAK D VEL: 0.17 M/S
PV PEAK S VEL: 0.59 M/S
PV PEAK VELOCITY: 0.68 CM/S
RA MAJOR: 3.77 CM
RA PRESSURE: 3 MMHG
RA WIDTH: 3.27 CM
RBC # BLD AUTO: 5.51 M/UL (ref 4.6–6.2)
RBC # BLD AUTO: 5.83 M/UL (ref 4.6–6.2)
RIGHT VENTRICULAR END-DIASTOLIC DIMENSION: 3.31 CM
SARS-COV-2 RDRP RESP QL NAA+PROBE: NEGATIVE
SODIUM SERPL-SCNC: 136 MMOL/L (ref 136–145)
SODIUM SERPL-SCNC: 138 MMOL/L (ref 136–145)
STJ: 2.77 CM
TDI LATERAL: 0.07 M/S
TDI SEPTAL: 0.04 M/S
TDI: 0.06 M/S
TOXICOLOGY INFORMATION: NORMAL
TR MAX PG: 11 MMHG
TROPONIN I SERPL DL<=0.01 NG/ML-MCNC: 0.02 NG/ML (ref 0–0.03)
TROPONIN I SERPL DL<=0.01 NG/ML-MCNC: 0.02 NG/ML (ref 0–0.03)
TROPONIN I SERPL DL<=0.01 NG/ML-MCNC: 0.03 NG/ML (ref 0–0.03)
TV REST PULMONARY ARTERY PRESSURE: 14 MMHG
WBC # BLD AUTO: 7.28 K/UL (ref 3.9–12.7)
WBC # BLD AUTO: 7.96 K/UL (ref 3.9–12.7)

## 2020-08-27 PROCEDURE — 96376 TX/PRO/DX INJ SAME DRUG ADON: CPT | Mod: 59

## 2020-08-27 PROCEDURE — 99214 OFFICE O/P EST MOD 30 MIN: CPT | Mod: ,,, | Performed by: INTERNAL MEDICINE

## 2020-08-27 PROCEDURE — G0378 HOSPITAL OBSERVATION PER HR: HCPCS

## 2020-08-27 PROCEDURE — 63600175 PHARM REV CODE 636 W HCPCS: Performed by: EMERGENCY MEDICINE

## 2020-08-27 PROCEDURE — 80053 COMPREHEN METABOLIC PANEL: CPT | Mod: 91

## 2020-08-27 PROCEDURE — 99285 EMERGENCY DEPT VISIT HI MDM: CPT | Mod: 25

## 2020-08-27 PROCEDURE — 85025 COMPLETE CBC W/AUTO DIFF WBC: CPT

## 2020-08-27 PROCEDURE — 99214 PR OFFICE/OUTPT VISIT, EST, LEVL IV, 30-39 MIN: ICD-10-PCS | Mod: ,,, | Performed by: INTERNAL MEDICINE

## 2020-08-27 PROCEDURE — 93010 ELECTROCARDIOGRAM REPORT: CPT | Mod: 76,,, | Performed by: INTERNAL MEDICINE

## 2020-08-27 PROCEDURE — 96374 THER/PROPH/DIAG INJ IV PUSH: CPT | Mod: 59

## 2020-08-27 PROCEDURE — 93005 ELECTROCARDIOGRAM TRACING: CPT

## 2020-08-27 PROCEDURE — 96361 HYDRATE IV INFUSION ADD-ON: CPT

## 2020-08-27 PROCEDURE — 84484 ASSAY OF TROPONIN QUANT: CPT | Mod: 91

## 2020-08-27 PROCEDURE — 96361 HYDRATE IV INFUSION ADD-ON: CPT | Performed by: EMERGENCY MEDICINE

## 2020-08-27 PROCEDURE — 25000242 PHARM REV CODE 250 ALT 637 W/ HCPCS: Performed by: EMERGENCY MEDICINE

## 2020-08-27 PROCEDURE — 93010 EKG 12-LEAD: ICD-10-PCS | Mod: ,,, | Performed by: INTERNAL MEDICINE

## 2020-08-27 PROCEDURE — U0002 COVID-19 LAB TEST NON-CDC: HCPCS

## 2020-08-27 PROCEDURE — 83605 ASSAY OF LACTIC ACID: CPT

## 2020-08-27 PROCEDURE — 25000003 PHARM REV CODE 250: Performed by: EMERGENCY MEDICINE

## 2020-08-27 PROCEDURE — 80307 DRUG TEST PRSMV CHEM ANLYZR: CPT

## 2020-08-27 PROCEDURE — 93010 ELECTROCARDIOGRAM REPORT: CPT | Mod: ,,, | Performed by: INTERNAL MEDICINE

## 2020-08-27 PROCEDURE — 83880 ASSAY OF NATRIURETIC PEPTIDE: CPT

## 2020-08-27 RX ORDER — AMLODIPINE BESYLATE 5 MG/1
10 TABLET ORAL DAILY
Status: DISCONTINUED | OUTPATIENT
Start: 2020-08-27 | End: 2020-08-27 | Stop reason: HOSPADM

## 2020-08-27 RX ORDER — ATORVASTATIN CALCIUM 10 MG/1
10 TABLET, FILM COATED ORAL DAILY
Status: DISCONTINUED | OUTPATIENT
Start: 2020-08-27 | End: 2020-08-27 | Stop reason: HOSPADM

## 2020-08-27 RX ORDER — DIAZEPAM 5 MG/1
5 TABLET ORAL
Status: COMPLETED | OUTPATIENT
Start: 2020-08-27 | End: 2020-08-27

## 2020-08-27 RX ORDER — ASPIRIN 81 MG/1
81 TABLET ORAL DAILY
Status: DISCONTINUED | OUTPATIENT
Start: 2020-08-27 | End: 2020-08-27 | Stop reason: HOSPADM

## 2020-08-27 RX ORDER — NITROGLYCERIN 0.4 MG/1
0.4 TABLET SUBLINGUAL EVERY 5 MIN PRN
Status: DISCONTINUED | OUTPATIENT
Start: 2020-08-27 | End: 2020-08-27 | Stop reason: HOSPADM

## 2020-08-27 RX ORDER — ASPIRIN 325 MG
325 TABLET ORAL
Status: COMPLETED | OUTPATIENT
Start: 2020-08-27 | End: 2020-08-27

## 2020-08-27 RX ORDER — SODIUM CHLORIDE 0.9 % (FLUSH) 0.9 %
10 SYRINGE (ML) INJECTION
Status: DISCONTINUED | OUTPATIENT
Start: 2020-08-27 | End: 2020-08-27 | Stop reason: HOSPADM

## 2020-08-27 RX ORDER — DIAZEPAM 10 MG/2ML
10 INJECTION INTRAMUSCULAR
Status: COMPLETED | OUTPATIENT
Start: 2020-08-27 | End: 2020-08-27

## 2020-08-27 RX ORDER — CLOPIDOGREL BISULFATE 75 MG/1
75 TABLET ORAL DAILY
Status: DISCONTINUED | OUTPATIENT
Start: 2020-08-27 | End: 2020-08-27 | Stop reason: HOSPADM

## 2020-08-27 RX ORDER — ACETAMINOPHEN 325 MG/1
650 TABLET ORAL EVERY 4 HOURS PRN
Status: DISCONTINUED | OUTPATIENT
Start: 2020-08-27 | End: 2020-08-27 | Stop reason: HOSPADM

## 2020-08-27 RX ADMIN — NITROGLYCERIN 0.4 MG: 0.4 TABLET, ORALLY DISINTEGRATING SUBLINGUAL at 05:08

## 2020-08-27 RX ADMIN — DIAZEPAM 5 MG: 5 TABLET ORAL at 05:08

## 2020-08-27 RX ADMIN — DIAZEPAM 10 MG: 10 INJECTION, SOLUTION INTRAMUSCULAR; INTRAVENOUS at 03:08

## 2020-08-27 RX ADMIN — DIAZEPAM 10 MG: 10 INJECTION, SOLUTION INTRAMUSCULAR; INTRAVENOUS at 02:08

## 2020-08-27 RX ADMIN — ASPIRIN 81 MG: 81 TABLET, COATED ORAL at 09:08

## 2020-08-27 RX ADMIN — CLOPIDOGREL 75 MG: 75 TABLET, FILM COATED ORAL at 09:08

## 2020-08-27 RX ADMIN — ATORVASTATIN CALCIUM 10 MG: 10 TABLET, FILM COATED ORAL at 09:08

## 2020-08-27 RX ADMIN — ASPIRIN 325 MG ORAL TABLET 325 MG: 325 PILL ORAL at 02:08

## 2020-08-27 RX ADMIN — SODIUM CHLORIDE 500 ML: 0.9 INJECTION, SOLUTION INTRAVENOUS at 06:08

## 2020-08-27 RX ADMIN — SODIUM CHLORIDE, SODIUM LACTATE, POTASSIUM CHLORIDE, AND CALCIUM CHLORIDE 1000 ML: .6; .31; .03; .02 INJECTION, SOLUTION INTRAVENOUS at 03:08

## 2020-08-27 NOTE — ASSESSMENT & PLAN NOTE
- chest pain, palpitations and SOB in setting of recent ETOH and drug use  - denies any exertional symptoms  - troponin .025-.023; EKG with anterior infarct unchanged from previous EKG; no acute STTWC  - feel chest pain likely related to recent ETOH/drug use and no concern for ACS currently  - will do echocardiogram this AM; if echo normal then suitable for discharge from the ER and follow up with established cardiologist at [facility]; if echo abnormal then will re evaluate- anticipate medical management if abnormality noted

## 2020-08-27 NOTE — ASSESSMENT & PLAN NOTE
- history of  MI with LAD and LCX ABEL at [facility] in 2019  - on DAPT and statin therapy and would continue  - no BB likely due to cocaine use; no ACEI/ARB and will hold given renal function currently  - echocardiogram as detailed previously  - follow up with established cardiologist at [facility]- Dr. Vitale

## 2020-08-27 NOTE — CONSULTS
[facility]  Cardiology  Consult Note    Patient Name: Chris Aguirre Jr.  MRN: [id number]  Admission Date: 8/27/2020  Hospital Length of Stay: 0 days  Code Status: Full Code   Attending Provider: No att. providers found   Consulting Provider: JUANI Bennett ANP  Primary Care Physician: Primary Doctor No  Principal Problem:<principal problem not specified>    Patient information was obtained from patient, past medical records and ER records.     Inpatient consult to [facility]  Consult performed by: JUANI Baker ANP  Consult ordered by: Guy J. Lefort, MD  Reason for consult: chest pain, SOB and palpitations         Subjective:     Chief Complaint:  Chest pain, SOB and palpitations      HPI:   56yo male with CAD s/p LAD & LCX ABEL 2019 at [facility] with complaints of palpitations and SOB. He reports SOB and palpitations that started suddenly prior to arrival in the ER. He then reports chest pain that started upon arrival to the ER. He has a history of MI with PCI last year and is followed by Dr. Vitale at [facility]. He endorses tobacco abuse as well as ETOH and drug use. He reports using meth earlier in the day and taking a shot of alcohol earlier. He reports similar symptoms with previous use. He reports resolution of symptoms currently. Of note, his tox screen is positive to ETOH, THC, amphetamines and cocaine. BP stable upon arrival in the 140s/90. Initial troponin .023 with repeat troponin .025. EKG with NSR normal axis anterior infarct similar to previous EKG. Cardiology consulted for evaluation of chest pain     Hospital Course: see HPI   Past Medical History:   Diagnosis Date    Anxiety about health 6/20/2018    Bladder cancer     Colon cancer     Coronary artery disease     Depression     History of hepatitis C, s/p successful Rx w/ cure (SVR12 - 6/2020) 03/09/2018    History of psychiatric care     History of psychiatric hospitalization     Hypertension     MI  (myocardial infarction) 2007    Neuropathy     NSTEMI (non-ST elevated myocardial infarction) 06/2019    Due to crystal meth? LVEF 6/2019: 50%    Psychiatric problem     Psychosis     Self-harming behavior     Suicide attempt     Urinary tract infection        Past Surgical History:   Procedure Laterality Date    ABDOMINAL SURGERY      History of perforated ulcer, unknown surgery    APPENDECTOMY      COLONOSCOPY N/A 2/26/2019    Procedure: COLONOSCOPY;  Surgeon: Mikal Nino MD;  [facility]  Service: Colon and Rectal;  Laterality: N/A;    EXCISION OF HYDROCELE Left 3/26/2020    Procedure: HYDROCELECTOMY;  Surgeon: Mikal Schultz MD;  [facility];  Service: Urology;  Laterality: Left;    ILEOSTOMY      ILEOSTOMY CLOSURE         Review of patient's allergies indicates:  No Known Allergies    No current facility-administered medications on file prior to encounter.      Current Outpatient Medications on File Prior to Encounter   Medication Sig    acetaminophen (TYLENOL) 325 MG tablet Take 1 tablet (325 mg total) by mouth every 6 (six) hours as needed for Pain.    amLODIPine (NORVASC) 10 MG tablet Take 1 tablet (10 mg total) by mouth once daily.    aspirin (ECOTRIN) 81 MG EC tablet Take 1 tablet (81 mg total) by mouth once daily.    atorvastatin (LIPITOR) 10 MG tablet Take 1 tablet (10 mg total) by mouth once daily.    carvediloL (COREG) 12.5 MG tablet Take 1 tablet (12.5 mg total) by mouth 2 (two) times a day.    clopidogreL (PLAVIX) 75 mg tablet Take 1 tablet (75 mg total) by mouth once daily.    folic acid (FOLVITE) 1 MG tablet Take 1 tablet (1 mg total) by mouth once daily.    LORazepam (ATIVAN) 0.5 MG tablet Take 1 tablet (0.5 mg total) by mouth 2 (two) times daily.    multivitamin with minerals tablet Take 1 tablet by mouth once daily.    tamsulosin (FLOMAX) 0.4 mg Cap Take 1 capsule (0.4 mg total) by mouth every evening.    thiamine 100 MG tablet Take 1 tablet  (100 mg total) by mouth once daily.     Family History     Problem Relation (Age of Onset)    ADD / ADHD Cousin    No Known Problems Mother, Father, Sister, Brother, Maternal Aunt, Paternal Aunt, Maternal Uncle, Paternal Uncle, Maternal Grandfather, Maternal Grandmother, Paternal Grandfather, Paternal Grandmother        Tobacco Use    Smoking status: Current Every Day Smoker     Packs/day: 2.00     Years: 40.00     Pack years: 80.00     Types: Cigarettes     Start date: 1980    Smokeless tobacco: Never Used    [facility]   Substance and Sexual Activity    Alcohol use: Not Currently     Comment: occasional    Drug use: Not Currently    Sexual activity: Yes     Partners: Female     Birth control/protection: None     Review of Systems   Constitution: Negative for chills, decreased appetite, diaphoresis, fever, malaise/fatigue, weight gain and weight loss.   Cardiovascular: Positive for chest pain and palpitations. Negative for claudication, cyanosis, dyspnea on exertion, irregular heartbeat, leg swelling, near-syncope, orthopnea, paroxysmal nocturnal dyspnea and syncope.   Respiratory: Negative for cough, shortness of breath, snoring, sputum production and wheezing.    Endocrine: Negative for cold intolerance, heat intolerance, polydipsia, polyphagia and polyuria.   Skin: Negative for color change, dry skin, itching, nail changes and poor wound healing.   Musculoskeletal: Negative for back pain, gout, joint pain and joint swelling.   Gastrointestinal: Negative for bloating, abdominal pain, constipation, diarrhea, hematemesis, hematochezia, melena, nausea and vomiting.   Genitourinary: Negative for dysuria, hematuria and nocturia.   Neurological: Negative for dizziness, headaches, light-headedness, numbness, paresthesias and weakness.   Psychiatric/Behavioral: Negative for altered mental status, depression and memory loss.      Objective:     Vital Signs (Most Recent):  Temp: 97.5 °F (36.4 °C) (08/27/20 0525)  Pulse: 80 (08/27/20 0802)  Resp: 19 (08/27/20 0802)  BP: (!) 105/59 (08/27/20 0802)  SpO2: (!) 93 % (08/27/20 0802) Vital Signs (24h Range):  Temp:  [97.5 °F (36.4 °C)-98.4 °F (36.9 °C)] 97.5 °F (36.4 °C)  Pulse:  [] 80  Resp:  [14-24] 19  SpO2:  [93 %-100 %] 93 %  BP: (105-150)/(58-98) 105/59     Weight: 83.9 kg (185 lb)  Body mass index is 25.8 kg/m².    SpO2: (!) 93 %  O2 Device (Oxygen Therapy): room air      Intake/Output Summary (Last 24 hours) at 8/27/2020 0853  Last data filed at 8/27/2020 0704  Gross per 24 hour   Intake 1500 ml   Output --   Net 1500 ml       Lines/Drains/Airways     Drain                 Ileostomy 03/13/18 1727 Loop  days          Peripheral Intravenous Line                 Peripheral IV - Single Lumen 08/27/20 0204 18 G Left Antecubital less than 1 day                Physical Exam   Constitutional: He is oriented to person, place, and time. He appears well-developed and well-nourished. No distress.   Neck: Normal range of motion. Neck supple. No JVD present.   Cardiovascular: Normal rate and regular rhythm. Exam reveals no gallop.   No murmur heard.  Pulmonary/Chest: Effort normal and breath sounds normal. No respiratory distress. He has no wheezes.   Abdominal: Soft. Bowel sounds are normal. He exhibits no distension. There is no abdominal tenderness.   Musculoskeletal:         General: No edema.   Neurological: He is alert and oriented to person, place, and time.   Skin: Skin is warm and dry.   Psychiatric: He has a normal mood and affect. His behavior is normal. Judgment and thought content normal.       Significant Labs:     Recent Labs   Lab 08/27/20  0510   WBC 7.28   RBC 5.51   HGB 13.0*   HCT 41.2      MCV 75*   MCH 23.6*   MCHC 31.6*     Recent Labs   Lab 08/27/20  0510      K 3.5      CO2 23   BUN 27*   CREATININE 1.4     Recent Labs   Lab 08/27/20  0510    TROPONINI 0.025       Significant Imaging: TTE 8/27/2020 with pending results     Assessment and Plan:     Coronary artery disease involving native coronary artery of native heart with unstable angina pectoris  - history of  MI with LAD and LCX ABEL at [facility] in 2019  - on DAPT and statin therapy and would continue  - no BB likely due to cocaine use; no ACEI/ARB and will hold given renal function currently  - echocardiogram as detailed previously  - follow up with established cardiologist at [facility]- Dr. Vitale     Chest pain  - chest pain, palpitations and SOB in setting of recent ETOH and drug use  - denies any exertional symptoms  - troponin .025-.023; EKG with anterior infarct unchanged from previous EKG; no acute STTWC  - feel chest pain likely related to recent ETOH/drug use and no concern for ACS currently  - will do echocardiogram this AM; if echo normal then suitable for discharge from the ER and follow up with established cardiologist at [facility]; if echo abnormal then will re evaluate- anticipate medical management if abnormality noted         VTE Risk Mitigation (From admission, onward)         Ordered     IP VTE HIGH RISK PATIENT  Once      08/27/20 0455     Place sequential compression device  Until discontinued      08/27/20 0455                Thank you for your consult. I will sign off. Please contact us if you have any additional questions.    JUANI Bennett, ANP  Cardiology   [facility]

## 2020-08-27 NOTE — PLAN OF CARE
" Chest Pain       Pt presents to the ED via  ems with c/o midsternal chest pain and SOB that began approx 2hrs pta. Pt admits to meth use 14hrs ago and states "I did something stupid. Last time I used meth gave me a heart attack"    Drug / Alcohol Assessment      Chris Aguirre Jr. is a 55 y.o. male who  has a past medical history of Anxiety about health (6/20/2018), Bladder cancer, Colon cancer, Coronary artery disease, Depression, History of hepatitis C, s/p successful Rx w/ cure (SVR12 - 6/2020) (03/09/2018), History of psychiatric care, History of psychiatric hospitalization, Hypertension, MI (myocardial infarction) (2007), Neuropathy, NSTEMI (non-ST elevated myocardial infarction) (06/2019), Psychiatric problem, Psychosis, Self-harming behavior, Suicide attempt, and Urinary tract infection.     He presents to the ED via EMS due to palpitations and SOB that started suddenly 2 hours prior to arrival. Patient reports he did meth about 14 hours ago. States he also worked all day today. He endorses to chest pain that started minutes ago after arriving to the ER. He has experienced these symptoms before in the past after using meth. Patient reports history of MI on 3 occasions with stents placed. He is followed by Cardiology, Dr. Vitale. He denies fever, cough, congestion, or N/V. He denies history of DM. Patient admits to taking "a small shot" of alcohol this morning he reports not having use alcohol in several days and only drinking when he can afford it.  He states he is currently feeling agitated.     The pt's currently in CDU.The Sw spoke to the pt via phone to complete the assessment b/c he just returned from an echo. The pt lives with his mother Francesca Aguirre [phone] in [place]. The pt's independent with his adl's and doesn't use dme. The pt's employed as a . The pt's mother or one of his friends will transport him home at d/c when he calls them. The Sw spoke to the pt in reference " to his meth use but he was in denial stating this is not his first time but he doesn't do it often. The pt's past records shows he drinks and uses meth. The pt declined requiring any resources for substance abuse. The Sw left her name and contact info and encouraged him to call if he has any questions or concerns. The Sw will continue to follow the pt throughout his transitions of care and will assist with any d/c needs.      08/27/20 1158   Discharge Assessment   Assessment Type Discharge Planning Assessment   Confirmed/corrected address and phone number on facesheet? Yes   Assessment information obtained from? Patient   Prior to hospitilization cognitive status: Alert/Oriented   Prior to hospitalization functional status: Independent   Current cognitive status: Alert/Oriented   Current Functional Status: Independent   Lives With parent(s)   Able to Return to Prior Arrangements yes   Is patient able to care for self after discharge? Yes   Who are your caregiver(s) and their phone number(s)? Francesca Aguirre(mother)[phone]   Patient's perception of discharge disposition home or selfcare   Readmission Within the Last 30 Days no previous admission in last 30 days   Patient currently being followed by outpatient case management? No   Patient currently receives any other outside agency services? No   Equipment Currently Used at Home none   Do you have any problems affording any of your prescribed medications? No  (the pt receives his meds affordably at [facility])   Is the patient taking medications as prescribed? yes   Does the patient have transportation home? Yes   Transportation Anticipated family or friend will provide   Does the patient receive services at the [facility]? No   Discharge Plan A Home with family   Discharge Plan B Home Health   DME Needed Upon Discharge  none   Patient/Family in Agreement with Plan yes

## 2020-08-27 NOTE — ED NOTES
"Introduced self to patient. Let him know I would be resuming care. Patient appears a little anxious without tremors. Breathing heavier. States midsternal chest pain is 6/10. Will let MD know.     Review of patient's allergies indicates:  No Known Allergies     Patient has verified the spelling of their name and  on armband.   APPEARANCE: Patient is alert, calm, oriented x 4. Has some restlessness. States "I can still feel the drugs in my system."  SKIN: Skin is normal for race, warm, and dry. Normal skin turgor and mucous membranes moist.  CARDIAC: Normal rate and rhythm, no murmur heard. +midsternal chest pain rated 6/10   RESPIRATORY:Normal rate and effort. Breath sounds clear bilaterally throughout chest. Respirations are equal and unlabored. +Patient breathing heavy. States he feels SOB but thinks its due to anxiety   GASTRO: Bowel sounds normal, abdomen is soft, no tenderness, and no abdominal distention.  MUSCLE: Full ROM. No bony tenderness or soft tissue tenderness. No obvious deformity.  PERIPHERAL VASCULAR: peripheral pulses present. Normal cap refill. No edema. Warm to touch.  NEURO: 5/5 strength major flexors/extensors bilaterally. Sensory intact to light touch bilaterally. Emilia coma scale: eyes open spontaneously-4, oriented & converses-5, obeys commands-6. No neurological abnormalities.   MENTAL STATUS: awake, alert and aware of environment. Patient very cooperative  : Voids without complication    "

## 2020-08-27 NOTE — DISCHARGE SUMMARY
"[facility]- ED Observation Unit  Discharge Summary        History of Present Illness:    He presents to the ED via EMS due to palpitations and SOB that started suddenly 2 hours prior to arrival. Patient reports he did meth about 14 hours ago. States he also worked all day today. He endorses to chest pain that started minutes ago after arriving to the ER. He has experienced these symptoms before in the past after using meth. Patient reports history of MI on 3 occasions with stents placed. He is followed by Cardiology, Dr. Vitale. He denies fever, cough, congestion, or N/V. He denies history of DM. Patient admits to taking "a small shot" of alcohol this morning he reports not having use alcohol in several days and only drinking when he can afford it.  He states he is currently feeling agitated.     Patient was placed in the ED Observation Unit for Unstable angina.   Observation Course:    No acute events  CP free  Cardiology consulted, appreciate recommendations      Consultants:    Cardiology    Final Diagnosis:  Chest pain  Polysubstance abuse    Discharge Condition: Good    Disposition: Home or Self Care     Time spent on the discharge of the patient including review of hospital course with the patient. reviewing discharge medications and arranging follow-up care 35 minutes.  Patient was seen and examined on the date of discharge and determined to be suitable for discharge.    Follow Up:  Future Appointments   Date Time Provider Department Center   9/1/2020  9:20 AM [facility]   9/18/2020 11:00 AM [facility]   9/18/2020 12:00 PM LAB, APPOINTMENT [facility] RHONDA [facility] LAB IM [facility] PCW   9/25/2020  9:20 AM Jennifer B. Scheuermann, PA [facility] HEPC Ovi Fishman         "

## 2020-08-27 NOTE — ED NOTES
Pt states his anxiety has greatly decreased at this time and he is now attempting to rest. Pt given blanket for comfort. Denies any further needs at this time. Call light within reach

## 2020-08-27 NOTE — SUBJECTIVE & OBJECTIVE
Past Medical History:   Diagnosis Date    Anxiety about health 6/20/2018    Bladder cancer     Colon cancer     Coronary artery disease     Depression     History of hepatitis C, s/p successful Rx w/ cure (SVR12 - 6/2020) 03/09/2018    History of psychiatric care     History of psychiatric hospitalization     Hypertension     MI (myocardial infarction) 2007    Neuropathy     NSTEMI (non-ST elevated myocardial infarction) 06/2019    Due to crystal meth? LVEF 6/2019: 50%    Psychiatric problem     Psychosis     Self-harming behavior     Suicide attempt     Urinary tract infection        Past Surgical History:   Procedure Laterality Date    ABDOMINAL SURGERY      History of perforated ulcer, unknown surgery    APPENDECTOMY      COLONOSCOPY N/A 2/26/2019    Procedure: COLONOSCOPY;  Surgeon: Mikal Nino MD;  Location: [facility] ENDO ([address] [place] [address]);  Service: Colon and Rectal;  Laterality: N/A;    EXCISION OF HYDROCELE Left 3/26/2020    Procedure: HYDROCELECTOMY;  Surgeon: Mikal Schultz MD;  Location: [facility] OR;  Service: Urology;  Laterality: Left;    ILEOSTOMY      ILEOSTOMY CLOSURE         Review of patient's allergies indicates:  No Known Allergies    No current facility-administered medications on file prior to encounter.      Current Outpatient Medications on File Prior to Encounter   Medication Sig    acetaminophen (TYLENOL) 325 MG tablet Take 1 tablet (325 mg total) by mouth every 6 (six) hours as needed for Pain.    amLODIPine (NORVASC) 10 MG tablet Take 1 tablet (10 mg total) by mouth once daily.    aspirin (ECOTRIN) 81 MG EC tablet Take 1 tablet (81 mg total) by mouth once daily.    atorvastatin (LIPITOR) 10 MG tablet Take 1 tablet (10 mg total) by mouth once daily.    carvediloL (COREG) 12.5 MG tablet Take 1 tablet (12.5 mg total) by mouth 2 (two) times a day.    clopidogreL (PLAVIX) 75 mg tablet Take 1 tablet (75 mg total) by mouth once daily.    folic acid (FOLVITE) 1 MG tablet Take 1  tablet (1 mg total) by mouth once daily.    LORazepam (ATIVAN) 0.5 MG tablet Take 1 tablet (0.5 mg total) by mouth 2 (two) times daily.    multivitamin with minerals tablet Take 1 tablet by mouth once daily.    tamsulosin (FLOMAX) 0.4 mg Cap Take 1 capsule (0.4 mg total) by mouth every evening.    thiamine 100 MG tablet Take 1 tablet (100 mg total) by mouth once daily.     Family History     Problem Relation (Age of Onset)    ADD / ADHD Cousin    No Known Problems Mother, Father, Sister, Brother, Maternal Aunt, Paternal Aunt, Maternal Uncle, Paternal Uncle, Maternal Grandfather, Maternal Grandmother, Paternal Grandfather, Paternal Grandmother        Tobacco Use    Smoking status: Current Every Day Smoker     Packs/day: 2.00     Years: 40.00     Pack years: 80.00     Types: Cigarettes     Start date: 1980    Smokeless tobacco: Never Used    [facility]   Substance and Sexual Activity    Alcohol use: Not Currently     Comment: occasional    Drug use: Not Currently    Sexual activity: Yes     Partners: Female     Birth control/protection: None     Review of Systems   Constitution: Negative for chills, decreased appetite, diaphoresis, fever, malaise/fatigue, weight gain and weight loss.   Cardiovascular: Positive for chest pain and palpitations. Negative for claudication, cyanosis, dyspnea on exertion, irregular heartbeat, leg swelling, near-syncope, orthopnea, paroxysmal nocturnal dyspnea and syncope.   Respiratory: Negative for cough, shortness of breath, snoring, sputum production and wheezing.    Endocrine: Negative for cold intolerance, heat intolerance, polydipsia, polyphagia and polyuria.   Skin: Negative for color change, dry skin, itching, nail changes and poor wound healing.   Musculoskeletal: Negative for back pain, gout, joint pain and joint swelling.   Gastrointestinal: Negative for bloating, abdominal pain,  constipation, diarrhea, hematemesis, hematochezia, melena, nausea and vomiting.   Genitourinary: Negative for dysuria, hematuria and nocturia.   Neurological: Negative for dizziness, headaches, light-headedness, numbness, paresthesias and weakness.   Psychiatric/Behavioral: Negative for altered mental status, depression and memory loss.     Objective:     Vital Signs (Most Recent):  Temp: 97.5 °F (36.4 °C) (08/27/20 0525)  Pulse: 80 (08/27/20 0802)  Resp: 19 (08/27/20 0802)  BP: (!) 105/59 (08/27/20 0802)  SpO2: (!) 93 % (08/27/20 0802) Vital Signs (24h Range):  Temp:  [97.5 °F (36.4 °C)-98.4 °F (36.9 °C)] 97.5 °F (36.4 °C)  Pulse:  [] 80  Resp:  [14-24] 19  SpO2:  [93 %-100 %] 93 %  BP: (105-150)/(58-98) 105/59     Weight: 83.9 kg (185 lb)  Body mass index is 25.8 kg/m².    SpO2: (!) 93 %  O2 Device (Oxygen Therapy): room air      Intake/Output Summary (Last 24 hours) at 8/27/2020 0853  Last data filed at 8/27/2020 0704  Gross per 24 hour   Intake 1500 ml   Output --   Net 1500 ml       Lines/Drains/Airways     Drain                 Ileostomy 03/13/18 1727 Loop  days          Peripheral Intravenous Line                 Peripheral IV - Single Lumen 08/27/20 0204 18 G Left Antecubital less than 1 day                Physical Exam   Constitutional: He is oriented to person, place, and time. He appears well-developed and well-nourished. No distress.   Neck: Normal range of motion. Neck supple. No JVD present.   Cardiovascular: Normal rate and regular rhythm. Exam reveals no gallop.   No murmur heard.  Pulmonary/Chest: Effort normal and breath sounds normal. No respiratory distress. He has no wheezes.   Abdominal: Soft. Bowel sounds are normal. He exhibits no distension. There is no abdominal tenderness.   Musculoskeletal:         General: No edema.   Neurological: He is alert and oriented to person, place, and time.   Skin: Skin is warm and dry.   Psychiatric: He has a normal mood and affect. His behavior  is normal. Judgment and thought content normal.       Significant Labs:     Recent Labs   Lab 08/27/20  0510   WBC 7.28   RBC 5.51   HGB 13.0*   HCT 41.2      MCV 75*   MCH 23.6*   MCHC 31.6*     Recent Labs   Lab 08/27/20  0510      K 3.5      CO2 23   BUN 27*   CREATININE 1.4     Recent Labs   Lab 08/27/20  0510   TROPONINI 0.025       Significant Imaging: TTE 8/27/2020 with pending results

## 2020-08-27 NOTE — ED NOTES
Yuni Watson and Dr Earl with [facility] cardiology at bedside.  EKG completed and given to Dr Earl for review.

## 2020-08-27 NOTE — ED NOTES
Pt's tremor has improved and is no longer notable when arms are raised but pt is reporting anxiety

## 2020-08-27 NOTE — ED NOTES
"Pt presents to ED via ems secondary to chest discomfort and SOB. States symptoms started approx 2hrs pta. Describes chest discomfort as tightness and sharp. Pain is located to midsternum and does not radiate. Pt states he used meth 14 hours ago but denies regular use. Denies using any other drugs and states he had very little "not more than a sip" of alcohol yesterday. Pt noted to have a tremor when raising hands.   "

## 2020-08-27 NOTE — ED NOTES
Patient stated chest pain was starting to subside and he was feeling more relaxed after PO valium. Urinal placed at bedside. Patient aware of order for urine sample.

## 2020-08-27 NOTE — ED NOTES
Pt from ECHO via W/C with transporter.  Pt AAOx3 and in NAD reconnected to monitor/will continue to monitor.

## 2020-08-27 NOTE — H&P
"[facility] ED Observation Unit  History and Physical      I assumed care of this patient from the Emergency Department at onset of observation time, 0500  on 08/27/2020.       History of Present Illness:    He presents to the ED via EMS due to palpitations and SOB that started suddenly 2 hours prior to arrival. Patient reports he did meth about 14 hours ago. States he also worked all day today. He endorses to chest pain that started minutes ago after arriving to the ER. He has experienced these symptoms before in the past after using meth. Patient reports history of MI on 3 occasions with stents placed. He is followed by Cardiology, Dr. Vitale. He denies fever, cough, congestion, or N/V. He denies history of DM. Patient admits to taking "a small shot" of alcohol this morning he reports not having use alcohol in several days and only drinking when he can afford it.  He states he is currently feeling agitated.    Patient was placed in the ED Observation Unit for Unstable angina.    PMHx   Past Medical History:   Diagnosis Date    Anxiety about health 6/20/2018    Bladder cancer     Colon cancer     Coronary artery disease     Depression     History of hepatitis C, s/p successful Rx w/ cure (SVR12 - 6/2020) 03/09/2018    History of psychiatric care     History of psychiatric hospitalization     Hypertension     MI (myocardial infarction) 2007    Neuropathy     NSTEMI (non-ST elevated myocardial infarction) 06/2019    Due to crystal meth? LVEF 6/2019: 50%    Psychiatric problem     Psychosis     Self-harming behavior     Suicide attempt     Urinary tract infection       Past Surgical History:   Procedure Laterality Date    ABDOMINAL SURGERY      History of perforated ulcer, unknown surgery    APPENDECTOMY      COLONOSCOPY N/A 2/26/2019    Procedure: COLONOSCOPY;  Surgeon: Mikal Nino MD;  Location: [facility] ([address] [place] [address]);  Service: Colon and Rectal;  Laterality: N/A;    EXCISION OF HYDROCELE Left " 3/26/2020    Procedure: HYDROCELECTOMY;  Surgeon: Mikal Schultz MD;  [facility];  Service: Urology;  Laterality: Left;    ILEOSTOMY      ILEOSTOMY CLOSURE          Family Hx   Family History   Problem Relation Age of Onset    No Known Problems Mother     No Known Problems Father     No Known Problems Sister     No Known Problems Brother     No Known Problems Maternal Aunt     No Known Problems Paternal Aunt     No Known Problems Maternal Uncle     No Known Problems Paternal Uncle     No Known Problems Maternal Grandfather     No Known Problems Maternal Grandmother     No Known Problems Paternal Grandfather     No Known Problems Paternal Grandmother     ADD / ADHD Cousin     Alcohol abuse Neg Hx     Anxiety disorder Neg Hx     Bipolar disorder Neg Hx     Dementia Neg Hx     Depression Neg Hx     Drug abuse Neg Hx     OCD Neg Hx     Paranoid behavior Neg Hx     Physical abuse Neg Hx     Schizophrenia Neg Hx     Seizures Neg Hx     Self injury Neg Hx     Sexual abuse Neg Hx     Suicide Neg Hx     Prostate cancer Neg Hx     Kidney disease Neg Hx         Social Hx   Social History     Socioeconomic History    Marital status: Single     Spouse name: Not on file    Number of children: Not on file    Years of education: Not on file    Highest education level: Not on file   Occupational History    Not on file   Social Needs    Financial resource strain: Not on file    Food insecurity     Worry: Not on file     Inability: Not on file    Transportation needs     Medical: Not on file     Non-medical: Not on file   Tobacco Use    Smoking status: Current Every Day Smoker     Packs/day: 2.00     Years: 40.00     Pack years: 80.00     Types: Cigarettes     Start date: 1980    Smokeless tobacco: Never Used    [facility]   Substance and Sexual Activity    Alcohol use: Not Currently     Comment:  occasional    Drug use: Not Currently    Sexual activity: Yes     Partners: Female     Birth control/protection: None   Lifestyle    Physical activity     Days per week: Not on file     Minutes per session: Not on file    Stress: Not on file   Relationships    Social connections     Talks on phone: Not on file     Gets together: Not on file     Attends [employer] service: Not on file     Active member of club or organization: Not on file     Attends meetings of clubs or organizations: Not on file     Relationship status: Not on file   Other Topics Concern    Patient feels they ought to cut down on drinking/drug use No    Patient annoyed by others criticizing their drinking/drug use No    Patient has felt bad or guilty about drinking/drug use No    Patient has had a drink/used drugs as an eye opener in the AM No   Social History Narrative    Not on file        Vital Signs   Vitals:    08/27/20 0234 08/27/20 0400 08/27/20 0401 08/27/20 0402   BP:    (!) 150/84   BP Location:       Patient Position:       Pulse: 95  88 86   Resp: 14  18    Temp:  98.4 °F (36.9 °C)     TempSrc:  Oral     SpO2: 98%  97% 95%   Weight:       Height:            Review of Systems  Review of Systems   Constitutional: Negative for fever.   Respiratory: Negative for shortness of breath.    Cardiovascular: Positive for chest pain.   Gastrointestinal: Negative for abdominal pain and vomiting.   Genitourinary: Negative for dysuria.   Musculoskeletal: Negative for myalgias.   Skin: Negative for rash.   Neurological: Negative for headaches.       Brief Physical Exam/Reassessment   Physical Exam    Nursing note and vitals reviewed.  Constitutional: He appears well-developed and well-nourished. He is not diaphoretic. No distress.   HENT:   Head: Normocephalic and atraumatic.   Eyes: Conjunctivae are normal.   Cardiovascular: Regular rhythm and intact distal pulses.   Neurological: He is alert.   Skin: Skin is warm and dry. Capillary refill  takes less than 2 seconds. No rash noted.   Psychiatric: He has a normal mood and affect.         Medications:   Scheduled Meds:   amLODIPine  10 mg Oral Daily    aspirin  81 mg Oral Daily    atorvastatin  10 mg Oral Daily    clopidogreL  75 mg Oral Daily     Continuous Infusions:  PRN Meds:.acetaminophen, nitroGLYCERIN, sodium chloride 0.9%      Assessment/Plan:    1. Unstable Angina   - amphetamine likely precipitant   -chest pain resolved   -trend troponin   - cardiology consult in a.m..  Appreciate recommendations.    2.  Acute kidney injury   -given IV fluids   -recheck renal function with a.m. labs      Disposition pending Cardiology recommendations and improvement of renal function.    Case was discussed with the ED provider, Dr. Lefort

## 2020-08-27 NOTE — ED NOTES
Pt is AAOx3 in NAD, VSS, IV removed with catheter intact. No complaints of pain or SOB at this time. Discharge instructions printed and given to pt with explanation/pt verbalized understanding.

## 2020-08-27 NOTE — ED PROVIDER NOTES
"Encounter Date: 8/27/2020    COVID Statement  The [place] [employer] and Zackary Sotelo, Governor of the [facility], have declared a State of Public Health Emergency due to the spread of a novel coronavirus and disease (COVID-19).  There is no currently accepted treatment except conservative measures and respiratory support if appropriate.  This has lead to significant resource capacity and potential delays in care.      SCRIBE #1 NOTE: I, Jorge L Steiner, am scribing for, and in the presence of, Reinier Garnica MD.       History     Chief Complaint   Patient presents with    Chest Pain     Pt presents to the ED via  ems with c/o midsternal chest pain and SOB that began approx 2hrs pta. Pt admits to meth use 14hrs ago and states "I did something stupid. Last time I used meth gave me a heart attack"    Drug / Alcohol Assessment     Chris Aguirre Jr. is a 55 y.o. male who  has a past medical history of Anxiety about health (6/20/2018), Bladder cancer, Colon cancer, Coronary artery disease, Depression, History of hepatitis C, s/p successful Rx w/ cure (SVR12 - 6/2020) (03/09/2018), History of psychiatric care, History of psychiatric hospitalization, Hypertension, MI (myocardial infarction) (2007), Neuropathy, NSTEMI (non-ST elevated myocardial infarction) (06/2019), Psychiatric problem, Psychosis, Self-harming behavior, Suicide attempt, and Urinary tract infection.    He presents to the ED via EMS due to palpitations and SOB that started suddenly 2 hours prior to arrival. Patient reports he did meth about 14 hours ago. States he also worked all day today. He endorses to chest pain that started minutes ago after arriving to the ER. He has experienced these symptoms before in the past after using meth. Patient reports history of MI on 3 occasions with stents placed. He is followed by Cardiology, Dr. Vitale. He denies fever, cough, congestion, or N/V. He denies history of DM. Patient " "admits to taking "a small shot" of alcohol this morning he reports not having use alcohol in several days and only drinking when he can afford it.  He states he is currently feeling agitated.    The history is provided by the patient.     Review of patient's allergies indicates:  No Known Allergies  Past Medical History:   Diagnosis Date    Anxiety about health 6/20/2018    Bladder cancer     Colon cancer     Coronary artery disease     Depression     History of hepatitis C, s/p successful Rx w/ cure (SVR12 - 6/2020) 03/09/2018    History of psychiatric care     History of psychiatric hospitalization     Hypertension     MI (myocardial infarction) 2007    Neuropathy     NSTEMI (non-ST elevated myocardial infarction) 06/2019    Due to crystal meth? LVEF 6/2019: 50%    Psychiatric problem     Psychosis     Self-harming behavior     Suicide attempt     Urinary tract infection      Past Surgical History:   Procedure Laterality Date    ABDOMINAL SURGERY      History of perforated ulcer, unknown surgery    APPENDECTOMY      COLONOSCOPY N/A 2/26/2019    Procedure: COLONOSCOPY;  Surgeon: Mikal Nino MD;  Location: [address]);  Service: Colon and Rectal;  Laterality: N/A;    EXCISION OF HYDROCELE Left 3/26/2020    Procedure: HYDROCELECTOMY;  Surgeon: Mikal Schultz MD;  Location: [facility];  Service: Urology;  Laterality: Left;    ILEOSTOMY      ILEOSTOMY CLOSURE       Family History   Problem Relation Age of Onset    No Known Problems Mother     No Known Problems Father     No Known Problems Sister     No Known Problems Brother     No Known Problems Maternal Aunt     No Known Problems Paternal Aunt     No Known Problems Maternal Uncle     No Known Problems Paternal Uncle     No Known Problems Maternal Grandfather     No Known Problems Maternal Grandmother     No Known Problems Paternal Grandfather     No Known Problems Paternal Grandmother     ADD / ADHD Cousin     Alcohol " abuse Neg Hx     Anxiety disorder Neg Hx     Bipolar disorder Neg Hx     Dementia Neg Hx     Depression Neg Hx     Drug abuse Neg Hx     OCD Neg Hx     Paranoid behavior Neg Hx     Physical abuse Neg Hx     Schizophrenia Neg Hx     Seizures Neg Hx     Self injury Neg Hx     Sexual abuse Neg Hx     Suicide Neg Hx     Prostate cancer Neg Hx     Kidney disease Neg Hx      Social History     Tobacco Use    Smoking status: Current Every Day Smoker     Packs/day: 2.00     Years: 40.00     Pack years: 80.00     Types: Cigarettes     Start date: 1980    Smokeless tobacco: Never Used    [facility]   Substance Use Topics    Alcohol use: Not Currently     Comment: occasional    Drug use: Not Currently     Review of Systems   Constitutional: Negative for chills and fever.   HENT: Negative for rhinorrhea, sore throat and trouble swallowing.    Eyes: Negative for visual disturbance.   Respiratory: Positive for shortness of breath. Negative for cough.    Cardiovascular: Positive for chest pain and palpitations.   Gastrointestinal: Negative for abdominal pain, diarrhea and vomiting.   Genitourinary: Negative for dysuria and hematuria.   Musculoskeletal: Negative for back pain.   Skin: Negative for rash.   Neurological: Negative for numbness and headaches.   Hematological: Negative for adenopathy.   All other systems reviewed and are negative.      Physical Exam     Initial Vitals [08/27/20 0202]   BP Pulse Resp Temp SpO2   (!) 138/98 107 20 98.4 °F (36.9 °C) 96 %      MAP       --         Physical Exam    Nursing note and vitals reviewed.  Constitutional: He appears well-developed and well-nourished.   HENT:   Head: Normocephalic and atraumatic.   Eyes: EOM are normal. Pupils are equal, round, and reactive to light.   Neck: Normal range of motion. Neck supple.   Cardiovascular: Normal heart sounds and intact distal pulses.  Tachycardia present.    Pulmonary/Chest: Breath sounds normal. No respiratory distress. He has no wheezes.   Abdominal: Soft. He exhibits no distension. There is no abdominal tenderness.   Musculoskeletal: Normal range of motion. No edema.   Neurological: He is alert and oriented to person, place, and time.   Skin: Skin is warm and dry.   Psychiatric:   Appears anxious         ED Course   Procedures  Labs Reviewed   CBC W/ AUTO DIFFERENTIAL - Abnormal; Notable for the following components:       Result Value    Hemoglobin 13.8 (*)     Mean Corpuscular Volume 73 (*)     Mean Corpuscular Hemoglobin 23.7 (*)     RDW 20.9 (*)     All other components within normal limits   COMPREHENSIVE METABOLIC PANEL - Abnormal; Notable for the following components:    Potassium 3.2 (*)     CO2 20 (*)     BUN, Bld 28 (*)     Creatinine 1.7 (*)     Calcium 11.0 (*)     Anion Gap 17 (*)     eGFR if  52 (*)     eGFR if non  45 (*)     All other components within normal limits   TROPONIN I   B-TYPE NATRIURETIC PEPTIDE   LACTIC ACID, PLASMA   TROPONIN I   CBC W/ AUTO DIFFERENTIAL   COMPREHENSIVE METABOLIC PANEL   SARS-COV-2 RNA AMPLIFICATION, QUAL          Imaging Results    None          Medical Decision Making:   History:   Old Medical Records: I decided to obtain old medical records.  Old Records Summarized: records from previous admission(s).       <> Summary of Records: Patient underwent angioplasty with stent placement on 08/16/19.  Differential Diagnosis:   Differential Diagnosis includes, but is not limited to:  ACS/MI, PE, aortic dissection, pneumothorax, cardiac tamponade, pericarditis/myocarditis, pneumonia, infection/abscess, lung mass, trauma/fracture, costochondritis/pleurisy, MSK pain/contusion, GERD, biliary disease, pancreatitis, anemia    Clinical Tests:   Lab Tests: Reviewed  Radiological Study: Reviewed  Medical Tests: Reviewed                   ED Course as of Aug 27 0418   Thu Aug 27,  2020 0200 EKG:  Rate 108.  Sinus tachycardia.  No STEMI.  No ectopy.    [RN]   0417 Initial troponin is negative.  Patient's labs remarkable for acute kidney injury.  He is given IV fluids.  Given patient's history of coronary artery disease and other comorbidities I feel he would benefit from further observation including troponin testing and Cardiology consult.  He will be admitted to the ED observation unit.    [RN]      ED Course User Index  [RN] Reinier Garnica Jr., MD                Clinical Impression:       ICD-10-CM ICD-9-CM   1. Chest pain, unspecified type  R07.9 786.50   2. Chest pain  R07.9 786.50   3. Methamphetamine use  F15.10 305.70   4. Unstable angina  I20.0 411.1           Disposition:   Disposition: Placed in Observation  Condition: Stable            I, Reinier Garnica,  personally performed the services described in this documentation. All medical record entries made by the scribe were at my direction and in my presence.  I have reviewed the chart and agree that the record reflects my personal performance and is accurate and complete. Reinier Garnica M.D. 4:18 AM08/27/2020        Portions of this note were dictated using voice recognition software and may contain dictation related errors in spelling/grammar/syntax not found on text review          Reinier Garnica Jr., MD  08/27/20 0418       Reinier Garnica Jr., MD  08/27/20 0419

## 2020-08-27 NOTE — HPI
56yo male with CAD s/p LAD & LCX ABEL 2019 at [facility] with complaints of palpitations and SOB. He reports SOB and palpitations that started suddenly prior to arrival in the ER. He then reports chest pain that started upon arrival to the ER. He has a history of MI with PCI last year and is followed by Dr. Vitale at [facility]. He endorses tobacco abuse as well as ETOH and drug use. He reports using meth earlier in the day and taking a shot of alcohol earlier. He reports similar symptoms with previous use. He reports resolution of symptoms currently. Of note, his tox screen is positive to ETOH, THC, amphetamines and cocaine. BP stable upon arrival in the 140s/90. Initial troponin .023 with repeat troponin .025. EKG with NSR normal axis anterior infarct similar to previous EKG. Cardiology consulted for evaluation of chest pain

## 2020-09-22 ENCOUNTER — HOSPITAL ENCOUNTER (OUTPATIENT)
Dept: RADIOLOGY | Facility: HOSPITAL | Age: 55
Discharge: HOME | End: 2020-09-22
Attending: PHYSICIAN ASSISTANT
Payer: MEDICAID

## 2020-09-22 DIAGNOSIS — K76.9 LIVER DISEASE, UNSPECIFIED: ICD-10-CM

## 2020-09-22 PROCEDURE — 74183 MRI ABDOMEN W WO CONTRAST: ICD-10-PCS | Mod: 26,,, | Performed by: RADIOLOGY

## 2020-09-22 PROCEDURE — 74183 MRI ABD W/O CNTR FLWD CNTR: CPT | Mod: TC

## 2020-09-22 PROCEDURE — A9585 GADOBUTROL INJECTION: HCPCS | Performed by: PHYSICIAN ASSISTANT

## 2020-09-22 PROCEDURE — 74183 MRI ABD W/O CNTR FLWD CNTR: CPT | Mod: 26,,, | Performed by: RADIOLOGY

## 2020-09-22 PROCEDURE — 25500020 PHARM REV CODE 255: Performed by: PHYSICIAN ASSISTANT

## 2020-09-22 RX ORDER — GADOBUTROL 604.72 MG/ML
10 INJECTION INTRAVENOUS
Status: COMPLETED | OUTPATIENT
Start: 2020-09-22 | End: 2020-09-22

## 2020-09-22 RX ADMIN — GADOBUTROL 10 ML: 604.72 INJECTION INTRAVENOUS at 04:09

## 2020-09-25 ENCOUNTER — TELEPHONE (OUTPATIENT)
Dept: HEPATOLOGY | Facility: CLINIC | Age: 55
End: 2020-09-25

## 2020-09-25 NOTE — TELEPHONE ENCOUNTER
----- Message from Azalea Kamara sent at 9/25/2020  7:48 AM CDT -----  Patient called to reschedule an appointment specifically with hepatology  And wishes to speak with a nurse regarding this matter.          can be reached at [phone]    Thanks  KB

## 2020-09-25 NOTE — TELEPHONE ENCOUNTER
Spoke with pt, appointment rescheduled as requested. Reminder notice mailed to pt.

## 2020-10-01 ENCOUNTER — TELEPHONE (OUTPATIENT)
Dept: HEPATOLOGY | Facility: CLINIC | Age: 55
End: 2020-10-01

## 2020-10-01 ENCOUNTER — OFFICE VISIT (OUTPATIENT)
Dept: HEPATOLOGY | Facility: CLINIC | Age: 55
End: 2020-10-01
Payer: MEDICAID

## 2020-10-01 VITALS
HEIGHT: 71 IN | SYSTOLIC BLOOD PRESSURE: 119 MMHG | HEART RATE: 64 BPM | DIASTOLIC BLOOD PRESSURE: 79 MMHG | WEIGHT: 179 LBS | RESPIRATION RATE: 16 BRPM | BODY MASS INDEX: 25.06 KG/M2

## 2020-10-01 DIAGNOSIS — Z86.19 HISTORY OF HEPATITIS C: Primary | ICD-10-CM

## 2020-10-01 DIAGNOSIS — K74.01 EARLY HEPATIC FIBROSIS: ICD-10-CM

## 2020-10-01 DIAGNOSIS — K76.9 LIVER LESION: ICD-10-CM

## 2020-10-01 PROCEDURE — 99999 PR PBB SHADOW E&M-EST. PATIENT-LVL IV: CPT | Mod: PBBFAC,,, | Performed by: PHYSICIAN ASSISTANT

## 2020-10-01 PROCEDURE — 99999 PR PBB SHADOW E&M-EST. PATIENT-LVL IV: ICD-10-PCS | Mod: PBBFAC,,, | Performed by: PHYSICIAN ASSISTANT

## 2020-10-01 PROCEDURE — 99213 OFFICE O/P EST LOW 20 MIN: CPT | Mod: S$PBB,,, | Performed by: PHYSICIAN ASSISTANT

## 2020-10-01 PROCEDURE — 99213 PR OFFICE/OUTPT VISIT, EST, LEVL III, 20-29 MIN: ICD-10-PCS | Mod: S$PBB,,, | Performed by: PHYSICIAN ASSISTANT

## 2020-10-01 PROCEDURE — 99214 OFFICE O/P EST MOD 30 MIN: CPT | Mod: PBBFAC | Performed by: PHYSICIAN ASSISTANT

## 2020-10-01 NOTE — PROGRESS NOTES
HEPATOLOGY CLINIC VISIT NOTE - HCV clinic    CHIEF COMPLAINT: HCV, liver lesions    HISTORY: This is a 55 y.o. White male here for f/u    Since last visit has completed HCV rx - now cured  Continued imaging surveillance for liver lesions looks okay  Continued HBV lab monitoring was unremarkable    Feels okay  Denies jaundice, dark urine, hematemesis, melena, slowed mentation, abdominal distention.     HCV history:  Completed 12 weeks epclusa w/ SVR12 7/2020 (cured)  - Treatment naive  - Genotype 1a    Liver staging:  FibroScan 9/17/18 - kPa 7.9, (F2 Fibrosis) ;  (No steatosis)  FibroScan 12/18/19 - kPa 6.6, F0-1  Labs and imaging reveal no evidence of advanced fibrosis or portal HTN    Abnormal liver imaging:  Abnormal liver imaging dating back to 2018 - liver lesions that have been indeterminate, hemangiomas, cysts    Current 9/2020 MRI: stable hemangiomas    One liver lesion from before no longer visualized    One is less stable    None appear concerning    AFP has remained normal     *Reviewed w/ Dr Interiano - repeat MRI due in 6 months (3/2021)    HBV history:  Prior resolved HBV (pos HBcAb and HBsAb, neg HBsAg)  Completed 5FU for colon CA 11/2018.   -- Per AASLD guidelines, remained on lamivudine prophylaxis x 6 months after chemo ended (through 5/2019). HBV monitoring x 12 months (through 5/2020) revealed neg HBV DNA - no additional monitoring needed                    Past Medical History:   Diagnosis Date    Anxiety about health 6/20/2018    Bladder cancer     Colon cancer     Coronary artery disease     Depression     History of hepatitis C, s/p successful Rx w/ cure (SVR12 - 6/2020) 03/09/2018    History of psychiatric care     History of psychiatric hospitalization     Hypertension     MI (myocardial infarction) 2007    Neuropathy     NSTEMI (non-ST elevated myocardial infarction) 06/2019    Due to crystal meth? LVEF 6/2019: 50%    Psychiatric problem     Psychosis      Self-harming behavior     Suicide attempt     Urinary tract infection        Past Surgical History:   Procedure Laterality Date    ABDOMINAL SURGERY      History of perforated ulcer, unknown surgery    APPENDECTOMY      COLONOSCOPY N/A 2/26/2019    Procedure: COLONOSCOPY;  Surgeon: Mikal Nino MD;  Location: [facility] (4TH FLR);  Service: Colon and Rectal;  Laterality: N/A;    EXCISION OF HYDROCELE Left 3/26/2020    Procedure: HYDROCELECTOMY;  Surgeon: Mikal Schultz MD;  Location: [facility];  Service: Urology;  Laterality: Left;    ILEOSTOMY      ILEOSTOMY CLOSURE         FAMILY HISTORY: Negative for liver disease    SOCIAL HISTORY:   Social History     Tobacco Use   Smoking Status Current Every Day Smoker    Packs/day: 2.00    Years: 40.00    Pack years: 80.00    Types: Cigarettes    Start date: 1980   Smokeless Tobacco Never Used   Tobacco Comment    [facility]     Alcohol - daily alcohol for many years in the past. Has decreased this notably  Drugs - IVDA and nasal 18-20 yrs old. Recently snorted meth (6/2019)      ROS:   No fever, chills, weight loss, fatigue  No chest pain, palpitations, dyspnea, cough  No abdominal pain, nausea, vomiting  No headaches  No lower extremity edema  No depression or anxiety      PHYSICAL EXAM:  Friendly White male, in no acute distress; alert and oriented to person, place and time  VITALS: reviewed  HEENT: Sclerae anicteric.   NECK: Supple  LUNGS: Normal respiratory effort.  CVS: RRR, no murmurs  ABDOMEN: Flat, soft, nondistended.  SKIN: Warm and dry. No jaundice, No obvious rashes. (+) tattoos  EXTREMITIES: No lower extremity edema  NEURO/PSYCH: Normal gate. Memory intact. Thought and speech pattern appropriate. Behavior normal. No depression or anxiety noted.      RECENT LABS:  Lab Results   Component Value Date    WBC 5.97 09/22/2020    HGB 12.8 (L) 09/22/2020     09/22/2020     Lab  Results   Component Value Date    INR 0.9 09/22/2020    INR 0.9 09/22/2020     Lab Results   Component Value Date    AST 17 09/22/2020    ALT 12 09/22/2020    BILITOT 0.4 09/22/2020    ALBUMIN 4.0 09/22/2020    ALKPHOS 48 (L) 09/22/2020    CREATININE 1.1 09/22/2020    BUN 16 09/22/2020     09/22/2020    K 3.7 09/22/2020    AFP 1.5 09/22/2020       RECENT IMAGING:  Results for orders placed during the hospital encounter of 09/22/20   MRI Abdomen W WO Contrast    Narrative EXAMINATION:  MRI ABDOMEN W WO CONTRAST    CLINICAL HISTORY:  Liver lesion, > 1cm, history of malignancy, US nondiagnostic;  Liver disease, unspecified    TECHNIQUE:  Multiplanar multisequence images of the abdomen before and after administration of 10 mL Gadavist intravenous contrast.    COMPARISON:  CT 07/20/2020, MRI abdomen 03/10/2028, MRI abdomen 12/12/2019    FINDINGS:  Inferior Thorax: Normal.    Liver: Persistent subcentimeter focus of arterial enhancement at the peripheral margin of hepatic segment 6 (series 11, image 40) which persists on venous and delayed phase imaging.  Lesion is less prominent on today's examination in comparison to prior.  Additional previously described arterial enhancement within the central aspect of the right hepatic lobe is not visualized on today's examination.  Several stable T2 hyperintense enhancing hepatic lesions with characteristics consistent with hemangiomas.  Several additional stable nonenhancing T2 hyperintense lesions consistent with cysts.  No new hepatic lesion identified.    Gallbladder: No gallstones.    Bile Ducts: No dilatation.    Pancreas: No mass. No peripancreatic fat stranding.    Spleen: Normal.    Adrenals: Normal.    Kidneys/Ureters: No mass or hydroureteronephrosis.  Subcentimeter T2 hyperintense left renal lesion likely representing a cyst.    GI Tract/Mesentery: No evidence of bowel obstruction or inflammation.    Peritoneal Space: No ascites or free air.    Retroperitoneum: No  pathologically enlarged nodes.    Abdominal wall: Small midline ventral abdominal wall hernia containing an portion of small bowel.    Vasculature: No aneurysm.    Bones: No significant abnormality.  Normal marrow signal.      Impression 1. Previously noted subcentimeter focus of enhancement at the peripheral margin of the right hepatic lobe less prominent on today's examination.  No concerning liver lesions identified.  2. Stable hepatic hemangiomas and cysts.    Electronically signed by resident: Atilio De Los Santos  Date:    09/23/2020  Time:    08:14    Electronically signed by: Donte Barba MD  Date:    09/23/2020  Time:    10:42         ASSESSMENT  55 y.o. White male with:  1. HISTORY OF CHRONIC HEPATITIS C, GENOTYPE 1a - treated / cured  -- s/p 12 weeks epclusa - SVR12 - 7/2020  -- FibroScan 9/2018- F2, FibroScan 12/2019 - F0-1  -- Lacking Immunity to HAV - s/p vaccine series     2. PRIOR RESOLVED HBV  -- risk of reactivation w/ chemo   -- s/p 5FU for colon cancer through 11/2018; completed lamivudine 5/2019; has now completed HBV monitoring     3. ABNORMAL LIVER LESIONS  -- some hemangiomas, some indeterminate. Requires one additional U/S 3/2021    4.  SIGMOID COLON CANCER  -- s/p surgery 3/2018, completed 5FU in 11/2018       PLAN:  1. U/S abdomen and AFP due 3/2021    If U/S stable, no additional surveillance needed

## 2020-10-01 NOTE — TELEPHONE ENCOUNTER
----- Message from Juice Interiano MD sent at 9/29/2020  1:47 PM CDT -----  US in 6 months only  ----- Message -----  From: Jennifer B. Scheuermann, PA  Sent: 9/28/2020   4:57 PM CDT  To: Juice Interiano MD    54 y/o WM w/ HCV - treated & cured.  F1-2    3/2018 CT abd w/ contrast:  - subcentimeter hypoattenuating lesion in left hepatic lobe, too small for characterization  - 2 faint foci of irregular peripheral enhancement that normalize with hepatic parenchymal enhancement on delayed image, ?perfusional anomaly versus flash filling hemangiomas  - Punctate hypoattenuating focus in the right hepatic lobe, too small for characterization     4/2018 CT abdomen w/ contrast  - Stable subcentimeter hypodensities in left and right hepatic lobes, too small to fully characterize. ?cysts     11/2018 CT abd w/ contrast  - Stable 1.1 cm arterial enhancing focus at the anterior margin of the left hepatic lobe  - stable enhancing focus at the posterior margin of hepatic segment III  - Subcentimeter hepatic hypodensities are similar in appearance and too small to characterize     12/2019 MRI:  -- Multiple indeterminate arterial hyperenhancing foci within the caudate, right and left hepatic lobes.    -- Multiple hemangiomas throughout the right and left hepatic lobes.    3/2020 MRI:  -- liver lesions unchanged    Current 9/2020 MRI is attached  -- stable hemangiomas  One liver lesion from before no longer visualized  One is less stable  None appear concerning     AFP has remained normal     Do these require continued monitoring? No advanced fibrosis.

## 2020-10-05 NOTE — TELEPHONE ENCOUNTER
HCV LAB REVIEW   Completed 12 weeks of Epclusa, 4/2020  Renee 1a, tx naive  F1-2      Pertinent labs:  7/1/20  CMP stable, LFT normal  HBV neg  HCV neg      These labs document SVR12 following successful HCV treatment with Epclusa    please tell patient:  1.) Lab test shows there is NO Hepatitis C in the blood. This means the Hepatitis C is cured!!  We do not expect the virus to return.  This does not give protection from Hepatitis C and patient could be infected again if ever exposed to the virus again.    2.) Cirrhosis is still present and patient needs monitoring of liver and liver cancer screening every 6 months for the rest of their life. This is due again in 9/2020      Please schedule   pls add these labs to 9/2020 lab appt - CBC, HCV, INR, HBV DNA  pls schedule visit  1 week after MRI           Referred by: Luis Antonio Jules PA-C; Medical Diagnosis (from order):    Diagnosis Information      Diagnosis    719.46 (ICD-9-CM) - M25.562 (ICD-10-CM) - Patellofemoral arthralgia of left knee    728.89 (ICD-9-CM) - M76.32 (ICD-10-CM) - Iliotibial band syndrome of left side                Physical Therapy -  Daily Treatment Note    Visit:  4     SUBJECTIVE                                                                                                             Patient reports still having some continued L hip soreness down to knee.  He took some meds and sprayed his L knee with icy hot during lunch period at work which helped.  He is no longer having L calf cramping with new stretch.  Did not do HEP yet.    Functional Change: As above    Pain / Symptoms:  Pain/symptom is: intermittent  Pain rating (out of 10): Current: 4   Function:   Limitations / Exacerbation Factors: pain, difficulty, Prolonged standing for 8 hour day, yardwork, stairs, donning shoes  Prior Level of Function: no limitation in involved extremity. pain free ADLs and IADLs,  Patient Goals: decreased pain and increased strength    Prior treatment: injections  Discharged from hospital, home health, or skilled nursing facility in last 30 days: no    Home Environment:   Patient lives with parent or legal guardian  Assistance available: as needed  Feels safe at home/work/school.  2 or more falls or an unexplained fall with injury in the last year:  No    OBJECTIVE                                                                                                                      Strength  (out of 5 unless noted, standard test position unless noted, lbs tested with hand held dynamometer)   Ankle:    - Plantar Flexion:    - Single Leg Heel Raises: • Left: 0 reps • Right: 0 reps      Palpation:   Comments / Details: Moderate restrictions L more than R ITB/lateral retinaculum; Mild L VMO atrophy    Muscle Flexibility:   Comments / Details:  Moderate  tightness B gastrocs, ITB, rectus,        Lower Extremity Functional Scale (LEFS): Score: 37  scored 0=extreme difficulty; 80=no difficulty  TREATMENT                                                                                                                  Therapeutic Exercise:  Nustep x 5 min L4 cues for form    Quad/VMO sets x 5 min  Sidestepping 2 min x 2 red  SL hip abd x 10  X 2  gastroc stretch strap  2 x 30 B  Hams stretch strap 2 x 30  TFL stretch 2 x 30  Rectus stretch 2 x 30   frequent/moderate specific cues required for form    Skilled input: verbal instruction/cues, as detailed above, tactile instruction/cues and posture correction    Writer verbally educated and received verbal consent for hand placement, positioning of patient, and techniques to be performed today from patient for clothing adjustments for techniques, therapist position for techniques, hand placement and palpation for techniques and modality application as described above and how they are pertinent to the patient's plan of care.    Home Exercise Program: Quad/VMO sets     ASSESSMENT                                                                                                             Patient is doing better with HEP as her required less cues this visit except he is still confused about the proper way to perform SL hip abd, ITB stretch and rectus stretch.   Some improvement in second set.     Pain/symptoms after session: 3    Patient Education:   Results of above outlined education: Verbalizes understanding, Demonstrates understanding and Needs reinforcement      GOALS                                                                                                                       Long Term Goals: To be met by end of plan of care:      Home Exercise Program: Independent with progressed and modified home exercise program (HEP)      Strength: Improve involved strength to  4+ and 5    Ambulation: Demonstrate ability to  negotiate level and unlevel surfaces at variable velocities, including change of direction without increased pain or instability to return to age appropriate and community activities at prior level of function     Patient Reported Outcome Measure: Improvement in function /disability/impairment as indicated by   46       Procedures and total treatment time documented Time Entry flowsheet.

## 2020-10-07 NOTE — TELEPHONE ENCOUNTER
pls schedule fibroscan to be done when he comes for visit this week  thanks   Detail Level: Zone Samples Given: CerAVe cream for facial moisturizer Discontinue Regimen: Triamcinolone 0.1% cream Initiate Treatment: Hydrocortisone / Iodoquinol ointment BID

## 2021-01-04 RX ORDER — ATORVASTATIN CALCIUM 10 MG/1
10 TABLET, FILM COATED ORAL DAILY
Qty: 30 TABLET | Refills: 2 | Status: CANCELLED | OUTPATIENT
Start: 2021-01-04 | End: 2022-01-04

## 2021-01-14 RX ORDER — AMLODIPINE BESYLATE 10 MG/1
10 TABLET ORAL DAILY
Qty: 90 TABLET | Refills: 3 | Status: CANCELLED | OUTPATIENT
Start: 2021-01-04

## 2021-02-28 NOTE — HPI
Chris Aguirre is a 54-year-old male with a past medical history of Anxiety, colon Cancer, Coronary Artery Disease, Depression, Hepatitis C, Hypertension, Myocardial infarction, Psychosis and Suicide attempt. Patient has a history of Methamphetamine abuse. He lives in [place], [place]. His PCP is Ash Hung NP.     He presented to [facility] ED 6/6/2019 via EMS with a chief complaint of dizziness, thirst, and a rash to both legs. The patient reports snorting crystal meth and feels he was poisoned. Upon ED presentation, Blood pressure (194/111), Heart rate (125), Hgb (13.3), Hct (39.5), K (3.3), Glucose (150), Troponin (4.393), Mg (1.4), BNP (498), CXR unremarkable. Patient given IV fluids and Benzos in ED, Workup revealed NSTEMI likely secondary to amphetamine use. Admitted for further evaluation and treatment.   

## 2021-04-16 ENCOUNTER — PATIENT MESSAGE (OUTPATIENT)
Dept: RESEARCH | Facility: HOSPITAL | Age: 56
End: 2021-04-16

## 2021-08-24 ENCOUNTER — TELEPHONE (OUTPATIENT)
Dept: HEPATOLOGY | Facility: CLINIC | Age: 56
End: 2021-08-24

## 2021-08-24 DIAGNOSIS — K76.9 LIVER LESION: Primary | ICD-10-CM

## 2021-08-25 ENCOUNTER — TELEPHONE (OUTPATIENT)
Dept: HEMATOLOGY/ONCOLOGY | Facility: CLINIC | Age: 56
End: 2021-08-25

## 2021-08-25 DIAGNOSIS — Z85.030: ICD-10-CM

## 2021-08-25 DIAGNOSIS — Z08: ICD-10-CM

## 2021-08-25 DIAGNOSIS — C18.9 MALIGNANT NEOPLASM OF COLON, UNSPECIFIED PART OF COLON: ICD-10-CM

## 2021-08-26 ENCOUNTER — HOSPITAL ENCOUNTER (OUTPATIENT)
Dept: RADIOLOGY | Facility: HOSPITAL | Age: 56
Discharge: HOME | End: 2021-08-26
Attending: PHYSICIAN ASSISTANT
Payer: MEDICAID

## 2021-08-26 DIAGNOSIS — K74.01 EARLY HEPATIC FIBROSIS: ICD-10-CM

## 2021-08-26 DIAGNOSIS — K76.9 LIVER LESION: ICD-10-CM

## 2021-08-26 PROCEDURE — 76705 ECHO EXAM OF ABDOMEN: CPT | Mod: TC

## 2021-08-26 PROCEDURE — 76705 ECHO EXAM OF ABDOMEN: CPT | Mod: 26,,, | Performed by: RADIOLOGY

## 2021-08-26 PROCEDURE — 76705 US ABDOMEN LIMITED: ICD-10-PCS | Mod: 26,,, | Performed by: RADIOLOGY

## 2021-08-27 ENCOUNTER — TELEPHONE (OUTPATIENT)
Dept: HEPATOLOGY | Facility: CLINIC | Age: 56
End: 2021-08-27

## 2021-09-14 ENCOUNTER — TELEPHONE (OUTPATIENT)
Dept: HEMATOLOGY/ONCOLOGY | Facility: CLINIC | Age: 56
End: 2021-09-14

## 2024-09-10 NOTE — TELEPHONE ENCOUNTER
Esophageal Manometry Scheduled patient for follow up with necessary orders per dr chapman's request

## (undated) DEVICE — KIT EVACUATOR FLAT DRAIN 100CC

## (undated) DEVICE — SOL NS 1000CC

## (undated) DEVICE — SUT MCRYL PLUS 4-0 PS2 27IN

## (undated) DEVICE — SUT V-LOC 180 ABD 2/0 GS-21

## (undated) DEVICE — SET CYSTO IRRIGATING

## (undated) DEVICE — SUT 1 36IN PDS II VIO MONO

## (undated) DEVICE — DRESSING ADH ISLAND 3.6 X 14

## (undated) DEVICE — Device

## (undated) DEVICE — DRAPE ABDOMINAL TIBURON 14X11

## (undated) DEVICE — SYR 30CC LUER LOCK

## (undated) DEVICE — CUTTER PROXIMATE BLUE 75MM

## (undated) DEVICE — SEE L#95700

## (undated) DEVICE — RELOAD PROXIMATE CUT BLUE 75MM

## (undated) DEVICE — LUBRICANT SURGILUBE 2 OZ

## (undated) DEVICE — SEE MEDLINE ITEM 146417

## (undated) DEVICE — SYS CLSR DERMABOND PRINEO 22CM

## (undated) DEVICE — SYR ONLY LUER LOCK 20CC

## (undated) DEVICE — SEE MEDLINE ITEM 152487

## (undated) DEVICE — WIRE GD LUB STD 3CM .038 150CM

## (undated) DEVICE — SEE MEDLINE ITEM 157181

## (undated) DEVICE — ELECTRODE REM PLYHSV RETURN 9

## (undated) DEVICE — SEE MEDLINE ITEM 146347

## (undated) DEVICE — COVER LIGHT HANDLE 80/CA

## (undated) DEVICE — CONNECTOR Y 3/8X3/8X3/8

## (undated) DEVICE — GOWN SMARTGOWN LVL4 X-LONG XL

## (undated) DEVICE — SUT CTD VICRYL 2-0 VIL BR

## (undated) DEVICE — SEE MEDLINE ITEM 157144

## (undated) DEVICE — SUT 0 18IN COATED VICRYL V

## (undated) DEVICE — SET IRR URLGY 2LINE UNIV SPIKE

## (undated) DEVICE — SUT 0 8-18 IN CTD VICRYL

## (undated) DEVICE — SUT 3-0 VICRYL SH CR/8 18

## (undated) DEVICE — CATH POLLACK OPEN-END FLEXI-TI

## (undated) DEVICE — SET DECANTER MEDICHOICE

## (undated) DEVICE — ADHESIVE DERMABOND ADVANCED

## (undated) DEVICE — HEMOSTAT SURGICEL 2X3IN

## (undated) DEVICE — TRAY CYSTO BASIN

## (undated) DEVICE — SEE MEDLINE ITEM 156902

## (undated) DEVICE — SUT 3/0 27IN PDS II VIO MO

## (undated) DEVICE — CLIP LIGACLIP XTRA TITANIUM

## (undated) DEVICE — SUT CTD VICRYL BR CR/SH VIL

## (undated) DEVICE — SUT 2-0 NYLON D/A

## (undated) DEVICE — DEVICE ENSEAL X1 LARGE JAW

## (undated) DEVICE — SUT CTD VICRYL VIL BR CR/SH

## (undated) DEVICE — SUT CTD VICRYL VIL BR SH 27

## (undated) DEVICE — SUT CTD VICRYL 0 VIL BR/CT

## (undated) DEVICE — SEE MEDLINE ITEM 152622

## (undated) DEVICE — STAPLER CIRCULAR 33MM

## (undated) DEVICE — PACK CYSTO

## (undated) DEVICE — SUT CTD VICRYL 0 UND BR CT

## (undated) DEVICE — NDL 22GA X1 1/2 REG BEVEL

## (undated) DEVICE — SUT 1 48IN PDS II VIO MONO

## (undated) DEVICE — NDL BOX COUNTER

## (undated) DEVICE — TRAY FOLEY 16FR INFECTION CONT

## (undated) DEVICE — SOL IRR WATER STRL 3000 ML

## (undated) DEVICE — SEE MEDLINE ITEM 157117

## (undated) DEVICE — CLIP MED TICALL

## (undated) DEVICE — TRAY MINOR GEN SURG

## (undated) DEVICE — LEGGINGS 48X31 INCH

## (undated) DEVICE — STAPLER DST GREEN 45X4.8MM